# Patient Record
Sex: MALE | Race: WHITE | Employment: OTHER | URBAN - METROPOLITAN AREA
[De-identification: names, ages, dates, MRNs, and addresses within clinical notes are randomized per-mention and may not be internally consistent; named-entity substitution may affect disease eponyms.]

---

## 2017-03-10 PROBLEM — R07.9 CHEST PAIN: Status: ACTIVE | Noted: 2017-03-10

## 2017-05-26 PROBLEM — I48.91 ATRIAL FIBRILLATION WITH RVR (HCC): Status: ACTIVE | Noted: 2017-05-26

## 2018-08-22 ENCOUNTER — TELEPHONE (OUTPATIENT)
Dept: VASCULAR SURGERY | Age: 68
End: 2018-08-22

## 2018-12-13 ENCOUNTER — HOSPITAL ENCOUNTER (EMERGENCY)
Age: 68
Discharge: HOME | End: 2018-12-13
Payer: MEDICARE

## 2018-12-13 ENCOUNTER — APPOINTMENT (OUTPATIENT)
Dept: ULTRASOUND IMAGING | Age: 68
End: 2018-12-13
Payer: MEDICARE

## 2018-12-13 VITALS
DIASTOLIC BLOOD PRESSURE: 86 MMHG | TEMPERATURE: 98 F | HEIGHT: 70 IN | SYSTOLIC BLOOD PRESSURE: 168 MMHG | RESPIRATION RATE: 16 BRPM | OXYGEN SATURATION: 91 % | HEART RATE: 64 BPM | WEIGHT: 172 LBS | BODY MASS INDEX: 24.62 KG/M2

## 2018-12-13 DIAGNOSIS — L03.113 RIGHT ARM CELLULITIS: Primary | ICD-10-CM

## 2018-12-13 PROCEDURE — 99283 EMERGENCY DEPT VISIT LOW MDM: CPT

## 2018-12-13 PROCEDURE — 93971 EXTREMITY STUDY: CPT

## 2018-12-13 PROCEDURE — 6370000000 HC RX 637 (ALT 250 FOR IP): Performed by: NURSE PRACTITIONER

## 2018-12-13 RX ORDER — DIPHENHYDRAMINE HCL 25 MG
25 TABLET ORAL ONCE
Status: COMPLETED | OUTPATIENT
Start: 2018-12-13 | End: 2018-12-13

## 2018-12-13 RX ORDER — DOXYCYCLINE HYCLATE 100 MG
100 TABLET ORAL 2 TIMES DAILY
Qty: 20 TABLET | Refills: 0 | Status: SHIPPED | OUTPATIENT
Start: 2018-12-13 | End: 2018-12-19 | Stop reason: ALTCHOICE

## 2018-12-13 RX ADMIN — DIPHENHYDRAMINE HCL 25 MG: 25 TABLET ORAL at 19:15

## 2018-12-14 NOTE — ED PROVIDER NOTES
Independent MLP    HPI:  12/13/18,   Time: 7:11 PM         Jose Luis Kimbrough Sr. is a 70 y.o. male presenting to the ED for right arm mild swelling, redness after having had itching with some pick marks noted. He has not seen his PCP for this condition. He still had a DVT. He is on anticoagulants. He does have some bruising of the area but no injuries or falls. He notices started over the last week or so. The complaint has been persistent, moderate in severity, and worsened by nothing. He says the fever, chills, nausea, vomiting, inability to use the extremity. ROS:   Pertinent positives and negatives are stated within HPI, all other systems reviewed and are negative.  --------------------------------------------- PAST HISTORY ---------------------------------------------  Past Medical History:  has a past medical history of Acute MI ([facility] Anxiety; Arthritis; Arthritis; CAD (coronary artery disease); Carotid artery stenosis; Carotid bruit; Carotid stenosis, right; CHF (congestive heart failure) ([facility]); COPD (chronic obstructive pulmonary disease) ([facility] [facility] Headache(784.0); Hyperlipidemia; Hypertension; PVD (peripheral vascular disease) ([facility] [address] [facility] PVD (peripheral vascular disease) with [facility]ication [facility] STEMI (ST elevation myocardial infarction) ([facility] Subclavian artery stenosis, left ([facility]); and Traumatic retroperitoneal hematoma. Past Surgical History:  has a past surgical history that includes Coronary angioplasty with stent (10/07/2012); ECHO Compl W Dop Color Flow (10/9/2012); Colonoscopy; Coronary angioplasty; Diagnostic Cardiac Cath Lab Procedure (1998); Diagnostic Cardiac Cath Lab Procedure (1999); Diagnostic Cardiac Cath Lab Procedure (2002); Coronary angioplasty with stent (4/24/13); ECHO Compl W Dop Color Flow (4/25/2013); and Coronary angioplasty (03/13/2017). Social History:  reports that he quit smoking about 5 years ago. He has a 37.50 pack-year smoking history.  He uses smokeless tobacco. He reports left arm as well but that is not erythematous. The patient does state that he has been working outside on some projects and may have gotten something on him. He doesn't have any of these sites anywhere else on his body. Skin: warm and dry without rash  Neurologic: GCS 15, no focal neurologic gross findings  Psych: Normal Affect      ------------------------------------------PROGRESS NOTES -------------------------------------------    MDM  The patient has a negative duplex right arm for DVT. We'll put him on some antibiotics and antihistamines and have him follow-up with his PCP. ED COURSE MEDICATIONS:                Medications   diphenhydrAMINE (BENADRYL) tablet 25 mg (25 mg Oral Given 12/13/18 1915)       RE-Evaluation(s):    Time: 2032   Patients symptoms are improving. Repeat physical examination has improved. COUNSELING:   I have spoken with the patient and discussed todays results, in addition to providing specific details for the plan of care and counseling regarding the diagnosis and prognosis.     --------------------------------------- IMPRESSION & DISPOSITION --------------------------------     IMPRESSION(s):  1. Right arm cellulitis          DISPOSITION:  Disposition: Discharge to home. Patient condition is good.                  Betty Barrera, BARBI - DANYA  12/13/18 2033

## 2018-12-19 ENCOUNTER — APPOINTMENT (OUTPATIENT)
Dept: GENERAL RADIOLOGY | Age: 68
End: 2018-12-19
Payer: MEDICARE

## 2018-12-19 ENCOUNTER — HOSPITAL ENCOUNTER (OUTPATIENT)
Age: 68
Setting detail: OBSERVATION
Discharge: LEFT BEFORE DISCHARGE | End: 2018-12-19
Attending: EMERGENCY MEDICINE | Admitting: INTERNAL MEDICINE
Payer: MEDICARE

## 2018-12-19 VITALS
HEIGHT: 71 IN | WEIGHT: 170 LBS | RESPIRATION RATE: 18 BRPM | TEMPERATURE: 97.9 F | HEART RATE: 50 BPM | OXYGEN SATURATION: 93 % | DIASTOLIC BLOOD PRESSURE: 72 MMHG | BODY MASS INDEX: 23.8 KG/M2 | SYSTOLIC BLOOD PRESSURE: 158 MMHG

## 2018-12-19 DIAGNOSIS — R07.9 CHEST PAIN, UNSPECIFIED TYPE: Primary | ICD-10-CM

## 2018-12-19 LAB
ALBUMIN SERPL-MCNC: 3.9 G/DL (ref 3.5–5.2)
ALP BLD-CCNC: 64 U/L (ref 40–129)
ALT SERPL-CCNC: 24 U/L (ref 0–40)
ANION GAP SERPL CALCULATED.3IONS-SCNC: 10 MMOL/L (ref 7–16)
APTT: <20 SEC (ref 24.5–35.1)
AST SERPL-CCNC: 32 U/L (ref 0–39)
BASOPHILS ABSOLUTE: 0.02 E9/L (ref 0–0.2)
BASOPHILS RELATIVE PERCENT: 0.1 % (ref 0–2)
BILIRUB SERPL-MCNC: 0.2 MG/DL (ref 0–1.2)
BUN BLDV-MCNC: 18 MG/DL (ref 8–23)
CALCIUM SERPL-MCNC: 9.2 MG/DL (ref 8.6–10.2)
CHLORIDE BLD-SCNC: 102 MMOL/L (ref 98–107)
CO2: 26 MMOL/L (ref 22–29)
CREAT SERPL-MCNC: 0.7 MG/DL (ref 0.7–1.2)
EOSINOPHILS ABSOLUTE: 0.14 E9/L (ref 0.05–0.5)
EOSINOPHILS RELATIVE PERCENT: 0.8 % (ref 0–6)
GFR AFRICAN AMERICAN: >60
GFR NON-AFRICAN AMERICAN: >60 ML/MIN/1.73
GLUCOSE BLD-MCNC: 102 MG/DL (ref 74–99)
HCT VFR BLD CALC: 44.7 % (ref 37–54)
HEMOGLOBIN: 14.3 G/DL (ref 12.5–16.5)
IMMATURE GRANULOCYTES #: 0.1 E9/L
IMMATURE GRANULOCYTES %: 0.6 % (ref 0–5)
INR BLD: 1
LYMPHOCYTES ABSOLUTE: 1.13 E9/L (ref 1.5–4)
LYMPHOCYTES RELATIVE PERCENT: 6.8 % (ref 20–42)
MCH RBC QN AUTO: 30.5 PG (ref 26–35)
MCHC RBC AUTO-ENTMCNC: 32 % (ref 32–34.5)
MCV RBC AUTO: 95.3 FL (ref 80–99.9)
MONOCYTES ABSOLUTE: 0.88 E9/L (ref 0.1–0.95)
MONOCYTES RELATIVE PERCENT: 5.3 % (ref 2–12)
NEUTROPHILS ABSOLUTE: 14.3 E9/L (ref 1.8–7.3)
NEUTROPHILS RELATIVE PERCENT: 86.4 % (ref 43–80)
PDW BLD-RTO: 12.9 FL (ref 11.5–15)
PLATELET # BLD: 267 E9/L (ref 130–450)
PMV BLD AUTO: 9.8 FL (ref 7–12)
POTASSIUM SERPL-SCNC: 4.6 MMOL/L (ref 3.5–5)
PROTHROMBIN TIME: 11.6 SEC (ref 9.3–12.4)
RBC # BLD: 4.69 E12/L (ref 3.8–5.8)
SODIUM BLD-SCNC: 138 MMOL/L (ref 132–146)
TOTAL PROTEIN: 7.2 G/DL (ref 6.4–8.3)
TROPONIN: <0.01 NG/ML (ref 0–0.03)
WBC # BLD: 16.6 E9/L (ref 4.5–11.5)

## 2018-12-19 PROCEDURE — 80053 COMPREHEN METABOLIC PANEL: CPT

## 2018-12-19 PROCEDURE — 85610 PROTHROMBIN TIME: CPT

## 2018-12-19 PROCEDURE — 85730 THROMBOPLASTIN TIME PARTIAL: CPT

## 2018-12-19 PROCEDURE — G0378 HOSPITAL OBSERVATION PER HR: HCPCS

## 2018-12-19 PROCEDURE — 93005 ELECTROCARDIOGRAM TRACING: CPT | Performed by: EMERGENCY MEDICINE

## 2018-12-19 PROCEDURE — 99285 EMERGENCY DEPT VISIT HI MDM: CPT

## 2018-12-19 PROCEDURE — 94761 N-INVAS EAR/PLS OXIMETRY MLT: CPT

## 2018-12-19 PROCEDURE — 6370000000 HC RX 637 (ALT 250 FOR IP): Performed by: EMERGENCY MEDICINE

## 2018-12-19 PROCEDURE — 84484 ASSAY OF TROPONIN QUANT: CPT

## 2018-12-19 PROCEDURE — 85025 COMPLETE CBC W/AUTO DIFF WBC: CPT

## 2018-12-19 PROCEDURE — 36415 COLL VENOUS BLD VENIPUNCTURE: CPT

## 2018-12-19 PROCEDURE — 71045 X-RAY EXAM CHEST 1 VIEW: CPT

## 2018-12-19 RX ORDER — ASPIRIN 81 MG/1
81 TABLET, CHEWABLE ORAL DAILY
Status: DISCONTINUED | OUTPATIENT
Start: 2018-12-20 | End: 2018-12-19 | Stop reason: SDUPTHER

## 2018-12-19 RX ORDER — ONDANSETRON 2 MG/ML
4 INJECTION INTRAMUSCULAR; INTRAVENOUS EVERY 6 HOURS PRN
Status: DISCONTINUED | OUTPATIENT
Start: 2018-12-19 | End: 2018-12-20 | Stop reason: HOSPADM

## 2018-12-19 RX ORDER — ISOSORBIDE MONONITRATE 60 MG/1
60 TABLET, EXTENDED RELEASE ORAL 2 TIMES DAILY
Status: DISCONTINUED | OUTPATIENT
Start: 2018-12-19 | End: 2018-12-20 | Stop reason: HOSPADM

## 2018-12-19 RX ORDER — METOPROLOL SUCCINATE 100 MG/1
100 TABLET, EXTENDED RELEASE ORAL DAILY
Status: DISCONTINUED | OUTPATIENT
Start: 2018-12-20 | End: 2018-12-20 | Stop reason: HOSPADM

## 2018-12-19 RX ORDER — SODIUM CHLORIDE 0.9 % (FLUSH) 0.9 %
10 SYRINGE (ML) INJECTION PRN
Status: DISCONTINUED | OUTPATIENT
Start: 2018-12-19 | End: 2018-12-20 | Stop reason: HOSPADM

## 2018-12-19 RX ORDER — SODIUM CHLORIDE 0.9 % (FLUSH) 0.9 %
10 SYRINGE (ML) INJECTION EVERY 12 HOURS SCHEDULED
Status: DISCONTINUED | OUTPATIENT
Start: 2018-12-19 | End: 2018-12-20 | Stop reason: HOSPADM

## 2018-12-19 RX ORDER — ACETAMINOPHEN 325 MG/1
650 TABLET ORAL EVERY 4 HOURS PRN
Status: DISCONTINUED | OUTPATIENT
Start: 2018-12-19 | End: 2018-12-20 | Stop reason: HOSPADM

## 2018-12-19 RX ORDER — CEPHALEXIN 500 MG/1
500 CAPSULE ORAL 4 TIMES DAILY
COMMUNITY
End: 2019-08-26 | Stop reason: ALTCHOICE

## 2018-12-19 RX ORDER — TAMSULOSIN HYDROCHLORIDE 0.4 MG/1
0.4 CAPSULE ORAL DAILY
Status: DISCONTINUED | OUTPATIENT
Start: 2018-12-20 | End: 2018-12-20 | Stop reason: HOSPADM

## 2018-12-19 RX ORDER — NITROGLYCERIN 0.4 MG/1
0.4 TABLET SUBLINGUAL EVERY 5 MIN PRN
Status: DISCONTINUED | OUTPATIENT
Start: 2018-12-19 | End: 2018-12-20 | Stop reason: HOSPADM

## 2018-12-19 RX ORDER — ATORVASTATIN CALCIUM 40 MG/1
40 TABLET, FILM COATED ORAL NIGHTLY
Status: DISCONTINUED | OUTPATIENT
Start: 2018-12-19 | End: 2018-12-20 | Stop reason: HOSPADM

## 2018-12-19 RX ORDER — METHYLPREDNISOLONE 4 MG/1
2 TABLET ORAL DAILY
COMMUNITY
End: 2019-08-26 | Stop reason: ALTCHOICE

## 2018-12-19 RX ORDER — CEPHALEXIN 500 MG/1
500 CAPSULE ORAL 4 TIMES DAILY
Status: DISCONTINUED | OUTPATIENT
Start: 2018-12-19 | End: 2018-12-20 | Stop reason: HOSPADM

## 2018-12-19 RX ORDER — AMLODIPINE BESYLATE 5 MG/1
5 TABLET ORAL DAILY
Status: DISCONTINUED | OUTPATIENT
Start: 2018-12-20 | End: 2018-12-20 | Stop reason: HOSPADM

## 2018-12-19 RX ORDER — ACETAMINOPHEN 325 MG/1
650 TABLET ORAL ONCE
Status: COMPLETED | OUTPATIENT
Start: 2018-12-19 | End: 2018-12-19

## 2018-12-19 RX ORDER — LISINOPRIL 20 MG/1
20 TABLET ORAL 2 TIMES DAILY
Status: DISCONTINUED | OUTPATIENT
Start: 2018-12-19 | End: 2018-12-20 | Stop reason: HOSPADM

## 2018-12-19 RX ORDER — ASPIRIN 81 MG/1
81 TABLET, CHEWABLE ORAL DAILY
Status: DISCONTINUED | OUTPATIENT
Start: 2018-12-20 | End: 2018-12-20 | Stop reason: HOSPADM

## 2018-12-19 RX ADMIN — ACETAMINOPHEN 650 MG: 325 TABLET, FILM COATED ORAL at 21:04

## 2018-12-19 ASSESSMENT — PAIN DESCRIPTION - FREQUENCY: FREQUENCY: CONTINUOUS

## 2018-12-19 ASSESSMENT — PAIN DESCRIPTION - ORIENTATION: ORIENTATION: LEFT

## 2018-12-19 ASSESSMENT — PAIN SCALES - GENERAL
PAINLEVEL_OUTOF10: 2
PAINLEVEL_OUTOF10: 6
PAINLEVEL_OUTOF10: 0

## 2018-12-19 ASSESSMENT — PAIN DESCRIPTION - PAIN TYPE: TYPE: ACUTE PAIN

## 2018-12-19 ASSESSMENT — PAIN DESCRIPTION - LOCATION: LOCATION: CHEST

## 2018-12-19 NOTE — ED NOTES
Bed: 16  Expected date:   Expected time:   Means of arrival:   Comments:  Ems chest pain     Krissy Siddiqui RN  12/19/18 8881

## 2018-12-19 NOTE — ED PROVIDER NOTES
Alb 3.9 3.5 - 5.2 g/dL    Total Bilirubin 0.2 0.0 - 1.2 mg/dL    Alkaline Phosphatase 64 40 - 129 U/L    ALT 24 0 - 40 U/L    AST 32 0 - 39 U/L   Troponin   Result Value Ref Range    Troponin <0.01 0.00 - 0.03 ng/mL     Imaging: All Radiology results interpreted by Radiologist unless otherwise noted. XR CHEST PORTABLE   Final Result   No acute cardiopulmonary pathology. Mild dependent interstitial density in the lungs as a chronic finding. ED Course / Medical Decision Making     Medications   nitroglycerin (NITRO-BID) 2 % ointment 1 inch (not administered)      Re-Evaluations:  12/19/18      Time: 1815    Patients symptoms are improving. I have updated him on the need for repeat blood work. Time: 1930  Patient has been updated on the plan. Consultations:             [facility] HOSPITAL[facility] Dr. Best Murphy spoke with Dr. Emma Alva to arrange admission. Procedures:   none    MDM: Patient presents to emergency department for chest pain. Shift, we had not yet received a call back from the patient's admitting physician. Please refer to Dr. Stefani Crawford note. Counseling:   I have spoken with the patient ans SO and discussed todays results, in addition to providing specific details for the plan of care and counseling regarding the diagnosis and prognosis and are agreeable with the plan. Assessment      1. Chest pain, unspecified type      This patient's ED course included: a personal history and physicial examination, re-evaluation prior to disposition, cardiac monitoring and continuous pulse oximetry  This patient has remained hemodynamically stable, improved and been closely monitored during their ED course. Plan   Admit to telemetry. Patient condition is good. New Medications     New Prescriptions    No medications on file     Electronically signed by Tootie Christianson   DD: 12/19/18  **This report was transcribed using voice recognition software.  Every effort was made to ensure

## 2018-12-20 NOTE — ED NOTES
Patient presented with intermittent chest pains left side going down his  Left arm. Patient has history of coronary disease medicated with nitro and aspirin with improvement.  Heart and lung exam normal abdomen is soft and non tender labs and EKG reviewed patient resting comfortably and in no distress I spoke to Dr. Edmond Gitelman and will admit     Rio Dumont MD  12/19/18 2025

## 2018-12-22 LAB
EKG ATRIAL RATE: 67 BPM
EKG Q-T INTERVAL: 418 MS
EKG QRS DURATION: 114 MS
EKG QTC CALCULATION (BAZETT): 441 MS
EKG R AXIS: 40 DEGREES
EKG T AXIS: 81 DEGREES
EKG VENTRICULAR RATE: 67 BPM

## 2019-07-18 ENCOUNTER — TELEPHONE (OUTPATIENT)
Dept: VASCULAR SURGERY | Age: 69
End: 2019-07-18

## 2019-07-18 ENCOUNTER — OFFICE VISIT (OUTPATIENT)
Dept: VASCULAR SURGERY | Age: 69
End: 2019-07-18
Payer: MEDICARE

## 2019-07-18 VITALS — SYSTOLIC BLOOD PRESSURE: 132 MMHG | HEART RATE: 74 BPM | DIASTOLIC BLOOD PRESSURE: 70 MMHG

## 2019-07-18 DIAGNOSIS — I70.222 ATHEROSCLEROSIS OF NATIVE ARTERY OF LEFT LOWER EXTREMITY WITH REST PAIN (HCC): ICD-10-CM

## 2019-07-18 DIAGNOSIS — R09.89 BILATERAL CAROTID BRUITS: ICD-10-CM

## 2019-07-18 DIAGNOSIS — I77.1 SUBCLAVIAN ARTERY STENOSIS, LEFT (HCC): ICD-10-CM

## 2019-07-18 DIAGNOSIS — I73.9 PVD (PERIPHERAL VASCULAR DISEASE) WITH CLAUDICATION (HCC): Primary | ICD-10-CM

## 2019-07-18 DIAGNOSIS — I65.21 CAROTID STENOSIS, RIGHT: ICD-10-CM

## 2019-07-18 DIAGNOSIS — I65.23 BILATERAL CAROTID ARTERY STENOSIS: ICD-10-CM

## 2019-07-18 PROBLEM — I70.229 ATHEROSCLEROSIS OF NATIVE ARTERIES OF EXTREMITY WITH REST PAIN (HCC): Status: ACTIVE | Noted: 2019-07-18

## 2019-07-18 PROCEDURE — G8427 DOCREV CUR MEDS BY ELIG CLIN: HCPCS | Performed by: SURGERY

## 2019-07-18 PROCEDURE — 99204 OFFICE O/P NEW MOD 45 MIN: CPT | Performed by: SURGERY

## 2019-07-18 PROCEDURE — 4004F PT TOBACCO SCREEN RCVD TLK: CPT | Performed by: SURGERY

## 2019-07-18 PROCEDURE — 1123F ACP DISCUSS/DSCN MKR DOCD: CPT | Performed by: SURGERY

## 2019-07-18 PROCEDURE — G8420 CALC BMI NORM PARAMETERS: HCPCS | Performed by: SURGERY

## 2019-07-18 PROCEDURE — G8599 NO ASA/ANTIPLAT THER USE RNG: HCPCS | Performed by: SURGERY

## 2019-07-18 PROCEDURE — 3017F COLORECTAL CA SCREEN DOC REV: CPT | Performed by: SURGERY

## 2019-07-18 PROCEDURE — 4040F PNEUMOC VAC/ADMIN/RCVD: CPT | Performed by: SURGERY

## 2019-07-18 RX ORDER — CILOSTAZOL 100 MG/1
100 TABLET ORAL 2 TIMES DAILY
Qty: 60 TABLET | Refills: 11 | Status: SHIPPED | OUTPATIENT
Start: 2019-07-18 | End: 2019-09-19

## 2019-07-18 NOTE — PROGRESS NOTES
mouth 4 times daily      methylPREDNISolone (MEDROL) 4 MG tablet Take 2 mg by mouth daily Take until 12/22/18      nitroGLYCERIN (NITROSTAT) 0.4 MG SL tablet Place 0.4 mg under the tongue every 5 minutes as needed for Chest pain.  PROAIR  (90 BASE) MCG/ACT inhaler Inhale 2 puffs into the lungs every 4 hours as needed. No current facility-administered medications for this visit.         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 ([address] 7/18/2019    Bilateral carotid artery stenosis 7/18/2019    CAD (coronary artery disease)     Carotid artery stenosis     Carotid bruit 6/5/2013    Carotid stenosis, right 6/5/2013    [facility]     COPD (chronic obstructive pulmonary disease) [facility]     Headache(784.0)     Hyperlipidemia     Hypertension     PVD (peripheral vascular disease) ([facility] 4/26/2013    PVD (peripheral vascular disease) with claudication ([address] 6/5/2013    STEMI (ST elevation myocardial infarction) ([address] 10/7/2012    Subclavian artery stenosis, left ([address] 4/26/2013    Traumatic retroperitoneal hematoma 4/26/2013       Past Surgical History:   Procedure Laterality Date    COLONOSCOPY      CORONARY ANGIOPLASTY      CORONARY ANGIOPLASTY  03/13/2017    2.5/15 [place] balloon to RCA and RPL    CORONARY ANGIOPLASTY WITH STENT PLACEMENT  10/07/2012    Ion KILEY x2 to RCA per Dr. Mel Almazan  4/24/13    Stent MID RCA 3.5x38, 2.5X15 RPL    DIAGNOSTIC CARDIAC CATH LAB PROCEDURE  1998    [place] stent to the RCA    DIAGNOSTIC CARDIAC CATH LAB PROCEDURE  1999    balloon to prox RCA unsuccessful PTC of the ostium of the posterior descending bracnh of the RCA    DIAGNOSTIC CARDIAC CATH LAB PROCEDURE  2002    cardiac cath with PTCA cutting balloon angioplasty to the ostium of the

## 2019-08-01 ENCOUNTER — HOSPITAL ENCOUNTER (OUTPATIENT)
Dept: INTERVENTIONAL RADIOLOGY/VASCULAR | Age: 69
Discharge: HOME | End: 2019-08-03
Payer: MEDICARE

## 2019-08-01 ENCOUNTER — HOSPITAL ENCOUNTER (OUTPATIENT)
Dept: ULTRASOUND IMAGING | Age: 69
Discharge: HOME | End: 2019-08-03
Payer: MEDICARE

## 2019-08-01 DIAGNOSIS — I73.9 PVD (PERIPHERAL VASCULAR DISEASE) WITH CLAUDICATION (HCC): ICD-10-CM

## 2019-08-01 DIAGNOSIS — I65.23 BILATERAL CAROTID ARTERY STENOSIS: ICD-10-CM

## 2019-08-01 PROCEDURE — 93880 EXTRACRANIAL BILAT STUDY: CPT

## 2019-08-01 PROCEDURE — 93923 UPR/LXTR ART STDY 3+ LVLS: CPT

## 2019-08-02 ENCOUNTER — TELEPHONE (OUTPATIENT)
Dept: VASCULAR SURGERY | Age: 69
End: 2019-08-02

## 2019-08-02 DIAGNOSIS — I70.8 AORTO-ILIAC ATHEROSCLEROSIS (HCC): Primary | ICD-10-CM

## 2019-08-02 DIAGNOSIS — I70.0 AORTO-ILIAC ATHEROSCLEROSIS (HCC): Primary | ICD-10-CM

## 2019-08-02 DIAGNOSIS — I70.222 ATHEROSCLEROSIS OF NATIVE ARTERY OF LEFT LOWER EXTREMITY WITH REST PAIN (HCC): ICD-10-CM

## 2019-08-05 ENCOUNTER — HOSPITAL ENCOUNTER (OUTPATIENT)
Age: 69
Discharge: HOME | End: 2019-08-05
Payer: MEDICARE

## 2019-08-05 DIAGNOSIS — I70.0 AORTO-ILIAC ATHEROSCLEROSIS (HCC): ICD-10-CM

## 2019-08-05 DIAGNOSIS — I70.222 ATHEROSCLEROSIS OF NATIVE ARTERY OF LEFT LOWER EXTREMITY WITH REST PAIN (HCC): ICD-10-CM

## 2019-08-05 DIAGNOSIS — I70.8 AORTO-ILIAC ATHEROSCLEROSIS (HCC): ICD-10-CM

## 2019-08-05 LAB
ANION GAP SERPL CALCULATED.3IONS-SCNC: 12 MMOL/L (ref 7–16)
BUN BLDV-MCNC: 14 MG/DL (ref 8–23)
CALCIUM SERPL-MCNC: 9.5 MG/DL (ref 8.6–10.2)
CHLORIDE BLD-SCNC: 102 MMOL/L (ref 98–107)
CO2: 27 MMOL/L (ref 22–29)
CREAT SERPL-MCNC: 0.8 MG/DL (ref 0.7–1.2)
GFR AFRICAN AMERICAN: >60
GFR NON-AFRICAN AMERICAN: >60 ML/MIN/1.73
GLUCOSE BLD-MCNC: 100 MG/DL (ref 74–99)
POTASSIUM SERPL-SCNC: 4.5 MMOL/L (ref 3.5–5)
SODIUM BLD-SCNC: 141 MMOL/L (ref 132–146)

## 2019-08-05 PROCEDURE — 36415 COLL VENOUS BLD VENIPUNCTURE: CPT

## 2019-08-05 PROCEDURE — 80048 BASIC METABOLIC PNL TOTAL CA: CPT

## 2019-08-08 ENCOUNTER — HOSPITAL ENCOUNTER (OUTPATIENT)
Dept: CT IMAGING | Age: 69
Discharge: HOME | End: 2019-08-10
Payer: MEDICARE

## 2019-08-08 DIAGNOSIS — I70.222 ATHEROSCLEROSIS OF NATIVE ARTERY OF LEFT LOWER EXTREMITY WITH REST PAIN (HCC): ICD-10-CM

## 2019-08-08 DIAGNOSIS — I70.0 AORTO-ILIAC ATHEROSCLEROSIS (HCC): ICD-10-CM

## 2019-08-08 DIAGNOSIS — I70.8 AORTO-ILIAC ATHEROSCLEROSIS (HCC): ICD-10-CM

## 2019-08-08 PROCEDURE — 6360000004 HC RX CONTRAST MEDICATION: Performed by: RADIOLOGY

## 2019-08-08 PROCEDURE — 75635 CT ANGIO ABDOMINAL ARTERIES: CPT

## 2019-08-08 PROCEDURE — 2580000003 HC RX 258: Performed by: RADIOLOGY

## 2019-08-08 RX ORDER — SODIUM CHLORIDE 0.9 % (FLUSH) 0.9 %
10 SYRINGE (ML) INJECTION
Status: COMPLETED | OUTPATIENT
Start: 2019-08-08 | End: 2019-08-08

## 2019-08-08 RX ADMIN — IOPAMIDOL 120 ML: 755 INJECTION, SOLUTION INTRAVENOUS at 06:55

## 2019-08-08 RX ADMIN — Medication 10 ML: at 06:55

## 2019-08-15 ENCOUNTER — TELEPHONE (OUTPATIENT)
Dept: VASCULAR SURGERY | Age: 69
End: 2019-08-15

## 2019-08-15 NOTE — TELEPHONE ENCOUNTER
Discussed with the patient regarding the CTA findings, extensive iliac femoral-popliteal arterial occlusive disease, to see him in the office to review the CTA with him and explained to him the options

## 2019-08-22 ENCOUNTER — OFFICE VISIT (OUTPATIENT)
Dept: VASCULAR SURGERY | Age: 69
End: 2019-08-22
Payer: MEDICARE

## 2019-08-22 DIAGNOSIS — I70.222 ATHEROSCLEROSIS OF NATIVE ARTERY OF LEFT LOWER EXTREMITY WITH REST PAIN (HCC): ICD-10-CM

## 2019-08-22 DIAGNOSIS — I70.0 AORTO-ILIAC ATHEROSCLEROSIS (HCC): Primary | ICD-10-CM

## 2019-08-22 DIAGNOSIS — I70.8 AORTO-ILIAC ATHEROSCLEROSIS (HCC): ICD-10-CM

## 2019-08-22 DIAGNOSIS — I70.8 AORTO-ILIAC ATHEROSCLEROSIS (HCC): Primary | ICD-10-CM

## 2019-08-22 DIAGNOSIS — I73.9 PVD (PERIPHERAL VASCULAR DISEASE) WITH CLAUDICATION (HCC): Primary | ICD-10-CM

## 2019-08-22 DIAGNOSIS — I77.1 SUBCLAVIAN ARTERY STENOSIS, LEFT (HCC): ICD-10-CM

## 2019-08-22 DIAGNOSIS — I65.23 BILATERAL CAROTID ARTERY STENOSIS: ICD-10-CM

## 2019-08-22 DIAGNOSIS — I70.0 AORTO-ILIAC ATHEROSCLEROSIS (HCC): ICD-10-CM

## 2019-08-22 PROCEDURE — 3017F COLORECTAL CA SCREEN DOC REV: CPT | Performed by: SURGERY

## 2019-08-22 PROCEDURE — G8427 DOCREV CUR MEDS BY ELIG CLIN: HCPCS | Performed by: SURGERY

## 2019-08-22 PROCEDURE — G8420 CALC BMI NORM PARAMETERS: HCPCS | Performed by: SURGERY

## 2019-08-22 PROCEDURE — 4004F PT TOBACCO SCREEN RCVD TLK: CPT | Performed by: SURGERY

## 2019-08-22 PROCEDURE — 1123F ACP DISCUSS/DSCN MKR DOCD: CPT | Performed by: SURGERY

## 2019-08-22 PROCEDURE — 99214 OFFICE O/P EST MOD 30 MIN: CPT | Performed by: SURGERY

## 2019-08-22 PROCEDURE — 4040F PNEUMOC VAC/ADMIN/RCVD: CPT | Performed by: SURGERY

## 2019-08-22 PROCEDURE — G8599 NO ASA/ANTIPLAT THER USE RNG: HCPCS | Performed by: SURGERY

## 2019-08-22 NOTE — PROGRESS NOTES
Fear of current or ex partner: Not on file     Emotionally abused: Not on file     Physically abused: Not on file     Forced sexual activity: Not on file   Other Topics Concern    Not on file   Social History Narrative    Not on file       Review of Systems:    Eyes:  No blurring, diplopia or vision loss  Respiratory:  No cough, pleuritic chest pain, dyspnea, or wheezing. Chronic obstructive lung disease  Cardiovascular: No angina, palpitations. Coronary artery disease, coronary artery angioplasty and stent placement  Musculoskeletal:  No arthritis or weakness  Neurologic:  No paralysis, paresis, paresthesia, seizures or headach        Physical Exam:  General appearance:  Alert, awake, oriented x 3. No distress. Eyes:  Conjunctivae appear normal; PERRL  Neck:  No jugular venous distention, lymphadenopathy or thyromegaly. Carotid bruit noted  Lungs:  Clear to ausculation bilaterally. No rhonchi, crackles, wheezes  Heart:  Regular rate and rhythm. No rub or murmur  Abdomen:  Soft, non-tender. No masses, organomegaly. Musculoskeletal : No joint effusions, tenderness swelling    Neuro: Speech is intact. Moving all extremities. No focal motor or sensory deficits      Extremities:  Both feet are warm to touch. The color of both feet is normal.        Pulses Right  Left    Brachial 3 2    Radial 3 2  3=normal   Femoral 0-1 0  2=diminished   Popliteal    1=barely palpable   Dorsalis pedis 0 0  0=absent   Posterior tibial    4=aneurysmal             Other pertinent information:1. The past medical records were reviewed. 2.  The lower  extremity arterial Doppler study was personally reviewed by me, ankle-brachial index of 0.5 on the right and 0.4 on the left, with significantly diminished pulse volume recordings of the metatarsal on the left side, and markedly diminished ankle Doppler tracings left more than the right    3.   The CTA of the aorta with runoff was reviewed with the patient, I have actually shown

## 2019-08-26 ENCOUNTER — OFFICE VISIT (OUTPATIENT)
Dept: CARDIOLOGY CLINIC | Age: 69
End: 2019-08-26
Payer: MEDICARE

## 2019-08-26 VITALS
WEIGHT: 172 LBS | SYSTOLIC BLOOD PRESSURE: 138 MMHG | HEART RATE: 82 BPM | HEIGHT: 70 IN | RESPIRATION RATE: 14 BRPM | BODY MASS INDEX: 24.62 KG/M2 | DIASTOLIC BLOOD PRESSURE: 80 MMHG

## 2019-08-26 DIAGNOSIS — R94.31 ABNORMAL EKG: ICD-10-CM

## 2019-08-26 DIAGNOSIS — I25.10 CORONARY ARTERY DISEASE INVOLVING NATIVE CORONARY ARTERY OF NATIVE HEART WITHOUT ANGINA PECTORIS: Primary | ICD-10-CM

## 2019-08-26 DIAGNOSIS — Z01.818 PRE-OP EXAMINATION: ICD-10-CM

## 2019-08-26 DIAGNOSIS — R07.9 CHEST PAIN, UNSPECIFIED TYPE: ICD-10-CM

## 2019-08-26 PROCEDURE — 1123F ACP DISCUSS/DSCN MKR DOCD: CPT | Performed by: INTERNAL MEDICINE

## 2019-08-26 PROCEDURE — 93000 ELECTROCARDIOGRAM COMPLETE: CPT | Performed by: INTERNAL MEDICINE

## 2019-08-26 PROCEDURE — 4004F PT TOBACCO SCREEN RCVD TLK: CPT | Performed by: INTERNAL MEDICINE

## 2019-08-26 PROCEDURE — G8420 CALC BMI NORM PARAMETERS: HCPCS | Performed by: INTERNAL MEDICINE

## 2019-08-26 PROCEDURE — 3017F COLORECTAL CA SCREEN DOC REV: CPT | Performed by: INTERNAL MEDICINE

## 2019-08-26 PROCEDURE — G8599 NO ASA/ANTIPLAT THER USE RNG: HCPCS | Performed by: INTERNAL MEDICINE

## 2019-08-26 PROCEDURE — 4040F PNEUMOC VAC/ADMIN/RCVD: CPT | Performed by: INTERNAL MEDICINE

## 2019-08-26 PROCEDURE — G8427 DOCREV CUR MEDS BY ELIG CLIN: HCPCS | Performed by: INTERNAL MEDICINE

## 2019-08-26 PROCEDURE — 99213 OFFICE O/P EST LOW 20 MIN: CPT | Performed by: INTERNAL MEDICINE

## 2019-08-26 RX ORDER — ATORVASTATIN CALCIUM 80 MG/1
80 TABLET, FILM COATED ORAL DAILY
Qty: 90 TABLET | Refills: 3 | Status: SHIPPED | OUTPATIENT
Start: 2019-08-26

## 2019-08-26 RX ORDER — NITROGLYCERIN 0.4 MG/1
0.4 TABLET SUBLINGUAL EVERY 5 MIN PRN
Qty: 25 TABLET | Refills: 3 | Status: SHIPPED | OUTPATIENT
Start: 2019-08-26

## 2019-08-26 RX ORDER — ISOSORBIDE MONONITRATE 30 MG/1
30 TABLET, EXTENDED RELEASE ORAL DAILY
Refills: 0 | Status: ON HOLD | COMMUNITY
Start: 2019-06-24 | End: 2020-01-01 | Stop reason: HOSPADM

## 2019-08-26 NOTE — PROGRESS NOTES
stenosis. 99% ostial stenosis of RCA-PL branch ( in stent ) and 100% stenosis of RCA-PDA  LV angio: 50-55% EF. Review of Systems:  Constitutional: negative for fever and chills  Respiratory: negative for cough and hemoptysis  Cardiovascular:   Gastrointestinal: negative for abdominal pain, diarrhea, nausea and vomiting  Genitourinary:negative for dysuria and hematuria  Derm: negative for rash and skin lesion(s)  Neurological: negative for seizures and tremors  Endocrine: negative for diabetic symptoms including polydipsia and polyuria  Musculoskeletal: negative for CTD  Psychiatric: negative for psychosis and major depression    On examination, skin is warm and dry. Respirations are unlabored. /80   Pulse 82   Resp 14   Ht 5' 10\" (1.778 m)   Wt 172 lb (78 kg)   BMI 24.68 kg/m² . HEENT negative for scleral icterus. Extraocular muscles intact. No facial asymmetry or central cyanosis. Neck without masses or goiter. No bruit or JVD. Cardiac apex not displaced. Rhythm regular. Abdomen normal.  Extremities without edema. Dorsalis and posterior tibial 0-1. Lungs clear. EKG today shows sinus rhythm. Normal.    The patient's chest pain and periprocedural cardiac risk will be reevaluated with a stress perfusion study. Today his Lipitor is increased to 80 mg daily.     At completion of today's visit, medications include the following:    Current Outpatient Medications:     isosorbide mononitrate (IMDUR) 30 MG extended release tablet, TAKE 1 TABLET BY MOUTH TWICE DAILY, Disp: , Rfl: 0    nitroGLYCERIN (NITROSTAT) 0.4 MG SL tablet, Place 1 tablet under the tongue every 5 minutes as needed for Chest pain, Disp: 25 tablet, Rfl: 3    atorvastatin (LIPITOR) 80 MG tablet, Take 1 tablet by mouth daily, Disp: 90 tablet, Rfl: 3    Fluticasone-Umeclidin-Vilant (TRELEGY ELLIPTA) 100-62.5-25 MCG/INH AEPB, Inhale into the lungs, Disp: , Rfl:     cilostazol (PLETAL) 100 MG tablet, Take 1 tablet by

## 2019-08-28 ENCOUNTER — HOSPITAL ENCOUNTER (OUTPATIENT)
Dept: CARDIOLOGY | Age: 69
Discharge: HOME | End: 2019-08-28
Payer: MEDICARE

## 2019-08-28 VITALS
HEIGHT: 70 IN | HEART RATE: 83 BPM | DIASTOLIC BLOOD PRESSURE: 74 MMHG | SYSTOLIC BLOOD PRESSURE: 130 MMHG | BODY MASS INDEX: 24.62 KG/M2 | WEIGHT: 172 LBS

## 2019-08-28 DIAGNOSIS — Z01.818 PRE-OP EXAMINATION: ICD-10-CM

## 2019-08-28 DIAGNOSIS — R94.31 ABNORMAL EKG: Primary | ICD-10-CM

## 2019-08-28 DIAGNOSIS — R07.9 CHEST PAIN, UNSPECIFIED TYPE: ICD-10-CM

## 2019-08-28 PROCEDURE — 2580000003 HC RX 258: Performed by: INTERNAL MEDICINE

## 2019-08-28 PROCEDURE — 78452 HT MUSCLE IMAGE SPECT MULT: CPT

## 2019-08-28 PROCEDURE — 93017 CV STRESS TEST TRACING ONLY: CPT

## 2019-08-28 PROCEDURE — A9500 TC99M SESTAMIBI: HCPCS | Performed by: INTERNAL MEDICINE

## 2019-08-28 PROCEDURE — 3430000000 HC RX DIAGNOSTIC RADIOPHARMACEUTICAL: Performed by: INTERNAL MEDICINE

## 2019-08-28 PROCEDURE — 6360000002 HC RX W HCPCS: Performed by: INTERNAL MEDICINE

## 2019-08-28 RX ORDER — SODIUM CHLORIDE 0.9 % (FLUSH) 0.9 %
10 SYRINGE (ML) INJECTION PRN
Status: DISCONTINUED | OUTPATIENT
Start: 2019-08-28 | End: 2019-08-29 | Stop reason: HOSPADM

## 2019-08-28 RX ADMIN — Medication 10 ML: at 09:21

## 2019-08-28 RX ADMIN — REGADENOSON 0.4 MG: 0.08 INJECTION, SOLUTION INTRAVENOUS at 09:20

## 2019-08-28 RX ADMIN — Medication 9.3 MILLICURIE: at 07:50

## 2019-08-28 RX ADMIN — Medication 33.3 MILLICURIE: at 09:20

## 2019-08-28 RX ADMIN — Medication 10 ML: at 07:50

## 2019-08-28 RX ADMIN — Medication 10 ML: at 09:20

## 2019-09-10 ENCOUNTER — TELEPHONE (OUTPATIENT)
Dept: CARDIOLOGY CLINIC | Age: 69
End: 2019-09-10

## 2019-09-11 ENCOUNTER — TELEPHONE (OUTPATIENT)
Dept: CARDIOLOGY CLINIC | Age: 69
End: 2019-09-11

## 2020-01-01 ENCOUNTER — APPOINTMENT (OUTPATIENT)
Dept: GENERAL RADIOLOGY | Age: 70
DRG: 177 | End: 2020-01-01
Payer: MEDICARE

## 2020-01-01 ENCOUNTER — CARE COORDINATION (OUTPATIENT)
Dept: CASE MANAGEMENT | Age: 70
End: 2020-01-01

## 2020-01-01 ENCOUNTER — ANESTHESIA (OUTPATIENT)
Dept: ICU | Age: 70
DRG: 870 | End: 2020-01-01
Payer: MEDICARE

## 2020-01-01 ENCOUNTER — APPOINTMENT (OUTPATIENT)
Dept: GENERAL RADIOLOGY | Age: 70
DRG: 870 | End: 2020-01-01
Payer: MEDICARE

## 2020-01-01 ENCOUNTER — TELEPHONE (OUTPATIENT)
Dept: OTHER | Facility: CLINIC | Age: 70
End: 2020-01-01

## 2020-01-01 ENCOUNTER — TELEPHONE (OUTPATIENT)
Dept: VASCULAR SURGERY | Age: 70
End: 2020-01-01

## 2020-01-01 ENCOUNTER — ANESTHESIA EVENT (OUTPATIENT)
Dept: ICU | Age: 70
DRG: 870 | End: 2020-01-01
Payer: MEDICARE

## 2020-01-01 ENCOUNTER — APPOINTMENT (OUTPATIENT)
Dept: GENERAL RADIOLOGY | Age: 70
DRG: 286 | End: 2020-01-01
Payer: MEDICARE

## 2020-01-01 ENCOUNTER — APPOINTMENT (OUTPATIENT)
Dept: CT IMAGING | Age: 70
DRG: 870 | End: 2020-01-01
Payer: MEDICARE

## 2020-01-01 ENCOUNTER — HOSPITAL ENCOUNTER (INPATIENT)
Age: 70
LOS: 6 days | Discharge: HOME | DRG: 286 | End: 2020-07-22
Attending: EMERGENCY MEDICINE | Admitting: INTERNAL MEDICINE
Payer: MEDICARE

## 2020-01-01 ENCOUNTER — APPOINTMENT (OUTPATIENT)
Dept: CT IMAGING | Age: 70
DRG: 177 | End: 2020-01-01
Payer: MEDICARE

## 2020-01-01 ENCOUNTER — HOSPITAL ENCOUNTER (INPATIENT)
Age: 70
LOS: 8 days | DRG: 870 | End: 2021-01-02
Attending: EMERGENCY MEDICINE | Admitting: INTERNAL MEDICINE
Payer: MEDICARE

## 2020-01-01 ENCOUNTER — APPOINTMENT (OUTPATIENT)
Dept: ULTRASOUND IMAGING | Age: 70
DRG: 177 | End: 2020-01-01
Payer: MEDICARE

## 2020-01-01 ENCOUNTER — TELEPHONE (OUTPATIENT)
Dept: ADMINISTRATIVE | Age: 70
End: 2020-01-01

## 2020-01-01 ENCOUNTER — HOSPITAL ENCOUNTER (EMERGENCY)
Age: 70
Discharge: HOME | DRG: 177 | End: 2020-11-30
Attending: EMERGENCY MEDICINE
Payer: MEDICARE

## 2020-01-01 ENCOUNTER — HOSPITAL ENCOUNTER (INPATIENT)
Age: 70
LOS: 5 days | Discharge: HOME | DRG: 177 | End: 2020-12-15
Attending: EMERGENCY MEDICINE | Admitting: FAMILY MEDICINE
Payer: MEDICARE

## 2020-01-01 ENCOUNTER — HOSPITAL ENCOUNTER (INPATIENT)
Age: 70
LOS: 5 days | Discharge: HOME | DRG: 177 | End: 2020-12-06
Attending: EMERGENCY MEDICINE | Admitting: FAMILY MEDICINE
Payer: MEDICARE

## 2020-01-01 ENCOUNTER — OFFICE VISIT (OUTPATIENT)
Dept: CARDIOLOGY CLINIC | Age: 70
End: 2020-01-01
Payer: MEDICARE

## 2020-01-01 VITALS
DIASTOLIC BLOOD PRESSURE: 53 MMHG | TEMPERATURE: 97.9 F | OXYGEN SATURATION: 90 % | SYSTOLIC BLOOD PRESSURE: 92 MMHG | RESPIRATION RATE: 18 BRPM | WEIGHT: 187 LBS | BODY MASS INDEX: 26.77 KG/M2 | HEART RATE: 59 BPM | HEIGHT: 70 IN

## 2020-01-01 VITALS
SYSTOLIC BLOOD PRESSURE: 128 MMHG | RESPIRATION RATE: 18 BRPM | DIASTOLIC BLOOD PRESSURE: 64 MMHG | HEART RATE: 63 BPM | BODY MASS INDEX: 25.08 KG/M2 | WEIGHT: 175.2 LBS | HEIGHT: 70 IN

## 2020-01-01 VITALS
SYSTOLIC BLOOD PRESSURE: 168 MMHG | TEMPERATURE: 100.7 F | HEIGHT: 70 IN | RESPIRATION RATE: 16 BRPM | BODY MASS INDEX: 25.34 KG/M2 | DIASTOLIC BLOOD PRESSURE: 82 MMHG | WEIGHT: 177 LBS | OXYGEN SATURATION: 95 % | HEART RATE: 86 BPM

## 2020-01-01 VITALS
DIASTOLIC BLOOD PRESSURE: 82 MMHG | HEART RATE: 65 BPM | HEIGHT: 70 IN | TEMPERATURE: 97.7 F | WEIGHT: 177 LBS | RESPIRATION RATE: 18 BRPM | SYSTOLIC BLOOD PRESSURE: 178 MMHG | OXYGEN SATURATION: 96 % | BODY MASS INDEX: 25.34 KG/M2

## 2020-01-01 VITALS
DIASTOLIC BLOOD PRESSURE: 75 MMHG | RESPIRATION RATE: 20 BRPM | WEIGHT: 177 LBS | HEART RATE: 59 BPM | BODY MASS INDEX: 24.78 KG/M2 | TEMPERATURE: 98.6 F | SYSTOLIC BLOOD PRESSURE: 168 MMHG | HEIGHT: 71 IN | OXYGEN SATURATION: 97 %

## 2020-01-01 DIAGNOSIS — R06.03 RESPIRATORY DISTRESS: ICD-10-CM

## 2020-01-01 DIAGNOSIS — J96.21 ACUTE ON CHRONIC RESPIRATORY FAILURE WITH HYPOXIA (HCC): Primary | ICD-10-CM

## 2020-01-01 DIAGNOSIS — R07.9 ACUTE CHEST PAIN: ICD-10-CM

## 2020-01-01 DIAGNOSIS — Z99.81 SUPPLEMENTAL OXYGEN DEPENDENT: ICD-10-CM

## 2020-01-01 DIAGNOSIS — J81.0 ACUTE PULMONARY EDEMA (HCC): ICD-10-CM

## 2020-01-01 DIAGNOSIS — R94.31 ACUTE ELECTROCARDIOGRAM CHANGES: ICD-10-CM

## 2020-01-01 DIAGNOSIS — R77.8 ELEVATED TROPONIN: ICD-10-CM

## 2020-01-01 DIAGNOSIS — I95.9 HYPOTENSION, UNSPECIFIED HYPOTENSION TYPE: ICD-10-CM

## 2020-01-01 LAB
AADO2: 400.9 MMHG
AADO2: 447.9 MMHG
AADO2: 506.6 MMHG
AADO2: 514.6 MMHG
AADO2: 516.9 MMHG
AADO2: 542.5 MMHG
AADO2: 554.6 MMHG
AADO2: 565.9 MMHG
AADO2: 570.8 MMHG
AADO2: 575 MMHG
AADO2: 580.3 MMHG
AADO2: 580.5 MMHG
ABO/RH: NORMAL
ABO/RH: NORMAL
ADENOVIRUS BY PCR: NOT DETECTED
ALBUMIN SERPL-MCNC: 1.8 G/DL (ref 3.5–5.2)
ALBUMIN SERPL-MCNC: 2.3 G/DL (ref 3.5–5.2)
ALBUMIN SERPL-MCNC: 2.5 G/DL (ref 3.5–5.2)
ALBUMIN SERPL-MCNC: 2.7 G/DL (ref 3.5–5.2)
ALBUMIN SERPL-MCNC: 2.7 G/DL (ref 3.5–5.2)
ALBUMIN SERPL-MCNC: 2.8 G/DL (ref 3.5–5.2)
ALBUMIN SERPL-MCNC: 2.9 G/DL (ref 3.5–5.2)
ALBUMIN SERPL-MCNC: 3 G/DL (ref 3.5–5.2)
ALBUMIN SERPL-MCNC: 3 G/DL (ref 3.5–5.2)
ALBUMIN SERPL-MCNC: 3.1 G/DL (ref 3.5–5.2)
ALBUMIN SERPL-MCNC: 3.1 G/DL (ref 3.5–5.2)
ALBUMIN SERPL-MCNC: 3.2 G/DL (ref 3.5–5.2)
ALBUMIN SERPL-MCNC: 3.3 G/DL (ref 3.5–5.2)
ALBUMIN SERPL-MCNC: 3.4 G/DL (ref 3.5–5.2)
ALBUMIN SERPL-MCNC: 3.6 G/DL (ref 3.5–5.2)
ALBUMIN SERPL-MCNC: 3.9 G/DL (ref 3.5–5.2)
ALP BLD-CCNC: 48 U/L (ref 40–129)
ALP BLD-CCNC: 50 U/L (ref 40–129)
ALP BLD-CCNC: 51 U/L (ref 40–129)
ALP BLD-CCNC: 51 U/L (ref 40–129)
ALP BLD-CCNC: 54 U/L (ref 40–129)
ALP BLD-CCNC: 55 U/L (ref 40–129)
ALP BLD-CCNC: 58 U/L (ref 40–129)
ALP BLD-CCNC: 60 U/L (ref 40–129)
ALP BLD-CCNC: 62 U/L (ref 40–129)
ALP BLD-CCNC: 63 U/L (ref 40–129)
ALP BLD-CCNC: 63 U/L (ref 40–129)
ALP BLD-CCNC: 65 U/L (ref 40–129)
ALP BLD-CCNC: 68 U/L (ref 40–129)
ALP BLD-CCNC: 69 U/L (ref 40–129)
ALP BLD-CCNC: 80 U/L (ref 40–129)
ALP BLD-CCNC: 91 U/L (ref 40–129)
ALT SERPL-CCNC: 19 U/L (ref 0–40)
ALT SERPL-CCNC: 20 U/L (ref 0–40)
ALT SERPL-CCNC: 21 U/L (ref 0–40)
ALT SERPL-CCNC: 22 U/L (ref 0–40)
ALT SERPL-CCNC: 28 U/L (ref 0–40)
ALT SERPL-CCNC: 29 U/L (ref 0–40)
ALT SERPL-CCNC: 30 U/L (ref 0–40)
ALT SERPL-CCNC: 30 U/L (ref 0–40)
ALT SERPL-CCNC: 31 U/L (ref 0–40)
ALT SERPL-CCNC: 34 U/L (ref 0–40)
ALT SERPL-CCNC: 34 U/L (ref 0–40)
ALT SERPL-CCNC: 35 U/L (ref 0–40)
ALT SERPL-CCNC: 36 U/L (ref 0–40)
ALT SERPL-CCNC: 37 U/L (ref 0–40)
ALT SERPL-CCNC: 40 U/L (ref 0–40)
ALT SERPL-CCNC: 46 U/L (ref 0–40)
ALT SERPL-CCNC: 59 U/L (ref 0–40)
ALT SERPL-CCNC: 60 U/L (ref 0–40)
ANION GAP SERPL CALCULATED.3IONS-SCNC: 10 MMOL/L (ref 7–16)
ANION GAP SERPL CALCULATED.3IONS-SCNC: 11 MMOL/L (ref 7–16)
ANION GAP SERPL CALCULATED.3IONS-SCNC: 12 MMOL/L (ref 7–16)
ANION GAP SERPL CALCULATED.3IONS-SCNC: 13 MMOL/L (ref 7–16)
ANION GAP SERPL CALCULATED.3IONS-SCNC: 14 MMOL/L (ref 7–16)
ANION GAP SERPL CALCULATED.3IONS-SCNC: 14 MMOL/L (ref 7–16)
ANION GAP SERPL CALCULATED.3IONS-SCNC: 15 MMOL/L (ref 7–16)
ANION GAP SERPL CALCULATED.3IONS-SCNC: 15 MMOL/L (ref 7–16)
ANION GAP SERPL CALCULATED.3IONS-SCNC: 5 MMOL/L (ref 7–16)
ANION GAP SERPL CALCULATED.3IONS-SCNC: 7 MMOL/L (ref 7–16)
ANION GAP SERPL CALCULATED.3IONS-SCNC: 8 MMOL/L (ref 7–16)
ANION GAP SERPL CALCULATED.3IONS-SCNC: 9 MMOL/L (ref 7–16)
ANISOCYTOSIS: ABNORMAL
ANTIBODY SCREEN: NORMAL
ANTIBODY SCREEN: NORMAL
APTT: 31.3 SEC (ref 24.5–35.1)
APTT: 31.4 SEC (ref 24.5–35.1)
APTT: 31.5 SEC (ref 24.5–35.1)
APTT: 34 SEC (ref 24.5–35.1)
APTT: 64.2 SEC (ref 24.5–35.1)
APTT: 96.2 SEC (ref 24.5–35.1)
AST SERPL-CCNC: 24 U/L (ref 0–39)
AST SERPL-CCNC: 27 U/L (ref 0–39)
AST SERPL-CCNC: 28 U/L (ref 0–39)
AST SERPL-CCNC: 29 U/L (ref 0–39)
AST SERPL-CCNC: 29 U/L (ref 0–39)
AST SERPL-CCNC: 30 U/L (ref 0–39)
AST SERPL-CCNC: 31 U/L (ref 0–39)
AST SERPL-CCNC: 32 U/L (ref 0–39)
AST SERPL-CCNC: 33 U/L (ref 0–39)
AST SERPL-CCNC: 35 U/L (ref 0–39)
AST SERPL-CCNC: 35 U/L (ref 0–39)
AST SERPL-CCNC: 37 U/L (ref 0–39)
AST SERPL-CCNC: 37 U/L (ref 0–39)
AST SERPL-CCNC: 46 U/L (ref 0–39)
AST SERPL-CCNC: 58 U/L (ref 0–39)
AST SERPL-CCNC: 86 U/L (ref 0–39)
B.E.: 0.5 MMOL/L (ref -3–3)
B.E.: 10.2 MMOL/L (ref -3–3)
B.E.: 2.8 MMOL/L (ref -3–3)
B.E.: 3.5 MMOL/L (ref -3–3)
B.E.: 3.9 MMOL/L (ref -3–3)
B.E.: 4.9 MMOL/L (ref -3–3)
B.E.: 5.7 MMOL/L (ref -3–3)
B.E.: 5.9 MMOL/L (ref -3–0)
B.E.: 7.7 MMOL/L (ref -3–3)
B.E.: 8.1 MMOL/L (ref -3–3)
B.E.: 8.1 MMOL/L (ref -3–3)
B.E.: 8.3 MMOL/L (ref -3–3)
B.E.: 8.4 MMOL/L (ref -3–3)
B.E.: 9 MMOL/L (ref -3–3)
BASOPHILIC STIPPLING: ABNORMAL
BASOPHILIC STIPPLING: ABNORMAL
BASOPHILS ABSOLUTE: 0 E9/L (ref 0–0.2)
BASOPHILS ABSOLUTE: 0.01 E9/L (ref 0–0.2)
BASOPHILS ABSOLUTE: 0.02 E9/L (ref 0–0.2)
BASOPHILS ABSOLUTE: 0.02 E9/L (ref 0–0.2)
BASOPHILS ABSOLUTE: 0.04 E9/L (ref 0–0.2)
BASOPHILS ABSOLUTE: 0.04 E9/L (ref 0–0.2)
BASOPHILS RELATIVE PERCENT: 0 % (ref 0–2)
BASOPHILS RELATIVE PERCENT: 0 % (ref 0–2)
BASOPHILS RELATIVE PERCENT: 0.1 % (ref 0–2)
BASOPHILS RELATIVE PERCENT: 0.2 % (ref 0–2)
BASOPHILS RELATIVE PERCENT: 0.3 % (ref 0–2)
BASOPHILS RELATIVE PERCENT: 0.6 % (ref 0–2)
BILIRUB SERPL-MCNC: 0.2 MG/DL (ref 0–1.2)
BILIRUB SERPL-MCNC: 0.2 MG/DL (ref 0–1.2)
BILIRUB SERPL-MCNC: 0.3 MG/DL (ref 0–1.2)
BILIRUB SERPL-MCNC: 0.4 MG/DL (ref 0–1.2)
BILIRUB SERPL-MCNC: 0.5 MG/DL (ref 0–1.2)
BILIRUB SERPL-MCNC: 0.5 MG/DL (ref 0–1.2)
BILIRUB SERPL-MCNC: 0.6 MG/DL (ref 0–1.2)
BILIRUB SERPL-MCNC: <0.2 MG/DL (ref 0–1.2)
BILIRUBIN DIRECT: <0.2 MG/DL (ref 0–0.3)
BILIRUBIN URINE: NEGATIVE
BILIRUBIN, INDIRECT: ABNORMAL MG/DL (ref 0–1)
BLOOD BANK DISPENSE STATUS: NORMAL
BLOOD BANK DISPENSE STATUS: NORMAL
BLOOD BANK PRODUCT CODE: NORMAL
BLOOD BANK PRODUCT CODE: NORMAL
BLOOD CULTURE, ROUTINE: NORMAL
BLOOD, URINE: NEGATIVE
BORDETELLA PARAPERTUSSIS BY PCR: NOT DETECTED
BORDETELLA PERTUSSIS BY PCR: NOT DETECTED
BPU ID: NORMAL
BPU ID: NORMAL
BUN BLDV-MCNC: 16 MG/DL (ref 8–23)
BUN BLDV-MCNC: 22 MG/DL (ref 8–23)
BUN BLDV-MCNC: 23 MG/DL (ref 8–23)
BUN BLDV-MCNC: 24 MG/DL (ref 8–23)
BUN BLDV-MCNC: 28 MG/DL (ref 8–23)
BUN BLDV-MCNC: 29 MG/DL (ref 8–23)
BUN BLDV-MCNC: 31 MG/DL (ref 8–23)
BUN BLDV-MCNC: 31 MG/DL (ref 8–23)
BUN BLDV-MCNC: 32 MG/DL (ref 8–23)
BUN BLDV-MCNC: 33 MG/DL (ref 8–23)
BUN BLDV-MCNC: 36 MG/DL (ref 8–23)
BUN BLDV-MCNC: 36 MG/DL (ref 8–23)
BUN BLDV-MCNC: 37 MG/DL (ref 8–23)
BUN BLDV-MCNC: 38 MG/DL (ref 8–23)
BUN BLDV-MCNC: 39 MG/DL (ref 8–23)
BUN BLDV-MCNC: 40 MG/DL (ref 8–23)
BUN BLDV-MCNC: 43 MG/DL (ref 8–23)
BUN BLDV-MCNC: 44 MG/DL (ref 8–23)
BUN BLDV-MCNC: 46 MG/DL (ref 8–23)
BUN BLDV-MCNC: 47 MG/DL (ref 8–23)
BUN BLDV-MCNC: 56 MG/DL (ref 8–23)
BUN BLDV-MCNC: 57 MG/DL (ref 8–23)
BUN BLDV-MCNC: 58 MG/DL (ref 8–23)
BUN BLDV-MCNC: 61 MG/DL (ref 8–23)
BUN BLDV-MCNC: 64 MG/DL (ref 8–23)
BURR CELLS: ABNORMAL
C-REACTIVE PROTEIN: 13.2 MG/DL (ref 0–0.4)
C-REACTIVE PROTEIN: 4.8 MG/DL (ref 0–0.4)
C-REACTIVE PROTEIN: 5.5 MG/DL (ref 0–0.4)
C-REACTIVE PROTEIN: 6.7 MG/DL (ref 0–0.4)
C-REACTIVE PROTEIN: 7 MG/DL (ref 0–0.4)
C-REACTIVE PROTEIN: 7.1 MG/DL (ref 0–0.4)
C-REACTIVE PROTEIN: 8.7 MG/DL (ref 0–0.4)
CALCIUM IONIZED: 1.16 MMOL/L (ref 1.15–1.33)
CALCIUM IONIZED: 1.27 MMOL/L (ref 1.15–1.33)
CALCIUM SERPL-MCNC: 7.1 MG/DL (ref 8.6–10.2)
CALCIUM SERPL-MCNC: 7.5 MG/DL (ref 8.6–10.2)
CALCIUM SERPL-MCNC: 7.7 MG/DL (ref 8.6–10.2)
CALCIUM SERPL-MCNC: 7.8 MG/DL (ref 8.6–10.2)
CALCIUM SERPL-MCNC: 8 MG/DL (ref 8.6–10.2)
CALCIUM SERPL-MCNC: 8 MG/DL (ref 8.6–10.2)
CALCIUM SERPL-MCNC: 8.1 MG/DL (ref 8.6–10.2)
CALCIUM SERPL-MCNC: 8.2 MG/DL (ref 8.6–10.2)
CALCIUM SERPL-MCNC: 8.2 MG/DL (ref 8.6–10.2)
CALCIUM SERPL-MCNC: 8.3 MG/DL (ref 8.6–10.2)
CALCIUM SERPL-MCNC: 8.4 MG/DL (ref 8.6–10.2)
CALCIUM SERPL-MCNC: 8.5 MG/DL (ref 8.6–10.2)
CALCIUM SERPL-MCNC: 8.6 MG/DL (ref 8.6–10.2)
CALCIUM SERPL-MCNC: 8.7 MG/DL (ref 8.6–10.2)
CALCIUM SERPL-MCNC: 9 MG/DL (ref 8.6–10.2)
CALCIUM SERPL-MCNC: 9.1 MG/DL (ref 8.6–10.2)
CHLAMYDOPHILIA PNEUMONIAE BY PCR: NOT DETECTED
CHLORIDE BLD-SCNC: 100 MMOL/L (ref 98–107)
CHLORIDE BLD-SCNC: 101 MMOL/L (ref 98–107)
CHLORIDE BLD-SCNC: 101 MMOL/L (ref 98–107)
CHLORIDE BLD-SCNC: 102 MMOL/L (ref 98–107)
CHLORIDE BLD-SCNC: 103 MMOL/L (ref 98–107)
CHLORIDE BLD-SCNC: 104 MMOL/L (ref 98–107)
CHLORIDE BLD-SCNC: 104 MMOL/L (ref 98–107)
CHLORIDE BLD-SCNC: 106 MMOL/L (ref 98–107)
CHLORIDE BLD-SCNC: 106 MMOL/L (ref 98–107)
CHLORIDE BLD-SCNC: 95 MMOL/L (ref 98–107)
CHLORIDE BLD-SCNC: 96 MMOL/L (ref 98–107)
CHLORIDE BLD-SCNC: 97 MMOL/L (ref 98–107)
CHLORIDE BLD-SCNC: 97 MMOL/L (ref 98–107)
CHLORIDE BLD-SCNC: 98 MMOL/L (ref 98–107)
CHLORIDE BLD-SCNC: 99 MMOL/L (ref 98–107)
CK MB: 12.3 NG/ML (ref 0–7.7)
CK MB: 2.5 NG/ML (ref 0–7.7)
CK MB: 2.5 NG/ML (ref 0–7.7)
CK MB: 2.7 NG/ML (ref 0–7.7)
CK MB: 2.7 NG/ML (ref 0–7.7)
CK MB: 2.8 NG/ML (ref 0–7.7)
CK MB: 2.9 NG/ML (ref 0–7.7)
CK MB: 29.4 NG/ML (ref 0–7.7)
CK MB: 3 NG/ML (ref 0–7.7)
CK MB: 3 NG/ML (ref 0–7.7)
CK MB: 3.3 NG/ML (ref 0–7.7)
CK MB: 3.4 NG/ML (ref 0–7.7)
CK MB: 3.8 NG/ML (ref 0–7.7)
CK MB: 4.5 NG/ML (ref 0–7.7)
CK MB: 4.8 NG/ML (ref 0–7.7)
CK MB: 5.4 NG/ML (ref 0–7.7)
CK MB: 6.2 NG/ML (ref 0–7.7)
CK MB: 6.8 NG/ML (ref 0–7.7)
CK MB: 7.7 NG/ML (ref 0–7.7)
CK MB: 9.3 NG/ML (ref 0–7.7)
CK MB: 9.8 NG/ML (ref 0–7.7)
CLARITY: CLEAR
CO2: 18 MMOL/L (ref 22–29)
CO2: 20 MMOL/L (ref 22–29)
CO2: 21 MMOL/L (ref 22–29)
CO2: 22 MMOL/L (ref 22–29)
CO2: 24 MMOL/L (ref 22–29)
CO2: 25 MMOL/L (ref 22–29)
CO2: 26 MMOL/L (ref 22–29)
CO2: 27 MMOL/L (ref 22–29)
CO2: 28 MMOL/L (ref 22–29)
CO2: 29 MMOL/L (ref 22–29)
CO2: 29 MMOL/L (ref 22–29)
CO2: 30 MMOL/L (ref 22–29)
CO2: 31 MMOL/L (ref 22–29)
CO2: 32 MMOL/L (ref 22–29)
CO2: 32 MMOL/L (ref 22–29)
CO2: 33 MMOL/L (ref 22–29)
CO2: 36 MMOL/L (ref 22–29)
CO2: 38 MMOL/L (ref 22–29)
CO2: 39 MMOL/L (ref 22–29)
COHB: 0.3 % (ref 0–1.5)
COHB: 0.4 % (ref 0–1.5)
COHB: 0.5 % (ref 0–1.5)
COHB: 0.5 % (ref 0–1.5)
COHB: 0.6 % (ref 0–1.5)
COHB: 0.6 % (ref 0–1.5)
COHB: 0.7 % (ref 0–1.5)
COHB: 0.8 % (ref 0–1.5)
COHB: 0.8 % (ref 0–1.5)
COHB: 0.9 % (ref 0–1.5)
COHB: 1.2 % (ref 0–1.5)
COLOR: YELLOW
CORONAVIRUS 229E BY PCR: NOT DETECTED
CORONAVIRUS HKU1 BY PCR: NOT DETECTED
CORONAVIRUS NL63 BY PCR: NOT DETECTED
CORONAVIRUS OC43 BY PCR: NOT DETECTED
CORTISOL TOTAL: 17.07 MCG/DL (ref 2.68–18.4)
CREAT SERPL-MCNC: 0.7 MG/DL (ref 0.7–1.2)
CREAT SERPL-MCNC: 0.8 MG/DL (ref 0.7–1.2)
CREAT SERPL-MCNC: 0.9 MG/DL (ref 0.7–1.2)
CREAT SERPL-MCNC: 1 MG/DL (ref 0.7–1.2)
CREAT SERPL-MCNC: 1.1 MG/DL (ref 0.7–1.2)
CREAT SERPL-MCNC: 1.2 MG/DL (ref 0.7–1.2)
CREAT SERPL-MCNC: 1.2 MG/DL (ref 0.7–1.2)
CREAT SERPL-MCNC: 1.4 MG/DL (ref 0.7–1.2)
CREAT SERPL-MCNC: 1.4 MG/DL (ref 0.7–1.2)
CREAT SERPL-MCNC: 1.5 MG/DL (ref 0.7–1.2)
CREAT SERPL-MCNC: 1.5 MG/DL (ref 0.7–1.2)
CREAT SERPL-MCNC: 1.6 MG/DL (ref 0.7–1.2)
CREAT SERPL-MCNC: 2 MG/DL (ref 0.7–1.2)
CREAT SERPL-MCNC: 2.2 MG/DL (ref 0.7–1.2)
CREAT SERPL-MCNC: 2.2 MG/DL (ref 0.7–1.2)
CRITICAL: ABNORMAL
CULTURE, BLOOD 2: NORMAL
CULTURE, BLOOD 2: NORMAL
CULTURE, RESPIRATORY: ABNORMAL
CULTURE, RESPIRATORY: ABNORMAL
D DIMER: 1543 NG/ML DDU
D DIMER: 1599 NG/ML DDU
D DIMER: 269 NG/ML DDU
D DIMER: 284 NG/ML DDU
D DIMER: 353 NG/ML DDU
D DIMER: 437 NG/ML DDU
D DIMER: 462 NG/ML DDU
D DIMER: 463 NG/ML DDU
D DIMER: 531 NG/ML DDU
DATE ANALYZED: ABNORMAL
DATE OF COLLECTION: ABNORMAL
DESCRIPTION BLOOD BANK: NORMAL
DESCRIPTION BLOOD BANK: NORMAL
DEVICE: ABNORMAL
EKG ATRIAL RATE: 100 BPM
EKG ATRIAL RATE: 105 BPM
EKG ATRIAL RATE: 107 BPM
EKG ATRIAL RATE: 125 BPM
EKG ATRIAL RATE: 131 BPM
EKG ATRIAL RATE: 55 BPM
EKG ATRIAL RATE: 68 BPM
EKG ATRIAL RATE: 70 BPM
EKG ATRIAL RATE: 72 BPM
EKG ATRIAL RATE: 88 BPM
EKG ATRIAL RATE: 93 BPM
EKG ATRIAL RATE: 94 BPM
EKG ATRIAL RATE: 97 BPM
EKG ATRIAL RATE: 99 BPM
EKG P AXIS: 100 DEGREES
EKG P AXIS: 11 DEGREES
EKG P AXIS: 11 DEGREES
EKG P AXIS: 35 DEGREES
EKG P AXIS: 36 DEGREES
EKG P AXIS: 37 DEGREES
EKG P AXIS: 43 DEGREES
EKG P AXIS: 5 DEGREES
EKG P AXIS: 52 DEGREES
EKG P-R INTERVAL: 122 MS
EKG P-R INTERVAL: 144 MS
EKG P-R INTERVAL: 146 MS
EKG P-R INTERVAL: 156 MS
EKG P-R INTERVAL: 160 MS
EKG P-R INTERVAL: 162 MS
EKG P-R INTERVAL: 166 MS
EKG P-R INTERVAL: 168 MS
EKG P-R INTERVAL: 168 MS
EKG P-R INTERVAL: 176 MS
EKG Q-T INTERVAL: 324 MS
EKG Q-T INTERVAL: 336 MS
EKG Q-T INTERVAL: 342 MS
EKG Q-T INTERVAL: 348 MS
EKG Q-T INTERVAL: 348 MS
EKG Q-T INTERVAL: 350 MS
EKG Q-T INTERVAL: 354 MS
EKG Q-T INTERVAL: 358 MS
EKG Q-T INTERVAL: 366 MS
EKG Q-T INTERVAL: 370 MS
EKG Q-T INTERVAL: 380 MS
EKG Q-T INTERVAL: 396 MS
EKG Q-T INTERVAL: 410 MS
EKG Q-T INTERVAL: 444 MS
EKG QRS DURATION: 106 MS
EKG QRS DURATION: 108 MS
EKG QRS DURATION: 110 MS
EKG QRS DURATION: 112 MS
EKG QRS DURATION: 114 MS
EKG QRS DURATION: 114 MS
EKG QRS DURATION: 116 MS
EKG QRS DURATION: 118 MS
EKG QRS DURATION: 120 MS
EKG QRS DURATION: 122 MS
EKG QRS DURATION: 98 MS
EKG QTC CALCULATION (BAZETT): 411 MS
EKG QTC CALCULATION (BAZETT): 416 MS
EKG QTC CALCULATION (BAZETT): 421 MS
EKG QTC CALCULATION (BAZETT): 424 MS
EKG QTC CALCULATION (BAZETT): 441 MS
EKG QTC CALCULATION (BAZETT): 446 MS
EKG QTC CALCULATION (BAZETT): 447 MS
EKG QTC CALCULATION (BAZETT): 448 MS
EKG QTC CALCULATION (BAZETT): 449 MS
EKG QTC CALCULATION (BAZETT): 470 MS
EKG QTC CALCULATION (BAZETT): 472 MS
EKG QTC CALCULATION (BAZETT): 483 MS
EKG QTC CALCULATION (BAZETT): 486 MS
EKG QTC CALCULATION (BAZETT): 493 MS
EKG R AXIS: 10 DEGREES
EKG R AXIS: 17 DEGREES
EKG R AXIS: 20 DEGREES
EKG R AXIS: 21 DEGREES
EKG R AXIS: 25 DEGREES
EKG R AXIS: 26 DEGREES
EKG R AXIS: 27 DEGREES
EKG R AXIS: 28 DEGREES
EKG R AXIS: 31 DEGREES
EKG R AXIS: 32 DEGREES
EKG R AXIS: 36 DEGREES
EKG R AXIS: 37 DEGREES
EKG R AXIS: 42 DEGREES
EKG R AXIS: 5 DEGREES
EKG T AXIS: -159 DEGREES
EKG T AXIS: -162 DEGREES
EKG T AXIS: 1 DEGREES
EKG T AXIS: 110 DEGREES
EKG T AXIS: 116 DEGREES
EKG T AXIS: 141 DEGREES
EKG T AXIS: 34 DEGREES
EKG T AXIS: 42 DEGREES
EKG T AXIS: 44 DEGREES
EKG T AXIS: 62 DEGREES
EKG T AXIS: 65 DEGREES
EKG T AXIS: 79 DEGREES
EKG T AXIS: 84 DEGREES
EKG T AXIS: 93 DEGREES
EKG VENTRICULAR RATE: 100 BPM
EKG VENTRICULAR RATE: 105 BPM
EKG VENTRICULAR RATE: 107 BPM
EKG VENTRICULAR RATE: 126 BPM
EKG VENTRICULAR RATE: 127 BPM
EKG VENTRICULAR RATE: 55 BPM
EKG VENTRICULAR RATE: 65 BPM
EKG VENTRICULAR RATE: 72 BPM
EKG VENTRICULAR RATE: 85 BPM
EKG VENTRICULAR RATE: 88 BPM
EKG VENTRICULAR RATE: 93 BPM
EKG VENTRICULAR RATE: 94 BPM
EKG VENTRICULAR RATE: 97 BPM
EKG VENTRICULAR RATE: 99 BPM
EOSINOPHILS ABSOLUTE: 0 E9/L (ref 0.05–0.5)
EOSINOPHILS ABSOLUTE: 0.01 E9/L (ref 0.05–0.5)
EOSINOPHILS ABSOLUTE: 0.07 E9/L (ref 0.05–0.5)
EOSINOPHILS ABSOLUTE: 0.07 E9/L (ref 0.05–0.5)
EOSINOPHILS ABSOLUTE: 0.11 E9/L (ref 0.05–0.5)
EOSINOPHILS ABSOLUTE: 0.11 E9/L (ref 0.05–0.5)
EOSINOPHILS ABSOLUTE: 0.14 E9/L (ref 0.05–0.5)
EOSINOPHILS ABSOLUTE: 0.16 E9/L (ref 0.05–0.5)
EOSINOPHILS RELATIVE PERCENT: 0 % (ref 0–6)
EOSINOPHILS RELATIVE PERCENT: 0.1 % (ref 0–6)
EOSINOPHILS RELATIVE PERCENT: 0.7 % (ref 0–6)
EOSINOPHILS RELATIVE PERCENT: 0.7 % (ref 0–6)
EOSINOPHILS RELATIVE PERCENT: 0.8 % (ref 0–6)
EOSINOPHILS RELATIVE PERCENT: 0.9 % (ref 0–6)
EOSINOPHILS RELATIVE PERCENT: 1.1 % (ref 0–6)
EOSINOPHILS RELATIVE PERCENT: 2 % (ref 0–6)
FERRITIN: 335 NG/ML
FIO2 ARTERIAL: 75
FIO2: 100 %
FIO2: 75 %
FIO2: 85 %
FIO2: 90 %
FOLATE: 16.8 NG/ML (ref 4.8–24.2)
FOLATE: 17.8 NG/ML (ref 4.8–24.2)
GFR AFRICAN AMERICAN: 36
GFR AFRICAN AMERICAN: 36
GFR AFRICAN AMERICAN: 40
GFR AFRICAN AMERICAN: 52
GFR AFRICAN AMERICAN: 56
GFR AFRICAN AMERICAN: 56
GFR AFRICAN AMERICAN: >60
GFR NON-AFRICAN AMERICAN: 30 ML/MIN/1.73
GFR NON-AFRICAN AMERICAN: 30 ML/MIN/1.73
GFR NON-AFRICAN AMERICAN: 33 ML/MIN/1.73
GFR NON-AFRICAN AMERICAN: 43 ML/MIN/1.73
GFR NON-AFRICAN AMERICAN: 46 ML/MIN/1.73
GFR NON-AFRICAN AMERICAN: 46 ML/MIN/1.73
GFR NON-AFRICAN AMERICAN: 50 ML/MIN/1.73
GFR NON-AFRICAN AMERICAN: 50 ML/MIN/1.73
GFR NON-AFRICAN AMERICAN: 60 ML/MIN/1.73
GFR NON-AFRICAN AMERICAN: 60 ML/MIN/1.73
GFR NON-AFRICAN AMERICAN: >60 ML/MIN/1.73
GLUCOSE BLD-MCNC: 111 MG/DL (ref 74–99)
GLUCOSE BLD-MCNC: 117 MG/DL (ref 74–99)
GLUCOSE BLD-MCNC: 117 MG/DL (ref 74–99)
GLUCOSE BLD-MCNC: 119 MG/DL (ref 74–99)
GLUCOSE BLD-MCNC: 123 MG/DL (ref 74–99)
GLUCOSE BLD-MCNC: 124 MG/DL (ref 74–99)
GLUCOSE BLD-MCNC: 125 MG/DL (ref 74–99)
GLUCOSE BLD-MCNC: 125 MG/DL (ref 74–99)
GLUCOSE BLD-MCNC: 128 MG/DL (ref 74–99)
GLUCOSE BLD-MCNC: 128 MG/DL (ref 74–99)
GLUCOSE BLD-MCNC: 129 MG/DL (ref 74–99)
GLUCOSE BLD-MCNC: 129 MG/DL (ref 74–99)
GLUCOSE BLD-MCNC: 131 MG/DL (ref 74–99)
GLUCOSE BLD-MCNC: 132 MG/DL (ref 74–99)
GLUCOSE BLD-MCNC: 133 MG/DL (ref 74–99)
GLUCOSE BLD-MCNC: 133 MG/DL (ref 74–99)
GLUCOSE BLD-MCNC: 134 MG/DL (ref 74–99)
GLUCOSE BLD-MCNC: 136 MG/DL (ref 74–99)
GLUCOSE BLD-MCNC: 137 MG/DL (ref 74–99)
GLUCOSE BLD-MCNC: 141 MG/DL (ref 74–99)
GLUCOSE BLD-MCNC: 148 MG/DL (ref 74–99)
GLUCOSE BLD-MCNC: 150 MG/DL (ref 74–99)
GLUCOSE BLD-MCNC: 153 MG/DL (ref 74–99)
GLUCOSE BLD-MCNC: 153 MG/DL (ref 74–99)
GLUCOSE BLD-MCNC: 158 MG/DL (ref 74–99)
GLUCOSE BLD-MCNC: 178 MG/DL (ref 74–99)
GLUCOSE BLD-MCNC: 180 MG/DL (ref 74–99)
GLUCOSE BLD-MCNC: 214 MG/DL (ref 74–99)
GLUCOSE BLD-MCNC: 89 MG/DL (ref 74–99)
GLUCOSE BLD-MCNC: 90 MG/DL (ref 74–99)
GLUCOSE URINE: NEGATIVE MG/DL
HCO3 ARTERIAL: 31.5 MMOL/L (ref 22–26)
HCO3: 27.6 MMOL/L (ref 22–26)
HCO3: 28.7 MMOL/L (ref 22–26)
HCO3: 29.6 MMOL/L (ref 22–26)
HCO3: 30.2 MMOL/L (ref 22–26)
HCO3: 31.4 MMOL/L (ref 22–26)
HCO3: 32 MMOL/L (ref 22–26)
HCO3: 33.2 MMOL/L (ref 22–26)
HCO3: 33.5 MMOL/L (ref 22–26)
HCO3: 33.6 MMOL/L (ref 22–26)
HCO3: 33.8 MMOL/L (ref 22–26)
HCO3: 34.8 MMOL/L (ref 22–26)
HCO3: 35.1 MMOL/L (ref 22–26)
HCO3: 35.1 MMOL/L (ref 22–26)
HCT VFR BLD CALC: 23.8 % (ref 37–54)
HCT VFR BLD CALC: 24.3 % (ref 37–54)
HCT VFR BLD CALC: 27.1 % (ref 37–54)
HCT VFR BLD CALC: 27.2 % (ref 37–54)
HCT VFR BLD CALC: 28.3 % (ref 37–54)
HCT VFR BLD CALC: 29.4 % (ref 37–54)
HCT VFR BLD CALC: 29.8 % (ref 37–54)
HCT VFR BLD CALC: 30.3 % (ref 37–54)
HCT VFR BLD CALC: 30.8 % (ref 37–54)
HCT VFR BLD CALC: 31.1 % (ref 37–54)
HCT VFR BLD CALC: 31.2 % (ref 37–54)
HCT VFR BLD CALC: 31.3 % (ref 37–54)
HCT VFR BLD CALC: 31.3 % (ref 37–54)
HCT VFR BLD CALC: 31.4 % (ref 37–54)
HCT VFR BLD CALC: 31.5 % (ref 37–54)
HCT VFR BLD CALC: 32.1 % (ref 37–54)
HCT VFR BLD CALC: 32.2 % (ref 37–54)
HCT VFR BLD CALC: 32.4 % (ref 37–54)
HCT VFR BLD CALC: 32.5 % (ref 37–54)
HCT VFR BLD CALC: 32.6 % (ref 37–54)
HCT VFR BLD CALC: 33.6 % (ref 37–54)
HCT VFR BLD CALC: 33.8 % (ref 37–54)
HCT VFR BLD CALC: 33.9 % (ref 37–54)
HCT VFR BLD CALC: 34.1 % (ref 37–54)
HCT VFR BLD CALC: 34.2 % (ref 37–54)
HCT VFR BLD CALC: 34.3 % (ref 37–54)
HCT VFR BLD CALC: 35 % (ref 37–54)
HCT VFR BLD CALC: 35.1 % (ref 37–54)
HCT VFR BLD CALC: 35.8 % (ref 37–54)
HCT VFR BLD CALC: 35.9 % (ref 37–54)
HCT VFR BLD CALC: 36 % (ref 37–54)
HCT VFR BLD CALC: 36 % (ref 37–54)
HCT VFR BLD CALC: 36.2 % (ref 37–54)
HCT VFR BLD CALC: 36.9 % (ref 37–54)
HCT VFR BLD CALC: 36.9 % (ref 37–54)
HCT VFR BLD CALC: 37 % (ref 37–54)
HCT VFR BLD CALC: 37.1 % (ref 37–54)
HCT VFR BLD CALC: 43.8 % (ref 37–54)
HEMOGLOBIN: 10 G/DL (ref 12.5–16.5)
HEMOGLOBIN: 10.1 G/DL (ref 12.5–16.5)
HEMOGLOBIN: 10.2 G/DL (ref 12.5–16.5)
HEMOGLOBIN: 10.2 G/DL (ref 12.5–16.5)
HEMOGLOBIN: 10.3 G/DL (ref 12.5–16.5)
HEMOGLOBIN: 10.4 G/DL (ref 12.5–16.5)
HEMOGLOBIN: 10.5 G/DL (ref 12.5–16.5)
HEMOGLOBIN: 10.6 G/DL (ref 12.5–16.5)
HEMOGLOBIN: 10.7 G/DL (ref 12.5–16.5)
HEMOGLOBIN: 11 G/DL (ref 12.5–16.5)
HEMOGLOBIN: 11 G/DL (ref 12.5–16.5)
HEMOGLOBIN: 11.1 G/DL (ref 12.5–16.5)
HEMOGLOBIN: 11.1 G/DL (ref 12.5–16.5)
HEMOGLOBIN: 11.2 G/DL (ref 12.5–16.5)
HEMOGLOBIN: 11.3 G/DL (ref 12.5–16.5)
HEMOGLOBIN: 11.5 G/DL (ref 12.5–16.5)
HEMOGLOBIN: 11.6 G/DL (ref 12.5–16.5)
HEMOGLOBIN: 11.7 G/DL (ref 12.5–16.5)
HEMOGLOBIN: 11.7 G/DL (ref 12.5–16.5)
HEMOGLOBIN: 11.8 G/DL (ref 12.5–16.5)
HEMOGLOBIN: 11.8 G/DL (ref 12.5–16.5)
HEMOGLOBIN: 11.9 G/DL (ref 12.5–16.5)
HEMOGLOBIN: 13.4 G/DL (ref 12.5–16.5)
HEMOGLOBIN: 7.5 G/DL (ref 12.5–16.5)
HEMOGLOBIN: 7.7 G/DL (ref 12.5–16.5)
HEMOGLOBIN: 8.4 G/DL (ref 12.5–16.5)
HEMOGLOBIN: 8.7 G/DL (ref 12.5–16.5)
HEMOGLOBIN: 9.1 G/DL (ref 12.5–16.5)
HEMOGLOBIN: 9.3 G/DL (ref 12.5–16.5)
HEMOGLOBIN: 9.4 G/DL (ref 12.5–16.5)
HEMOGLOBIN: 9.7 G/DL (ref 12.5–16.5)
HEMOGLOBIN: 9.9 G/DL (ref 12.5–16.5)
HEMOGLOBIN: 9.9 G/DL (ref 12.5–16.5)
HHB: 13.3 % (ref 0–5)
HHB: 13.7 % (ref 0–5)
HHB: 13.8 % (ref 0–5)
HHB: 23.7 % (ref 0–5)
HHB: 3 % (ref 0–5)
HHB: 3.6 % (ref 0–5)
HHB: 5.2 % (ref 0–5)
HHB: 5.6 % (ref 0–5)
HHB: 5.6 % (ref 0–5)
HHB: 6.2 % (ref 0–5)
HHB: 7 % (ref 0–5)
HHB: 8 % (ref 0–5)
HHB: 9.5 % (ref 0–5)
HUMAN METAPNEUMOVIRUS BY PCR: NOT DETECTED
HUMAN RHINOVIRUS/ENTEROVIRUS BY PCR: NOT DETECTED
HYPOCHROMIA: ABNORMAL
IMMATURE GRANULOCYTES #: 0.02 E9/L
IMMATURE GRANULOCYTES #: 0.04 E9/L
IMMATURE GRANULOCYTES #: 0.06 E9/L
IMMATURE GRANULOCYTES #: 0.07 E9/L
IMMATURE GRANULOCYTES #: 0.07 E9/L
IMMATURE GRANULOCYTES #: 0.08 E9/L
IMMATURE GRANULOCYTES #: 0.14 E9/L
IMMATURE GRANULOCYTES #: 0.15 E9/L
IMMATURE GRANULOCYTES #: 0.32 E9/L
IMMATURE GRANULOCYTES %: 0.3 % (ref 0–5)
IMMATURE GRANULOCYTES %: 0.4 % (ref 0–5)
IMMATURE GRANULOCYTES %: 0.4 % (ref 0–5)
IMMATURE GRANULOCYTES %: 0.5 % (ref 0–5)
IMMATURE GRANULOCYTES %: 0.6 % (ref 0–5)
IMMATURE GRANULOCYTES %: 0.7 % (ref 0–5)
IMMATURE GRANULOCYTES %: 0.8 % (ref 0–5)
IMMATURE GRANULOCYTES %: 1.5 % (ref 0–5)
IMMATURE GRANULOCYTES %: 1.7 % (ref 0–5)
IMMATURE GRANULOCYTES %: 2.2 % (ref 0–5)
INFLUENZA A BY PCR: NOT DETECTED
INFLUENZA B BY PCR: NOT DETECTED
INR BLD: 1.5
INR BLD: 1.8
IRON SATURATION: 44 % (ref 20–55)
IRON SATURATION: 8 % (ref 20–55)
IRON: 14 MCG/DL (ref 59–158)
IRON: 87 MCG/DL (ref 59–158)
KETONES, URINE: NEGATIVE MG/DL
L. PNEUMOPHILA SEROGP 1 UR AG: NORMAL
L. PNEUMOPHILA SEROGP 1 UR AG: NORMAL
LAB: ABNORMAL
LACTIC ACID, SEPSIS: 0.7 MMOL/L (ref 0.5–1.9)
LACTIC ACID, SEPSIS: 2.3 MMOL/L (ref 0.5–1.9)
LACTIC ACID: 0.9 MMOL/L (ref 0.5–2.2)
LACTIC ACID: 1.3 MMOL/L (ref 0.5–2.2)
LEUKOCYTE ESTERASE, URINE: NEGATIVE
LV EF: 60 %
LVEF MODALITY: NORMAL
LYMPHOCYTES ABSOLUTE: 0.22 E9/L (ref 1.5–4)
LYMPHOCYTES ABSOLUTE: 0.37 E9/L (ref 1.5–4)
LYMPHOCYTES ABSOLUTE: 0.39 E9/L (ref 1.5–4)
LYMPHOCYTES ABSOLUTE: 0.43 E9/L (ref 1.5–4)
LYMPHOCYTES ABSOLUTE: 0.52 E9/L (ref 1.5–4)
LYMPHOCYTES ABSOLUTE: 0.54 E9/L (ref 1.5–4)
LYMPHOCYTES ABSOLUTE: 0.6 E9/L (ref 1.5–4)
LYMPHOCYTES ABSOLUTE: 0.6 E9/L (ref 1.5–4)
LYMPHOCYTES ABSOLUTE: 0.65 E9/L (ref 1.5–4)
LYMPHOCYTES ABSOLUTE: 0.67 E9/L (ref 1.5–4)
LYMPHOCYTES ABSOLUTE: 0.79 E9/L (ref 1.5–4)
LYMPHOCYTES ABSOLUTE: 0.8 E9/L (ref 1.5–4)
LYMPHOCYTES ABSOLUTE: 0.86 E9/L (ref 1.5–4)
LYMPHOCYTES ABSOLUTE: 0.91 E9/L (ref 1.5–4)
LYMPHOCYTES ABSOLUTE: 1.04 E9/L (ref 1.5–4)
LYMPHOCYTES ABSOLUTE: 1.1 E9/L (ref 1.5–4)
LYMPHOCYTES ABSOLUTE: 1.1 E9/L (ref 1.5–4)
LYMPHOCYTES ABSOLUTE: 2.49 E9/L (ref 1.5–4)
LYMPHOCYTES RELATIVE PERCENT: 1.7 % (ref 20–42)
LYMPHOCYTES RELATIVE PERCENT: 10.6 % (ref 20–42)
LYMPHOCYTES RELATIVE PERCENT: 12.7 % (ref 20–42)
LYMPHOCYTES RELATIVE PERCENT: 13.6 % (ref 20–42)
LYMPHOCYTES RELATIVE PERCENT: 16.1 % (ref 20–42)
LYMPHOCYTES RELATIVE PERCENT: 18.1 % (ref 20–42)
LYMPHOCYTES RELATIVE PERCENT: 2.6 % (ref 20–42)
LYMPHOCYTES RELATIVE PERCENT: 3 % (ref 20–42)
LYMPHOCYTES RELATIVE PERCENT: 5.4 % (ref 20–42)
LYMPHOCYTES RELATIVE PERCENT: 5.8 % (ref 20–42)
LYMPHOCYTES RELATIVE PERCENT: 6 % (ref 20–42)
LYMPHOCYTES RELATIVE PERCENT: 6.2 % (ref 20–42)
LYMPHOCYTES RELATIVE PERCENT: 6.5 % (ref 20–42)
LYMPHOCYTES RELATIVE PERCENT: 6.8 % (ref 20–42)
LYMPHOCYTES RELATIVE PERCENT: 7.1 % (ref 20–42)
LYMPHOCYTES RELATIVE PERCENT: 7.4 % (ref 20–42)
LYMPHOCYTES RELATIVE PERCENT: 8.2 % (ref 20–42)
LYMPHOCYTES RELATIVE PERCENT: 8.3 % (ref 20–42)
Lab: ABNORMAL
MAGNESIUM: 2 MG/DL (ref 1.6–2.6)
MAGNESIUM: 2.1 MG/DL (ref 1.6–2.6)
MAGNESIUM: 2.2 MG/DL (ref 1.6–2.6)
MCH RBC QN AUTO: 29.4 PG (ref 26–35)
MCH RBC QN AUTO: 29.4 PG (ref 26–35)
MCH RBC QN AUTO: 29.6 PG (ref 26–35)
MCH RBC QN AUTO: 29.8 PG (ref 26–35)
MCH RBC QN AUTO: 29.9 PG (ref 26–35)
MCH RBC QN AUTO: 29.9 PG (ref 26–35)
MCH RBC QN AUTO: 30 PG (ref 26–35)
MCH RBC QN AUTO: 30.1 PG (ref 26–35)
MCH RBC QN AUTO: 30.2 PG (ref 26–35)
MCH RBC QN AUTO: 30.4 PG (ref 26–35)
MCH RBC QN AUTO: 30.4 PG (ref 26–35)
MCH RBC QN AUTO: 30.5 PG (ref 26–35)
MCH RBC QN AUTO: 30.6 PG (ref 26–35)
MCH RBC QN AUTO: 30.7 PG (ref 26–35)
MCH RBC QN AUTO: 30.7 PG (ref 26–35)
MCH RBC QN AUTO: 31 PG (ref 26–35)
MCHC RBC AUTO-ENTMCNC: 30.6 % (ref 32–34.5)
MCHC RBC AUTO-ENTMCNC: 30.6 % (ref 32–34.5)
MCHC RBC AUTO-ENTMCNC: 31 % (ref 32–34.5)
MCHC RBC AUTO-ENTMCNC: 31.1 % (ref 32–34.5)
MCHC RBC AUTO-ENTMCNC: 31.3 % (ref 32–34.5)
MCHC RBC AUTO-ENTMCNC: 31.4 % (ref 32–34.5)
MCHC RBC AUTO-ENTMCNC: 31.5 % (ref 32–34.5)
MCHC RBC AUTO-ENTMCNC: 31.6 % (ref 32–34.5)
MCHC RBC AUTO-ENTMCNC: 31.6 % (ref 32–34.5)
MCHC RBC AUTO-ENTMCNC: 31.7 % (ref 32–34.5)
MCHC RBC AUTO-ENTMCNC: 32 % (ref 32–34.5)
MCHC RBC AUTO-ENTMCNC: 32 % (ref 32–34.5)
MCHC RBC AUTO-ENTMCNC: 32.2 % (ref 32–34.5)
MCHC RBC AUTO-ENTMCNC: 32.5 % (ref 32–34.5)
MCHC RBC AUTO-ENTMCNC: 32.5 % (ref 32–34.5)
MCHC RBC AUTO-ENTMCNC: 32.6 % (ref 32–34.5)
MCHC RBC AUTO-ENTMCNC: 32.6 % (ref 32–34.5)
MCHC RBC AUTO-ENTMCNC: 33 % (ref 32–34.5)
MCHC RBC AUTO-ENTMCNC: 33 % (ref 32–34.5)
MCHC RBC AUTO-ENTMCNC: 33.2 % (ref 32–34.5)
MCHC RBC AUTO-ENTMCNC: 33.5 % (ref 32–34.5)
MCHC RBC AUTO-ENTMCNC: 33.7 % (ref 32–34.5)
MCV RBC AUTO: 100.6 FL (ref 80–99.9)
MCV RBC AUTO: 90.2 FL (ref 80–99.9)
MCV RBC AUTO: 90.4 FL (ref 80–99.9)
MCV RBC AUTO: 91.2 FL (ref 80–99.9)
MCV RBC AUTO: 91.3 FL (ref 80–99.9)
MCV RBC AUTO: 91.7 FL (ref 80–99.9)
MCV RBC AUTO: 92.3 FL (ref 80–99.9)
MCV RBC AUTO: 92.6 FL (ref 80–99.9)
MCV RBC AUTO: 92.6 FL (ref 80–99.9)
MCV RBC AUTO: 93 FL (ref 80–99.9)
MCV RBC AUTO: 93.4 FL (ref 80–99.9)
MCV RBC AUTO: 93.6 FL (ref 80–99.9)
MCV RBC AUTO: 93.7 FL (ref 80–99.9)
MCV RBC AUTO: 94.4 FL (ref 80–99.9)
MCV RBC AUTO: 95.2 FL (ref 80–99.9)
MCV RBC AUTO: 95.2 FL (ref 80–99.9)
MCV RBC AUTO: 95.3 FL (ref 80–99.9)
MCV RBC AUTO: 95.8 FL (ref 80–99.9)
MCV RBC AUTO: 96.1 FL (ref 80–99.9)
MCV RBC AUTO: 97.2 FL (ref 80–99.9)
MCV RBC AUTO: 97.7 FL (ref 80–99.9)
MCV RBC AUTO: 99.5 FL (ref 80–99.9)
METAMYELOCYTES RELATIVE PERCENT: 0.9 % (ref 0–1)
METAMYELOCYTES RELATIVE PERCENT: 0.9 % (ref 0–1)
METHB: 0.1 % (ref 0–1.5)
METHB: 0.2 % (ref 0–1.5)
METHB: 0.2 % (ref 0–1.5)
METHB: 0.3 % (ref 0–1.5)
METHB: 0.4 % (ref 0–1.5)
METHB: 0.5 % (ref 0–1.5)
MODE: ABNORMAL
MODE: AC
MONOCYTES ABSOLUTE: 0.16 E9/L (ref 0.1–0.95)
MONOCYTES ABSOLUTE: 0.35 E9/L (ref 0.1–0.95)
MONOCYTES ABSOLUTE: 0.38 E9/L (ref 0.1–0.95)
MONOCYTES ABSOLUTE: 0.46 E9/L (ref 0.1–0.95)
MONOCYTES ABSOLUTE: 0.55 E9/L (ref 0.1–0.95)
MONOCYTES ABSOLUTE: 0.56 E9/L (ref 0.1–0.95)
MONOCYTES ABSOLUTE: 0.72 E9/L (ref 0.1–0.95)
MONOCYTES ABSOLUTE: 0.74 E9/L (ref 0.1–0.95)
MONOCYTES ABSOLUTE: 0.76 E9/L (ref 0.1–0.95)
MONOCYTES ABSOLUTE: 0.79 E9/L (ref 0.1–0.95)
MONOCYTES ABSOLUTE: 0.8 E9/L (ref 0.1–0.95)
MONOCYTES ABSOLUTE: 0.84 E9/L (ref 0.1–0.95)
MONOCYTES ABSOLUTE: 0.88 E9/L (ref 0.1–0.95)
MONOCYTES ABSOLUTE: 0.93 E9/L (ref 0.1–0.95)
MONOCYTES ABSOLUTE: 1.09 E9/L (ref 0.1–0.95)
MONOCYTES ABSOLUTE: 1.32 E9/L (ref 0.1–0.95)
MONOCYTES ABSOLUTE: 1.33 E9/L (ref 0.1–0.95)
MONOCYTES ABSOLUTE: 1.37 E9/L (ref 0.1–0.95)
MONOCYTES RELATIVE PERCENT: 10.3 % (ref 2–12)
MONOCYTES RELATIVE PERCENT: 13 % (ref 2–12)
MONOCYTES RELATIVE PERCENT: 16.9 % (ref 2–12)
MONOCYTES RELATIVE PERCENT: 2.7 % (ref 2–12)
MONOCYTES RELATIVE PERCENT: 2.8 % (ref 2–12)
MONOCYTES RELATIVE PERCENT: 20.6 % (ref 2–12)
MONOCYTES RELATIVE PERCENT: 4.3 % (ref 2–12)
MONOCYTES RELATIVE PERCENT: 4.8 % (ref 2–12)
MONOCYTES RELATIVE PERCENT: 5.2 % (ref 2–12)
MONOCYTES RELATIVE PERCENT: 5.5 % (ref 2–12)
MONOCYTES RELATIVE PERCENT: 5.8 % (ref 2–12)
MONOCYTES RELATIVE PERCENT: 6.8 % (ref 2–12)
MONOCYTES RELATIVE PERCENT: 7.3 % (ref 2–12)
MONOCYTES RELATIVE PERCENT: 7.5 % (ref 2–12)
MONOCYTES RELATIVE PERCENT: 8 % (ref 2–12)
MONOCYTES RELATIVE PERCENT: 8.1 % (ref 2–12)
MONOCYTES RELATIVE PERCENT: 9.1 % (ref 2–12)
MONOCYTES RELATIVE PERCENT: 9.6 % (ref 2–12)
MYCOPLASMA PNEUMONIAE BY PCR: NOT DETECTED
MYELOCYTE PERCENT: 0.9 % (ref 0–0)
NEUTROPHILS ABSOLUTE: 10.42 E9/L (ref 1.8–7.3)
NEUTROPHILS ABSOLUTE: 11.58 E9/L (ref 1.8–7.3)
NEUTROPHILS ABSOLUTE: 12.19 E9/L (ref 1.8–7.3)
NEUTROPHILS ABSOLUTE: 12.37 E9/L (ref 1.8–7.3)
NEUTROPHILS ABSOLUTE: 16.47 E9/L (ref 1.8–7.3)
NEUTROPHILS ABSOLUTE: 3.97 E9/L (ref 1.8–7.3)
NEUTROPHILS ABSOLUTE: 4.77 E9/L (ref 1.8–7.3)
NEUTROPHILS ABSOLUTE: 5.1 E9/L (ref 1.8–7.3)
NEUTROPHILS ABSOLUTE: 5.51 E9/L (ref 1.8–7.3)
NEUTROPHILS ABSOLUTE: 5.58 E9/L (ref 1.8–7.3)
NEUTROPHILS ABSOLUTE: 6.09 E9/L (ref 1.8–7.3)
NEUTROPHILS ABSOLUTE: 6.29 E9/L (ref 1.8–7.3)
NEUTROPHILS ABSOLUTE: 7.86 E9/L (ref 1.8–7.3)
NEUTROPHILS ABSOLUTE: 8.28 E9/L (ref 1.8–7.3)
NEUTROPHILS ABSOLUTE: 8.31 E9/L (ref 1.8–7.3)
NEUTROPHILS ABSOLUTE: 8.62 E9/L (ref 1.8–7.3)
NEUTROPHILS ABSOLUTE: 9.71 E9/L (ref 1.8–7.3)
NEUTROPHILS ABSOLUTE: 9.71 E9/L (ref 1.8–7.3)
NEUTROPHILS RELATIVE PERCENT: 61.6 % (ref 43–80)
NEUTROPHILS RELATIVE PERCENT: 68.8 % (ref 43–80)
NEUTROPHILS RELATIVE PERCENT: 70.8 % (ref 43–80)
NEUTROPHILS RELATIVE PERCENT: 71.4 % (ref 43–80)
NEUTROPHILS RELATIVE PERCENT: 77.3 % (ref 43–80)
NEUTROPHILS RELATIVE PERCENT: 81.8 % (ref 43–80)
NEUTROPHILS RELATIVE PERCENT: 82.1 % (ref 43–80)
NEUTROPHILS RELATIVE PERCENT: 83.1 % (ref 43–80)
NEUTROPHILS RELATIVE PERCENT: 83.7 % (ref 43–80)
NEUTROPHILS RELATIVE PERCENT: 85.2 % (ref 43–80)
NEUTROPHILS RELATIVE PERCENT: 86.6 % (ref 43–80)
NEUTROPHILS RELATIVE PERCENT: 87.1 % (ref 43–80)
NEUTROPHILS RELATIVE PERCENT: 89.1 % (ref 43–80)
NEUTROPHILS RELATIVE PERCENT: 90.3 % (ref 43–80)
NEUTROPHILS RELATIVE PERCENT: 90.4 % (ref 43–80)
NEUTROPHILS RELATIVE PERCENT: 90.5 % (ref 43–80)
NEUTROPHILS RELATIVE PERCENT: 90.8 % (ref 43–80)
NEUTROPHILS RELATIVE PERCENT: 92.2 % (ref 43–80)
NITRITE, URINE: NEGATIVE
NUCLEATED RED BLOOD CELLS: 0.9 /100 WBC
O2 CONTENT: 11.7 ML/DL
O2 CONTENT: 13.6 ML/DL
O2 CONTENT: 14 ML/DL
O2 CONTENT: 14.1 ML/DL
O2 CONTENT: 14.9 ML/DL
O2 CONTENT: 15.1 ML/DL
O2 CONTENT: 15.2 ML/DL
O2 CONTENT: 15.2 ML/DL
O2 CONTENT: 15.4 ML/DL
O2 CONTENT: 15.6 ML/DL
O2 CONTENT: 15.6 ML/DL
O2 CONTENT: 15.7 ML/DL
O2 CONTENT: 15.8 ML/DL
O2 SATURATION: 76.1 % (ref 92–98.5)
O2 SATURATION: 86 % (ref 92–98.5)
O2 SATURATION: 86.1 % (ref 92–98.5)
O2 SATURATION: 86.6 % (ref 92–98.5)
O2 SATURATION: 90.4 % (ref 92–98.5)
O2 SATURATION: 92 % (ref 92–98.5)
O2 SATURATION: 92.9 % (ref 92–98.5)
O2 SATURATION: 93.8 % (ref 92–98.5)
O2 SATURATION: 94.4 % (ref 92–98.5)
O2 SATURATION: 94.4 % (ref 92–98.5)
O2 SATURATION: 94.8 % (ref 92–98.5)
O2 SATURATION: 96.3 % (ref 92–98.5)
O2 SATURATION: 96.4 % (ref 92–98.5)
O2 SATURATION: 97 % (ref 92–98.5)
O2HB: 75.3 % (ref 94–97)
O2HB: 84.9 % (ref 94–97)
O2HB: 85.1 % (ref 94–97)
O2HB: 85.8 % (ref 94–97)
O2HB: 89.8 % (ref 94–97)
O2HB: 91.5 % (ref 94–97)
O2HB: 92.1 % (ref 94–97)
O2HB: 93 % (ref 94–97)
O2HB: 93.6 % (ref 94–97)
O2HB: 93.7 % (ref 94–97)
O2HB: 94 % (ref 94–97)
O2HB: 95.8 % (ref 94–97)
O2HB: 96.3 % (ref 94–97)
OPERATOR ID: 1632
OPERATOR ID: 1632
OPERATOR ID: 1868
OPERATOR ID: 1874
OPERATOR ID: 2067
OPERATOR ID: 2067
OPERATOR ID: 2593
OPERATOR ID: 2863
OPERATOR ID: 2962
OPERATOR ID: 359
OPERATOR ID: 366
OPERATOR ID: 421
OPERATOR ID: 882
OPERATOR ID: ABNORMAL
ORGANISM: ABNORMAL
OVALOCYTES: ABNORMAL
PARAINFLUENZA VIRUS 1 BY PCR: NOT DETECTED
PARAINFLUENZA VIRUS 2 BY PCR: NOT DETECTED
PARAINFLUENZA VIRUS 3 BY PCR: NOT DETECTED
PARAINFLUENZA VIRUS 4 BY PCR: NOT DETECTED
PARATHYROID HORMONE INTACT: 37 PG/ML (ref 15–65)
PATIENT TEMP: 37 C
PCO2 ARTERIAL: 50.1 MMHG (ref 35–45)
PCO2: 37.8 MMHG (ref 35–45)
PCO2: 44.6 MMHG (ref 35–45)
PCO2: 47.4 MMHG (ref 35–45)
PCO2: 48.3 MMHG (ref 35–45)
PCO2: 49 MMHG (ref 35–45)
PCO2: 49.9 MMHG (ref 35–45)
PCO2: 50.4 MMHG (ref 35–45)
PCO2: 55.5 MMHG (ref 35–45)
PCO2: 55.8 MMHG (ref 35–45)
PCO2: 60 MMHG (ref 35–45)
PCO2: 62.7 MMHG (ref 35–45)
PCO2: 63 MMHG (ref 35–45)
PCO2: 67.5 MMHG (ref 35–45)
PDW BLD-RTO: 13.1 FL (ref 11.5–15)
PDW BLD-RTO: 13.1 FL (ref 11.5–15)
PDW BLD-RTO: 13.2 FL (ref 11.5–15)
PDW BLD-RTO: 13.3 FL (ref 11.5–15)
PDW BLD-RTO: 13.3 FL (ref 11.5–15)
PDW BLD-RTO: 13.4 FL (ref 11.5–15)
PDW BLD-RTO: 13.5 FL (ref 11.5–15)
PDW BLD-RTO: 13.6 FL (ref 11.5–15)
PDW BLD-RTO: 13.7 FL (ref 11.5–15)
PDW BLD-RTO: 13.8 FL (ref 11.5–15)
PDW BLD-RTO: 14 FL (ref 11.5–15)
PDW BLD-RTO: 14.1 FL (ref 11.5–15)
PDW BLD-RTO: 14.1 FL (ref 11.5–15)
PDW BLD-RTO: 14.4 FL (ref 11.5–15)
PDW BLD-RTO: 14.5 FL (ref 11.5–15)
PDW BLD-RTO: 14.6 FL (ref 11.5–15)
PDW BLD-RTO: 14.7 FL (ref 11.5–15)
PDW BLD-RTO: 15.7 FL (ref 11.5–15)
PEEP/CPAP: 12 CMH2O
PEEP/CPAP: 14 CMH2O
PEEP/CPAP: 16 CMH2O
PEEP/CPAP: 5 CMH2O
PEEP/CPAP: 5 CMH2O
PEEP/CPAP: 9 CMH2O
PERFORMED ON: ABNORMAL
PFO2: 0.44 MMHG/%
PFO2: 0.53 MMHG/%
PFO2: 0.55 MMHG/%
PFO2: 0.57 MMHG/%
PFO2: 0.59 MMHG/%
PFO2: 0.67 MMHG/%
PFO2: 0.71 MMHG/%
PFO2: 0.73 MMHG/%
PFO2: 0.78 MMHG/%
PFO2: 0.78 MMHG/%
PFO2: 0.9 MMHG/%
PFO2: 1 MMHG/%
PH BLOOD GAS: 7.29 (ref 7.35–7.45)
PH BLOOD GAS: 7.31 (ref 7.35–7.45)
PH BLOOD GAS: 7.34 (ref 7.35–7.45)
PH BLOOD GAS: 7.35 (ref 7.35–7.45)
PH BLOOD GAS: 7.36 (ref 7.35–7.45)
PH BLOOD GAS: 7.37 (ref 7.35–7.45)
PH BLOOD GAS: 7.38 (ref 7.35–7.45)
PH BLOOD GAS: 7.41 (ref 7.35–7.45)
PH BLOOD GAS: 7.44 (ref 7.35–7.45)
PH BLOOD GAS: 7.45 (ref 7.35–7.45)
PH BLOOD GAS: 7.45 (ref 7.35–7.45)
PH BLOOD GAS: 7.47 (ref 7.35–7.45)
PH BLOOD GAS: 7.48 (ref 7.35–7.45)
PH BLOOD GAS: 7.54 (ref 7.35–7.45)
PH UA: 7 (ref 5–9)
PIP: 12 CMH2O
PLATELET # BLD: 190 E9/L (ref 130–450)
PLATELET # BLD: 191 E9/L (ref 130–450)
PLATELET # BLD: 196 E9/L (ref 130–450)
PLATELET # BLD: 198 E9/L (ref 130–450)
PLATELET # BLD: 203 E9/L (ref 130–450)
PLATELET # BLD: 205 E9/L (ref 130–450)
PLATELET # BLD: 209 E9/L (ref 130–450)
PLATELET # BLD: 209 E9/L (ref 130–450)
PLATELET # BLD: 212 E9/L (ref 130–450)
PLATELET # BLD: 216 E9/L (ref 130–450)
PLATELET # BLD: 222 E9/L (ref 130–450)
PLATELET # BLD: 226 E9/L (ref 130–450)
PLATELET # BLD: 229 E9/L (ref 130–450)
PLATELET # BLD: 239 E9/L (ref 130–450)
PLATELET # BLD: 247 E9/L (ref 130–450)
PLATELET # BLD: 249 E9/L (ref 130–450)
PLATELET # BLD: 254 E9/L (ref 130–450)
PLATELET # BLD: 266 E9/L (ref 130–450)
PLATELET # BLD: 291 E9/L (ref 130–450)
PLATELET # BLD: 304 E9/L (ref 130–450)
PLATELET # BLD: 362 E9/L (ref 130–450)
PLATELET # BLD: 383 E9/L (ref 130–450)
PMV BLD AUTO: 10.1 FL (ref 7–12)
PMV BLD AUTO: 10.2 FL (ref 7–12)
PMV BLD AUTO: 10.3 FL (ref 7–12)
PMV BLD AUTO: 10.4 FL (ref 7–12)
PMV BLD AUTO: 9.6 FL (ref 7–12)
PMV BLD AUTO: 9.7 FL (ref 7–12)
PMV BLD AUTO: 9.8 FL (ref 7–12)
PMV BLD AUTO: 9.8 FL (ref 7–12)
PMV BLD AUTO: 9.9 FL (ref 7–12)
PO2 ARTERIAL: 85.3 MMHG (ref 60–80)
PO2: 44.5 MMHG (ref 75–100)
PO2: 51.2 MMHG (ref 75–100)
PO2: 53 MMHG (ref 75–100)
PO2: 54.5 MMHG (ref 75–100)
PO2: 58.7 MMHG (ref 75–100)
PO2: 64 MMHG (ref 75–100)
PO2: 65.9 MMHG (ref 75–100)
PO2: 66.6 MMHG (ref 75–100)
PO2: 67.7 MMHG (ref 75–100)
PO2: 77.6 MMHG (ref 75–100)
PO2: 78 MMHG (ref 75–100)
PO2: 84.6 MMHG (ref 75–100)
PO2: 92.7 MMHG (ref 75–100)
POC CHLORIDE: 101 MMOL/L (ref 100–108)
POC CREATININE: 1.1 MG/DL (ref 0.7–1.2)
POC POTASSIUM: 5.3 MMOL/L (ref 3.5–5)
POC SODIUM: 140 MMOL/L (ref 132–146)
POIKILOCYTES: ABNORMAL
POLYCHROMASIA: ABNORMAL
POSITIVE END EXP PRESS: 5 CMH2O
POTASSIUM REFLEX MAGNESIUM: 3.7 MMOL/L (ref 3.5–5)
POTASSIUM REFLEX MAGNESIUM: 3.9 MMOL/L (ref 3.5–5)
POTASSIUM REFLEX MAGNESIUM: 3.9 MMOL/L (ref 3.5–5)
POTASSIUM REFLEX MAGNESIUM: 4.1 MMOL/L (ref 3.5–5)
POTASSIUM REFLEX MAGNESIUM: 4.2 MMOL/L (ref 3.5–5)
POTASSIUM REFLEX MAGNESIUM: 4.2 MMOL/L (ref 3.5–5)
POTASSIUM REFLEX MAGNESIUM: 4.3 MMOL/L (ref 3.5–5)
POTASSIUM REFLEX MAGNESIUM: 4.4 MMOL/L (ref 3.5–5)
POTASSIUM REFLEX MAGNESIUM: 4.5 MMOL/L (ref 3.5–5)
POTASSIUM REFLEX MAGNESIUM: 4.6 MMOL/L (ref 3.5–5)
POTASSIUM REFLEX MAGNESIUM: 4.9 MMOL/L (ref 3.5–5)
POTASSIUM REFLEX MAGNESIUM: 5.3 MMOL/L (ref 3.5–5)
POTASSIUM REFLEX MAGNESIUM: 6.2 MMOL/L (ref 3.5–5)
POTASSIUM SERPL-SCNC: 3.4 MMOL/L (ref 3.5–5)
POTASSIUM SERPL-SCNC: 3.6 MMOL/L (ref 3.5–5)
POTASSIUM SERPL-SCNC: 3.7 MMOL/L (ref 3.5–5)
POTASSIUM SERPL-SCNC: 3.8 MMOL/L (ref 3.5–5)
POTASSIUM SERPL-SCNC: 3.9 MMOL/L (ref 3.5–5)
POTASSIUM SERPL-SCNC: 3.9 MMOL/L (ref 3.5–5)
POTASSIUM SERPL-SCNC: 4 MMOL/L (ref 3.5–5)
POTASSIUM SERPL-SCNC: 4 MMOL/L (ref 3.5–5)
POTASSIUM SERPL-SCNC: 4.1 MMOL/L (ref 3.5–5)
POTASSIUM SERPL-SCNC: 4.1 MMOL/L (ref 3.5–5)
POTASSIUM SERPL-SCNC: 4.2 MMOL/L (ref 3.5–5)
POTASSIUM SERPL-SCNC: 4.4 MMOL/L (ref 3.5–5)
POTASSIUM SERPL-SCNC: 4.5 MMOL/L (ref 3.5–5)
POTASSIUM SERPL-SCNC: 4.5 MMOL/L (ref 3.5–5)
POTASSIUM SERPL-SCNC: 4.6 MMOL/L (ref 3.5–5)
POTASSIUM SERPL-SCNC: 4.6 MMOL/L (ref 3.5–5)
POTASSIUM SERPL-SCNC: 4.8 MMOL/L (ref 3.5–5)
POTASSIUM SERPL-SCNC: 5.4 MMOL/L (ref 3.5–5)
POTASSIUM SERPL-SCNC: 5.5 MMOL/L (ref 3.5–5)
POTASSIUM SERPL-SCNC: 5.8 MMOL/L (ref 3.5–5)
PRESSURE SUPPORT: 15 CMH2O
PRO-BNP: 1648 PG/ML (ref 0–125)
PRO-BNP: 1754 PG/ML (ref 0–125)
PRO-BNP: 1760 PG/ML (ref 0–125)
PRO-BNP: 4001 PG/ML (ref 0–125)
PRO-BNP: 4425 PG/ML (ref 0–125)
PRO-BNP: 5077 PG/ML (ref 0–125)
PROCALCITONIN: 0.08 NG/ML (ref 0–0.08)
PROCALCITONIN: 0.08 NG/ML (ref 0–0.08)
PROCALCITONIN: 0.09 NG/ML (ref 0–0.08)
PROCALCITONIN: 0.13 NG/ML (ref 0–0.08)
PROTEIN UA: NEGATIVE MG/DL
PROTHROMBIN TIME: 16.5 SEC (ref 9.3–12.4)
PROTHROMBIN TIME: 19.9 SEC (ref 9.3–12.4)
PS: 15 CMH20
PS: 18 CMH20
RBC # BLD: 2.5 E12/L (ref 3.8–5.8)
RBC # BLD: 2.55 E12/L (ref 3.8–5.8)
RBC # BLD: 2.82 E12/L (ref 3.8–5.8)
RBC # BLD: 2.88 E12/L (ref 3.8–5.8)
RBC # BLD: 3.29 E12/L (ref 3.8–5.8)
RBC # BLD: 3.36 E12/L (ref 3.8–5.8)
RBC # BLD: 3.39 E12/L (ref 3.8–5.8)
RBC # BLD: 3.39 E12/L (ref 3.8–5.8)
RBC # BLD: 3.5 E12/L (ref 3.8–5.8)
RBC # BLD: 3.51 E12/L (ref 3.8–5.8)
RBC # BLD: 3.52 E12/L (ref 3.8–5.8)
RBC # BLD: 3.55 E12/L (ref 3.8–5.8)
RBC # BLD: 3.57 E12/L (ref 3.8–5.8)
RBC # BLD: 3.58 E12/L (ref 3.8–5.8)
RBC # BLD: 3.61 E12/L (ref 3.8–5.8)
RBC # BLD: 3.75 E12/L (ref 3.8–5.8)
RBC # BLD: 3.83 E12/L (ref 3.8–5.8)
RBC # BLD: 3.85 E12/L (ref 3.8–5.8)
RBC # BLD: 3.91 E12/L (ref 3.8–5.8)
RBC # BLD: 3.95 E12/L (ref 3.8–5.8)
RBC # BLD: 3.98 E12/L (ref 3.8–5.8)
RBC # BLD: 4.4 E12/L (ref 3.8–5.8)
REASON FOR REJECTION: NORMAL
REJECTED TEST: NORMAL
RESPIRATORY SYNCYTIAL VIRUS BY PCR: NOT DETECTED
RI(T): 10.09
RI(T): 10.47
RI(T): 10.85
RI(T): 13.05
RI(T): 5.29
RI(T): 5.92
RI(T): 6.96
RI(T): 7.15
RI(T): 7.81
RI(T): 7.92
RI(T): 8.5
RI(T): 9.89
RR MECHANICAL: 20 B/MIN
RR MECHANICAL: 24 B/MIN
SARS-COV-2, NAAT: NOT DETECTED
SARS-COV-2, PCR: DETECTED
SARS-COV-2, PCR: DETECTED
SCHISTOCYTES: ABNORMAL
SMEAR, RESPIRATORY: ABNORMAL
SODIUM BLD-SCNC: 131 MMOL/L (ref 132–146)
SODIUM BLD-SCNC: 134 MMOL/L (ref 132–146)
SODIUM BLD-SCNC: 135 MMOL/L (ref 132–146)
SODIUM BLD-SCNC: 136 MMOL/L (ref 132–146)
SODIUM BLD-SCNC: 136 MMOL/L (ref 132–146)
SODIUM BLD-SCNC: 137 MMOL/L (ref 132–146)
SODIUM BLD-SCNC: 138 MMOL/L (ref 132–146)
SODIUM BLD-SCNC: 138 MMOL/L (ref 132–146)
SODIUM BLD-SCNC: 139 MMOL/L (ref 132–146)
SODIUM BLD-SCNC: 140 MMOL/L (ref 132–146)
SODIUM BLD-SCNC: 141 MMOL/L (ref 132–146)
SODIUM BLD-SCNC: 141 MMOL/L (ref 132–146)
SODIUM BLD-SCNC: 142 MMOL/L (ref 132–146)
SODIUM BLD-SCNC: 142 MMOL/L (ref 132–146)
SODIUM BLD-SCNC: 143 MMOL/L (ref 132–146)
SODIUM BLD-SCNC: 144 MMOL/L (ref 132–146)
SODIUM BLD-SCNC: 144 MMOL/L (ref 132–146)
SODIUM BLD-SCNC: 145 MMOL/L (ref 132–146)
SODIUM BLD-SCNC: 147 MMOL/L (ref 132–146)
SOURCE, BLOOD GAS: ABNORMAL
SPECIFIC GRAVITY UA: 1.01 (ref 1–1.03)
STREP PNEUMONIAE ANTIGEN, URINE: NORMAL
STREP PNEUMONIAE ANTIGEN, URINE: NORMAL
TARGET CELLS: ABNORMAL
TEAR DROP CELLS: ABNORMAL
THB: 10.3 G/DL (ref 11.5–16.5)
THB: 10.7 G/DL (ref 11.5–16.5)
THB: 11.5 G/DL (ref 11.5–16.5)
THB: 11.6 G/DL (ref 11.5–16.5)
THB: 11.8 G/DL (ref 11.5–16.5)
THB: 11.8 G/DL (ref 11.5–16.5)
THB: 12.2 G/DL (ref 11.5–16.5)
THB: 12.3 G/DL (ref 11.5–16.5)
THB: 12.5 G/DL (ref 11.5–16.5)
THB: 12.7 G/DL (ref 11.5–16.5)
THB: 12.9 G/DL (ref 11.5–16.5)
THB: 13 G/DL (ref 11.5–16.5)
THB: 8.5 G/DL (ref 11.5–16.5)
TIME ANALYZED: 1237
TIME ANALYZED: 1315
TIME ANALYZED: 1317
TIME ANALYZED: 1434
TIME ANALYZED: 150
TIME ANALYZED: 1910
TIME ANALYZED: 1917
TIME ANALYZED: 2052
TIME ANALYZED: 330
TIME ANALYZED: 503
TIME ANALYZED: 509
TIME ANALYZED: 602
TIME ANALYZED: 649
TOTAL CK: 107 U/L (ref 20–200)
TOTAL CK: 108 U/L (ref 20–200)
TOTAL CK: 118 U/L (ref 20–200)
TOTAL CK: 233 U/L (ref 20–200)
TOTAL CK: 47 U/L (ref 20–200)
TOTAL CK: 49 U/L (ref 20–200)
TOTAL CK: 50 U/L (ref 20–200)
TOTAL CK: 50 U/L (ref 20–200)
TOTAL CK: 51 U/L (ref 20–200)
TOTAL CK: 55 U/L (ref 20–200)
TOTAL CK: 57 U/L (ref 20–200)
TOTAL CK: 62 U/L (ref 20–200)
TOTAL CK: 63 U/L (ref 20–200)
TOTAL CK: 68 U/L (ref 20–200)
TOTAL CK: 68 U/L (ref 20–200)
TOTAL CK: 71 U/L (ref 20–200)
TOTAL CK: 73 U/L (ref 20–200)
TOTAL CK: 84 U/L (ref 20–200)
TOTAL CK: 88 U/L (ref 20–200)
TOTAL CK: 90 U/L (ref 20–200)
TOTAL CK: 96 U/L (ref 20–200)
TOTAL IRON BINDING CAPACITY: 170 MCG/DL (ref 250–450)
TOTAL IRON BINDING CAPACITY: 200 MCG/DL (ref 250–450)
TOTAL PROTEIN: 4.9 G/DL (ref 6.4–8.3)
TOTAL PROTEIN: 5.1 G/DL (ref 6.4–8.3)
TOTAL PROTEIN: 5.2 G/DL (ref 6.4–8.3)
TOTAL PROTEIN: 5.4 G/DL (ref 6.4–8.3)
TOTAL PROTEIN: 5.5 G/DL (ref 6.4–8.3)
TOTAL PROTEIN: 5.6 G/DL (ref 6.4–8.3)
TOTAL PROTEIN: 5.7 G/DL (ref 6.4–8.3)
TOTAL PROTEIN: 5.7 G/DL (ref 6.4–8.3)
TOTAL PROTEIN: 5.8 G/DL (ref 6.4–8.3)
TOTAL PROTEIN: 5.9 G/DL (ref 6.4–8.3)
TOTAL PROTEIN: 5.9 G/DL (ref 6.4–8.3)
TOTAL PROTEIN: 6.1 G/DL (ref 6.4–8.3)
TOTAL PROTEIN: 6.2 G/DL (ref 6.4–8.3)
TOTAL PROTEIN: 6.2 G/DL (ref 6.4–8.3)
TOTAL PROTEIN: 6.3 G/DL (ref 6.4–8.3)
TOTAL PROTEIN: 6.3 G/DL (ref 6.4–8.3)
TOTAL PROTEIN: 7 G/DL (ref 6.4–8.3)
TOTAL PROTEIN: 7.1 G/DL (ref 6.4–8.3)
TROPONIN: 0.07 NG/ML (ref 0–0.03)
TROPONIN: 0.11 NG/ML (ref 0–0.03)
TROPONIN: 0.2 NG/ML (ref 0–0.03)
TROPONIN: 0.21 NG/ML (ref 0–0.03)
TROPONIN: 0.21 NG/ML (ref 0–0.03)
TROPONIN: 0.22 NG/ML (ref 0–0.03)
TROPONIN: 0.27 NG/ML (ref 0–0.03)
TROPONIN: 0.32 NG/ML (ref 0–0.03)
TROPONIN: 0.39 NG/ML (ref 0–0.03)
TROPONIN: 0.4 NG/ML (ref 0–0.03)
TROPONIN: 0.42 NG/ML (ref 0–0.03)
TROPONIN: 0.45 NG/ML (ref 0–0.03)
TROPONIN: 0.62 NG/ML (ref 0–0.03)
TROPONIN: 0.66 NG/ML (ref 0–0.03)
TROPONIN: 0.72 NG/ML (ref 0–0.03)
TROPONIN: 0.81 NG/ML (ref 0–0.03)
TROPONIN: 0.88 NG/ML (ref 0–0.03)
TROPONIN: 0.88 NG/ML (ref 0–0.03)
TROPONIN: 0.98 NG/ML (ref 0–0.03)
TROPONIN: <0.01 NG/ML (ref 0–0.03)
UROBILINOGEN, URINE: 0.2 E.U./DL
VITAMIN B-12: 452 PG/ML (ref 211–946)
VITAMIN B-12: 659 PG/ML (ref 211–946)
VITAMIN D 25-HYDROXY: 26 NG/ML (ref 30–100)
VT MECHANICAL: 450 ML
VT MECHANICAL: 500 ML
WBC # BLD: 10 E9/L (ref 4.5–11.5)
WBC # BLD: 10 E9/L (ref 4.5–11.5)
WBC # BLD: 11.2 E9/L (ref 4.5–11.5)
WBC # BLD: 11.2 E9/L (ref 4.5–11.5)
WBC # BLD: 12.8 E9/L (ref 4.5–11.5)
WBC # BLD: 13.6 E9/L (ref 4.5–11.5)
WBC # BLD: 13.6 E9/L (ref 4.5–11.5)
WBC # BLD: 13.7 E9/L (ref 4.5–11.5)
WBC # BLD: 14.1 E9/L (ref 4.5–11.5)
WBC # BLD: 14.9 E9/L (ref 4.5–11.5)
WBC # BLD: 17.9 E9/L (ref 4.5–11.5)
WBC # BLD: 5.3 E9/L (ref 4.5–11.5)
WBC # BLD: 5.3 E9/L (ref 4.5–11.5)
WBC # BLD: 6.3 E9/L (ref 4.5–11.5)
WBC # BLD: 6.5 E9/L (ref 4.5–11.5)
WBC # BLD: 7.2 E9/L (ref 4.5–11.5)
WBC # BLD: 7.3 E9/L (ref 4.5–11.5)
WBC # BLD: 8.1 E9/L (ref 4.5–11.5)
WBC # BLD: 8.1 E9/L (ref 4.5–11.5)
WBC # BLD: 9.6 E9/L (ref 4.5–11.5)
WBC # BLD: 9.7 E9/L (ref 4.5–11.5)
WBC # BLD: 9.7 E9/L (ref 4.5–11.5)

## 2020-01-01 PROCEDURE — 1123F ACP DISCUSS/DSCN MKR DOCD: CPT | Performed by: INTERNAL MEDICINE

## 2020-01-01 PROCEDURE — P9016 RBC LEUKOCYTES REDUCED: HCPCS

## 2020-01-01 PROCEDURE — 83735 ASSAY OF MAGNESIUM: CPT

## 2020-01-01 PROCEDURE — 6360000002 HC RX W HCPCS: Performed by: INTERNAL MEDICINE

## 2020-01-01 PROCEDURE — 6370000000 HC RX 637 (ALT 250 FOR IP): Performed by: NURSE PRACTITIONER

## 2020-01-01 PROCEDURE — 6370000000 HC RX 637 (ALT 250 FOR IP): Performed by: FAMILY MEDICINE

## 2020-01-01 PROCEDURE — 85025 COMPLETE CBC W/AUTO DIFF WBC: CPT

## 2020-01-01 PROCEDURE — 2580000003 HC RX 258: Performed by: INTERNAL MEDICINE

## 2020-01-01 PROCEDURE — 80048 BASIC METABOLIC PNL TOTAL CA: CPT

## 2020-01-01 PROCEDURE — 6360000002 HC RX W HCPCS: Performed by: EMERGENCY MEDICINE

## 2020-01-01 PROCEDURE — 86850 RBC ANTIBODY SCREEN: CPT

## 2020-01-01 PROCEDURE — 99223 1ST HOSP IP/OBS HIGH 75: CPT | Performed by: INTERNAL MEDICINE

## 2020-01-01 PROCEDURE — 80076 HEPATIC FUNCTION PANEL: CPT

## 2020-01-01 PROCEDURE — 85027 COMPLETE CBC AUTOMATED: CPT

## 2020-01-01 PROCEDURE — 85378 FIBRIN DEGRADE SEMIQUANT: CPT

## 2020-01-01 PROCEDURE — C1769 GUIDE WIRE: HCPCS

## 2020-01-01 PROCEDURE — 85014 HEMATOCRIT: CPT

## 2020-01-01 PROCEDURE — 82553 CREATINE MB FRACTION: CPT

## 2020-01-01 PROCEDURE — 80053 COMPREHEN METABOLIC PANEL: CPT

## 2020-01-01 PROCEDURE — 93010 ELECTROCARDIOGRAM REPORT: CPT | Performed by: INTERNAL MEDICINE

## 2020-01-01 PROCEDURE — 94660 CPAP INITIATION&MGMT: CPT

## 2020-01-01 PROCEDURE — 2700000000 HC OXYGEN THERAPY PER DAY

## 2020-01-01 PROCEDURE — 83880 ASSAY OF NATRIURETIC PEPTIDE: CPT

## 2020-01-01 PROCEDURE — 6370000000 HC RX 637 (ALT 250 FOR IP): Performed by: INTERNAL MEDICINE

## 2020-01-01 PROCEDURE — 2140000000 HC CCU INTERMEDIATE R&B

## 2020-01-01 PROCEDURE — 36415 COLL VENOUS BLD VENIPUNCTURE: CPT

## 2020-01-01 PROCEDURE — 87040 BLOOD CULTURE FOR BACTERIA: CPT

## 2020-01-01 PROCEDURE — 83550 IRON BINDING TEST: CPT

## 2020-01-01 PROCEDURE — 99222 1ST HOSP IP/OBS MODERATE 55: CPT | Performed by: INTERNAL MEDICINE

## 2020-01-01 PROCEDURE — 0202U NFCT DS 22 TRGT SARS-COV-2: CPT

## 2020-01-01 PROCEDURE — 93005 ELECTROCARDIOGRAM TRACING: CPT | Performed by: INTERNAL MEDICINE

## 2020-01-01 PROCEDURE — 85730 THROMBOPLASTIN TIME PARTIAL: CPT

## 2020-01-01 PROCEDURE — 6360000002 HC RX W HCPCS: Performed by: GENERAL PRACTICE

## 2020-01-01 PROCEDURE — 6370000000 HC RX 637 (ALT 250 FOR IP)

## 2020-01-01 PROCEDURE — 6360000002 HC RX W HCPCS: Performed by: FAMILY MEDICINE

## 2020-01-01 PROCEDURE — 2580000003 HC RX 258: Performed by: FAMILY MEDICINE

## 2020-01-01 PROCEDURE — 84145 PROCALCITONIN (PCT): CPT

## 2020-01-01 PROCEDURE — 2580000003 HC RX 258: Performed by: EMERGENCY MEDICINE

## 2020-01-01 PROCEDURE — 94640 AIRWAY INHALATION TREATMENT: CPT

## 2020-01-01 PROCEDURE — 36410 VNPNXR 3YR/> PHY/QHP DX/THER: CPT

## 2020-01-01 PROCEDURE — 5A1955Z RESPIRATORY VENTILATION, GREATER THAN 96 CONSECUTIVE HOURS: ICD-10-PCS | Performed by: INTERNAL MEDICINE

## 2020-01-01 PROCEDURE — 94761 N-INVAS EAR/PLS OXIMETRY MLT: CPT

## 2020-01-01 PROCEDURE — 31500 INSERT EMERGENCY AIRWAY: CPT | Performed by: ANESTHESIOLOGY

## 2020-01-01 PROCEDURE — 94002 VENT MGMT INPAT INIT DAY: CPT

## 2020-01-01 PROCEDURE — 71045 X-RAY EXAM CHEST 1 VIEW: CPT

## 2020-01-01 PROCEDURE — 2500000003 HC RX 250 WO HCPCS: Performed by: FAMILY MEDICINE

## 2020-01-01 PROCEDURE — 2500000003 HC RX 250 WO HCPCS: Performed by: INTERNAL MEDICINE

## 2020-01-01 PROCEDURE — 2060000000 HC ICU INTERMEDIATE R&B

## 2020-01-01 PROCEDURE — 36592 COLLECT BLOOD FROM PICC: CPT

## 2020-01-01 PROCEDURE — 87206 SMEAR FLUORESCENT/ACID STAI: CPT

## 2020-01-01 PROCEDURE — 84484 ASSAY OF TROPONIN QUANT: CPT

## 2020-01-01 PROCEDURE — 4A023N7 MEASUREMENT OF CARDIAC SAMPLING AND PRESSURE, LEFT HEART, PERCUTANEOUS APPROACH: ICD-10-PCS | Performed by: INTERNAL MEDICINE

## 2020-01-01 PROCEDURE — 97530 THERAPEUTIC ACTIVITIES: CPT

## 2020-01-01 PROCEDURE — 82330 ASSAY OF CALCIUM: CPT

## 2020-01-01 PROCEDURE — C1887 CATHETER, GUIDING: HCPCS

## 2020-01-01 PROCEDURE — 36430 TRANSFUSION BLD/BLD COMPNT: CPT

## 2020-01-01 PROCEDURE — 85018 HEMOGLOBIN: CPT

## 2020-01-01 PROCEDURE — 82550 ASSAY OF CK (CPK): CPT

## 2020-01-01 PROCEDURE — 6360000002 HC RX W HCPCS

## 2020-01-01 PROCEDURE — 99284 EMERGENCY DEPT VISIT MOD MDM: CPT

## 2020-01-01 PROCEDURE — 36620 INSERTION CATHETER ARTERY: CPT | Performed by: INTERNAL MEDICINE

## 2020-01-01 PROCEDURE — 82805 BLOOD GASES W/O2 SATURATION: CPT

## 2020-01-01 PROCEDURE — 96374 THER/PROPH/DIAG INJ IV PUSH: CPT

## 2020-01-01 PROCEDURE — APPSS60 APP SPLIT SHARED TIME 46-60 MINUTES: Performed by: NURSE PRACTITIONER

## 2020-01-01 PROCEDURE — 86901 BLOOD TYPING SEROLOGIC RH(D): CPT

## 2020-01-01 PROCEDURE — 82607 VITAMIN B-12: CPT

## 2020-01-01 PROCEDURE — 0100U HC RESPIRPTHGN MULT REV TRANS & AMP PRB TECH 21 TRGT: CPT

## 2020-01-01 PROCEDURE — 93005 ELECTROCARDIOGRAM TRACING: CPT | Performed by: EMERGENCY MEDICINE

## 2020-01-01 PROCEDURE — 99233 SBSQ HOSP IP/OBS HIGH 50: CPT | Performed by: INTERNAL MEDICINE

## 2020-01-01 PROCEDURE — APPSS45 APP SPLIT SHARED TIME 31-45 MINUTES: Performed by: NURSE PRACTITIONER

## 2020-01-01 PROCEDURE — 83970 ASSAY OF PARATHORMONE: CPT

## 2020-01-01 PROCEDURE — 99232 SBSQ HOSP IP/OBS MODERATE 35: CPT | Performed by: NURSE PRACTITIONER

## 2020-01-01 PROCEDURE — 36620 INSERTION CATHETER ARTERY: CPT

## 2020-01-01 PROCEDURE — 97165 OT EVAL LOW COMPLEX 30 MIN: CPT

## 2020-01-01 PROCEDURE — 86140 C-REACTIVE PROTEIN: CPT

## 2020-01-01 PROCEDURE — 2500000003 HC RX 250 WO HCPCS

## 2020-01-01 PROCEDURE — 71046 X-RAY EXAM CHEST 2 VIEWS: CPT

## 2020-01-01 PROCEDURE — 82746 ASSAY OF FOLIC ACID SERUM: CPT

## 2020-01-01 PROCEDURE — 2000000000 HC ICU R&B

## 2020-01-01 PROCEDURE — 96375 TX/PRO/DX INJ NEW DRUG ADDON: CPT

## 2020-01-01 PROCEDURE — 82947 ASSAY GLUCOSE BLOOD QUANT: CPT

## 2020-01-01 PROCEDURE — 6360000004 HC RX CONTRAST MEDICATION: Performed by: RADIOLOGY

## 2020-01-01 PROCEDURE — 83540 ASSAY OF IRON: CPT

## 2020-01-01 PROCEDURE — 93458 L HRT ARTERY/VENTRICLE ANGIO: CPT | Performed by: INTERNAL MEDICINE

## 2020-01-01 PROCEDURE — 0BH18EZ INSERTION OF ENDOTRACHEAL AIRWAY INTO TRACHEA, VIA NATURAL OR ARTIFICIAL OPENING ENDOSCOPIC: ICD-10-PCS | Performed by: INTERNAL MEDICINE

## 2020-01-01 PROCEDURE — 96365 THER/PROPH/DIAG IV INF INIT: CPT

## 2020-01-01 PROCEDURE — 74018 RADEX ABDOMEN 1 VIEW: CPT

## 2020-01-01 PROCEDURE — 99285 EMERGENCY DEPT VISIT HI MDM: CPT

## 2020-01-01 PROCEDURE — 82803 BLOOD GASES ANY COMBINATION: CPT

## 2020-01-01 PROCEDURE — 94003 VENT MGMT INPAT SUBQ DAY: CPT

## 2020-01-01 PROCEDURE — 71275 CT ANGIOGRAPHY CHEST: CPT

## 2020-01-01 PROCEDURE — 87070 CULTURE OTHR SPECIMN AEROBIC: CPT

## 2020-01-01 PROCEDURE — 2580000003 HC RX 258: Performed by: SPECIALIST

## 2020-01-01 PROCEDURE — G8417 CALC BMI ABV UP PARAM F/U: HCPCS | Performed by: INTERNAL MEDICINE

## 2020-01-01 PROCEDURE — 86900 BLOOD TYPING SEROLOGIC ABO: CPT

## 2020-01-01 PROCEDURE — 6360000002 HC RX W HCPCS: Performed by: SPECIALIST

## 2020-01-01 PROCEDURE — 96367 TX/PROPH/DG ADDL SEQ IV INF: CPT

## 2020-01-01 PROCEDURE — 3017F COLORECTAL CA SCREEN DOC REV: CPT | Performed by: INTERNAL MEDICINE

## 2020-01-01 PROCEDURE — 31500 INSERT EMERGENCY AIRWAY: CPT

## 2020-01-01 PROCEDURE — 83605 ASSAY OF LACTIC ACID: CPT

## 2020-01-01 PROCEDURE — 37799 UNLISTED PX VASCULAR SURGERY: CPT

## 2020-01-01 PROCEDURE — 87077 CULTURE AEROBIC IDENTIFY: CPT

## 2020-01-01 PROCEDURE — 93306 TTE W/DOPPLER COMPLETE: CPT

## 2020-01-01 PROCEDURE — 2500000003 HC RX 250 WO HCPCS: Performed by: EMERGENCY MEDICINE

## 2020-01-01 PROCEDURE — 02HV33Z INSERTION OF INFUSION DEVICE INTO SUPERIOR VENA CAVA, PERCUTANEOUS APPROACH: ICD-10-PCS | Performed by: HOSPITALIST

## 2020-01-01 PROCEDURE — XW033E5 INTRODUCTION OF REMDESIVIR ANTI-INFECTIVE INTO PERIPHERAL VEIN, PERCUTANEOUS APPROACH, NEW TECHNOLOGY GROUP 5: ICD-10-PCS | Performed by: INTERNAL MEDICINE

## 2020-01-01 PROCEDURE — 4040F PNEUMOC VAC/ADMIN/RCVD: CPT | Performed by: INTERNAL MEDICINE

## 2020-01-01 PROCEDURE — 99221 1ST HOSP IP/OBS SF/LOW 40: CPT | Performed by: INTERNAL MEDICINE

## 2020-01-01 PROCEDURE — C1894 INTRO/SHEATH, NON-LASER: HCPCS

## 2020-01-01 PROCEDURE — 82435 ASSAY OF BLOOD CHLORIDE: CPT

## 2020-01-01 PROCEDURE — 82306 VITAMIN D 25 HYDROXY: CPT

## 2020-01-01 PROCEDURE — 93454 CORONARY ARTERY ANGIO S&I: CPT

## 2020-01-01 PROCEDURE — 85610 PROTHROMBIN TIME: CPT

## 2020-01-01 PROCEDURE — C9113 INJ PANTOPRAZOLE SODIUM, VIA: HCPCS | Performed by: INTERNAL MEDICINE

## 2020-01-01 PROCEDURE — 84132 ASSAY OF SERUM POTASSIUM: CPT

## 2020-01-01 PROCEDURE — 94770 HC ETCO2 MONITOR DAILY: CPT

## 2020-01-01 PROCEDURE — 86923 COMPATIBILITY TEST ELECTRIC: CPT

## 2020-01-01 PROCEDURE — 1111F DSCHRG MED/CURRENT MED MERGE: CPT | Performed by: INTERNAL MEDICINE

## 2020-01-01 PROCEDURE — 2709999900 HC NON-CHARGEABLE SUPPLY

## 2020-01-01 PROCEDURE — P9047 ALBUMIN (HUMAN), 25%, 50ML: HCPCS | Performed by: INTERNAL MEDICINE

## 2020-01-01 PROCEDURE — 82565 ASSAY OF CREATININE: CPT

## 2020-01-01 PROCEDURE — 6360000002 HC RX W HCPCS: Performed by: NURSE PRACTITIONER

## 2020-01-01 PROCEDURE — 96366 THER/PROPH/DIAG IV INF ADDON: CPT

## 2020-01-01 PROCEDURE — 76937 US GUIDE VASCULAR ACCESS: CPT

## 2020-01-01 PROCEDURE — APPSS30 APP SPLIT SHARED TIME 16-30 MINUTES: Performed by: PHYSICIAN ASSISTANT

## 2020-01-01 PROCEDURE — 96361 HYDRATE IV INFUSION ADD-ON: CPT

## 2020-01-01 PROCEDURE — 51702 INSERT TEMP BLADDER CATH: CPT

## 2020-01-01 PROCEDURE — 93005 ELECTROCARDIOGRAM TRACING: CPT | Performed by: FAMILY MEDICINE

## 2020-01-01 PROCEDURE — 81003 URINALYSIS AUTO W/O SCOPE: CPT

## 2020-01-01 PROCEDURE — 6370000000 HC RX 637 (ALT 250 FOR IP): Performed by: EMERGENCY MEDICINE

## 2020-01-01 PROCEDURE — 94664 DEMO&/EVAL PT USE INHALER: CPT

## 2020-01-01 PROCEDURE — 82533 TOTAL CORTISOL: CPT

## 2020-01-01 PROCEDURE — 84295 ASSAY OF SERUM SODIUM: CPT

## 2020-01-01 PROCEDURE — 87088 URINE BACTERIA CULTURE: CPT

## 2020-01-01 PROCEDURE — 87449 NOS EACH ORGANISM AG IA: CPT

## 2020-01-01 PROCEDURE — U0002 COVID-19 LAB TEST NON-CDC: HCPCS

## 2020-01-01 PROCEDURE — 87186 SC STD MICRODIL/AGAR DIL: CPT

## 2020-01-01 PROCEDURE — B2111ZZ FLUOROSCOPY OF MULTIPLE CORONARY ARTERIES USING LOW OSMOLAR CONTRAST: ICD-10-PCS | Performed by: INTERNAL MEDICINE

## 2020-01-01 PROCEDURE — 93005 ELECTROCARDIOGRAM TRACING: CPT | Performed by: PHYSICIAN ASSISTANT

## 2020-01-01 PROCEDURE — G8428 CUR MEDS NOT DOCUMENT: HCPCS | Performed by: INTERNAL MEDICINE

## 2020-01-01 PROCEDURE — 4004F PT TOBACCO SCREEN RCVD TLK: CPT | Performed by: INTERNAL MEDICINE

## 2020-01-01 PROCEDURE — 99213 OFFICE O/P EST LOW 20 MIN: CPT | Performed by: INTERNAL MEDICINE

## 2020-01-01 PROCEDURE — 87450 HC DIRECT STREP B ANTIGEN: CPT

## 2020-01-01 PROCEDURE — 97162 PT EVAL MOD COMPLEX 30 MIN: CPT

## 2020-01-01 PROCEDURE — XW033E5 INTRODUCTION OF REMDESIVIR ANTI-INFECTIVE INTO PERIPHERAL VEIN, PERCUTANEOUS APPROACH, NEW TECHNOLOGY GROUP 5: ICD-10-PCS | Performed by: FAMILY MEDICINE

## 2020-01-01 PROCEDURE — 76770 US EXAM ABDO BACK WALL COMP: CPT

## 2020-01-01 PROCEDURE — 93000 ELECTROCARDIOGRAM COMPLETE: CPT | Performed by: INTERNAL MEDICINE

## 2020-01-01 PROCEDURE — C1751 CATH, INF, PER/CENT/MIDLINE: HCPCS

## 2020-01-01 PROCEDURE — 82728 ASSAY OF FERRITIN: CPT

## 2020-01-01 PROCEDURE — 93005 ELECTROCARDIOGRAM TRACING: CPT | Performed by: NURSE PRACTITIONER

## 2020-01-01 PROCEDURE — 99291 CRITICAL CARE FIRST HOUR: CPT

## 2020-01-01 RX ORDER — 0.9 % SODIUM CHLORIDE 0.9 %
1000 INTRAVENOUS SOLUTION INTRAVENOUS ONCE
Status: COMPLETED | OUTPATIENT
Start: 2020-01-01 | End: 2020-01-01

## 2020-01-01 RX ORDER — DEXAMETHASONE SODIUM PHOSPHATE 10 MG/ML
6 INJECTION INTRAMUSCULAR; INTRAVENOUS EVERY 6 HOURS
Status: DISCONTINUED | OUTPATIENT
Start: 2020-01-01 | End: 2020-01-01

## 2020-01-01 RX ORDER — AMLODIPINE BESYLATE 5 MG/1
5 TABLET ORAL 2 TIMES DAILY
Status: DISCONTINUED | OUTPATIENT
Start: 2020-01-01 | End: 2020-01-01 | Stop reason: HOSPADM

## 2020-01-01 RX ORDER — LANOLIN ALCOHOL/MO/W.PET/CERES
6 CREAM (GRAM) TOPICAL NIGHTLY
Status: DISCONTINUED | OUTPATIENT
Start: 2020-01-01 | End: 2020-01-01 | Stop reason: HOSPADM

## 2020-01-01 RX ORDER — BUMETANIDE 0.25 MG/ML
0.5 INJECTION, SOLUTION INTRAMUSCULAR; INTRAVENOUS ONCE
Status: COMPLETED | OUTPATIENT
Start: 2020-01-01 | End: 2020-01-01

## 2020-01-01 RX ORDER — ATORVASTATIN CALCIUM 80 MG/1
80 TABLET, FILM COATED ORAL DAILY
Status: DISCONTINUED | OUTPATIENT
Start: 2020-01-01 | End: 2020-01-01 | Stop reason: HOSPADM

## 2020-01-01 RX ORDER — CILOSTAZOL 100 MG/1
100 TABLET ORAL 2 TIMES DAILY
COMMUNITY
End: 2020-01-01 | Stop reason: SDUPTHER

## 2020-01-01 RX ORDER — IPRATROPIUM BROMIDE AND ALBUTEROL SULFATE 2.5; .5 MG/3ML; MG/3ML
3 SOLUTION RESPIRATORY (INHALATION)
Status: DISCONTINUED | OUTPATIENT
Start: 2020-01-01 | End: 2020-01-01

## 2020-01-01 RX ORDER — PREDNISONE 20 MG/1
20 TABLET ORAL DAILY
Status: DISCONTINUED | OUTPATIENT
Start: 2020-01-01 | End: 2020-01-01 | Stop reason: HOSPADM

## 2020-01-01 RX ORDER — MONTELUKAST SODIUM 10 MG/1
10 TABLET ORAL NIGHTLY
Status: DISCONTINUED | OUTPATIENT
Start: 2020-01-01 | End: 2021-01-01

## 2020-01-01 RX ORDER — CLOPIDOGREL BISULFATE 75 MG/1
75 TABLET ORAL DAILY
Status: DISCONTINUED | OUTPATIENT
Start: 2020-01-01 | End: 2021-01-01

## 2020-01-01 RX ORDER — ZOLPIDEM TARTRATE 5 MG/1
5 TABLET ORAL NIGHTLY PRN
Status: DISCONTINUED | OUTPATIENT
Start: 2020-01-01 | End: 2020-01-01 | Stop reason: HOSPADM

## 2020-01-01 RX ORDER — ACETAMINOPHEN 325 MG/1
650 TABLET ORAL ONCE
Status: COMPLETED | OUTPATIENT
Start: 2020-01-01 | End: 2020-01-01

## 2020-01-01 RX ORDER — CILOSTAZOL 100 MG/1
100 TABLET ORAL 2 TIMES DAILY
Status: DISCONTINUED | OUTPATIENT
Start: 2020-01-01 | End: 2020-01-01 | Stop reason: HOSPADM

## 2020-01-01 RX ORDER — FLUTICASONE PROPIONATE 50 MCG
1 SPRAY, SUSPENSION (ML) NASAL DAILY
Status: DISCONTINUED | OUTPATIENT
Start: 2020-01-01 | End: 2020-01-01 | Stop reason: HOSPADM

## 2020-01-01 RX ORDER — SENNA PLUS 8.6 MG/1
1 TABLET ORAL DAILY PRN
Status: DISCONTINUED | OUTPATIENT
Start: 2020-01-01 | End: 2021-01-01

## 2020-01-01 RX ORDER — IPRATROPIUM BROMIDE AND ALBUTEROL SULFATE 2.5; .5 MG/3ML; MG/3ML
1 SOLUTION RESPIRATORY (INHALATION)
Status: DISCONTINUED | OUTPATIENT
Start: 2020-01-01 | End: 2020-01-01

## 2020-01-01 RX ORDER — ALBUMIN (HUMAN) 12.5 G/50ML
25 SOLUTION INTRAVENOUS EVERY 8 HOURS
Status: COMPLETED | OUTPATIENT
Start: 2020-01-01 | End: 2021-01-01

## 2020-01-01 RX ORDER — LORAZEPAM 2 MG/ML
0.5 INJECTION INTRAMUSCULAR ONCE
Status: COMPLETED | OUTPATIENT
Start: 2020-01-01 | End: 2020-01-01

## 2020-01-01 RX ORDER — DEXAMETHASONE 4 MG/1
6 TABLET ORAL DAILY
Status: DISCONTINUED | OUTPATIENT
Start: 2020-01-01 | End: 2020-01-01

## 2020-01-01 RX ORDER — ZINC SULFATE 50(220)MG
50 CAPSULE ORAL DAILY
Status: DISCONTINUED | OUTPATIENT
Start: 2020-01-01 | End: 2020-01-01 | Stop reason: HOSPADM

## 2020-01-01 RX ORDER — BUMETANIDE 0.25 MG/ML
2 INJECTION, SOLUTION INTRAMUSCULAR; INTRAVENOUS ONCE
Status: COMPLETED | OUTPATIENT
Start: 2020-01-01 | End: 2020-01-01

## 2020-01-01 RX ORDER — RANOLAZINE 500 MG/1
500 TABLET, EXTENDED RELEASE ORAL 2 TIMES DAILY
Qty: 60 TABLET | Refills: 3 | Status: SHIPPED | OUTPATIENT
Start: 2020-01-01

## 2020-01-01 RX ORDER — PREDNISONE 10 MG/1
TABLET ORAL
Qty: 7 TABLET | Refills: 0 | Status: SHIPPED | OUTPATIENT
Start: 2020-01-01 | End: 2020-01-01

## 2020-01-01 RX ORDER — CHOLECALCIFEROL (VITAMIN D3) 25 MCG
1000 TABLET ORAL DAILY
Qty: 60 TABLET | Refills: 0 | Status: SHIPPED | OUTPATIENT
Start: 2020-01-01

## 2020-01-01 RX ORDER — SODIUM CHLORIDE 0.9 % (FLUSH) 0.9 %
10 SYRINGE (ML) INJECTION PRN
Status: DISCONTINUED | OUTPATIENT
Start: 2020-01-01 | End: 2021-01-01

## 2020-01-01 RX ORDER — GUAIFENESIN 400 MG/1
400 TABLET ORAL 2 TIMES DAILY PRN
Status: DISCONTINUED | OUTPATIENT
Start: 2020-01-01 | End: 2021-01-01

## 2020-01-01 RX ORDER — ACETAMINOPHEN 325 MG/1
650 TABLET ORAL EVERY 6 HOURS PRN
Status: DISCONTINUED | OUTPATIENT
Start: 2020-01-01 | End: 2020-01-01 | Stop reason: HOSPADM

## 2020-01-01 RX ORDER — ISOSORBIDE MONONITRATE 60 MG/1
60 TABLET, EXTENDED RELEASE ORAL DAILY
Status: DISCONTINUED | OUTPATIENT
Start: 2020-01-01 | End: 2020-01-01 | Stop reason: HOSPADM

## 2020-01-01 RX ORDER — ACETYLCYSTEINE 200 MG/ML
2 SOLUTION ORAL; RESPIRATORY (INHALATION) EVERY 6 HOURS
Status: DISCONTINUED | OUTPATIENT
Start: 2020-01-01 | End: 2020-01-01

## 2020-01-01 RX ORDER — 0.9 % SODIUM CHLORIDE 0.9 %
20 INTRAVENOUS SOLUTION INTRAVENOUS ONCE
Status: COMPLETED | OUTPATIENT
Start: 2020-01-01 | End: 2020-01-01

## 2020-01-01 RX ORDER — ARFORMOTEROL TARTRATE 15 UG/2ML
15 SOLUTION RESPIRATORY (INHALATION) 2 TIMES DAILY
Status: DISCONTINUED | OUTPATIENT
Start: 2020-01-01 | End: 2020-01-01 | Stop reason: HOSPADM

## 2020-01-01 RX ORDER — POLYETHYLENE GLYCOL 3350 17 G/17G
17 POWDER, FOR SOLUTION ORAL DAILY PRN
Status: DISCONTINUED | OUTPATIENT
Start: 2020-01-01 | End: 2020-01-01 | Stop reason: HOSPADM

## 2020-01-01 RX ORDER — ACETAMINOPHEN 325 MG/1
650 TABLET ORAL EVERY 4 HOURS PRN
Status: DISCONTINUED | OUTPATIENT
Start: 2020-01-01 | End: 2020-01-01 | Stop reason: SDUPTHER

## 2020-01-01 RX ORDER — BUMETANIDE 0.25 MG/ML
2 INJECTION, SOLUTION INTRAMUSCULAR; INTRAVENOUS DAILY
Status: DISCONTINUED | OUTPATIENT
Start: 2020-01-01 | End: 2021-01-01 | Stop reason: HOSPADM

## 2020-01-01 RX ORDER — PANTOPRAZOLE SODIUM 40 MG/10ML
40 INJECTION, POWDER, LYOPHILIZED, FOR SOLUTION INTRAVENOUS DAILY
Status: DISCONTINUED | OUTPATIENT
Start: 2020-01-01 | End: 2020-01-01 | Stop reason: SDUPTHER

## 2020-01-01 RX ORDER — METOPROLOL SUCCINATE 50 MG/1
50 TABLET, EXTENDED RELEASE ORAL DAILY
Status: DISCONTINUED | OUTPATIENT
Start: 2020-01-01 | End: 2020-01-01

## 2020-01-01 RX ORDER — ASPIRIN 81 MG/1
81 TABLET, CHEWABLE ORAL DAILY
Status: DISCONTINUED | OUTPATIENT
Start: 2020-01-01 | End: 2020-01-01 | Stop reason: HOSPADM

## 2020-01-01 RX ORDER — ATORVASTATIN CALCIUM 80 MG/1
80 TABLET, FILM COATED ORAL NIGHTLY
Status: DISCONTINUED | OUTPATIENT
Start: 2020-01-01 | End: 2021-01-01

## 2020-01-01 RX ORDER — ETOMIDATE 2 MG/ML
INJECTION INTRAVENOUS
Status: COMPLETED
Start: 2020-01-01 | End: 2020-01-01

## 2020-01-01 RX ORDER — IPRATROPIUM BROMIDE AND ALBUTEROL SULFATE 2.5; .5 MG/3ML; MG/3ML
3 SOLUTION RESPIRATORY (INHALATION) EVERY 6 HOURS PRN
Status: DISCONTINUED | OUTPATIENT
Start: 2020-01-01 | End: 2020-01-01 | Stop reason: HOSPADM

## 2020-01-01 RX ORDER — GUAIFENESIN/DEXTROMETHORPHAN 100-10MG/5
5 SYRUP ORAL EVERY 4 HOURS PRN
Status: DISCONTINUED | OUTPATIENT
Start: 2020-01-01 | End: 2020-01-01 | Stop reason: HOSPADM

## 2020-01-01 RX ORDER — 0.9 % SODIUM CHLORIDE 0.9 %
500 INTRAVENOUS SOLUTION INTRAVENOUS ONCE
Status: COMPLETED | OUTPATIENT
Start: 2020-01-01 | End: 2020-01-01

## 2020-01-01 RX ORDER — VITAMIN B COMPLEX
1000 TABLET ORAL DAILY
Status: DISCONTINUED | OUTPATIENT
Start: 2020-01-01 | End: 2021-01-01

## 2020-01-01 RX ORDER — LEVOFLOXACIN 5 MG/ML
750 INJECTION, SOLUTION INTRAVENOUS EVERY 24 HOURS
Status: DISCONTINUED | OUTPATIENT
Start: 2020-01-01 | End: 2020-01-01

## 2020-01-01 RX ORDER — ASCORBIC ACID 500 MG
500 TABLET ORAL DAILY
Qty: 30 TABLET | Refills: 3 | Status: SHIPPED | OUTPATIENT
Start: 2020-01-01

## 2020-01-01 RX ORDER — ISOSORBIDE MONONITRATE 60 MG/1
60 TABLET, EXTENDED RELEASE ORAL DAILY
Qty: 30 TABLET | Refills: 3 | Status: SHIPPED | OUTPATIENT
Start: 2020-01-01

## 2020-01-01 RX ORDER — IPRATROPIUM BROMIDE AND ALBUTEROL SULFATE 2.5; .5 MG/3ML; MG/3ML
3 SOLUTION RESPIRATORY (INHALATION) EVERY 6 HOURS PRN
Status: DISCONTINUED | OUTPATIENT
Start: 2020-01-01 | End: 2020-01-01

## 2020-01-01 RX ORDER — BUDESONIDE AND FORMOTEROL FUMARATE DIHYDRATE 160; 4.5 UG/1; UG/1
2 AEROSOL RESPIRATORY (INHALATION) 2 TIMES DAILY
Status: DISCONTINUED | OUTPATIENT
Start: 2020-01-01 | End: 2020-01-01 | Stop reason: HOSPADM

## 2020-01-01 RX ORDER — PANTOPRAZOLE SODIUM 40 MG/10ML
40 INJECTION, POWDER, LYOPHILIZED, FOR SOLUTION INTRAVENOUS DAILY
Status: DISCONTINUED | OUTPATIENT
Start: 2020-01-01 | End: 2021-01-01

## 2020-01-01 RX ORDER — LOPERAMIDE HYDROCHLORIDE 2 MG/1
2 CAPSULE ORAL 4 TIMES DAILY PRN
Status: DISCONTINUED | OUTPATIENT
Start: 2020-01-01 | End: 2020-01-01 | Stop reason: HOSPADM

## 2020-01-01 RX ORDER — METOPROLOL SUCCINATE 50 MG/1
75 TABLET, EXTENDED RELEASE ORAL 2 TIMES DAILY
Status: DISCONTINUED | OUTPATIENT
Start: 2020-01-01 | End: 2020-01-01 | Stop reason: HOSPADM

## 2020-01-01 RX ORDER — LORAZEPAM 2 MG/ML
0.5 INJECTION INTRAMUSCULAR EVERY 6 HOURS PRN
Status: DISCONTINUED | OUTPATIENT
Start: 2020-01-01 | End: 2020-01-01 | Stop reason: ALTCHOICE

## 2020-01-01 RX ORDER — MIDAZOLAM HYDROCHLORIDE 2 MG/2ML
2 INJECTION, SOLUTION INTRAMUSCULAR; INTRAVENOUS EVERY 4 HOURS PRN
Status: DISCONTINUED | OUTPATIENT
Start: 2020-01-01 | End: 2020-01-01

## 2020-01-01 RX ORDER — POTASSIUM CHLORIDE 20 MEQ/1
40 TABLET, EXTENDED RELEASE ORAL ONCE
Status: COMPLETED | OUTPATIENT
Start: 2020-01-01 | End: 2020-01-01

## 2020-01-01 RX ORDER — AMLODIPINE BESYLATE 5 MG/1
5 TABLET ORAL 2 TIMES DAILY
COMMUNITY

## 2020-01-01 RX ORDER — MIDAZOLAM HYDROCHLORIDE 2 MG/2ML
2 INJECTION, SOLUTION INTRAMUSCULAR; INTRAVENOUS ONCE
Status: COMPLETED | OUTPATIENT
Start: 2020-01-01 | End: 2020-01-01

## 2020-01-01 RX ORDER — ACETYLCYSTEINE 200 MG/ML
2 SOLUTION ORAL; RESPIRATORY (INHALATION) 2 TIMES DAILY
Status: DISCONTINUED | OUTPATIENT
Start: 2020-01-01 | End: 2021-01-01

## 2020-01-01 RX ORDER — MAGNESIUM SULFATE IN WATER 40 MG/ML
2 INJECTION, SOLUTION INTRAVENOUS ONCE
Status: COMPLETED | OUTPATIENT
Start: 2020-01-01 | End: 2020-01-01

## 2020-01-01 RX ORDER — LANOLIN ALCOHOL/MO/W.PET/CERES
3 CREAM (GRAM) TOPICAL NIGHTLY PRN
Status: DISCONTINUED | OUTPATIENT
Start: 2020-01-01 | End: 2020-01-01 | Stop reason: HOSPADM

## 2020-01-01 RX ORDER — GUAIFENESIN/DEXTROMETHORPHAN 100-10MG/5
5 SYRUP ORAL EVERY 4 HOURS PRN
Qty: 120 ML | Refills: 0 | Status: SHIPPED | OUTPATIENT
Start: 2020-01-01 | End: 2020-01-01

## 2020-01-01 RX ORDER — METOPROLOL TARTRATE 50 MG/1
50 TABLET, FILM COATED ORAL 2 TIMES DAILY
Status: DISCONTINUED | OUTPATIENT
Start: 2020-01-01 | End: 2021-01-01

## 2020-01-01 RX ORDER — SODIUM CHLORIDE 9 MG/ML
INJECTION, SOLUTION INTRAVENOUS CONTINUOUS
Status: ACTIVE | OUTPATIENT
Start: 2020-01-01 | End: 2020-01-01

## 2020-01-01 RX ORDER — FUROSEMIDE 10 MG/ML
40 INJECTION INTRAMUSCULAR; INTRAVENOUS ONCE
Status: COMPLETED | OUTPATIENT
Start: 2020-01-01 | End: 2020-01-01

## 2020-01-01 RX ORDER — SODIUM CHLORIDE 0.9 % (FLUSH) 0.9 %
10 SYRINGE (ML) INJECTION PRN
Status: DISCONTINUED | OUTPATIENT
Start: 2020-01-01 | End: 2020-01-01 | Stop reason: HOSPADM

## 2020-01-01 RX ORDER — VITAMIN B COMPLEX
1000 TABLET ORAL DAILY
Status: DISCONTINUED | OUTPATIENT
Start: 2020-01-01 | End: 2020-01-01 | Stop reason: HOSPADM

## 2020-01-01 RX ORDER — MIDAZOLAM HYDROCHLORIDE 2 MG/2ML
4 INJECTION, SOLUTION INTRAMUSCULAR; INTRAVENOUS ONCE
Status: DISCONTINUED | OUTPATIENT
Start: 2020-01-01 | End: 2021-01-01

## 2020-01-01 RX ORDER — ACETAMINOPHEN 650 MG/1
650 SUPPOSITORY RECTAL EVERY 6 HOURS PRN
Status: DISCONTINUED | OUTPATIENT
Start: 2020-01-01 | End: 2020-01-01 | Stop reason: SDUPTHER

## 2020-01-01 RX ORDER — ONDANSETRON 2 MG/ML
4 INJECTION INTRAMUSCULAR; INTRAVENOUS EVERY 6 HOURS PRN
Status: DISCONTINUED | OUTPATIENT
Start: 2020-01-01 | End: 2020-01-01 | Stop reason: HOSPADM

## 2020-01-01 RX ORDER — PROPOFOL 10 MG/ML
INJECTION, EMULSION INTRAVENOUS
Status: DISPENSED
Start: 2020-01-01 | End: 2020-01-01

## 2020-01-01 RX ORDER — DIPHENHYDRAMINE HYDROCHLORIDE 50 MG/ML
25 INJECTION INTRAMUSCULAR; INTRAVENOUS EVERY 6 HOURS PRN
Status: DISCONTINUED | OUTPATIENT
Start: 2020-01-01 | End: 2021-01-01

## 2020-01-01 RX ORDER — BUMETANIDE 0.25 MG/ML
INJECTION, SOLUTION INTRAMUSCULAR; INTRAVENOUS
Status: COMPLETED
Start: 2020-01-01 | End: 2020-01-01

## 2020-01-01 RX ORDER — 0.9 % SODIUM CHLORIDE 0.9 %
30 INTRAVENOUS SOLUTION INTRAVENOUS PRN
Status: DISCONTINUED | OUTPATIENT
Start: 2020-01-01 | End: 2020-01-01 | Stop reason: HOSPADM

## 2020-01-01 RX ORDER — SODIUM CHLORIDE 0.9 % (FLUSH) 0.9 %
10 SYRINGE (ML) INJECTION PRN
Status: DISCONTINUED | OUTPATIENT
Start: 2020-01-01 | End: 2020-01-01 | Stop reason: SDUPTHER

## 2020-01-01 RX ORDER — TAMSULOSIN HYDROCHLORIDE 0.4 MG/1
0.4 CAPSULE ORAL DAILY
Status: DISCONTINUED | OUTPATIENT
Start: 2020-01-01 | End: 2020-01-01 | Stop reason: HOSPADM

## 2020-01-01 RX ORDER — ARFORMOTEROL TARTRATE 15 UG/2ML
15 SOLUTION RESPIRATORY (INHALATION) 2 TIMES DAILY
Status: DISCONTINUED | OUTPATIENT
Start: 2020-01-01 | End: 2021-01-01

## 2020-01-01 RX ORDER — METOPROLOL SUCCINATE 100 MG/1
100 TABLET, EXTENDED RELEASE ORAL DAILY
Status: DISCONTINUED | OUTPATIENT
Start: 2020-01-01 | End: 2020-01-01

## 2020-01-01 RX ORDER — DEXAMETHASONE SODIUM PHOSPHATE 4 MG/ML
4 INJECTION, SOLUTION INTRA-ARTICULAR; INTRALESIONAL; INTRAMUSCULAR; INTRAVENOUS; SOFT TISSUE EVERY 12 HOURS
Status: DISCONTINUED | OUTPATIENT
Start: 2020-01-01 | End: 2020-01-01

## 2020-01-01 RX ORDER — NITROGLYCERIN 20 MG/100ML
5 INJECTION INTRAVENOUS CONTINUOUS
Status: DISCONTINUED | OUTPATIENT
Start: 2020-01-01 | End: 2020-01-01

## 2020-01-01 RX ORDER — METHYLPREDNISOLONE SODIUM SUCCINATE 125 MG/2ML
40 INJECTION, POWDER, LYOPHILIZED, FOR SOLUTION INTRAMUSCULAR; INTRAVENOUS EVERY 8 HOURS
Status: DISCONTINUED | OUTPATIENT
Start: 2020-01-01 | End: 2020-01-01

## 2020-01-01 RX ORDER — NITROGLYCERIN 0.4 MG/1
0.4 TABLET SUBLINGUAL EVERY 5 MIN PRN
Status: DISCONTINUED | OUTPATIENT
Start: 2020-01-01 | End: 2021-01-01

## 2020-01-01 RX ORDER — CILOSTAZOL 100 MG/1
100 TABLET ORAL 2 TIMES DAILY
Qty: 180 TABLET | Refills: 3 | Status: SHIPPED | OUTPATIENT
Start: 2020-01-01

## 2020-01-01 RX ORDER — TRAMADOL HYDROCHLORIDE 50 MG/1
50 TABLET ORAL EVERY 6 HOURS PRN
Status: DISCONTINUED | OUTPATIENT
Start: 2020-01-01 | End: 2020-01-01 | Stop reason: HOSPADM

## 2020-01-01 RX ORDER — SODIUM CHLORIDE 0.9 % (FLUSH) 0.9 %
10 SYRINGE (ML) INJECTION EVERY 12 HOURS SCHEDULED
Status: DISCONTINUED | OUTPATIENT
Start: 2020-01-01 | End: 2020-01-01 | Stop reason: HOSPADM

## 2020-01-01 RX ORDER — SODIUM CHLORIDE 0.9 % (FLUSH) 0.9 %
10 SYRINGE (ML) INJECTION EVERY 12 HOURS SCHEDULED
Status: DISCONTINUED | OUTPATIENT
Start: 2020-01-01 | End: 2020-01-01 | Stop reason: SDUPTHER

## 2020-01-01 RX ORDER — METOPROLOL SUCCINATE 25 MG/1
75 TABLET, EXTENDED RELEASE ORAL 2 TIMES DAILY
Qty: 30 TABLET | Refills: 3 | Status: SHIPPED | OUTPATIENT
Start: 2020-01-01

## 2020-01-01 RX ORDER — ZINC SULFATE 50(220)MG
50 CAPSULE ORAL DAILY
Qty: 30 CAPSULE | Refills: 3 | COMMUNITY
Start: 2020-01-01

## 2020-01-01 RX ORDER — METOPROLOL SUCCINATE 25 MG/1
75 TABLET, EXTENDED RELEASE ORAL 2 TIMES DAILY
Status: CANCELLED | OUTPATIENT
Start: 2020-01-01

## 2020-01-01 RX ORDER — ALBUTEROL SULFATE 2.5 MG/3ML
7.5 SOLUTION RESPIRATORY (INHALATION) ONCE
Status: COMPLETED | OUTPATIENT
Start: 2020-01-01 | End: 2020-01-01

## 2020-01-01 RX ORDER — IPRATROPIUM BROMIDE AND ALBUTEROL SULFATE 2.5; .5 MG/3ML; MG/3ML
3 SOLUTION RESPIRATORY (INHALATION) EVERY 6 HOURS PRN
Qty: 360 ML | Refills: 2 | Status: SHIPPED | OUTPATIENT
Start: 2020-01-01

## 2020-01-01 RX ORDER — LISINOPRIL 20 MG/1
20 TABLET ORAL 2 TIMES DAILY
Status: DISCONTINUED | OUTPATIENT
Start: 2020-01-01 | End: 2020-01-01 | Stop reason: HOSPADM

## 2020-01-01 RX ORDER — SUCCINYLCHOLINE CHLORIDE 20 MG/ML
INJECTION INTRAMUSCULAR; INTRAVENOUS
Status: COMPLETED
Start: 2020-01-01 | End: 2020-01-01

## 2020-01-01 RX ORDER — SODIUM CHLORIDE 0.9 % (FLUSH) 0.9 %
10 SYRINGE (ML) INJECTION EVERY 12 HOURS SCHEDULED
Status: DISCONTINUED | OUTPATIENT
Start: 2020-01-01 | End: 2021-01-01

## 2020-01-01 RX ORDER — IPRATROPIUM BROMIDE AND ALBUTEROL SULFATE 2.5; .5 MG/3ML; MG/3ML
1 SOLUTION RESPIRATORY (INHALATION)
Status: DISCONTINUED | OUTPATIENT
Start: 2020-01-01 | End: 2021-01-01

## 2020-01-01 RX ORDER — ACETAMINOPHEN 500 MG
1000 TABLET ORAL 2 TIMES DAILY PRN
Status: DISCONTINUED | OUTPATIENT
Start: 2020-01-01 | End: 2020-01-01 | Stop reason: HOSPADM

## 2020-01-01 RX ORDER — ISOSORBIDE MONONITRATE 30 MG/1
60 TABLET, EXTENDED RELEASE ORAL DAILY
Status: DISCONTINUED | OUTPATIENT
Start: 2020-01-01 | End: 2020-01-01 | Stop reason: HOSPADM

## 2020-01-01 RX ORDER — MONTELUKAST SODIUM 10 MG/1
10 TABLET ORAL NIGHTLY
Status: DISCONTINUED | OUTPATIENT
Start: 2020-01-01 | End: 2020-01-01 | Stop reason: HOSPADM

## 2020-01-01 RX ORDER — RANOLAZINE 500 MG/1
500 TABLET, EXTENDED RELEASE ORAL 2 TIMES DAILY
Status: DISCONTINUED | OUTPATIENT
Start: 2020-01-01 | End: 2020-01-01 | Stop reason: HOSPADM

## 2020-01-01 RX ORDER — BUMETANIDE 0.25 MG/ML
1 INJECTION, SOLUTION INTRAMUSCULAR; INTRAVENOUS ONCE
Status: DISCONTINUED | OUTPATIENT
Start: 2020-01-01 | End: 2020-01-01

## 2020-01-01 RX ORDER — CALCIUM CARBONATE 200(500)MG
500 TABLET,CHEWABLE ORAL 3 TIMES DAILY PRN
Status: DISCONTINUED | OUTPATIENT
Start: 2020-01-01 | End: 2020-01-01 | Stop reason: HOSPADM

## 2020-01-01 RX ORDER — DEXAMETHASONE 4 MG/1
4 TABLET ORAL EVERY 12 HOURS SCHEDULED
Status: DISCONTINUED | OUTPATIENT
Start: 2020-01-01 | End: 2020-01-01 | Stop reason: HOSPADM

## 2020-01-01 RX ORDER — IPRATROPIUM BROMIDE AND ALBUTEROL SULFATE 2.5; .5 MG/3ML; MG/3ML
1 SOLUTION RESPIRATORY (INHALATION) 4 TIMES DAILY
Status: DISCONTINUED | OUTPATIENT
Start: 2020-01-01 | End: 2020-01-01 | Stop reason: HOSPADM

## 2020-01-01 RX ORDER — ALBUMIN (HUMAN) 12.5 G/50ML
25 SOLUTION INTRAVENOUS EVERY 8 HOURS
Status: DISCONTINUED | OUTPATIENT
Start: 2020-01-01 | End: 2020-01-01

## 2020-01-01 RX ORDER — PROMETHAZINE HYDROCHLORIDE 25 MG/1
12.5 TABLET ORAL EVERY 6 HOURS PRN
Status: DISCONTINUED | OUTPATIENT
Start: 2020-01-01 | End: 2020-01-01 | Stop reason: HOSPADM

## 2020-01-01 RX ORDER — ASPIRIN 81 MG/1
81 TABLET, CHEWABLE ORAL DAILY
Status: DISCONTINUED | OUTPATIENT
Start: 2020-01-01 | End: 2021-01-01

## 2020-01-01 RX ORDER — 0.9 % SODIUM CHLORIDE 0.9 %
30 INTRAVENOUS SOLUTION INTRAVENOUS ONCE
Status: COMPLETED | OUTPATIENT
Start: 2020-01-01 | End: 2020-01-01

## 2020-01-01 RX ORDER — ACETAMINOPHEN 500 MG
1000 TABLET ORAL 2 TIMES DAILY PRN
COMMUNITY

## 2020-01-01 RX ORDER — ACETAMINOPHEN 325 MG/1
650 TABLET ORAL EVERY 6 HOURS PRN
Status: DISCONTINUED | OUTPATIENT
Start: 2020-01-01 | End: 2021-01-01

## 2020-01-01 RX ORDER — HEPARIN SODIUM 1000 [USP'U]/ML
4000 INJECTION, SOLUTION INTRAVENOUS; SUBCUTANEOUS ONCE
Status: COMPLETED | OUTPATIENT
Start: 2020-01-01 | End: 2020-01-01

## 2020-01-01 RX ORDER — POTASSIUM CHLORIDE 20 MEQ/1
40 TABLET, EXTENDED RELEASE ORAL PRN
Status: DISCONTINUED | OUTPATIENT
Start: 2020-01-01 | End: 2020-01-01 | Stop reason: HOSPADM

## 2020-01-01 RX ORDER — METOPROLOL TARTRATE 50 MG/1
50 TABLET, FILM COATED ORAL 2 TIMES DAILY
Status: DISCONTINUED | OUTPATIENT
Start: 2020-01-01 | End: 2020-01-01

## 2020-01-01 RX ORDER — HEPARIN SODIUM 1000 [USP'U]/ML
4000 INJECTION, SOLUTION INTRAVENOUS; SUBCUTANEOUS PRN
Status: DISCONTINUED | OUTPATIENT
Start: 2020-01-01 | End: 2020-01-01

## 2020-01-01 RX ORDER — MAGNESIUM SULFATE IN WATER 40 MG/ML
2 INJECTION, SOLUTION INTRAVENOUS PRN
Status: DISCONTINUED | OUTPATIENT
Start: 2020-01-01 | End: 2020-01-01 | Stop reason: HOSPADM

## 2020-01-01 RX ORDER — POTASSIUM CHLORIDE 20 MEQ/1
20 TABLET, EXTENDED RELEASE ORAL ONCE
Status: DISCONTINUED | OUTPATIENT
Start: 2020-01-01 | End: 2020-01-01 | Stop reason: ALTCHOICE

## 2020-01-01 RX ORDER — DEXAMETHASONE 4 MG/1
4 TABLET ORAL EVERY 12 HOURS SCHEDULED
Qty: 20 TABLET | Refills: 0 | Status: SHIPPED | OUTPATIENT
Start: 2020-01-01 | End: 2020-01-01

## 2020-01-01 RX ORDER — FENTANYL CITRATE 50 UG/ML
50 INJECTION, SOLUTION INTRAMUSCULAR; INTRAVENOUS ONCE
Status: COMPLETED | OUTPATIENT
Start: 2020-01-01 | End: 2020-01-01

## 2020-01-01 RX ORDER — ACETAMINOPHEN 325 MG/1
650 TABLET ORAL EVERY 4 HOURS PRN
Status: DISCONTINUED | OUTPATIENT
Start: 2020-01-01 | End: 2020-01-01 | Stop reason: HOSPADM

## 2020-01-01 RX ORDER — ISOSORBIDE MONONITRATE 30 MG/1
30 TABLET, EXTENDED RELEASE ORAL DAILY
Status: DISCONTINUED | OUTPATIENT
Start: 2020-01-01 | End: 2020-01-01

## 2020-01-01 RX ORDER — FUROSEMIDE 20 MG/1
20 TABLET ORAL DAILY
Status: DISCONTINUED | OUTPATIENT
Start: 2020-01-01 | End: 2020-01-01

## 2020-01-01 RX ORDER — POTASSIUM CHLORIDE 7.45 MG/ML
10 INJECTION INTRAVENOUS ONCE
Status: COMPLETED | OUTPATIENT
Start: 2020-01-01 | End: 2020-01-01

## 2020-01-01 RX ORDER — METHYLPREDNISOLONE SODIUM SUCCINATE 125 MG/2ML
40 INJECTION, POWDER, LYOPHILIZED, FOR SOLUTION INTRAMUSCULAR; INTRAVENOUS DAILY
Status: COMPLETED | OUTPATIENT
Start: 2020-01-01 | End: 2020-01-01

## 2020-01-01 RX ORDER — BENZONATATE 100 MG/1
100 CAPSULE ORAL 3 TIMES DAILY PRN
Status: DISCONTINUED | OUTPATIENT
Start: 2020-01-01 | End: 2020-01-01 | Stop reason: HOSPADM

## 2020-01-01 RX ORDER — ACETAMINOPHEN 650 MG/1
650 SUPPOSITORY RECTAL EVERY 6 HOURS PRN
Status: DISCONTINUED | OUTPATIENT
Start: 2020-01-01 | End: 2020-01-01 | Stop reason: HOSPADM

## 2020-01-01 RX ORDER — CILOSTAZOL 50 MG/1
100 TABLET ORAL 2 TIMES DAILY
Status: DISCONTINUED | OUTPATIENT
Start: 2020-01-01 | End: 2020-01-01 | Stop reason: HOSPADM

## 2020-01-01 RX ORDER — FENTANYL CITRATE 50 UG/ML
25 INJECTION, SOLUTION INTRAMUSCULAR; INTRAVENOUS ONCE
Status: COMPLETED | OUTPATIENT
Start: 2020-01-01 | End: 2020-01-01

## 2020-01-01 RX ORDER — ASCORBIC ACID 500 MG
500 TABLET ORAL DAILY
Status: DISCONTINUED | OUTPATIENT
Start: 2020-01-01 | End: 2020-01-01 | Stop reason: HOSPADM

## 2020-01-01 RX ORDER — NITROGLYCERIN 0.4 MG/1
TABLET SUBLINGUAL
Status: COMPLETED
Start: 2020-01-01 | End: 2020-01-01

## 2020-01-01 RX ORDER — POTASSIUM CHLORIDE 7.45 MG/ML
10 INJECTION INTRAVENOUS PRN
Status: DISCONTINUED | OUTPATIENT
Start: 2020-01-01 | End: 2020-01-01 | Stop reason: HOSPADM

## 2020-01-01 RX ORDER — ASCORBIC ACID 500 MG
500 TABLET ORAL 2 TIMES DAILY
Status: DISCONTINUED | OUTPATIENT
Start: 2020-01-01 | End: 2020-01-01 | Stop reason: HOSPADM

## 2020-01-01 RX ORDER — SODIUM CHLORIDE 9 MG/ML
INJECTION, SOLUTION INTRAVENOUS CONTINUOUS
Status: DISCONTINUED | OUTPATIENT
Start: 2020-01-01 | End: 2020-01-01

## 2020-01-01 RX ORDER — ACETAMINOPHEN 650 MG/1
650 SUPPOSITORY RECTAL EVERY 6 HOURS PRN
Status: DISCONTINUED | OUTPATIENT
Start: 2020-01-01 | End: 2021-01-01

## 2020-01-01 RX ORDER — DEXAMETHASONE 4 MG/1
6 TABLET ORAL DAILY
Status: DISCONTINUED | OUTPATIENT
Start: 2020-01-01 | End: 2020-01-01 | Stop reason: HOSPADM

## 2020-01-01 RX ORDER — SODIUM CHLORIDE 0.9 % (FLUSH) 0.9 %
10 SYRINGE (ML) INJECTION PRN
Status: DISCONTINUED | OUTPATIENT
Start: 2020-01-01 | End: 2020-01-01

## 2020-01-01 RX ORDER — NITROGLYCERIN 0.4 MG/1
0.4 TABLET SUBLINGUAL EVERY 5 MIN PRN
Status: DISCONTINUED | OUTPATIENT
Start: 2020-01-01 | End: 2020-01-01 | Stop reason: HOSPADM

## 2020-01-01 RX ORDER — HEPARIN SODIUM 1000 [USP'U]/ML
2000 INJECTION, SOLUTION INTRAVENOUS; SUBCUTANEOUS PRN
Status: DISCONTINUED | OUTPATIENT
Start: 2020-01-01 | End: 2020-01-01

## 2020-01-01 RX ORDER — CHLORHEXIDINE GLUCONATE 0.12 MG/ML
15 RINSE ORAL 2 TIMES DAILY
Status: DISCONTINUED | OUTPATIENT
Start: 2020-01-01 | End: 2021-01-01

## 2020-01-01 RX ORDER — METOPROLOL SUCCINATE 50 MG/1
25 TABLET, EXTENDED RELEASE ORAL DAILY
Status: DISCONTINUED | OUTPATIENT
Start: 2020-01-01 | End: 2020-01-01

## 2020-01-01 RX ORDER — GUAIFENESIN/DEXTROMETHORPHAN 100-10MG/5
10 SYRUP ORAL 4 TIMES DAILY
Status: DISCONTINUED | OUTPATIENT
Start: 2020-01-01 | End: 2020-01-01 | Stop reason: HOSPADM

## 2020-01-01 RX ORDER — HEPARIN SODIUM 10000 [USP'U]/100ML
12 INJECTION, SOLUTION INTRAVENOUS CONTINUOUS
Status: DISPENSED | OUTPATIENT
Start: 2020-01-01 | End: 2020-01-01

## 2020-01-01 RX ORDER — ACETAMINOPHEN 325 MG/1
650 TABLET ORAL EVERY 6 HOURS PRN
Status: DISCONTINUED | OUTPATIENT
Start: 2020-01-01 | End: 2020-01-01 | Stop reason: SDUPTHER

## 2020-01-01 RX ORDER — LISINOPRIL 10 MG/1
20 TABLET ORAL 2 TIMES DAILY
Status: DISCONTINUED | OUTPATIENT
Start: 2020-01-01 | End: 2020-01-01 | Stop reason: HOSPADM

## 2020-01-01 RX ORDER — BUDESONIDE 0.5 MG/2ML
500 INHALANT ORAL 2 TIMES DAILY
Status: DISCONTINUED | OUTPATIENT
Start: 2020-01-01 | End: 2021-01-01

## 2020-01-01 RX ORDER — BUDESONIDE AND FORMOTEROL FUMARATE DIHYDRATE 160; 4.5 UG/1; UG/1
2 AEROSOL RESPIRATORY (INHALATION) 2 TIMES DAILY
Qty: 1 INHALER | Refills: 3 | Status: SHIPPED | OUTPATIENT
Start: 2020-01-01 | End: 2020-01-01 | Stop reason: HOSPADM

## 2020-01-01 RX ORDER — SODIUM CHLORIDE 9 MG/ML
10 INJECTION INTRAVENOUS DAILY
Status: DISCONTINUED | OUTPATIENT
Start: 2020-01-01 | End: 2021-01-01

## 2020-01-01 RX ORDER — PHENYLEPHRINE HYDROCHLORIDE 10 MG/ML
INJECTION INTRAVENOUS
Status: DISCONTINUED
Start: 2020-01-01 | End: 2020-01-01 | Stop reason: WASHOUT

## 2020-01-01 RX ORDER — 0.9 % SODIUM CHLORIDE 0.9 %
500 INTRAVENOUS SOLUTION INTRAVENOUS ONCE
Status: DISCONTINUED | OUTPATIENT
Start: 2020-01-01 | End: 2020-01-01

## 2020-01-01 RX ORDER — SODIUM CHLORIDE 9 MG/ML
INJECTION, SOLUTION INTRAVENOUS ONCE
Status: COMPLETED | OUTPATIENT
Start: 2020-01-01 | End: 2020-01-01

## 2020-01-01 RX ORDER — POLYETHYLENE GLYCOL 3350 17 G/17G
17 POWDER, FOR SOLUTION ORAL DAILY PRN
Status: DISCONTINUED | OUTPATIENT
Start: 2020-01-01 | End: 2021-01-01

## 2020-01-01 RX ORDER — ASPIRIN 81 MG/1
324 TABLET, CHEWABLE ORAL ONCE
Status: COMPLETED | OUTPATIENT
Start: 2020-01-01 | End: 2020-01-01

## 2020-01-01 RX ORDER — ISOSORBIDE MONONITRATE 30 MG/1
30 TABLET, EXTENDED RELEASE ORAL ONCE
Status: COMPLETED | OUTPATIENT
Start: 2020-01-01 | End: 2020-01-01

## 2020-01-01 RX ORDER — GUAIFENESIN AND DEXTROMETHORPHAN HYDROBROMIDE 1200; 60 MG/1; MG/1
1 TABLET, EXTENDED RELEASE ORAL 2 TIMES DAILY PRN
Qty: 14 TABLET | Refills: 0 | Status: SHIPPED | OUTPATIENT
Start: 2020-01-01 | End: 2020-01-01

## 2020-01-01 RX ORDER — CLOPIDOGREL 300 MG/1
300 TABLET, FILM COATED ORAL ONCE
Status: COMPLETED | OUTPATIENT
Start: 2020-01-01 | End: 2020-01-01

## 2020-01-01 RX ORDER — BENZONATATE 100 MG/1
100 CAPSULE ORAL 3 TIMES DAILY PRN
Qty: 45 CAPSULE | Refills: 0 | Status: SHIPPED | OUTPATIENT
Start: 2020-01-01 | End: 2020-01-01

## 2020-01-01 RX ORDER — DEXAMETHASONE SODIUM PHOSPHATE 10 MG/ML
6 INJECTION INTRAMUSCULAR; INTRAVENOUS ONCE
Status: COMPLETED | OUTPATIENT
Start: 2020-01-01 | End: 2020-01-01

## 2020-01-01 RX ORDER — BUDESONIDE 0.5 MG/2ML
0.5 INHALANT ORAL 2 TIMES DAILY
Status: DISCONTINUED | OUTPATIENT
Start: 2020-01-01 | End: 2020-01-01 | Stop reason: HOSPADM

## 2020-01-01 RX ORDER — NITROGLYCERIN 20 MG/100ML
5 INJECTION INTRAVENOUS CONTINUOUS
Status: DISCONTINUED | OUTPATIENT
Start: 2020-01-01 | End: 2020-01-01 | Stop reason: HOSPADM

## 2020-01-01 RX ORDER — METOPROLOL SUCCINATE 100 MG/1
100 TABLET, EXTENDED RELEASE ORAL ONCE
Status: COMPLETED | OUTPATIENT
Start: 2020-01-01 | End: 2020-01-01

## 2020-01-01 RX ORDER — DEXAMETHASONE 4 MG/1
TABLET ORAL
Qty: 10 TABLET | Refills: 0 | Status: SHIPPED | OUTPATIENT
Start: 2020-01-01

## 2020-01-01 RX ORDER — FLUTICASONE PROPIONATE 50 MCG
1 SPRAY, SUSPENSION (ML) NASAL DAILY
Status: DISCONTINUED | OUTPATIENT
Start: 2020-01-01 | End: 2021-01-01

## 2020-01-01 RX ORDER — AMLODIPINE BESYLATE 5 MG/1
5 TABLET ORAL DAILY
Status: DISCONTINUED | OUTPATIENT
Start: 2020-01-01 | End: 2021-01-01

## 2020-01-01 RX ORDER — METHYLPREDNISOLONE SODIUM SUCCINATE 125 MG/2ML
40 INJECTION, POWDER, LYOPHILIZED, FOR SOLUTION INTRAMUSCULAR; INTRAVENOUS EVERY 6 HOURS
Status: DISCONTINUED | OUTPATIENT
Start: 2020-01-01 | End: 2020-01-01

## 2020-01-01 RX ORDER — GUAIFENESIN AND DEXTROMETHORPHAN HYDROBROMIDE 400; 20 MG/1; MG/1
1 TABLET ORAL 3 TIMES DAILY PRN
Status: DISCONTINUED | OUTPATIENT
Start: 2020-01-01 | End: 2020-01-01 | Stop reason: HOSPADM

## 2020-01-01 RX ORDER — PREDNISONE 20 MG/1
40 TABLET ORAL DAILY
Qty: 10 TABLET | Refills: 0 | Status: SHIPPED | OUTPATIENT
Start: 2020-01-01 | End: 2020-01-01

## 2020-01-01 RX ADMIN — RANOLAZINE 500 MG: 500 TABLET, FILM COATED, EXTENDED RELEASE ORAL at 00:41

## 2020-01-01 RX ADMIN — AMLODIPINE BESYLATE 5 MG: 5 TABLET ORAL at 19:59

## 2020-01-01 RX ADMIN — GUAIFENESIN 400 MG: 400 TABLET, FILM COATED ORAL at 08:33

## 2020-01-01 RX ADMIN — HYDROCORTISONE SODIUM SUCCINATE 100 MG: 100 INJECTION, POWDER, FOR SOLUTION INTRAMUSCULAR; INTRAVENOUS at 18:42

## 2020-01-01 RX ADMIN — AMLODIPINE BESYLATE 5 MG: 5 TABLET ORAL at 09:21

## 2020-01-01 RX ADMIN — TIOTROPIUM BROMIDE INHALATION SPRAY 2 PUFF: 3.12 SPRAY, METERED RESPIRATORY (INHALATION) at 19:58

## 2020-01-01 RX ADMIN — AMLODIPINE BESYLATE 5 MG: 5 TABLET ORAL at 20:37

## 2020-01-01 RX ADMIN — RANOLAZINE 500 MG: 500 TABLET, FILM COATED, EXTENDED RELEASE ORAL at 08:49

## 2020-01-01 RX ADMIN — METOPROLOL SUCCINATE 75 MG: 50 TABLET, EXTENDED RELEASE ORAL at 21:23

## 2020-01-01 RX ADMIN — ZOLPIDEM TARTRATE 5 MG: 5 TABLET ORAL at 01:12

## 2020-01-01 RX ADMIN — AMLODIPINE BESYLATE 5 MG: 5 TABLET ORAL at 08:34

## 2020-01-01 RX ADMIN — BUDESONIDE AND FORMOTEROL FUMARATE DIHYDRATE 2 PUFF: 160; 4.5 AEROSOL RESPIRATORY (INHALATION) at 08:58

## 2020-01-01 RX ADMIN — BUMETANIDE 2 MG: 0.25 INJECTION, SOLUTION INTRAMUSCULAR; INTRAVENOUS at 22:49

## 2020-01-01 RX ADMIN — AMLODIPINE BESYLATE 5 MG: 5 TABLET ORAL at 08:48

## 2020-01-01 RX ADMIN — METHYLPREDNISOLONE SODIUM SUCCINATE 40 MG: 125 INJECTION, POWDER, FOR SOLUTION INTRAMUSCULAR; INTRAVENOUS at 08:48

## 2020-01-01 RX ADMIN — TAMSULOSIN HYDROCHLORIDE 0.4 MG: 0.4 CAPSULE ORAL at 09:59

## 2020-01-01 RX ADMIN — CILOSTAZOL 100 MG: 100 TABLET ORAL at 21:19

## 2020-01-01 RX ADMIN — ACETAMINOPHEN 650 MG: 325 TABLET ORAL at 20:10

## 2020-01-01 RX ADMIN — GUAIFENESIN AND DEXTROMETHORPHAN 10 ML: 100; 10 SYRUP ORAL at 16:57

## 2020-01-01 RX ADMIN — DEXAMETHASONE 6 MG: 4 TABLET ORAL at 08:59

## 2020-01-01 RX ADMIN — Medication 1000 UNITS: at 12:11

## 2020-01-01 RX ADMIN — GUAIFENESIN AND DEXTROMETHORPHAN 10 ML: 100; 10 SYRUP ORAL at 09:20

## 2020-01-01 RX ADMIN — BUDESONIDE 500 MCG: 0.5 SUSPENSION RESPIRATORY (INHALATION) at 10:44

## 2020-01-01 RX ADMIN — Medication 10 ML: at 21:18

## 2020-01-01 RX ADMIN — NITROGLYCERIN 5 MCG/MIN: 20 INJECTION INTRAVENOUS at 00:51

## 2020-01-01 RX ADMIN — IPRATROPIUM BROMIDE AND ALBUTEROL SULFATE 1 AMPULE: .5; 3 SOLUTION RESPIRATORY (INHALATION) at 15:56

## 2020-01-01 RX ADMIN — TIOTROPIUM BROMIDE INHALATION SPRAY 2 PUFF: 3.12 SPRAY, METERED RESPIRATORY (INHALATION) at 08:58

## 2020-01-01 RX ADMIN — IPRATROPIUM BROMIDE AND ALBUTEROL SULFATE 1 AMPULE: 2.5; .5 SOLUTION RESPIRATORY (INHALATION) at 09:44

## 2020-01-01 RX ADMIN — Medication 10 ML: at 20:40

## 2020-01-01 RX ADMIN — METOPROLOL SUCCINATE 75 MG: 50 TABLET, EXTENDED RELEASE ORAL at 08:49

## 2020-01-01 RX ADMIN — METOPROLOL TARTRATE 50 MG: 50 TABLET, FILM COATED ORAL at 08:18

## 2020-01-01 RX ADMIN — Medication 1000 UNITS: at 08:33

## 2020-01-01 RX ADMIN — RANOLAZINE 500 MG: 500 TABLET, FILM COATED, EXTENDED RELEASE ORAL at 20:38

## 2020-01-01 RX ADMIN — GUAIFENESIN AND DEXTROMETHORPHAN 10 ML: 100; 10 SYRUP ORAL at 18:06

## 2020-01-01 RX ADMIN — OXYCODONE HYDROCHLORIDE AND ACETAMINOPHEN 500 MG: 500 TABLET ORAL at 09:37

## 2020-01-01 RX ADMIN — RANOLAZINE 500 MG: 500 TABLET, FILM COATED, EXTENDED RELEASE ORAL at 20:00

## 2020-01-01 RX ADMIN — ASPIRIN 81 MG CHEWABLE TABLET 81 MG: 81 TABLET CHEWABLE at 08:49

## 2020-01-01 RX ADMIN — ASPIRIN 81 MG CHEWABLE TABLET 81 MG: 81 TABLET CHEWABLE at 22:33

## 2020-01-01 RX ADMIN — METOPROLOL SUCCINATE 75 MG: 50 TABLET, EXTENDED RELEASE ORAL at 08:48

## 2020-01-01 RX ADMIN — GUAIFENESIN 400 MG: 400 TABLET, FILM COATED ORAL at 08:18

## 2020-01-01 RX ADMIN — METOPROLOL TARTRATE 50 MG: 50 TABLET, FILM COATED ORAL at 08:33

## 2020-01-01 RX ADMIN — RANOLAZINE 500 MG: 500 TABLET, FILM COATED, EXTENDED RELEASE ORAL at 09:22

## 2020-01-01 RX ADMIN — CLOPIDOGREL 75 MG: 75 TABLET, FILM COATED ORAL at 09:16

## 2020-01-01 RX ADMIN — ENOXAPARIN SODIUM 30 MG: 30 INJECTION SUBCUTANEOUS at 21:15

## 2020-01-01 RX ADMIN — MONTELUKAST SODIUM 10 MG: 10 TABLET, FILM COATED ORAL at 22:33

## 2020-01-01 RX ADMIN — Medication 6 MG: at 20:48

## 2020-01-01 RX ADMIN — Medication 10 ML: at 20:09

## 2020-01-01 RX ADMIN — ENOXAPARIN SODIUM 30 MG: 30 INJECTION SUBCUTANEOUS at 08:48

## 2020-01-01 RX ADMIN — SODIUM CHLORIDE 30 ML: 9 INJECTION, SOLUTION INTRAVENOUS at 18:11

## 2020-01-01 RX ADMIN — DEXAMETHASONE SODIUM PHOSPHATE 4 MG: 4 INJECTION, SOLUTION INTRAMUSCULAR; INTRAVENOUS at 01:10

## 2020-01-01 RX ADMIN — METOPROLOL SUCCINATE 75 MG: 50 TABLET, EXTENDED RELEASE ORAL at 08:34

## 2020-01-01 RX ADMIN — PIPERACILLIN AND TAZOBACTAM 3.38 G: 3; .375 INJECTION, POWDER, LYOPHILIZED, FOR SOLUTION INTRAVENOUS at 05:00

## 2020-01-01 RX ADMIN — ACETAMINOPHEN 650 MG: 325 TABLET ORAL at 20:31

## 2020-01-01 RX ADMIN — ACETAMINOPHEN 650 MG: 325 TABLET ORAL at 13:16

## 2020-01-01 RX ADMIN — LISINOPRIL 20 MG: 20 TABLET ORAL at 21:45

## 2020-01-01 RX ADMIN — IPRATROPIUM BROMIDE AND ALBUTEROL SULFATE 1 AMPULE: 2.5; .5 SOLUTION RESPIRATORY (INHALATION) at 19:07

## 2020-01-01 RX ADMIN — OXYCODONE HYDROCHLORIDE AND ACETAMINOPHEN 500 MG: 500 TABLET ORAL at 20:30

## 2020-01-01 RX ADMIN — Medication 10 ML: at 08:30

## 2020-01-01 RX ADMIN — DEXAMETHASONE SODIUM PHOSPHATE 4 MG: 4 INJECTION, SOLUTION INTRAMUSCULAR; INTRAVENOUS at 13:23

## 2020-01-01 RX ADMIN — ENOXAPARIN SODIUM 30 MG: 30 INJECTION SUBCUTANEOUS at 22:38

## 2020-01-01 RX ADMIN — BUDESONIDE AND FORMOTEROL FUMARATE DIHYDRATE 2 PUFF: 160; 4.5 AEROSOL RESPIRATORY (INHALATION) at 23:33

## 2020-01-01 RX ADMIN — RANOLAZINE 500 MG: 500 TABLET, FILM COATED, EXTENDED RELEASE ORAL at 20:31

## 2020-01-01 RX ADMIN — AMLODIPINE BESYLATE 5 MG: 5 TABLET ORAL at 08:59

## 2020-01-01 RX ADMIN — ZOLPIDEM TARTRATE 5 MG: 5 TABLET ORAL at 03:55

## 2020-01-01 RX ADMIN — ALBUMIN (HUMAN) 25 G: 0.25 INJECTION, SOLUTION INTRAVENOUS at 17:32

## 2020-01-01 RX ADMIN — IPRATROPIUM BROMIDE AND ALBUTEROL SULFATE 1 AMPULE: 2.5; .5 SOLUTION RESPIRATORY (INHALATION) at 20:45

## 2020-01-01 RX ADMIN — VANCOMYCIN HYDROCHLORIDE 1.5 G: 10 INJECTION, POWDER, LYOPHILIZED, FOR SOLUTION INTRAVENOUS at 13:05

## 2020-01-01 RX ADMIN — SODIUM CHLORIDE, PRESERVATIVE FREE 10 ML: 5 INJECTION INTRAVENOUS at 09:55

## 2020-01-01 RX ADMIN — CILOSTAZOL 100 MG: 100 TABLET ORAL at 09:22

## 2020-01-01 RX ADMIN — CHLORHEXIDINE GLUCONATE 0.12% ORAL RINSE 15 ML: 1.2 LIQUID ORAL at 21:18

## 2020-01-01 RX ADMIN — ISOSORBIDE MONONITRATE 60 MG: 30 TABLET ORAL at 09:30

## 2020-01-01 RX ADMIN — DEXAMETHASONE 6 MG: 4 TABLET ORAL at 08:33

## 2020-01-01 RX ADMIN — ENOXAPARIN SODIUM 30 MG: 30 INJECTION SUBCUTANEOUS at 09:13

## 2020-01-01 RX ADMIN — TAMSULOSIN HYDROCHLORIDE 0.4 MG: 0.4 CAPSULE ORAL at 09:12

## 2020-01-01 RX ADMIN — Medication 10 ML: at 20:19

## 2020-01-01 RX ADMIN — ASPIRIN 81 MG: 81 TABLET, CHEWABLE ORAL at 09:20

## 2020-01-01 RX ADMIN — TRAMADOL HYDROCHLORIDE 50 MG: 50 TABLET, FILM COATED ORAL at 17:14

## 2020-01-01 RX ADMIN — ATORVASTATIN CALCIUM 80 MG: 80 TABLET, FILM COATED ORAL at 08:57

## 2020-01-01 RX ADMIN — MONTELUKAST SODIUM 10 MG: 10 TABLET, FILM COATED ORAL at 20:31

## 2020-01-01 RX ADMIN — LORAZEPAM 0.5 MG: 2 INJECTION INTRAMUSCULAR; INTRAVENOUS at 10:39

## 2020-01-01 RX ADMIN — SODIUM CHLORIDE 30 ML: 9 INJECTION, SOLUTION INTRAVENOUS at 18:38

## 2020-01-01 RX ADMIN — ARFORMOTEROL TARTRATE 15 MCG: 15 SOLUTION RESPIRATORY (INHALATION) at 22:46

## 2020-01-01 RX ADMIN — PHENYLEPHRINE HYDROCHLORIDE 100 MCG/MIN: 10 INJECTION INTRAVENOUS at 01:12

## 2020-01-01 RX ADMIN — Medication 10 ML: at 21:23

## 2020-01-01 RX ADMIN — METOPROLOL SUCCINATE 75 MG: 50 TABLET, EXTENDED RELEASE ORAL at 20:42

## 2020-01-01 RX ADMIN — AMLODIPINE BESYLATE 5 MG: 5 TABLET ORAL at 09:33

## 2020-01-01 RX ADMIN — MONTELUKAST SODIUM 10 MG: 10 TABLET, FILM COATED ORAL at 20:37

## 2020-01-01 RX ADMIN — METOPROLOL TARTRATE 50 MG: 50 TABLET, FILM COATED ORAL at 20:15

## 2020-01-01 RX ADMIN — VANCOMYCIN HYDROCHLORIDE 1.5 G: 10 INJECTION, POWDER, LYOPHILIZED, FOR SOLUTION INTRAVENOUS at 03:59

## 2020-01-01 RX ADMIN — METOPROLOL TARTRATE 75 MG: 50 TABLET, FILM COATED ORAL at 20:09

## 2020-01-01 RX ADMIN — ASPIRIN 81 MG CHEWABLE TABLET 81 MG: 81 TABLET CHEWABLE at 08:58

## 2020-01-01 RX ADMIN — ENOXAPARIN SODIUM 30 MG: 30 INJECTION SUBCUTANEOUS at 08:15

## 2020-01-01 RX ADMIN — MONTELUKAST SODIUM 10 MG: 10 TABLET, COATED ORAL at 20:04

## 2020-01-01 RX ADMIN — ENOXAPARIN SODIUM 30 MG: 30 INJECTION SUBCUTANEOUS at 08:59

## 2020-01-01 RX ADMIN — SODIUM CHLORIDE: 9 INJECTION, SOLUTION INTRAVENOUS at 08:33

## 2020-01-01 RX ADMIN — BUDESONIDE AND FORMOTEROL FUMARATE DIHYDRATE 2 PUFF: 160; 4.5 AEROSOL RESPIRATORY (INHALATION) at 11:52

## 2020-01-01 RX ADMIN — MONTELUKAST SODIUM 10 MG: 10 TABLET, FILM COATED ORAL at 20:42

## 2020-01-01 RX ADMIN — ENOXAPARIN SODIUM 30 MG: 30 INJECTION SUBCUTANEOUS at 08:33

## 2020-01-01 RX ADMIN — PHENYLEPHRINE HYDROCHLORIDE 300 MCG/MIN: 10 INJECTION INTRAVENOUS at 09:00

## 2020-01-01 RX ADMIN — ACETAMINOPHEN 1000 MG: 500 TABLET ORAL at 17:14

## 2020-01-01 RX ADMIN — Medication 10 ML: at 08:59

## 2020-01-01 RX ADMIN — METHYLPREDNISOLONE SODIUM SUCCINATE 40 MG: 125 INJECTION, POWDER, FOR SOLUTION INTRAMUSCULAR; INTRAVENOUS at 14:26

## 2020-01-01 RX ADMIN — ATORVASTATIN CALCIUM 80 MG: 80 TABLET, FILM COATED ORAL at 08:49

## 2020-01-01 RX ADMIN — NITROGLYCERIN 0.4 MG: 0.4 TABLET, ORALLY DISINTEGRATING SUBLINGUAL at 08:58

## 2020-01-01 RX ADMIN — METOPROLOL SUCCINATE 75 MG: 50 TABLET, EXTENDED RELEASE ORAL at 20:31

## 2020-01-01 RX ADMIN — Medication 200 MCG/HR: at 12:41

## 2020-01-01 RX ADMIN — HEPARIN SODIUM 2000 UNITS: 1000 INJECTION, SOLUTION INTRAVENOUS; SUBCUTANEOUS at 16:58

## 2020-01-01 RX ADMIN — IPRATROPIUM BROMIDE AND ALBUTEROL SULFATE 1 AMPULE: 2.5; .5 SOLUTION RESPIRATORY (INHALATION) at 08:41

## 2020-01-01 RX ADMIN — ARFORMOTEROL TARTRATE 15 MCG: 15 SOLUTION RESPIRATORY (INHALATION) at 20:44

## 2020-01-01 RX ADMIN — ATORVASTATIN CALCIUM 80 MG: 80 TABLET, FILM COATED ORAL at 21:15

## 2020-01-01 RX ADMIN — PHENYLEPHRINE HYDROCHLORIDE 300 MCG/MIN: 10 INJECTION INTRAVENOUS at 17:29

## 2020-01-01 RX ADMIN — AMLODIPINE BESYLATE 5 MG: 5 TABLET ORAL at 09:13

## 2020-01-01 RX ADMIN — METHYLPREDNISOLONE SODIUM SUCCINATE 40 MG: 125 INJECTION, POWDER, FOR SOLUTION INTRAMUSCULAR; INTRAVENOUS at 20:23

## 2020-01-01 RX ADMIN — BUMETANIDE 2 MG: 0.25 INJECTION INTRAMUSCULAR; INTRAVENOUS at 10:57

## 2020-01-01 RX ADMIN — OXYCODONE HYDROCHLORIDE AND ACETAMINOPHEN 500 MG: 500 TABLET ORAL at 22:00

## 2020-01-01 RX ADMIN — CILOSTAZOL 100 MG: 100 TABLET ORAL at 19:59

## 2020-01-01 RX ADMIN — ARFORMOTEROL TARTRATE 15 MCG: 15 SOLUTION RESPIRATORY (INHALATION) at 10:22

## 2020-01-01 RX ADMIN — FLUTICASONE PROPIONATE 1 SPRAY: 50 SPRAY, METERED NASAL at 08:34

## 2020-01-01 RX ADMIN — TIOTROPIUM BROMIDE INHALATION SPRAY 2 PUFF: 3.12 SPRAY, METERED RESPIRATORY (INHALATION) at 08:57

## 2020-01-01 RX ADMIN — ACETAMINOPHEN 650 MG: 325 TABLET ORAL at 15:49

## 2020-01-01 RX ADMIN — ACETYLCYSTEINE 400 MG: 200 SOLUTION ORAL; RESPIRATORY (INHALATION) at 20:44

## 2020-01-01 RX ADMIN — Medication 200 MCG/HR: at 18:55

## 2020-01-01 RX ADMIN — RANOLAZINE 500 MG: 500 TABLET, FILM COATED, EXTENDED RELEASE ORAL at 08:46

## 2020-01-01 RX ADMIN — IPRATROPIUM BROMIDE AND ALBUTEROL SULFATE 1 AMPULE: 2.5; .5 SOLUTION RESPIRATORY (INHALATION) at 12:43

## 2020-01-01 RX ADMIN — MONTELUKAST SODIUM 10 MG: 10 TABLET, FILM COATED ORAL at 20:22

## 2020-01-01 RX ADMIN — ARFORMOTEROL TARTRATE 15 MCG: 15 SOLUTION RESPIRATORY (INHALATION) at 09:37

## 2020-01-01 RX ADMIN — FLUTICASONE PROPIONATE 1 SPRAY: 50 SPRAY, METERED NASAL at 22:32

## 2020-01-01 RX ADMIN — DEXAMETHASONE 4 MG: 4 TABLET ORAL at 09:38

## 2020-01-01 RX ADMIN — ASPIRIN 81 MG: 81 TABLET, CHEWABLE ORAL at 08:47

## 2020-01-01 RX ADMIN — AMLODIPINE BESYLATE 5 MG: 5 TABLET ORAL at 08:18

## 2020-01-01 RX ADMIN — IPRATROPIUM BROMIDE AND ALBUTEROL SULFATE 1 AMPULE: 2.5; .5 SOLUTION RESPIRATORY (INHALATION) at 16:45

## 2020-01-01 RX ADMIN — ISOSORBIDE MONONITRATE 30 MG: 30 TABLET ORAL at 08:47

## 2020-01-01 RX ADMIN — IPRATROPIUM BROMIDE AND ALBUTEROL SULFATE 3 ML: .5; 2.5 SOLUTION RESPIRATORY (INHALATION) at 17:33

## 2020-01-01 RX ADMIN — AMLODIPINE BESYLATE 5 MG: 5 TABLET ORAL at 08:46

## 2020-01-01 RX ADMIN — OXYCODONE HYDROCHLORIDE AND ACETAMINOPHEN 500 MG: 500 TABLET ORAL at 09:59

## 2020-01-01 RX ADMIN — IPRATROPIUM BROMIDE AND ALBUTEROL SULFATE 1 AMPULE: 2.5; .5 SOLUTION RESPIRATORY (INHALATION) at 17:43

## 2020-01-01 RX ADMIN — CEFTRIAXONE SODIUM 1 G: 1 INJECTION, POWDER, FOR SOLUTION INTRAMUSCULAR; INTRAVENOUS at 14:41

## 2020-01-01 RX ADMIN — ARFORMOTEROL TARTRATE 15 MCG: 15 SOLUTION RESPIRATORY (INHALATION) at 08:47

## 2020-01-01 RX ADMIN — CILOSTAZOL 100 MG: 100 TABLET ORAL at 20:24

## 2020-01-01 RX ADMIN — IPRATROPIUM BROMIDE AND ALBUTEROL SULFATE 1 AMPULE: 2.5; .5 SOLUTION RESPIRATORY (INHALATION) at 15:34

## 2020-01-01 RX ADMIN — MONTELUKAST SODIUM 10 MG: 10 TABLET, COATED ORAL at 21:49

## 2020-01-01 RX ADMIN — NITROGLYCERIN 0.4 MG: 0.4 TABLET, ORALLY DISINTEGRATING SUBLINGUAL at 22:03

## 2020-01-01 RX ADMIN — ACETAMINOPHEN 650 MG: 325 TABLET ORAL at 17:04

## 2020-01-01 RX ADMIN — CILOSTAZOL 100 MG: 100 TABLET ORAL at 08:34

## 2020-01-01 RX ADMIN — TAMSULOSIN HYDROCHLORIDE 0.4 MG: 0.4 CAPSULE ORAL at 08:34

## 2020-01-01 RX ADMIN — POTASSIUM CHLORIDE 10 MEQ: 10 INJECTION, SOLUTION INTRAVENOUS at 13:06

## 2020-01-01 RX ADMIN — Medication 10 ML: at 11:22

## 2020-01-01 RX ADMIN — ARFORMOTEROL TARTRATE 15 MCG: 15 SOLUTION RESPIRATORY (INHALATION) at 08:42

## 2020-01-01 RX ADMIN — Medication 10 ML: at 08:15

## 2020-01-01 RX ADMIN — REMDESIVIR 100 MG: 100 INJECTION, POWDER, LYOPHILIZED, FOR SOLUTION INTRAVENOUS at 18:19

## 2020-01-01 RX ADMIN — TAMSULOSIN HYDROCHLORIDE 0.4 MG: 0.4 CAPSULE ORAL at 08:58

## 2020-01-01 RX ADMIN — AMLODIPINE BESYLATE 5 MG: 5 TABLET ORAL at 21:24

## 2020-01-01 RX ADMIN — SODIUM CHLORIDE 30 ML: 9 INJECTION, SOLUTION INTRAVENOUS at 23:57

## 2020-01-01 RX ADMIN — ASPIRIN 81 MG CHEWABLE TABLET 81 MG: 81 TABLET CHEWABLE at 08:59

## 2020-01-01 RX ADMIN — RANOLAZINE 500 MG: 500 TABLET, FILM COATED, EXTENDED RELEASE ORAL at 09:37

## 2020-01-01 RX ADMIN — DEXAMETHASONE SODIUM PHOSPHATE 6 MG: 10 INJECTION INTRAMUSCULAR; INTRAVENOUS at 19:03

## 2020-01-01 RX ADMIN — ENOXAPARIN SODIUM 30 MG: 30 INJECTION SUBCUTANEOUS at 20:31

## 2020-01-01 RX ADMIN — AMLODIPINE BESYLATE 5 MG: 5 TABLET ORAL at 20:24

## 2020-01-01 RX ADMIN — ISOSORBIDE MONONITRATE 60 MG: 30 TABLET ORAL at 09:34

## 2020-01-01 RX ADMIN — ZOLPIDEM TARTRATE 5 MG: 5 TABLET ORAL at 21:24

## 2020-01-01 RX ADMIN — ISOSORBIDE MONONITRATE 60 MG: 60 TABLET ORAL at 08:49

## 2020-01-01 RX ADMIN — ISOSORBIDE MONONITRATE 60 MG: 30 TABLET ORAL at 08:49

## 2020-01-01 RX ADMIN — MONTELUKAST SODIUM 10 MG: 10 TABLET ORAL at 20:09

## 2020-01-01 RX ADMIN — MONTELUKAST SODIUM 10 MG: 10 TABLET, COATED ORAL at 20:23

## 2020-01-01 RX ADMIN — TAMSULOSIN HYDROCHLORIDE 0.4 MG: 0.4 CAPSULE ORAL at 08:47

## 2020-01-01 RX ADMIN — Medication 10 ML: at 20:37

## 2020-01-01 RX ADMIN — LORAZEPAM 0.5 MG: 2 INJECTION INTRAMUSCULAR; INTRAVENOUS at 09:45

## 2020-01-01 RX ADMIN — ENOXAPARIN SODIUM 30 MG: 30 INJECTION SUBCUTANEOUS at 21:31

## 2020-01-01 RX ADMIN — NITROGLYCERIN: 0.4 TABLET, ORALLY DISINTEGRATING SUBLINGUAL at 22:00

## 2020-01-01 RX ADMIN — PIPERACILLIN AND TAZOBACTAM 3.38 G: 3; .375 INJECTION, POWDER, LYOPHILIZED, FOR SOLUTION INTRAVENOUS at 13:07

## 2020-01-01 RX ADMIN — BUDESONIDE 500 MCG: 0.5 SUSPENSION RESPIRATORY (INHALATION) at 20:45

## 2020-01-01 RX ADMIN — ACETAMINOPHEN 650 MG: 325 TABLET ORAL at 13:44

## 2020-01-01 RX ADMIN — ACETYLCYSTEINE 400 MG: 200 SOLUTION ORAL; RESPIRATORY (INHALATION) at 02:45

## 2020-01-01 RX ADMIN — GUAIFENESIN AND DEXTROMETHORPHAN 10 ML: 100; 10 SYRUP ORAL at 20:22

## 2020-01-01 RX ADMIN — ASPIRIN 81 MG: 81 TABLET, CHEWABLE ORAL at 08:33

## 2020-01-01 RX ADMIN — FLUTICASONE PROPIONATE 1 SPRAY: 50 SPRAY, METERED NASAL at 09:24

## 2020-01-01 RX ADMIN — Medication 10 ML: at 21:15

## 2020-01-01 RX ADMIN — ENOXAPARIN SODIUM 30 MG: 30 INJECTION SUBCUTANEOUS at 09:36

## 2020-01-01 RX ADMIN — SODIUM CHLORIDE: 9 INJECTION, SOLUTION INTRAVENOUS at 18:00

## 2020-01-01 RX ADMIN — FLUTICASONE PROPIONATE 1 SPRAY: 50 SPRAY, METERED NASAL at 09:13

## 2020-01-01 RX ADMIN — Medication 10 ML: at 09:15

## 2020-01-01 RX ADMIN — IPRATROPIUM BROMIDE AND ALBUTEROL SULFATE 1 AMPULE: 2.5; .5 SOLUTION RESPIRATORY (INHALATION) at 17:31

## 2020-01-01 RX ADMIN — FLUTICASONE PROPIONATE 1 SPRAY: 50 SPRAY, METERED NASAL at 08:19

## 2020-01-01 RX ADMIN — CILOSTAZOL 100 MG: 50 TABLET ORAL at 00:40

## 2020-01-01 RX ADMIN — AMLODIPINE BESYLATE 5 MG: 5 TABLET ORAL at 20:42

## 2020-01-01 RX ADMIN — FLUTICASONE PROPIONATE 1 SPRAY: 50 SPRAY, METERED NASAL at 08:58

## 2020-01-01 RX ADMIN — METOPROLOL SUCCINATE 75 MG: 50 TABLET, EXTENDED RELEASE ORAL at 09:13

## 2020-01-01 RX ADMIN — LISINOPRIL 20 MG: 20 TABLET ORAL at 09:24

## 2020-01-01 RX ADMIN — ARFORMOTEROL TARTRATE 15 MCG: 15 SOLUTION RESPIRATORY (INHALATION) at 10:44

## 2020-01-01 RX ADMIN — ENOXAPARIN SODIUM 40 MG: 40 INJECTION SUBCUTANEOUS at 22:32

## 2020-01-01 RX ADMIN — ISOSORBIDE MONONITRATE 60 MG: 60 TABLET ORAL at 09:24

## 2020-01-01 RX ADMIN — METOPROLOL SUCCINATE 75 MG: 50 TABLET, EXTENDED RELEASE ORAL at 20:08

## 2020-01-01 RX ADMIN — LISINOPRIL 20 MG: 10 TABLET ORAL at 09:57

## 2020-01-01 RX ADMIN — OXYCODONE HYDROCHLORIDE AND ACETAMINOPHEN 500 MG: 500 TABLET ORAL at 22:38

## 2020-01-01 RX ADMIN — CILOSTAZOL 100 MG: 50 TABLET ORAL at 20:30

## 2020-01-01 RX ADMIN — ENOXAPARIN SODIUM 40 MG: 40 INJECTION SUBCUTANEOUS at 11:41

## 2020-01-01 RX ADMIN — MONTELUKAST SODIUM 10 MG: 10 TABLET, COATED ORAL at 02:13

## 2020-01-01 RX ADMIN — ISOSORBIDE MONONITRATE 60 MG: 60 TABLET ORAL at 08:34

## 2020-01-01 RX ADMIN — BUMETANIDE 2 MG: 0.25 INJECTION, SOLUTION INTRAMUSCULAR; INTRAVENOUS at 15:49

## 2020-01-01 RX ADMIN — REMDESIVIR 100 MG: 100 INJECTION, POWDER, LYOPHILIZED, FOR SOLUTION INTRAVENOUS at 18:11

## 2020-01-01 RX ADMIN — GUAIFENESIN AND DEXTROMETHORPHAN 10 ML: 100; 10 SYRUP ORAL at 23:10

## 2020-01-01 RX ADMIN — Medication 50 MG: at 08:46

## 2020-01-01 RX ADMIN — BUDESONIDE AND FORMOTEROL FUMARATE DIHYDRATE 2 PUFF: 160; 4.5 AEROSOL RESPIRATORY (INHALATION) at 08:47

## 2020-01-01 RX ADMIN — ENOXAPARIN SODIUM 30 MG: 30 INJECTION SUBCUTANEOUS at 21:18

## 2020-01-01 RX ADMIN — TIOTROPIUM BROMIDE INHALATION SPRAY 2 PUFF: 3.12 SPRAY, METERED RESPIRATORY (INHALATION) at 23:33

## 2020-01-01 RX ADMIN — IPRATROPIUM BROMIDE AND ALBUTEROL SULFATE 1 AMPULE: 2.5; .5 SOLUTION RESPIRATORY (INHALATION) at 21:12

## 2020-01-01 RX ADMIN — ISOSORBIDE MONONITRATE 60 MG: 30 TABLET ORAL at 09:13

## 2020-01-01 RX ADMIN — DEXAMETHASONE 6 MG: 4 TABLET ORAL at 08:26

## 2020-01-01 RX ADMIN — ENOXAPARIN SODIUM 30 MG: 30 INJECTION SUBCUTANEOUS at 20:38

## 2020-01-01 RX ADMIN — IPRATROPIUM BROMIDE AND ALBUTEROL SULFATE 1 AMPULE: 2.5; .5 SOLUTION RESPIRATORY (INHALATION) at 21:35

## 2020-01-01 RX ADMIN — CEFEPIME 2 G: 2 INJECTION, POWDER, FOR SOLUTION INTRAVENOUS at 10:37

## 2020-01-01 RX ADMIN — IPRATROPIUM BROMIDE AND ALBUTEROL SULFATE 1 AMPULE: .5; 3 SOLUTION RESPIRATORY (INHALATION) at 19:59

## 2020-01-01 RX ADMIN — TAMSULOSIN HYDROCHLORIDE 0.4 MG: 0.4 CAPSULE ORAL at 09:25

## 2020-01-01 RX ADMIN — LORAZEPAM 0.5 MG: 2 INJECTION INTRAMUSCULAR; INTRAVENOUS at 22:49

## 2020-01-01 RX ADMIN — LORAZEPAM 0.5 MG: 2 INJECTION INTRAMUSCULAR; INTRAVENOUS at 17:21

## 2020-01-01 RX ADMIN — DEXTROMETHORPHAN HBR, GUAIFENESIN 1 TABLET: 20; 400 TABLET, FILM COATED ORAL at 09:57

## 2020-01-01 RX ADMIN — ENOXAPARIN SODIUM 30 MG: 30 INJECTION SUBCUTANEOUS at 23:20

## 2020-01-01 RX ADMIN — FENTANYL CITRATE 25 MCG: 50 INJECTION, SOLUTION INTRAMUSCULAR; INTRAVENOUS at 20:25

## 2020-01-01 RX ADMIN — TIOTROPIUM BROMIDE INHALATION SPRAY 2 PUFF: 3.12 SPRAY, METERED RESPIRATORY (INHALATION) at 11:52

## 2020-01-01 RX ADMIN — PIPERACILLIN AND TAZOBACTAM 3.38 G: 3; .375 INJECTION, POWDER, LYOPHILIZED, FOR SOLUTION INTRAVENOUS at 13:37

## 2020-01-01 RX ADMIN — MIDAZOLAM 2 MG: 1 INJECTION INTRAMUSCULAR; INTRAVENOUS at 04:54

## 2020-01-01 RX ADMIN — Medication 10 ML: at 08:49

## 2020-01-01 RX ADMIN — GUAIFENESIN AND DEXTROMETHORPHAN 10 ML: 100; 10 SYRUP ORAL at 08:33

## 2020-01-01 RX ADMIN — SODIUM CHLORIDE 1000 ML: 9 INJECTION, SOLUTION INTRAVENOUS at 01:17

## 2020-01-01 RX ADMIN — LISINOPRIL 20 MG: 20 TABLET ORAL at 21:24

## 2020-01-01 RX ADMIN — REMDESIVIR 100 MG: 100 INJECTION, POWDER, LYOPHILIZED, FOR SOLUTION INTRAVENOUS at 17:02

## 2020-01-01 RX ADMIN — Medication 10 ML: at 09:36

## 2020-01-01 RX ADMIN — BUDESONIDE AND FORMOTEROL FUMARATE DIHYDRATE 2 PUFF: 160; 4.5 AEROSOL RESPIRATORY (INHALATION) at 09:24

## 2020-01-01 RX ADMIN — AMLODIPINE BESYLATE 5 MG: 5 TABLET ORAL at 21:19

## 2020-01-01 RX ADMIN — TIOTROPIUM BROMIDE INHALATION SPRAY 2 PUFF: 3.12 SPRAY, METERED RESPIRATORY (INHALATION) at 09:39

## 2020-01-01 RX ADMIN — Medication 50 MG: at 09:34

## 2020-01-01 RX ADMIN — MIDAZOLAM 2 MG: 1 INJECTION INTRAMUSCULAR; INTRAVENOUS at 01:15

## 2020-01-01 RX ADMIN — MONTELUKAST SODIUM 10 MG: 10 TABLET, COATED ORAL at 20:15

## 2020-01-01 RX ADMIN — PIPERACILLIN AND TAZOBACTAM 3.38 G: 3; .375 INJECTION, POWDER, LYOPHILIZED, FOR SOLUTION INTRAVENOUS at 21:15

## 2020-01-01 RX ADMIN — CILOSTAZOL 100 MG: 100 TABLET ORAL at 22:35

## 2020-01-01 RX ADMIN — SODIUM CHLORIDE, PRESERVATIVE FREE 10 ML: 5 INJECTION INTRAVENOUS at 13:24

## 2020-01-01 RX ADMIN — ASPIRIN 81 MG CHEWABLE TABLET 81 MG: 81 TABLET CHEWABLE at 09:59

## 2020-01-01 RX ADMIN — BUDESONIDE AND FORMOTEROL FUMARATE DIHYDRATE 2 PUFF: 160; 4.5 AEROSOL RESPIRATORY (INHALATION) at 21:35

## 2020-01-01 RX ADMIN — ARFORMOTEROL TARTRATE 15 MCG: 15 SOLUTION RESPIRATORY (INHALATION) at 20:45

## 2020-01-01 RX ADMIN — REMDESIVIR 100 MG: 100 INJECTION, POWDER, LYOPHILIZED, FOR SOLUTION INTRAVENOUS at 18:01

## 2020-01-01 RX ADMIN — ASPIRIN 81 MG CHEWABLE TABLET 81 MG: 81 TABLET CHEWABLE at 09:12

## 2020-01-01 RX ADMIN — Medication 10 ML: at 20:00

## 2020-01-01 RX ADMIN — REMDESIVIR 100 MG: 100 INJECTION, POWDER, LYOPHILIZED, FOR SOLUTION INTRAVENOUS at 18:38

## 2020-01-01 RX ADMIN — Medication 3 MG: at 21:28

## 2020-01-01 RX ADMIN — ARFORMOTEROL TARTRATE 15 MCG: 15 SOLUTION RESPIRATORY (INHALATION) at 08:58

## 2020-01-01 RX ADMIN — ALBUTEROL SULFATE 7.5 MG: 2.5 SOLUTION RESPIRATORY (INHALATION) at 07:25

## 2020-01-01 RX ADMIN — SODIUM CHLORIDE, PRESERVATIVE FREE 10 ML: 5 INJECTION INTRAVENOUS at 17:44

## 2020-01-01 RX ADMIN — LORAZEPAM 0.5 MG: 2 INJECTION INTRAMUSCULAR; INTRAVENOUS at 08:18

## 2020-01-01 RX ADMIN — ETOMIDATE: 2 INJECTION, SOLUTION INTRAVENOUS at 13:13

## 2020-01-01 RX ADMIN — BUDESONIDE AND FORMOTEROL FUMARATE DIHYDRATE 2 PUFF: 160; 4.5 AEROSOL RESPIRATORY (INHALATION) at 09:38

## 2020-01-01 RX ADMIN — IPRATROPIUM BROMIDE AND ALBUTEROL SULFATE 1 AMPULE: 2.5; .5 SOLUTION RESPIRATORY (INHALATION) at 02:45

## 2020-01-01 RX ADMIN — FUROSEMIDE 20 MG: 20 TABLET ORAL at 08:48

## 2020-01-01 RX ADMIN — RANOLAZINE 500 MG: 500 TABLET, FILM COATED, EXTENDED RELEASE ORAL at 08:48

## 2020-01-01 RX ADMIN — CHLORHEXIDINE GLUCONATE 0.12% ORAL RINSE 15 ML: 1.2 LIQUID ORAL at 08:25

## 2020-01-01 RX ADMIN — ENOXAPARIN SODIUM 40 MG: 40 INJECTION SUBCUTANEOUS at 08:35

## 2020-01-01 RX ADMIN — Medication 10 ML: at 10:21

## 2020-01-01 RX ADMIN — IPRATROPIUM BROMIDE AND ALBUTEROL SULFATE 1 AMPULE: 2.5; .5 SOLUTION RESPIRATORY (INHALATION) at 19:35

## 2020-01-01 RX ADMIN — METHYLPREDNISOLONE SODIUM SUCCINATE 40 MG: 125 INJECTION, POWDER, FOR SOLUTION INTRAMUSCULAR; INTRAVENOUS at 16:42

## 2020-01-01 RX ADMIN — CILOSTAZOL 100 MG: 50 TABLET ORAL at 08:48

## 2020-01-01 RX ADMIN — ISOSORBIDE MONONITRATE 60 MG: 60 TABLET ORAL at 08:33

## 2020-01-01 RX ADMIN — SALINE NASAL SPRAY 1 SPRAY: 1.5 SOLUTION NASAL at 15:42

## 2020-01-01 RX ADMIN — FLUTICASONE PROPIONATE 1 SPRAY: 50 SPRAY, METERED NASAL at 12:17

## 2020-01-01 RX ADMIN — TAMSULOSIN HYDROCHLORIDE 0.4 MG: 0.4 CAPSULE ORAL at 08:50

## 2020-01-01 RX ADMIN — LISINOPRIL 20 MG: 20 TABLET ORAL at 20:37

## 2020-01-01 RX ADMIN — BUDESONIDE 500 MCG: 0.5 SUSPENSION RESPIRATORY (INHALATION) at 08:41

## 2020-01-01 RX ADMIN — IPRATROPIUM BROMIDE AND ALBUTEROL SULFATE 1 AMPULE: 2.5; .5 SOLUTION RESPIRATORY (INHALATION) at 16:13

## 2020-01-01 RX ADMIN — MONTELUKAST SODIUM 10 MG: 10 TABLET, FILM COATED ORAL at 21:45

## 2020-01-01 RX ADMIN — METOPROLOL SUCCINATE 100 MG: 100 TABLET, EXTENDED RELEASE ORAL at 08:47

## 2020-01-01 RX ADMIN — LISINOPRIL 20 MG: 10 TABLET ORAL at 09:31

## 2020-01-01 RX ADMIN — TRAMADOL HYDROCHLORIDE 50 MG: 50 TABLET, FILM COATED ORAL at 12:33

## 2020-01-01 RX ADMIN — ATORVASTATIN CALCIUM 80 MG: 80 TABLET, FILM COATED ORAL at 08:48

## 2020-01-01 RX ADMIN — ASPIRIN 81 MG: 81 TABLET, CHEWABLE ORAL at 08:50

## 2020-01-01 RX ADMIN — BUDESONIDE 500 MCG: 0.5 SUSPENSION RESPIRATORY (INHALATION) at 19:07

## 2020-01-01 RX ADMIN — BUDESONIDE 500 MCG: 0.5 SUSPENSION RESPIRATORY (INHALATION) at 21:14

## 2020-01-01 RX ADMIN — CILOSTAZOL 100 MG: 100 TABLET ORAL at 20:37

## 2020-01-01 RX ADMIN — ENOXAPARIN SODIUM 30 MG: 30 INJECTION SUBCUTANEOUS at 20:03

## 2020-01-01 RX ADMIN — ENOXAPARIN SODIUM 30 MG: 30 INJECTION SUBCUTANEOUS at 09:34

## 2020-01-01 RX ADMIN — PIPERACILLIN AND TAZOBACTAM 3.38 G: 3; .375 INJECTION, POWDER, LYOPHILIZED, FOR SOLUTION INTRAVENOUS at 14:16

## 2020-01-01 RX ADMIN — METHYLPREDNISOLONE SODIUM SUCCINATE 40 MG: 125 INJECTION, POWDER, FOR SOLUTION INTRAMUSCULAR; INTRAVENOUS at 03:54

## 2020-01-01 RX ADMIN — OXYCODONE HYDROCHLORIDE AND ACETAMINOPHEN 500 MG: 500 TABLET ORAL at 08:58

## 2020-01-01 RX ADMIN — Medication 50 MG: at 09:59

## 2020-01-01 RX ADMIN — SODIUM CHLORIDE, PRESERVATIVE FREE 10 ML: 5 INJECTION INTRAVENOUS at 08:34

## 2020-01-01 RX ADMIN — IPRATROPIUM BROMIDE AND ALBUTEROL SULFATE 1 AMPULE: 2.5; .5 SOLUTION RESPIRATORY (INHALATION) at 19:59

## 2020-01-01 RX ADMIN — Medication 10 ML: at 08:47

## 2020-01-01 RX ADMIN — MONTELUKAST SODIUM 10 MG: 10 TABLET, COATED ORAL at 20:10

## 2020-01-01 RX ADMIN — ISOSORBIDE MONONITRATE 60 MG: 30 TABLET ORAL at 09:22

## 2020-01-01 RX ADMIN — PIPERACILLIN AND TAZOBACTAM 3.38 G: 3; .375 INJECTION, POWDER, LYOPHILIZED, FOR SOLUTION INTRAVENOUS at 21:49

## 2020-01-01 RX ADMIN — RANOLAZINE 500 MG: 500 TABLET, FILM COATED, EXTENDED RELEASE ORAL at 12:12

## 2020-01-01 RX ADMIN — AMLODIPINE BESYLATE 5 MG: 5 TABLET ORAL at 21:29

## 2020-01-01 RX ADMIN — AZITHROMYCIN MONOHYDRATE 500 MG: 500 INJECTION, POWDER, LYOPHILIZED, FOR SOLUTION INTRAVENOUS at 09:11

## 2020-01-01 RX ADMIN — Medication 200 MCG/HR: at 23:00

## 2020-01-01 RX ADMIN — CLOPIDOGREL 75 MG: 75 TABLET, FILM COATED ORAL at 08:34

## 2020-01-01 RX ADMIN — Medication 10 ML: at 22:38

## 2020-01-01 RX ADMIN — HEPARIN SODIUM 12 UNITS/KG/HR: 10000 INJECTION, SOLUTION INTRAVENOUS at 20:21

## 2020-01-01 RX ADMIN — METOPROLOL SUCCINATE 100 MG: 100 TABLET, EXTENDED RELEASE ORAL at 11:35

## 2020-01-01 RX ADMIN — ISOSORBIDE MONONITRATE 60 MG: 30 TABLET ORAL at 09:37

## 2020-01-01 RX ADMIN — BUMETANIDE 0.5 MG: 0.25 INJECTION, SOLUTION INTRAMUSCULAR; INTRAVENOUS at 10:39

## 2020-01-01 RX ADMIN — IOPAMIDOL 75 ML: 755 INJECTION, SOLUTION INTRAVENOUS at 15:14

## 2020-01-01 RX ADMIN — IPRATROPIUM BROMIDE AND ALBUTEROL SULFATE 1 AMPULE: 2.5; .5 SOLUTION RESPIRATORY (INHALATION) at 10:45

## 2020-01-01 RX ADMIN — IPRATROPIUM BROMIDE AND ALBUTEROL SULFATE 1 AMPULE: 2.5; .5 SOLUTION RESPIRATORY (INHALATION) at 21:14

## 2020-01-01 RX ADMIN — BUDESONIDE 500 MCG: 0.5 SUSPENSION RESPIRATORY (INHALATION) at 19:59

## 2020-01-01 RX ADMIN — HEPARIN SODIUM 2000 UNITS: 1000 INJECTION, SOLUTION INTRAVENOUS; SUBCUTANEOUS at 03:40

## 2020-01-01 RX ADMIN — DEXAMETHASONE 6 MG: 4 TABLET ORAL at 08:50

## 2020-01-01 RX ADMIN — ISOSORBIDE MONONITRATE 30 MG: 30 TABLET ORAL at 14:56

## 2020-01-01 RX ADMIN — OXYCODONE HYDROCHLORIDE AND ACETAMINOPHEN 500 MG: 500 TABLET ORAL at 09:12

## 2020-01-01 RX ADMIN — ATORVASTATIN CALCIUM 80 MG: 80 TABLET, FILM COATED ORAL at 20:24

## 2020-01-01 RX ADMIN — TIOTROPIUM BROMIDE INHALATION SPRAY 2 PUFF: 3.12 SPRAY, METERED RESPIRATORY (INHALATION) at 20:31

## 2020-01-01 RX ADMIN — Medication 1000 UNITS: at 09:34

## 2020-01-01 RX ADMIN — RANOLAZINE 500 MG: 500 TABLET, FILM COATED, EXTENDED RELEASE ORAL at 21:28

## 2020-01-01 RX ADMIN — SODIUM CHLORIDE: 9 INJECTION, SOLUTION INTRAVENOUS at 12:39

## 2020-01-01 RX ADMIN — SALINE NASAL SPRAY 1 SPRAY: 1.5 SOLUTION NASAL at 09:39

## 2020-01-01 RX ADMIN — GUAIFENESIN AND DEXTROMETHORPHAN 10 ML: 100; 10 SYRUP ORAL at 21:41

## 2020-01-01 RX ADMIN — NITROGLYCERIN 0.4 MG: 0.4 TABLET, ORALLY DISINTEGRATING SUBLINGUAL at 22:27

## 2020-01-01 RX ADMIN — BUDESONIDE 500 MCG: 0.5 SUSPENSION RESPIRATORY (INHALATION) at 08:47

## 2020-01-01 RX ADMIN — Medication 10 ML: at 09:24

## 2020-01-01 RX ADMIN — FLUTICASONE PROPIONATE 1 SPRAY: 50 SPRAY, METERED NASAL at 08:47

## 2020-01-01 RX ADMIN — RANOLAZINE 500 MG: 500 TABLET, FILM COATED, EXTENDED RELEASE ORAL at 21:45

## 2020-01-01 RX ADMIN — ASPIRIN 81 MG CHEWABLE TABLET 81 MG: 81 TABLET CHEWABLE at 08:46

## 2020-01-01 RX ADMIN — METOPROLOL SUCCINATE 75 MG: 50 TABLET, EXTENDED RELEASE ORAL at 21:41

## 2020-01-01 RX ADMIN — AMLODIPINE BESYLATE 5 MG: 5 TABLET ORAL at 21:45

## 2020-01-01 RX ADMIN — PHENYLEPHRINE HYDROCHLORIDE 270 MCG/MIN: 10 INJECTION INTRAVENOUS at 23:01

## 2020-01-01 RX ADMIN — CILOSTAZOL 100 MG: 100 TABLET ORAL at 08:33

## 2020-01-01 RX ADMIN — AMLODIPINE BESYLATE 5 MG: 5 TABLET ORAL at 12:31

## 2020-01-01 RX ADMIN — CLOPIDOGREL 75 MG: 75 TABLET, FILM COATED ORAL at 08:18

## 2020-01-01 RX ADMIN — OXYCODONE HYDROCHLORIDE AND ACETAMINOPHEN 500 MG: 500 TABLET ORAL at 09:22

## 2020-01-01 RX ADMIN — SODIUM CHLORIDE 20 ML: 9 INJECTION, SOLUTION INTRAVENOUS at 09:21

## 2020-01-01 RX ADMIN — ATORVASTATIN CALCIUM 80 MG: 80 TABLET, FILM COATED ORAL at 21:49

## 2020-01-01 RX ADMIN — RANOLAZINE 500 MG: 500 TABLET, FILM COATED, EXTENDED RELEASE ORAL at 09:34

## 2020-01-01 RX ADMIN — OXYCODONE HYDROCHLORIDE AND ACETAMINOPHEN 500 MG: 500 TABLET ORAL at 08:46

## 2020-01-01 RX ADMIN — METOPROLOL SUCCINATE 75 MG: 50 TABLET, EXTENDED RELEASE ORAL at 20:25

## 2020-01-01 RX ADMIN — NITROGLYCERIN 0.4 MG: 0.4 TABLET, ORALLY DISINTEGRATING SUBLINGUAL at 14:53

## 2020-01-01 RX ADMIN — ARFORMOTEROL TARTRATE 15 MCG: 15 SOLUTION RESPIRATORY (INHALATION) at 09:43

## 2020-01-01 RX ADMIN — REMDESIVIR 200 MG: 100 INJECTION, POWDER, LYOPHILIZED, FOR SOLUTION INTRAVENOUS at 21:15

## 2020-01-01 RX ADMIN — SODIUM CHLORIDE, PRESERVATIVE FREE 10 ML: 5 INJECTION INTRAVENOUS at 12:02

## 2020-01-01 RX ADMIN — ACETAMINOPHEN 650 MG: 325 TABLET ORAL at 05:34

## 2020-01-01 RX ADMIN — Medication 10 ML: at 09:31

## 2020-01-01 RX ADMIN — LISINOPRIL 20 MG: 20 TABLET ORAL at 20:24

## 2020-01-01 RX ADMIN — Medication 10 ML: at 09:00

## 2020-01-01 RX ADMIN — ACETAMINOPHEN 650 MG: 325 TABLET ORAL at 17:58

## 2020-01-01 RX ADMIN — REMDESIVIR 100 MG: 100 INJECTION, POWDER, LYOPHILIZED, FOR SOLUTION INTRAVENOUS at 17:56

## 2020-01-01 RX ADMIN — FLUTICASONE PROPIONATE 1 SPRAY: 50 SPRAY, METERED NASAL at 08:15

## 2020-01-01 RX ADMIN — TIOTROPIUM BROMIDE INHALATION SPRAY 2 PUFF: 3.12 SPRAY, METERED RESPIRATORY (INHALATION) at 08:59

## 2020-01-01 RX ADMIN — ASPIRIN 81 MG: 81 TABLET, CHEWABLE ORAL at 08:31

## 2020-01-01 RX ADMIN — DEXAMETHASONE SODIUM PHOSPHATE 4 MG: 4 INJECTION, SOLUTION INTRAMUSCULAR; INTRAVENOUS at 13:14

## 2020-01-01 RX ADMIN — BUDESONIDE 500 MCG: 0.5 SUSPENSION RESPIRATORY (INHALATION) at 09:36

## 2020-01-01 RX ADMIN — CILOSTAZOL 100 MG: 100 TABLET ORAL at 20:42

## 2020-01-01 RX ADMIN — CILOSTAZOL 100 MG: 100 TABLET ORAL at 21:45

## 2020-01-01 RX ADMIN — METOPROLOL TARTRATE 75 MG: 50 TABLET, FILM COATED ORAL at 21:49

## 2020-01-01 RX ADMIN — Medication 10 ML: at 09:20

## 2020-01-01 RX ADMIN — NITROGLYCERIN 0.4 MG: 0.4 TABLET, ORALLY DISINTEGRATING SUBLINGUAL at 21:50

## 2020-01-01 RX ADMIN — Medication 2 MCG/MIN: at 17:17

## 2020-01-01 RX ADMIN — IPRATROPIUM BROMIDE AND ALBUTEROL SULFATE 1 AMPULE: 2.5; .5 SOLUTION RESPIRATORY (INHALATION) at 09:29

## 2020-01-01 RX ADMIN — CILOSTAZOL 100 MG: 50 TABLET ORAL at 09:13

## 2020-01-01 RX ADMIN — REMDESIVIR 100 MG: 100 INJECTION, POWDER, LYOPHILIZED, FOR SOLUTION INTRAVENOUS at 17:35

## 2020-01-01 RX ADMIN — IPRATROPIUM BROMIDE AND ALBUTEROL SULFATE 1 AMPULE: 2.5; .5 SOLUTION RESPIRATORY (INHALATION) at 16:25

## 2020-01-01 RX ADMIN — PREDNISONE 20 MG: 20 TABLET ORAL at 08:49

## 2020-01-01 RX ADMIN — SODIUM CHLORIDE: 9 INJECTION, SOLUTION INTRAVENOUS at 20:29

## 2020-01-01 RX ADMIN — ARFORMOTEROL TARTRATE 15 MCG: 15 SOLUTION RESPIRATORY (INHALATION) at 21:13

## 2020-01-01 RX ADMIN — CILOSTAZOL 100 MG: 50 TABLET ORAL at 09:37

## 2020-01-01 RX ADMIN — TAMSULOSIN HYDROCHLORIDE 0.4 MG: 0.4 CAPSULE ORAL at 08:46

## 2020-01-01 RX ADMIN — FUROSEMIDE 40 MG: 10 INJECTION, SOLUTION INTRAMUSCULAR; INTRAVENOUS at 09:45

## 2020-01-01 RX ADMIN — ENOXAPARIN SODIUM 40 MG: 40 INJECTION SUBCUTANEOUS at 08:47

## 2020-01-01 RX ADMIN — ACETAMINOPHEN 650 MG: 325 TABLET ORAL at 15:09

## 2020-01-01 RX ADMIN — OXYCODONE HYDROCHLORIDE AND ACETAMINOPHEN 500 MG: 500 TABLET ORAL at 12:12

## 2020-01-01 RX ADMIN — RANOLAZINE 500 MG: 500 TABLET, FILM COATED, EXTENDED RELEASE ORAL at 20:42

## 2020-01-01 RX ADMIN — METOPROLOL SUCCINATE 75 MG: 50 TABLET, EXTENDED RELEASE ORAL at 08:59

## 2020-01-01 RX ADMIN — RANOLAZINE 500 MG: 500 TABLET, FILM COATED, EXTENDED RELEASE ORAL at 08:34

## 2020-01-01 RX ADMIN — SODIUM CHLORIDE 30 ML: 9 INJECTION, SOLUTION INTRAVENOUS at 17:44

## 2020-01-01 RX ADMIN — Medication 1000 UNITS: at 09:22

## 2020-01-01 RX ADMIN — HEPARIN SODIUM 12 UNITS/KG/HR: 10000 INJECTION, SOLUTION INTRAVENOUS at 14:53

## 2020-01-01 RX ADMIN — LISINOPRIL 20 MG: 20 TABLET ORAL at 08:34

## 2020-01-01 RX ADMIN — DEXAMETHASONE SODIUM PHOSPHATE 4 MG: 4 INJECTION, SOLUTION INTRAMUSCULAR; INTRAVENOUS at 00:12

## 2020-01-01 RX ADMIN — ASPIRIN 81 MG: 81 TABLET, CHEWABLE ORAL at 08:27

## 2020-01-01 RX ADMIN — BUMETANIDE 0.5 MG/HR: 0.25 INJECTION INTRAMUSCULAR; INTRAVENOUS at 10:30

## 2020-01-01 RX ADMIN — RANOLAZINE 500 MG: 500 TABLET, FILM COATED, EXTENDED RELEASE ORAL at 09:20

## 2020-01-01 RX ADMIN — BUDESONIDE AND FORMOTEROL FUMARATE DIHYDRATE 2 PUFF: 160; 4.5 AEROSOL RESPIRATORY (INHALATION) at 20:32

## 2020-01-01 RX ADMIN — AMLODIPINE BESYLATE 5 MG: 5 TABLET ORAL at 20:23

## 2020-01-01 RX ADMIN — AMLODIPINE BESYLATE 5 MG: 5 TABLET ORAL at 21:28

## 2020-01-01 RX ADMIN — FUROSEMIDE 20 MG: 20 TABLET ORAL at 08:34

## 2020-01-01 RX ADMIN — DEXAMETHASONE 6 MG: 4 TABLET ORAL at 08:46

## 2020-01-01 RX ADMIN — AMLODIPINE BESYLATE 5 MG: 5 TABLET ORAL at 09:22

## 2020-01-01 RX ADMIN — IPRATROPIUM BROMIDE AND ALBUTEROL SULFATE 1 AMPULE: 2.5; .5 SOLUTION RESPIRATORY (INHALATION) at 08:47

## 2020-01-01 RX ADMIN — IPRATROPIUM BROMIDE AND ALBUTEROL SULFATE 1 AMPULE: 2.5; .5 SOLUTION RESPIRATORY (INHALATION) at 22:43

## 2020-01-01 RX ADMIN — Medication 10 ML: at 08:58

## 2020-01-01 RX ADMIN — Medication 10 ML: at 20:24

## 2020-01-01 RX ADMIN — BUDESONIDE 500 MCG: 0.5 SUSPENSION RESPIRATORY (INHALATION) at 09:31

## 2020-01-01 RX ADMIN — OXYCODONE HYDROCHLORIDE AND ACETAMINOPHEN 500 MG: 500 TABLET ORAL at 20:09

## 2020-01-01 RX ADMIN — ZOLPIDEM TARTRATE 5 MG: 5 TABLET ORAL at 23:17

## 2020-01-01 RX ADMIN — Medication 10 ML: at 21:31

## 2020-01-01 RX ADMIN — Medication 1000 UNITS: at 08:15

## 2020-01-01 RX ADMIN — CEFEPIME 2 G: 2 INJECTION, POWDER, FOR SOLUTION INTRAVENOUS at 00:06

## 2020-01-01 RX ADMIN — RANOLAZINE 500 MG: 500 TABLET, FILM COATED, EXTENDED RELEASE ORAL at 09:57

## 2020-01-01 RX ADMIN — TAMSULOSIN HYDROCHLORIDE 0.4 MG: 0.4 CAPSULE ORAL at 08:48

## 2020-01-01 RX ADMIN — PIPERACILLIN AND TAZOBACTAM 3.38 G: 3; .375 INJECTION, POWDER, LYOPHILIZED, FOR SOLUTION INTRAVENOUS at 04:56

## 2020-01-01 RX ADMIN — AMLODIPINE BESYLATE 5 MG: 5 TABLET ORAL at 08:49

## 2020-01-01 RX ADMIN — ARFORMOTEROL TARTRATE 15 MCG: 15 SOLUTION RESPIRATORY (INHALATION) at 10:48

## 2020-01-01 RX ADMIN — DEXTROMETHORPHAN HBR, GUAIFENESIN 1 TABLET: 20; 400 TABLET, FILM COATED ORAL at 22:35

## 2020-01-01 RX ADMIN — METOPROLOL SUCCINATE 75 MG: 50 TABLET, EXTENDED RELEASE ORAL at 22:38

## 2020-01-01 RX ADMIN — AMLODIPINE BESYLATE 5 MG: 5 TABLET ORAL at 20:31

## 2020-01-01 RX ADMIN — MIDAZOLAM HYDROCHLORIDE 3 MG/HR: 5 INJECTION, SOLUTION INTRAMUSCULAR; INTRAVENOUS at 15:10

## 2020-01-01 RX ADMIN — LISINOPRIL 20 MG: 20 TABLET ORAL at 08:48

## 2020-01-01 RX ADMIN — HEPARIN SODIUM 4000 UNITS: 1000 INJECTION INTRAVENOUS; SUBCUTANEOUS at 20:21

## 2020-01-01 RX ADMIN — CILOSTAZOL 100 MG: 100 TABLET ORAL at 12:12

## 2020-01-01 RX ADMIN — Medication 10 ML: at 20:23

## 2020-01-01 RX ADMIN — DEXAMETHASONE 6 MG: 4 TABLET ORAL at 09:58

## 2020-01-01 RX ADMIN — METOPROLOL SUCCINATE 75 MG: 50 TABLET, EXTENDED RELEASE ORAL at 08:46

## 2020-01-01 RX ADMIN — Medication 10 ML: at 09:13

## 2020-01-01 RX ADMIN — ATORVASTATIN CALCIUM 80 MG: 80 TABLET, FILM COATED ORAL at 09:13

## 2020-01-01 RX ADMIN — CHLORHEXIDINE GLUCONATE 0.12% ORAL RINSE 15 ML: 1.2 LIQUID ORAL at 21:50

## 2020-01-01 RX ADMIN — CLOPIDOGREL BISULFATE 300 MG: 300 TABLET, FILM COATED ORAL at 12:47

## 2020-01-01 RX ADMIN — RANOLAZINE 500 MG: 500 TABLET, FILM COATED, EXTENDED RELEASE ORAL at 22:35

## 2020-01-01 RX ADMIN — ENOXAPARIN SODIUM 30 MG: 30 INJECTION SUBCUTANEOUS at 09:54

## 2020-01-01 RX ADMIN — IPRATROPIUM BROMIDE AND ALBUTEROL SULFATE 1 AMPULE: 2.5; .5 SOLUTION RESPIRATORY (INHALATION) at 08:54

## 2020-01-01 RX ADMIN — BUDESONIDE AND FORMOTEROL FUMARATE DIHYDRATE 2 PUFF: 160; 4.5 AEROSOL RESPIRATORY (INHALATION) at 22:37

## 2020-01-01 RX ADMIN — RANOLAZINE 500 MG: 500 TABLET, FILM COATED, EXTENDED RELEASE ORAL at 21:29

## 2020-01-01 RX ADMIN — SODIUM CHLORIDE, PRESERVATIVE FREE 10 ML: 5 INJECTION INTRAVENOUS at 08:16

## 2020-01-01 RX ADMIN — ENOXAPARIN SODIUM 30 MG: 30 INJECTION SUBCUTANEOUS at 21:49

## 2020-01-01 RX ADMIN — ATORVASTATIN CALCIUM 80 MG: 80 TABLET, FILM COATED ORAL at 08:34

## 2020-01-01 RX ADMIN — ZINC SULFATE 220 MG (50 MG) CAPSULE 50 MG: CAPSULE at 08:49

## 2020-01-01 RX ADMIN — SODIUM CHLORIDE: 9 INJECTION, SOLUTION INTRAVENOUS at 16:46

## 2020-01-01 RX ADMIN — Medication 1000 UNITS: at 08:50

## 2020-01-01 RX ADMIN — OXYCODONE HYDROCHLORIDE AND ACETAMINOPHEN 500 MG: 500 TABLET ORAL at 21:36

## 2020-01-01 RX ADMIN — SODIUM CHLORIDE 20 ML: 9 INJECTION, SOLUTION INTRAVENOUS at 11:50

## 2020-01-01 RX ADMIN — MAGNESIUM SULFATE HEPTAHYDRATE 2 G: 40 INJECTION, SOLUTION INTRAVENOUS at 07:37

## 2020-01-01 RX ADMIN — ENOXAPARIN SODIUM 30 MG: 30 INJECTION SUBCUTANEOUS at 19:59

## 2020-01-01 RX ADMIN — ACETYLCYSTEINE 400 MG: 200 SOLUTION ORAL; RESPIRATORY (INHALATION) at 08:41

## 2020-01-01 RX ADMIN — ATORVASTATIN CALCIUM 80 MG: 80 TABLET, FILM COATED ORAL at 20:17

## 2020-01-01 RX ADMIN — BUDESONIDE AND FORMOTEROL FUMARATE DIHYDRATE 2 PUFF: 160; 4.5 AEROSOL RESPIRATORY (INHALATION) at 12:11

## 2020-01-01 RX ADMIN — ARFORMOTEROL TARTRATE 15 MCG: 15 SOLUTION RESPIRATORY (INHALATION) at 19:59

## 2020-01-01 RX ADMIN — FLUTICASONE PROPIONATE 1 SPRAY: 50 SPRAY, METERED NASAL at 08:50

## 2020-01-01 RX ADMIN — ENOXAPARIN SODIUM 40 MG: 40 INJECTION SUBCUTANEOUS at 09:25

## 2020-01-01 RX ADMIN — ATORVASTATIN CALCIUM 80 MG: 80 TABLET, FILM COATED ORAL at 12:12

## 2020-01-01 RX ADMIN — SODIUM CHLORIDE, PRESERVATIVE FREE 10 ML: 5 INJECTION INTRAVENOUS at 03:36

## 2020-01-01 RX ADMIN — MONTELUKAST SODIUM 10 MG: 10 TABLET ORAL at 21:29

## 2020-01-01 RX ADMIN — CILOSTAZOL 100 MG: 100 TABLET ORAL at 10:37

## 2020-01-01 RX ADMIN — Medication 25 MCG/HR: at 12:52

## 2020-01-01 RX ADMIN — IPRATROPIUM BROMIDE AND ALBUTEROL SULFATE 1 AMPULE: 2.5; .5 SOLUTION RESPIRATORY (INHALATION) at 10:48

## 2020-01-01 RX ADMIN — GUAIFENESIN AND DEXTROMETHORPHAN 5 ML: 100; 10 SYRUP ORAL at 20:30

## 2020-01-01 RX ADMIN — SODIUM CHLORIDE 2409 ML: 9 INJECTION, SOLUTION INTRAVENOUS at 18:42

## 2020-01-01 RX ADMIN — DEXAMETHASONE 6 MG: 4 TABLET ORAL at 09:12

## 2020-01-01 RX ADMIN — OXYCODONE HYDROCHLORIDE AND ACETAMINOPHEN 500 MG: 500 TABLET ORAL at 09:33

## 2020-01-01 RX ADMIN — BUMETANIDE 2 MG: 0.25 INJECTION, SOLUTION INTRAMUSCULAR; INTRAVENOUS at 13:31

## 2020-01-01 RX ADMIN — ATORVASTATIN CALCIUM 80 MG: 80 TABLET, FILM COATED ORAL at 08:33

## 2020-01-01 RX ADMIN — NITROGLYCERIN 0.4 MG: 0.4 TABLET, ORALLY DISINTEGRATING SUBLINGUAL at 08:41

## 2020-01-01 RX ADMIN — METHYLPREDNISOLONE SODIUM SUCCINATE 40 MG: 125 INJECTION, POWDER, FOR SOLUTION INTRAMUSCULAR; INTRAVENOUS at 08:34

## 2020-01-01 RX ADMIN — BUDESONIDE AND FORMOTEROL FUMARATE DIHYDRATE 2 PUFF: 160; 4.5 AEROSOL RESPIRATORY (INHALATION) at 20:31

## 2020-01-01 RX ADMIN — BUDESONIDE AND FORMOTEROL FUMARATE DIHYDRATE 2 PUFF: 160; 4.5 AEROSOL RESPIRATORY (INHALATION) at 08:57

## 2020-01-01 RX ADMIN — AMLODIPINE BESYLATE 5 MG: 5 TABLET ORAL at 09:30

## 2020-01-01 RX ADMIN — METOPROLOL SUCCINATE 75 MG: 50 TABLET, EXTENDED RELEASE ORAL at 10:39

## 2020-01-01 RX ADMIN — MONTELUKAST SODIUM 10 MG: 10 TABLET, FILM COATED ORAL at 21:19

## 2020-01-01 RX ADMIN — DEXAMETHASONE 6 MG: 4 TABLET ORAL at 22:33

## 2020-01-01 RX ADMIN — RANOLAZINE 500 MG: 500 TABLET, FILM COATED, EXTENDED RELEASE ORAL at 20:24

## 2020-01-01 RX ADMIN — AMLODIPINE BESYLATE 5 MG: 5 TABLET ORAL at 20:08

## 2020-01-01 RX ADMIN — GUAIFENESIN AND DEXTROMETHORPHAN 10 ML: 100; 10 SYRUP ORAL at 17:06

## 2020-01-01 RX ADMIN — ZOLPIDEM TARTRATE 5 MG: 5 TABLET ORAL at 19:59

## 2020-01-01 RX ADMIN — LISINOPRIL 20 MG: 20 TABLET ORAL at 20:42

## 2020-01-01 RX ADMIN — Medication 6 MG: at 22:10

## 2020-01-01 RX ADMIN — RANOLAZINE 500 MG: 500 TABLET, FILM COATED, EXTENDED RELEASE ORAL at 09:13

## 2020-01-01 RX ADMIN — SUCCINYLCHOLINE CHLORIDE: 20 INJECTION, SOLUTION INTRAMUSCULAR; INTRAVENOUS at 13:12

## 2020-01-01 RX ADMIN — ASPIRIN 81 MG: 81 TABLET, CHEWABLE ORAL at 08:34

## 2020-01-01 RX ADMIN — Medication 10 ML: at 21:49

## 2020-01-01 RX ADMIN — TAMSULOSIN HYDROCHLORIDE 0.4 MG: 0.4 CAPSULE ORAL at 09:22

## 2020-01-01 RX ADMIN — AMLODIPINE BESYLATE 5 MG: 5 TABLET ORAL at 09:57

## 2020-01-01 RX ADMIN — FLUTICASONE PROPIONATE 1 SPRAY: 50 SPRAY, METERED NASAL at 09:38

## 2020-01-01 RX ADMIN — AMLODIPINE BESYLATE 5 MG: 5 TABLET ORAL at 09:37

## 2020-01-01 RX ADMIN — PHENYLEPHRINE HYDROCHLORIDE 270 MCG/MIN: 10 INJECTION INTRAVENOUS at 18:55

## 2020-01-01 RX ADMIN — GUAIFENESIN AND DEXTROMETHORPHAN 10 ML: 100; 10 SYRUP ORAL at 17:12

## 2020-01-01 RX ADMIN — PANTOPRAZOLE SODIUM 40 MG: 40 INJECTION, POWDER, FOR SOLUTION INTRAVENOUS at 08:15

## 2020-01-01 RX ADMIN — CILOSTAZOL 100 MG: 100 TABLET ORAL at 20:38

## 2020-01-01 RX ADMIN — GUAIFENESIN AND DEXTROMETHORPHAN 10 ML: 100; 10 SYRUP ORAL at 14:17

## 2020-01-01 RX ADMIN — DEXAMETHASONE 6 MG: 4 TABLET ORAL at 09:30

## 2020-01-01 RX ADMIN — ATORVASTATIN CALCIUM 80 MG: 80 TABLET, FILM COATED ORAL at 12:28

## 2020-01-01 RX ADMIN — ATORVASTATIN CALCIUM 80 MG: 80 TABLET, FILM COATED ORAL at 21:24

## 2020-01-01 RX ADMIN — BUMETANIDE 0.5 MG/HR: 0.25 INJECTION INTRAMUSCULAR; INTRAVENOUS at 05:04

## 2020-01-01 RX ADMIN — CILOSTAZOL 100 MG: 50 TABLET ORAL at 08:58

## 2020-01-01 RX ADMIN — FLUTICASONE PROPIONATE 1 SPRAY: 50 SPRAY, METERED NASAL at 08:40

## 2020-01-01 RX ADMIN — SODIUM CHLORIDE, PRESERVATIVE FREE 10 ML: 5 INJECTION INTRAVENOUS at 09:34

## 2020-01-01 RX ADMIN — ENOXAPARIN SODIUM 40 MG: 40 INJECTION SUBCUTANEOUS at 22:38

## 2020-01-01 RX ADMIN — CEFTRIAXONE SODIUM 1 G: 1 INJECTION, POWDER, FOR SOLUTION INTRAMUSCULAR; INTRAVENOUS at 08:27

## 2020-01-01 RX ADMIN — Medication 10 ML: at 20:43

## 2020-01-01 RX ADMIN — Medication 125 MCG/HR: at 01:32

## 2020-01-01 RX ADMIN — ENOXAPARIN SODIUM 40 MG: 40 INJECTION SUBCUTANEOUS at 09:30

## 2020-01-01 RX ADMIN — Medication 50 MG: at 09:22

## 2020-01-01 RX ADMIN — ENOXAPARIN SODIUM 40 MG: 40 INJECTION SUBCUTANEOUS at 10:23

## 2020-01-01 RX ADMIN — DEXAMETHASONE 6 MG: 4 TABLET ORAL at 08:18

## 2020-01-01 RX ADMIN — METOPROLOL SUCCINATE 75 MG: 50 TABLET, EXTENDED RELEASE ORAL at 19:59

## 2020-01-01 RX ADMIN — MONTELUKAST SODIUM 10 MG: 10 TABLET, FILM COATED ORAL at 19:59

## 2020-01-01 RX ADMIN — ATORVASTATIN CALCIUM 80 MG: 80 TABLET, FILM COATED ORAL at 09:20

## 2020-01-01 RX ADMIN — FLUTICASONE PROPIONATE 1 SPRAY: 50 SPRAY, METERED NASAL at 09:19

## 2020-01-01 RX ADMIN — ASPIRIN 324 MG: 81 TABLET, CHEWABLE ORAL at 20:38

## 2020-01-01 RX ADMIN — ZINC SULFATE 220 MG (50 MG) CAPSULE 50 MG: CAPSULE at 08:58

## 2020-01-01 RX ADMIN — GUAIFENESIN 400 MG: 400 TABLET, FILM COATED ORAL at 08:50

## 2020-01-01 RX ADMIN — BENZONATATE 100 MG: 100 CAPSULE ORAL at 15:42

## 2020-01-01 RX ADMIN — Medication 10 ML: at 08:51

## 2020-01-01 RX ADMIN — AMLODIPINE BESYLATE 5 MG: 5 TABLET ORAL at 08:57

## 2020-01-01 RX ADMIN — ALBUMIN (HUMAN) 25 G: 0.25 INJECTION, SOLUTION INTRAVENOUS at 08:07

## 2020-01-01 RX ADMIN — ZINC SULFATE 220 MG (50 MG) CAPSULE 50 MG: CAPSULE at 10:08

## 2020-01-01 RX ADMIN — GUAIFENESIN AND DEXTROMETHORPHAN 5 ML: 100; 10 SYRUP ORAL at 13:22

## 2020-01-01 RX ADMIN — Medication 10 ML: at 21:47

## 2020-01-01 RX ADMIN — BUDESONIDE AND FORMOTEROL FUMARATE DIHYDRATE 2 PUFF: 160; 4.5 AEROSOL RESPIRATORY (INHALATION) at 22:38

## 2020-01-01 RX ADMIN — ENOXAPARIN SODIUM 30 MG: 30 INJECTION SUBCUTANEOUS at 20:33

## 2020-01-01 RX ADMIN — MONTELUKAST SODIUM 10 MG: 10 TABLET, COATED ORAL at 21:15

## 2020-01-01 RX ADMIN — IPRATROPIUM BROMIDE AND ALBUTEROL SULFATE 1 AMPULE: 2.5; .5 SOLUTION RESPIRATORY (INHALATION) at 16:34

## 2020-01-01 RX ADMIN — FLUTICASONE PROPIONATE 1 SPRAY: 50 SPRAY, METERED NASAL at 08:57

## 2020-01-01 RX ADMIN — CLOPIDOGREL 75 MG: 75 TABLET, FILM COATED ORAL at 08:16

## 2020-01-01 RX ADMIN — GUAIFENESIN AND DEXTROMETHORPHAN 10 ML: 100; 10 SYRUP ORAL at 14:56

## 2020-01-01 RX ADMIN — TAMSULOSIN HYDROCHLORIDE 0.4 MG: 0.4 CAPSULE ORAL at 09:30

## 2020-01-01 RX ADMIN — CLOPIDOGREL 75 MG: 75 TABLET, FILM COATED ORAL at 08:33

## 2020-01-01 RX ADMIN — MONTELUKAST SODIUM 10 MG: 10 TABLET ORAL at 00:41

## 2020-01-01 RX ADMIN — ATORVASTATIN CALCIUM 80 MG: 80 TABLET, FILM COATED ORAL at 09:22

## 2020-01-01 RX ADMIN — ASPIRIN 81 MG: 81 TABLET, CHEWABLE ORAL at 08:49

## 2020-01-01 RX ADMIN — CHLORHEXIDINE GLUCONATE 0.12% ORAL RINSE 15 ML: 1.2 LIQUID ORAL at 20:02

## 2020-01-01 RX ADMIN — CHLORHEXIDINE GLUCONATE 0.12% ORAL RINSE 15 ML: 1.2 LIQUID ORAL at 08:30

## 2020-01-01 RX ADMIN — RANOLAZINE 500 MG: 500 TABLET, FILM COATED, EXTENDED RELEASE ORAL at 20:08

## 2020-01-01 RX ADMIN — Medication 10 ML: at 08:35

## 2020-01-01 RX ADMIN — ACETYLCYSTEINE 400 MG: 200 SOLUTION ORAL; RESPIRATORY (INHALATION) at 09:38

## 2020-01-01 RX ADMIN — CILOSTAZOL 100 MG: 50 TABLET ORAL at 21:27

## 2020-01-01 RX ADMIN — IPRATROPIUM BROMIDE AND ALBUTEROL SULFATE 1 AMPULE: 2.5; .5 SOLUTION RESPIRATORY (INHALATION) at 17:33

## 2020-01-01 RX ADMIN — GUAIFENESIN AND DEXTROMETHORPHAN 10 ML: 100; 10 SYRUP ORAL at 17:54

## 2020-01-01 RX ADMIN — ASPIRIN 81 MG: 81 TABLET, CHEWABLE ORAL at 09:25

## 2020-01-01 RX ADMIN — HYDROCORTISONE SODIUM SUCCINATE 100 MG: 100 INJECTION, POWDER, FOR SOLUTION INTRAMUSCULAR; INTRAVENOUS at 08:15

## 2020-01-01 RX ADMIN — CILOSTAZOL 100 MG: 100 TABLET ORAL at 09:33

## 2020-01-01 RX ADMIN — Medication 1000 UNITS: at 09:59

## 2020-01-01 RX ADMIN — MIDAZOLAM HYDROCHLORIDE 1 MG/HR: 5 INJECTION, SOLUTION INTRAMUSCULAR; INTRAVENOUS at 06:14

## 2020-01-01 RX ADMIN — GUAIFENESIN AND DEXTROMETHORPHAN 10 ML: 100; 10 SYRUP ORAL at 20:37

## 2020-01-01 RX ADMIN — LEVOFLOXACIN 750 MG: 5 INJECTION, SOLUTION INTRAVENOUS at 11:02

## 2020-01-01 RX ADMIN — FLUTICASONE PROPIONATE 1 SPRAY: 50 SPRAY, METERED NASAL at 09:15

## 2020-01-01 RX ADMIN — NITROGLYCERIN: 0.4 TABLET SUBLINGUAL at 18:15

## 2020-01-01 RX ADMIN — Medication 200 MCG/HR: at 13:10

## 2020-01-01 RX ADMIN — ZINC SULFATE 220 MG (50 MG) CAPSULE 50 MG: CAPSULE at 08:59

## 2020-01-01 RX ADMIN — IOPAMIDOL 75 ML: 755 INJECTION, SOLUTION INTRAVENOUS at 19:13

## 2020-01-01 RX ADMIN — LISINOPRIL 20 MG: 10 TABLET ORAL at 22:32

## 2020-01-01 RX ADMIN — RANOLAZINE 500 MG: 500 TABLET, FILM COATED, EXTENDED RELEASE ORAL at 08:57

## 2020-01-01 RX ADMIN — ISOSORBIDE MONONITRATE 60 MG: 30 TABLET ORAL at 08:59

## 2020-01-01 RX ADMIN — SODIUM CHLORIDE 1000 ML: 9 INJECTION, SOLUTION INTRAVENOUS at 12:30

## 2020-01-01 RX ADMIN — CEFEPIME 2 G: 2 INJECTION, POWDER, FOR SOLUTION INTRAVENOUS at 12:11

## 2020-01-01 RX ADMIN — POTASSIUM CHLORIDE 40 MEQ: 1500 TABLET, EXTENDED RELEASE ORAL at 02:13

## 2020-01-01 RX ADMIN — BUDESONIDE 500 MCG: 0.5 SUSPENSION RESPIRATORY (INHALATION) at 17:43

## 2020-01-01 RX ADMIN — ACETAMINOPHEN 1000 MG: 500 TABLET ORAL at 16:42

## 2020-01-01 RX ADMIN — ACETAMINOPHEN 650 MG: 325 TABLET ORAL at 21:27

## 2020-01-01 RX ADMIN — BUDESONIDE 500 MCG: 0.5 SUSPENSION RESPIRATORY (INHALATION) at 21:35

## 2020-01-01 RX ADMIN — DEXAMETHASONE 6 MG: 4 TABLET ORAL at 23:20

## 2020-01-01 RX ADMIN — IPRATROPIUM BROMIDE AND ALBUTEROL SULFATE 1 AMPULE: 2.5; .5 SOLUTION RESPIRATORY (INHALATION) at 12:15

## 2020-01-01 RX ADMIN — MIDAZOLAM 2 MG: 1 INJECTION INTRAMUSCULAR; INTRAVENOUS at 22:11

## 2020-01-01 RX ADMIN — FLUTICASONE PROPIONATE 1 SPRAY: 50 SPRAY, METERED NASAL at 09:31

## 2020-01-01 RX ADMIN — ATORVASTATIN CALCIUM 80 MG: 80 TABLET, FILM COATED ORAL at 20:10

## 2020-01-01 RX ADMIN — MONTELUKAST SODIUM 10 MG: 10 TABLET ORAL at 21:28

## 2020-01-01 RX ADMIN — ZINC SULFATE 220 MG (50 MG) CAPSULE 50 MG: CAPSULE at 09:36

## 2020-01-01 RX ADMIN — BUMETANIDE 0.5 MG/HR: 0.25 INJECTION INTRAMUSCULAR; INTRAVENOUS at 20:52

## 2020-01-01 RX ADMIN — LORAZEPAM 0.5 MG: 2 INJECTION INTRAMUSCULAR; INTRAVENOUS at 02:51

## 2020-01-01 RX ADMIN — ZOLPIDEM TARTRATE 5 MG: 5 TABLET ORAL at 23:11

## 2020-01-01 RX ADMIN — BUDESONIDE AND FORMOTEROL FUMARATE DIHYDRATE 2 PUFF: 160; 4.5 AEROSOL RESPIRATORY (INHALATION) at 20:48

## 2020-01-01 RX ADMIN — BUDESONIDE 500 MCG: 0.5 SUSPENSION RESPIRATORY (INHALATION) at 21:12

## 2020-01-01 RX ADMIN — METOPROLOL SUCCINATE 75 MG: 50 TABLET, EXTENDED RELEASE ORAL at 21:29

## 2020-01-01 RX ADMIN — IPRATROPIUM BROMIDE AND ALBUTEROL SULFATE 1 AMPULE: 2.5; .5 SOLUTION RESPIRATORY (INHALATION) at 12:24

## 2020-01-01 RX ADMIN — PANTOPRAZOLE SODIUM 40 MG: 40 INJECTION, POWDER, FOR SOLUTION INTRAVENOUS at 08:33

## 2020-01-01 RX ADMIN — CILOSTAZOL 100 MG: 100 TABLET ORAL at 09:20

## 2020-01-01 RX ADMIN — ISOSORBIDE MONONITRATE 60 MG: 30 TABLET ORAL at 08:47

## 2020-01-01 RX ADMIN — ENOXAPARIN SODIUM 30 MG: 30 INJECTION SUBCUTANEOUS at 22:00

## 2020-01-01 RX ADMIN — HYDROCORTISONE SODIUM SUCCINATE 100 MG: 100 INJECTION, POWDER, FOR SOLUTION INTRAMUSCULAR; INTRAVENOUS at 17:36

## 2020-01-01 RX ADMIN — ACETYLCYSTEINE 400 MG: 200 SOLUTION ORAL; RESPIRATORY (INHALATION) at 09:31

## 2020-01-01 RX ADMIN — CILOSTAZOL 100 MG: 100 TABLET ORAL at 20:23

## 2020-01-01 RX ADMIN — BUDESONIDE 500 MCG: 0.5 SUSPENSION RESPIRATORY (INHALATION) at 10:48

## 2020-01-01 RX ADMIN — NITROGLYCERIN 0.4 MG: 0.4 TABLET, ORALLY DISINTEGRATING SUBLINGUAL at 08:46

## 2020-01-01 RX ADMIN — ISOSORBIDE MONONITRATE 60 MG: 30 TABLET ORAL at 22:32

## 2020-01-01 RX ADMIN — ASPIRIN 81 MG CHEWABLE TABLET 81 MG: 81 TABLET CHEWABLE at 09:37

## 2020-01-01 RX ADMIN — Medication 200 MCG/HR: at 04:43

## 2020-01-01 RX ADMIN — IPRATROPIUM BROMIDE AND ALBUTEROL SULFATE 1 AMPULE: 2.5; .5 SOLUTION RESPIRATORY (INHALATION) at 12:05

## 2020-01-01 RX ADMIN — ARFORMOTEROL TARTRATE 15 MCG: 15 SOLUTION RESPIRATORY (INHALATION) at 19:35

## 2020-01-01 RX ADMIN — METOPROLOL SUCCINATE 75 MG: 50 TABLET, EXTENDED RELEASE ORAL at 12:59

## 2020-01-01 RX ADMIN — LISINOPRIL 20 MG: 10 TABLET ORAL at 22:14

## 2020-01-01 RX ADMIN — Medication 200 MCG/HR: at 06:14

## 2020-01-01 RX ADMIN — Medication 175 MCG/HR: at 16:51

## 2020-01-01 RX ADMIN — Medication 1000 UNITS: at 08:47

## 2020-01-01 RX ADMIN — NITROGLYCERIN 0.4 MG: 0.4 TABLET, ORALLY DISINTEGRATING SUBLINGUAL at 22:33

## 2020-01-01 RX ADMIN — OXYCODONE HYDROCHLORIDE AND ACETAMINOPHEN 500 MG: 500 TABLET ORAL at 08:48

## 2020-01-01 RX ADMIN — IPRATROPIUM BROMIDE AND ALBUTEROL SULFATE 1 AMPULE: 2.5; .5 SOLUTION RESPIRATORY (INHALATION) at 13:19

## 2020-01-01 RX ADMIN — Medication 150 MCG/HR: at 08:25

## 2020-01-01 RX ADMIN — ACETAMINOPHEN 650 MG: 325 TABLET ORAL at 08:47

## 2020-01-01 RX ADMIN — ARFORMOTEROL TARTRATE 15 MCG: 15 SOLUTION RESPIRATORY (INHALATION) at 21:14

## 2020-01-01 RX ADMIN — IPRATROPIUM BROMIDE AND ALBUTEROL SULFATE 1 AMPULE: 2.5; .5 SOLUTION RESPIRATORY (INHALATION) at 12:12

## 2020-01-01 RX ADMIN — PIPERACILLIN AND TAZOBACTAM 3.38 G: 3; .375 INJECTION, POWDER, LYOPHILIZED, FOR SOLUTION INTRAVENOUS at 20:11

## 2020-01-01 RX ADMIN — OXYCODONE HYDROCHLORIDE AND ACETAMINOPHEN 500 MG: 500 TABLET ORAL at 09:00

## 2020-01-01 RX ADMIN — MONTELUKAST SODIUM 10 MG: 10 TABLET, FILM COATED ORAL at 20:38

## 2020-01-01 RX ADMIN — ASPIRIN 81 MG CHEWABLE TABLET 81 MG: 81 TABLET CHEWABLE at 09:31

## 2020-01-01 RX ADMIN — ACETAMINOPHEN 650 MG: 325 TABLET ORAL at 18:01

## 2020-01-01 RX ADMIN — Medication 10 ML: at 23:27

## 2020-01-01 RX ADMIN — ATORVASTATIN CALCIUM 80 MG: 80 TABLET, FILM COATED ORAL at 09:24

## 2020-01-01 RX ADMIN — AMLODIPINE BESYLATE 5 MG: 5 TABLET ORAL at 00:41

## 2020-01-01 RX ADMIN — ARFORMOTEROL TARTRATE 15 MCG: 15 SOLUTION RESPIRATORY (INHALATION) at 21:35

## 2020-01-01 RX ADMIN — BUDESONIDE AND FORMOTEROL FUMARATE DIHYDRATE 2 PUFF: 160; 4.5 AEROSOL RESPIRATORY (INHALATION) at 19:58

## 2020-01-01 RX ADMIN — ATORVASTATIN CALCIUM 80 MG: 80 TABLET, FILM COATED ORAL at 09:34

## 2020-01-01 RX ADMIN — DEXTROMETHORPHAN HBR, GUAIFENESIN 1 TABLET: 20; 400 TABLET, FILM COATED ORAL at 08:50

## 2020-01-01 RX ADMIN — BENZONATATE 100 MG: 100 CAPSULE ORAL at 08:57

## 2020-01-01 RX ADMIN — ACETYLCYSTEINE 400 MG: 200 SOLUTION ORAL; RESPIRATORY (INHALATION) at 22:46

## 2020-01-01 RX ADMIN — ASPIRIN 81 MG CHEWABLE TABLET 81 MG: 81 TABLET CHEWABLE at 09:34

## 2020-01-01 RX ADMIN — ARFORMOTEROL TARTRATE 15 MCG: 15 SOLUTION RESPIRATORY (INHALATION) at 09:31

## 2020-01-01 RX ADMIN — TAMSULOSIN HYDROCHLORIDE 0.4 MG: 0.4 CAPSULE ORAL at 09:34

## 2020-01-01 RX ADMIN — TAMSULOSIN HYDROCHLORIDE 0.4 MG: 0.4 CAPSULE ORAL at 09:37

## 2020-01-01 RX ADMIN — ACETYLCYSTEINE 400 MG: 200 SOLUTION ORAL; RESPIRATORY (INHALATION) at 21:12

## 2020-01-01 RX ADMIN — ACETYLCYSTEINE 400 MG: 200 SOLUTION ORAL; RESPIRATORY (INHALATION) at 21:35

## 2020-01-01 RX ADMIN — Medication 10 ML: at 08:41

## 2020-01-01 RX ADMIN — ACETAMINOPHEN 650 MG: 325 TABLET ORAL at 21:24

## 2020-01-01 RX ADMIN — BUDESONIDE 500 MCG: 0.5 SUSPENSION RESPIRATORY (INHALATION) at 09:44

## 2020-01-01 RX ADMIN — SALINE NASAL SPRAY 1 SPRAY: 1.5 SOLUTION NASAL at 16:47

## 2020-01-01 RX ADMIN — ISOSORBIDE MONONITRATE 60 MG: 30 TABLET ORAL at 09:59

## 2020-01-01 RX ADMIN — ACETAMINOPHEN 650 MG: 325 TABLET ORAL at 17:35

## 2020-01-01 RX ADMIN — CILOSTAZOL 100 MG: 100 TABLET ORAL at 21:24

## 2020-01-01 RX ADMIN — SODIUM CHLORIDE: 9 INJECTION, SOLUTION INTRAVENOUS at 12:57

## 2020-01-01 RX ADMIN — ARFORMOTEROL TARTRATE 15 MCG: 15 SOLUTION RESPIRATORY (INHALATION) at 19:07

## 2020-01-01 RX ADMIN — LORAZEPAM 0.5 MG: 2 INJECTION INTRAMUSCULAR; INTRAVENOUS at 20:42

## 2020-01-01 RX ADMIN — ACETAMINOPHEN 1000 MG: 500 TABLET ORAL at 08:38

## 2020-01-01 RX ADMIN — GUAIFENESIN AND DEXTROMETHORPHAN 5 ML: 100; 10 SYRUP ORAL at 13:15

## 2020-01-01 RX ADMIN — PREDNISONE 20 MG: 20 TABLET ORAL at 09:20

## 2020-01-01 RX ADMIN — AMLODIPINE BESYLATE 5 MG: 5 TABLET ORAL at 08:33

## 2020-01-01 RX ADMIN — ATORVASTATIN CALCIUM 80 MG: 80 TABLET, FILM COATED ORAL at 08:58

## 2020-01-01 RX ADMIN — ENOXAPARIN SODIUM 30 MG: 30 INJECTION SUBCUTANEOUS at 09:31

## 2020-01-01 RX ADMIN — CHLORHEXIDINE GLUCONATE 0.12% ORAL RINSE 15 ML: 1.2 LIQUID ORAL at 13:37

## 2020-01-01 RX ADMIN — TAMSULOSIN HYDROCHLORIDE 0.4 MG: 0.4 CAPSULE ORAL at 10:39

## 2020-01-01 RX ADMIN — METHYLPREDNISOLONE SODIUM SUCCINATE 40 MG: 125 INJECTION, POWDER, FOR SOLUTION INTRAMUSCULAR; INTRAVENOUS at 08:35

## 2020-01-01 RX ADMIN — DEXAMETHASONE SODIUM PHOSPHATE 6 MG: 10 INJECTION INTRAMUSCULAR; INTRAVENOUS at 12:41

## 2020-01-01 RX ADMIN — METHYLPREDNISOLONE SODIUM SUCCINATE 40 MG: 125 INJECTION, POWDER, FOR SOLUTION INTRAMUSCULAR; INTRAVENOUS at 09:25

## 2020-01-01 RX ADMIN — FUROSEMIDE 40 MG: 10 INJECTION, SOLUTION INTRAVENOUS at 21:24

## 2020-01-01 RX ADMIN — SODIUM CHLORIDE: 9 INJECTION, SOLUTION INTRAVENOUS at 08:34

## 2020-01-01 RX ADMIN — LISINOPRIL 20 MG: 20 TABLET ORAL at 20:23

## 2020-01-01 RX ADMIN — BUDESONIDE 500 MCG: 0.5 SUSPENSION RESPIRATORY (INHALATION) at 22:43

## 2020-01-01 RX ADMIN — ASPIRIN 81 MG: 81 TABLET, CHEWABLE ORAL at 08:15

## 2020-01-01 RX ADMIN — AMLODIPINE BESYLATE 5 MG: 5 TABLET ORAL at 20:38

## 2020-01-01 RX ADMIN — PIPERACILLIN AND TAZOBACTAM 3.38 G: 3; .375 INJECTION, POWDER, LYOPHILIZED, FOR SOLUTION INTRAVENOUS at 05:16

## 2020-01-01 RX ADMIN — MONTELUKAST SODIUM 10 MG: 10 TABLET ORAL at 20:31

## 2020-01-01 RX ADMIN — NITROGLYCERIN 0.4 MG: 0.4 TABLET, ORALLY DISINTEGRATING SUBLINGUAL at 12:21

## 2020-01-01 RX ADMIN — BUMETANIDE 2 MG: 0.25 INJECTION, SOLUTION INTRAMUSCULAR; INTRAVENOUS at 10:57

## 2020-01-01 RX ADMIN — ENOXAPARIN SODIUM 30 MG: 30 INJECTION SUBCUTANEOUS at 09:00

## 2020-01-01 RX ADMIN — TRAMADOL HYDROCHLORIDE 50 MG: 50 TABLET, FILM COATED ORAL at 18:27

## 2020-01-01 RX ADMIN — Medication 1000 UNITS: at 08:19

## 2020-01-01 RX ADMIN — REMDESIVIR 200 MG: 100 INJECTION, POWDER, LYOPHILIZED, FOR SOLUTION INTRAVENOUS at 23:20

## 2020-01-01 RX ADMIN — FLUTICASONE PROPIONATE 1 SPRAY: 50 SPRAY, METERED NASAL at 09:35

## 2020-01-01 RX ADMIN — AMLODIPINE BESYLATE 5 MG: 5 TABLET ORAL at 09:24

## 2020-01-01 RX ADMIN — DEXAMETHASONE 4 MG: 4 TABLET ORAL at 20:36

## 2020-01-01 RX ADMIN — HEPARIN SODIUM 2000 UNITS: 1000 INJECTION, SOLUTION INTRAVENOUS; SUBCUTANEOUS at 06:41

## 2020-01-01 RX ADMIN — BUDESONIDE 500 MCG: 0.5 SUSPENSION RESPIRATORY (INHALATION) at 10:13

## 2020-01-01 RX ADMIN — METOPROLOL TARTRATE 75 MG: 50 TABLET, FILM COATED ORAL at 08:25

## 2020-01-01 RX ADMIN — Medication 50 MG: at 12:12

## 2020-01-01 RX ADMIN — RANOLAZINE 500 MG: 500 TABLET, FILM COATED, EXTENDED RELEASE ORAL at 21:24

## 2020-01-01 RX ADMIN — SODIUM CHLORIDE: 9 INJECTION, SOLUTION INTRAVENOUS at 16:18

## 2020-01-01 RX ADMIN — Medication 10 ML: at 08:34

## 2020-01-01 RX ADMIN — CILOSTAZOL 100 MG: 50 TABLET ORAL at 20:08

## 2020-01-01 RX ADMIN — ERTAPENEM SODIUM 1000 MG: 1 INJECTION, POWDER, LYOPHILIZED, FOR SOLUTION INTRAMUSCULAR; INTRAVENOUS at 15:04

## 2020-01-01 RX ADMIN — GUAIFENESIN AND DEXTROMETHORPHAN 10 ML: 100; 10 SYRUP ORAL at 08:35

## 2020-01-01 RX ADMIN — DEXAMETHASONE 4 MG: 4 TABLET ORAL at 20:11

## 2020-01-01 RX ADMIN — FUROSEMIDE 20 MG: 20 TABLET ORAL at 16:42

## 2020-01-01 RX ADMIN — LISINOPRIL 20 MG: 20 TABLET ORAL at 12:31

## 2020-01-01 RX ADMIN — ASPIRIN 81 MG CHEWABLE TABLET 81 MG: 81 TABLET CHEWABLE at 09:22

## 2020-01-01 RX ADMIN — Medication 1000 UNITS: at 08:25

## 2020-01-01 RX ADMIN — ATORVASTATIN CALCIUM 80 MG: 80 TABLET, FILM COATED ORAL at 20:04

## 2020-01-01 RX ADMIN — PANTOPRAZOLE SODIUM 40 MG: 40 INJECTION, POWDER, FOR SOLUTION INTRAVENOUS at 12:02

## 2020-01-01 RX ADMIN — ISOSORBIDE MONONITRATE 60 MG: 30 TABLET ORAL at 08:58

## 2020-01-01 RX ADMIN — CILOSTAZOL 100 MG: 50 TABLET ORAL at 08:57

## 2020-01-01 RX ADMIN — METOPROLOL SUCCINATE 100 MG: 100 TABLET, EXTENDED RELEASE ORAL at 22:08

## 2020-01-01 RX ADMIN — DEXAMETHASONE 6 MG: 4 TABLET ORAL at 09:33

## 2020-01-01 RX ADMIN — METOPROLOL SUCCINATE 25 MG: 50 TABLET, EXTENDED RELEASE ORAL at 09:21

## 2020-01-01 RX ADMIN — BUDESONIDE 500 MCG: 0.5 SUSPENSION RESPIRATORY (INHALATION) at 19:35

## 2020-01-01 RX ADMIN — SODIUM CHLORIDE 500 ML: 9 INJECTION, SOLUTION INTRAVENOUS at 08:53

## 2020-01-01 RX ADMIN — IPRATROPIUM BROMIDE AND ALBUTEROL SULFATE 1 AMPULE: 2.5; .5 SOLUTION RESPIRATORY (INHALATION) at 12:55

## 2020-01-01 RX ADMIN — RANOLAZINE 500 MG: 500 TABLET, FILM COATED, EXTENDED RELEASE ORAL at 08:58

## 2020-01-01 RX ADMIN — Medication 6 MG: at 20:00

## 2020-01-01 RX ADMIN — ATORVASTATIN CALCIUM 80 MG: 80 TABLET, FILM COATED ORAL at 09:59

## 2020-01-01 RX ADMIN — ASPIRIN 81 MG: 81 TABLET, CHEWABLE ORAL at 08:18

## 2020-01-01 RX ADMIN — DEXAMETHASONE 6 MG: 4 TABLET ORAL at 10:20

## 2020-01-01 RX ADMIN — MONTELUKAST SODIUM 10 MG: 10 TABLET, FILM COATED ORAL at 20:24

## 2020-01-01 RX ADMIN — BUDESONIDE AND FORMOTEROL FUMARATE DIHYDRATE 2 PUFF: 160; 4.5 AEROSOL RESPIRATORY (INHALATION) at 09:40

## 2020-01-01 RX ADMIN — MIDAZOLAM 2 MG: 1 INJECTION INTRAMUSCULAR; INTRAVENOUS at 17:59

## 2020-01-01 RX ADMIN — BUDESONIDE AND FORMOTEROL FUMARATE DIHYDRATE 2 PUFF: 160; 4.5 AEROSOL RESPIRATORY (INHALATION) at 10:02

## 2020-01-01 RX ADMIN — Medication 10 ML: at 20:45

## 2020-01-01 RX ADMIN — FLUTICASONE PROPIONATE 1 SPRAY: 50 SPRAY, METERED NASAL at 08:33

## 2020-01-01 RX ADMIN — METOPROLOL SUCCINATE 75 MG: 50 TABLET, EXTENDED RELEASE ORAL at 08:58

## 2020-01-01 RX ADMIN — FLUTICASONE PROPIONATE 1 SPRAY: 50 SPRAY, METERED NASAL at 10:02

## 2020-01-01 RX ADMIN — FLUTICASONE PROPIONATE 1 SPRAY: 50 SPRAY, METERED NASAL at 08:51

## 2020-01-01 RX ADMIN — CILOSTAZOL 100 MG: 100 TABLET ORAL at 09:24

## 2020-01-01 RX ADMIN — GUAIFENESIN AND DEXTROMETHORPHAN 10 ML: 100; 10 SYRUP ORAL at 14:41

## 2020-01-01 RX ADMIN — FENTANYL CITRATE 50 MCG: 50 INJECTION, SOLUTION INTRAMUSCULAR; INTRAVENOUS at 19:16

## 2020-01-01 RX ADMIN — ATORVASTATIN CALCIUM 80 MG: 80 TABLET, FILM COATED ORAL at 22:34

## 2020-01-01 RX ADMIN — ENOXAPARIN SODIUM 30 MG: 30 INJECTION SUBCUTANEOUS at 20:09

## 2020-01-01 RX ADMIN — Medication 200 MCG/HR: at 21:14

## 2020-01-01 RX ADMIN — PHENYLEPHRINE HYDROCHLORIDE 300 MCG/MIN: 10 INJECTION INTRAVENOUS at 04:44

## 2020-01-01 RX ADMIN — Medication 10 ML: at 20:32

## 2020-01-01 RX ADMIN — ISOSORBIDE MONONITRATE 30 MG: 30 TABLET ORAL at 09:24

## 2020-01-01 RX ADMIN — GUAIFENESIN AND DEXTROMETHORPHAN 10 ML: 100; 10 SYRUP ORAL at 13:20

## 2020-01-01 RX ADMIN — TRAMADOL HYDROCHLORIDE 50 MG: 50 TABLET, FILM COATED ORAL at 21:23

## 2020-01-01 RX ADMIN — ACETAMINOPHEN 650 MG: 325 TABLET ORAL at 21:16

## 2020-01-01 RX ADMIN — REMDESIVIR 100 MG: 100 INJECTION, POWDER, LYOPHILIZED, FOR SOLUTION INTRAVENOUS at 17:43

## 2020-01-01 RX ADMIN — ACETAMINOPHEN 650 MG: 325 TABLET ORAL at 18:44

## 2020-01-01 RX ADMIN — Medication 10 ML: at 22:01

## 2020-01-01 RX ADMIN — CILOSTAZOL 100 MG: 100 TABLET ORAL at 08:48

## 2020-01-01 RX ADMIN — SODIUM CHLORIDE: 9 INJECTION, SOLUTION INTRAVENOUS at 17:46

## 2020-01-01 RX ADMIN — RANOLAZINE 500 MG: 500 TABLET, FILM COATED, EXTENDED RELEASE ORAL at 21:18

## 2020-01-01 RX ADMIN — Medication 10 ML: at 20:48

## 2020-01-01 RX ADMIN — MONTELUKAST SODIUM 10 MG: 10 TABLET, FILM COATED ORAL at 21:24

## 2020-01-01 RX ADMIN — Medication 10 ML: at 20:25

## 2020-01-01 RX ADMIN — METOPROLOL SUCCINATE 75 MG: 50 TABLET, EXTENDED RELEASE ORAL at 09:38

## 2020-01-01 RX ADMIN — AMLODIPINE BESYLATE 5 MG: 5 TABLET ORAL at 22:33

## 2020-01-01 RX ADMIN — BUDESONIDE 500 MCG: 0.5 SUSPENSION RESPIRATORY (INHALATION) at 08:55

## 2020-01-01 RX ADMIN — ATORVASTATIN CALCIUM 80 MG: 80 TABLET, FILM COATED ORAL at 08:46

## 2020-01-01 RX ADMIN — ACETYLCYSTEINE 400 MG: 200 SOLUTION ORAL; RESPIRATORY (INHALATION) at 08:47

## 2020-01-01 RX ADMIN — METHYLPREDNISOLONE SODIUM SUCCINATE 40 MG: 125 INJECTION, POWDER, FOR SOLUTION INTRAMUSCULAR; INTRAVENOUS at 03:36

## 2020-01-01 RX ADMIN — Medication 6 MG: at 21:18

## 2020-01-01 RX ADMIN — GUAIFENESIN AND DEXTROMETHORPHAN 10 ML: 100; 10 SYRUP ORAL at 09:50

## 2020-01-01 RX ADMIN — CILOSTAZOL 100 MG: 100 TABLET ORAL at 20:31

## 2020-01-01 RX ADMIN — CILOSTAZOL 100 MG: 50 TABLET ORAL at 21:28

## 2020-01-01 RX ADMIN — ENOXAPARIN SODIUM 30 MG: 30 INJECTION SUBCUTANEOUS at 08:46

## 2020-01-01 RX ADMIN — CHLORHEXIDINE GLUCONATE 0.12% ORAL RINSE 15 ML: 1.2 LIQUID ORAL at 20:11

## 2020-01-01 RX ADMIN — RANOLAZINE 500 MG: 500 TABLET, FILM COATED, EXTENDED RELEASE ORAL at 20:37

## 2020-01-01 RX ADMIN — CHLORHEXIDINE GLUCONATE 0.12% ORAL RINSE 15 ML: 1.2 LIQUID ORAL at 08:15

## 2020-01-01 RX ADMIN — CILOSTAZOL 100 MG: 100 TABLET ORAL at 08:47

## 2020-01-01 RX ADMIN — IPRATROPIUM BROMIDE AND ALBUTEROL SULFATE 1 AMPULE: 2.5; .5 SOLUTION RESPIRATORY (INHALATION) at 09:36

## 2020-01-01 RX ADMIN — PHENYLEPHRINE HYDROCHLORIDE 300 MCG/MIN: 10 INJECTION INTRAVENOUS at 13:00

## 2020-01-01 ASSESSMENT — PAIN - FUNCTIONAL ASSESSMENT
PAIN_FUNCTIONAL_ASSESSMENT: ACTIVITIES ARE NOT PREVENTED

## 2020-01-01 ASSESSMENT — PULMONARY FUNCTION TESTS
PIF_VALUE: 36
PIF_VALUE: 33
PIF_VALUE: 34
PIF_VALUE: 35
PIF_VALUE: 35
PIF_VALUE: 32
PIF_VALUE: 35
PIF_VALUE: 34
PIF_VALUE: 32
PIF_VALUE: 35
PIF_VALUE: 35
PIF_VALUE: 32
PIF_VALUE: 36
PIF_VALUE: 37
PIF_VALUE: 34
PIF_VALUE: 33
PIF_VALUE: 33
PIF_VALUE: 35
PIF_VALUE: 35
PIF_VALUE: 36
PIF_VALUE: 33
PIF_VALUE: 38
PIF_VALUE: 35
PIF_VALUE: 33
PIF_VALUE: 36
PIF_VALUE: 35
PIF_VALUE: 37
PIF_VALUE: 34
PIF_VALUE: 32
PIF_VALUE: 34
PIF_VALUE: 34
PIF_VALUE: 31
PIF_VALUE: 37
PIF_VALUE: 38
PIF_VALUE: 33
PIF_VALUE: 34
PIF_VALUE: 36
PIF_VALUE: 34
PIF_VALUE: 31
PIF_VALUE: 33
PIF_VALUE: 34
PIF_VALUE: 36
PIF_VALUE: 37
PIF_VALUE: 33
PIF_VALUE: 33
PIF_VALUE: 35

## 2020-01-01 ASSESSMENT — PAIN DESCRIPTION - PAIN TYPE
TYPE: ACUTE PAIN

## 2020-01-01 ASSESSMENT — ENCOUNTER SYMPTOMS
NAUSEA: 0
VOMITING: 0
DIARRHEA: 0
SORE THROAT: 0
EYE PAIN: 0
EYE REDNESS: 0
ABDOMINAL PAIN: 0
SINUS PRESSURE: 0
WHEEZING: 0
COUGH: 1
SHORTNESS OF BREATH: 1
EYE DISCHARGE: 0
BACK PAIN: 0

## 2020-01-01 ASSESSMENT — PAIN SCALES - GENERAL
PAINLEVEL_OUTOF10: 0
PAINLEVEL_OUTOF10: 0
PAINLEVEL_OUTOF10: 3
PAINLEVEL_OUTOF10: 0
PAINLEVEL_OUTOF10: 10
PAINLEVEL_OUTOF10: 5
PAINLEVEL_OUTOF10: 0
PAINLEVEL_OUTOF10: 4
PAINLEVEL_OUTOF10: 3
PAINLEVEL_OUTOF10: 5
PAINLEVEL_OUTOF10: 8
PAINLEVEL_OUTOF10: 0
PAINLEVEL_OUTOF10: 0
PAINLEVEL_OUTOF10: 6
PAINLEVEL_OUTOF10: 0
PAINLEVEL_OUTOF10: 1
PAINLEVEL_OUTOF10: 0
PAINLEVEL_OUTOF10: 5
PAINLEVEL_OUTOF10: 3
PAINLEVEL_OUTOF10: 0
PAINLEVEL_OUTOF10: 7
PAINLEVEL_OUTOF10: 2
PAINLEVEL_OUTOF10: 5
PAINLEVEL_OUTOF10: 0
PAINLEVEL_OUTOF10: 2
PAINLEVEL_OUTOF10: 0
PAINLEVEL_OUTOF10: 3
PAINLEVEL_OUTOF10: 6
PAINLEVEL_OUTOF10: 0
PAINLEVEL_OUTOF10: 0
PAINLEVEL_OUTOF10: 4
PAINLEVEL_OUTOF10: 0
PAINLEVEL_OUTOF10: 6
PAINLEVEL_OUTOF10: 0
PAINLEVEL_OUTOF10: 3
PAINLEVEL_OUTOF10: 0
PAINLEVEL_OUTOF10: 4
PAINLEVEL_OUTOF10: 0
PAINLEVEL_OUTOF10: 10
PAINLEVEL_OUTOF10: 4
PAINLEVEL_OUTOF10: 0
PAINLEVEL_OUTOF10: 3
PAINLEVEL_OUTOF10: 0
PAINLEVEL_OUTOF10: 7
PAINLEVEL_OUTOF10: 6
PAINLEVEL_OUTOF10: 0
PAINLEVEL_OUTOF10: 4
PAINLEVEL_OUTOF10: 10
PAINLEVEL_OUTOF10: 2
PAINLEVEL_OUTOF10: 5
PAINLEVEL_OUTOF10: 0
PAINLEVEL_OUTOF10: 0
PAINLEVEL_OUTOF10: 9
PAINLEVEL_OUTOF10: 0
PAINLEVEL_OUTOF10: 0
PAINLEVEL_OUTOF10: 4
PAINLEVEL_OUTOF10: 0
PAINLEVEL_OUTOF10: 1
PAINLEVEL_OUTOF10: 0
PAINLEVEL_OUTOF10: 4
PAINLEVEL_OUTOF10: 0

## 2020-01-01 ASSESSMENT — PAIN DESCRIPTION - DESCRIPTORS
DESCRIPTORS: ACHING;CONSTANT;HEADACHE
DESCRIPTORS: HEADACHE
DESCRIPTORS: ACHING;HEADACHE
DESCRIPTORS: HEADACHE
DESCRIPTORS: HEADACHE
DESCRIPTORS: CONSTANT;DISCOMFORT
DESCRIPTORS: SORE

## 2020-01-01 ASSESSMENT — PAIN DESCRIPTION - LOCATION
LOCATION: BACK;HEAD
LOCATION: HEAD
LOCATION: CHEST
LOCATION: GENERALIZED;HEAD
LOCATION: HEAD
LOCATION: CHEST;ARM
LOCATION: GENERALIZED
LOCATION: CHEST;ARM
LOCATION: HEAD
LOCATION: BACK;HEAD
LOCATION: THROAT
LOCATION: HEAD

## 2020-01-01 ASSESSMENT — PAIN DESCRIPTION - FREQUENCY
FREQUENCY: INTERMITTENT
FREQUENCY: INTERMITTENT
FREQUENCY: CONTINUOUS
FREQUENCY: CONTINUOUS
FREQUENCY: INTERMITTENT
FREQUENCY: CONTINUOUS
FREQUENCY: INTERMITTENT

## 2020-01-01 ASSESSMENT — PAIN DESCRIPTION - PROGRESSION
CLINICAL_PROGRESSION: NOT CHANGED
CLINICAL_PROGRESSION: GRADUALLY IMPROVING
CLINICAL_PROGRESSION: GRADUALLY IMPROVING
CLINICAL_PROGRESSION: NOT CHANGED
CLINICAL_PROGRESSION: NOT CHANGED
CLINICAL_PROGRESSION: GRADUALLY WORSENING
CLINICAL_PROGRESSION: GRADUALLY IMPROVING

## 2020-01-01 ASSESSMENT — PAIN DESCRIPTION - ORIENTATION
ORIENTATION: LEFT
ORIENTATION: RIGHT;UPPER

## 2020-01-01 ASSESSMENT — PAIN DESCRIPTION - ONSET
ONSET: GRADUAL
ONSET: ON-GOING
ONSET: GRADUAL
ONSET: GRADUAL
ONSET: ON-GOING

## 2020-01-02 NOTE — TELEPHONE ENCOUNTER
Discussed with the patient, patient tells me that he still has 7 refills for the prescriptions but in the future may need to send it to a mail-in prescription in place, he does not know the details, he will have the wife called the office for details, also will see him in the office, patient's questions regarding possible vascular intervention of the right leg

## 2020-01-02 NOTE — TELEPHONE ENCOUNTER
----- Message from Coolidge Brittle, MD sent at 1/2/2020 12:20 PM EST -----  Please give him an appoint to see me on the 15th of this month

## 2020-01-02 NOTE — TELEPHONE ENCOUNTER
Medication refill request for Pletal 100 mg one twice daily, qty 180 with additional refills. The chart states this medication was discontinued 9-2019.

## 2020-07-16 PROBLEM — I50.9 CHF WITH UNKNOWN LVEF (HCC): Status: ACTIVE | Noted: 2020-01-01

## 2020-07-16 PROBLEM — J44.1 CHRONIC OBSTRUCTIVE PULMONARY DISEASE WITH ACUTE EXACERBATION (HCC): Status: ACTIVE | Noted: 2020-01-01

## 2020-07-16 NOTE — PROGRESS NOTES
Date: 7/16/2020    Time: 8:20 AM    Patient Placed On BIPAP/CPAP/ Non-Invasive Ventilation? Yes    If no must comment. Facial area red/color change? No           If YES are Blister/Lesion present? No   If yes must notify nursing staff  BIPAP/CPAP skin barrier?   Yes    Skin barrier type:Liquicel       Comments:        Alecia Vee

## 2020-07-16 NOTE — CONSULTS
[facility] [address]  [address], [address]  [facility], [address] [place] [address]    Pulmonary Consultation      Reason for Consult:  sob    Requesting Physician:  Jake Vallecillo MD    CHIEF COMPLAINT:  sob    History Obtained From:  patient    HISTORY OF PRESENT ILLNESS:                The patient is a 78 y.o. male with significant past medical history of COPD oxygen-dependent who presents with increased sob for past 4-5 months. Has been gradually getting worse for the past 4-5 months, on Trelegy daily and using nebs 2-3x/day. Was seen in office last month, continued on usual medications and felt continued to get worse until a week ago when got much worse with symptoms and not much help with medication use. Was using his oxygen usually at night only (2 li) and started using in daytime. Mentions cough and minimal thick sputum production. Upon arrival here, placed on steroids, nebs and BIPAP. Currently feeling slightly better, slight increase in sputum production with similar cough, on 7 li NC, lying down in bed.     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 ([address] 7/18/2019    Bilateral carotid artery stenosis 7/18/2019    CAD (coronary artery disease)     Carotid artery stenosis     Carotid bruit 6/5/2013    Carotid stenosis, right 6/5/2013    CHF (congestive heart failure) ([address]     COPD (chronic obstructive pulmonary disease) (HCC)     Headache(784.0)     Hyperlipidemia     Hypertension     PVD (peripheral vascular disease) ([address] 4/26/2013    PVD (peripheral vascular disease) with claudication ([address] 6/5/2013    STEMI (ST elevation myocardial infarction) ([address] 10/7/2012    Subclavian artery stenosis, left ([address] 4/26/2013    Traumatic retroperitoneal hematoma 4/26/2013       Past Surgical History: Procedure Laterality Date    COLONOSCOPY      CORONARY ANGIOPLASTY      CORONARY ANGIOPLASTY  03/13/2017    2.5/15 [place] balloon to RCA and RPL    CORONARY ANGIOPLASTY WITH STENT PLACEMENT  10/07/2012    Ion KILEY x2 to RCA per Dr. Yuniel Zuluaga  4/24/13    Stent MID RCA 3.5x38, 2.5X15 RPL    DIAGNOSTIC CARDIAC CATH LAB PROCEDURE  1998    [place] stent to the RCA    DIAGNOSTIC CARDIAC CATH LAB PROCEDURE  1999    balloon to prox RCA unsuccessful PTC of the ostium of the posterior descending bracnh of the RCA    DIAGNOSTIC CARDIAC CATH LAB PROCEDURE  2002    cardiac cath with PTCA cutting balloon angioplasty to the ostium of the posterior descending artery branch of the RC    ECHO COMPL W DOP COLOR FLOW  10/9/2012    Mod concentric LVH, EF 55%, stage II diastolic dysfunction    ECHO COMPL W DOP COLOR FLOW  4/25/2013            Current Medications:    Current Facility-Administered Medications: sodium chloride flush 0.9 % injection 10 mL, 10 mL, Intravenous, 2 times per day  sodium chloride flush 0.9 % injection 10 mL, 10 mL, Intravenous, PRN  enoxaparin (LOVENOX) injection 40 mg, 40 mg, Subcutaneous, Daily  acetaminophen (TYLENOL) tablet 1,000 mg, 1,000 mg, Oral, BID PRN  amLODIPine (NORVASC) tablet 5 mg, 5 mg, Oral, BID  aspirin chewable tablet 81 mg, 81 mg, Oral, Daily  atorvastatin (LIPITOR) tablet 80 mg, 80 mg, Oral, Daily  cilostazol (PLETAL) tablet 100 mg, 100 mg, Oral, BID  fluticasone (FLONASE) 50 MCG/ACT nasal spray 1 spray, 1 spray, Nasal, Daily  isosorbide mononitrate (IMDUR) extended release tablet 30 mg, 30 mg, Oral, Daily  lisinopril (PRINIVIL;ZESTRIL) tablet 20 mg, 20 mg, Oral, BID  metoprolol succinate (TOPROL XL) extended release tablet 100 mg, 100 mg, Oral, Daily  montelukast (SINGULAIR) tablet 10 mg, 10 mg, Oral, Nightly  tamsulosin (FLOMAX) capsule 0.4 mg, 0.4 mg, Oral, Daily  sodium chloride flush 0.9 % injection 10 mL, 10 mL, Intravenous, 2 times per day  sodium chloride flush 0.9 % injection 10 mL, 10 mL, Intravenous, PRN  polyethylene glycol (GLYCOLAX) packet 17 g, 17 g, Oral, Daily PRN  promethazine (PHENERGAN) tablet 12.5 mg, 12.5 mg, Oral, Q6H PRN **OR** ondansetron (ZOFRAN) injection 4 mg, 4 mg, Intravenous, Q6H PRN  ipratropium-albuterol (DUONEB) nebulizer solution 1 ampule, 1 ampule, Inhalation, Q4H WA  cefTRIAXone (ROCEPHIN) 1 g in sterile water 10 mL IV syringe, 1 g, Intravenous, Q24H  guaiFENesin-dextromethorphan (ROBITUSSIN DM) 100-10 MG/5ML syrup 10 mL, 10 mL, Oral, 4x Daily  methylPREDNISolone sodium (SOLU-MEDROL) injection 40 mg, 40 mg, Intravenous, Q6H    Allergies:  Bee pollen and Penicillins    Social History:    TOBACCO:   reports that he quit smoking about 7 years ago. His smoking use included cigarettes. He started smoking about 53 years ago. He has a 45.00 pack-year smoking history. His smokeless tobacco use includes chew. Family History:       Problem Relation Age of Onset    Heart Disease Mother          age 50    Heart Disease Father          66's     Cancer Father         kidney    Cancer Paternal Uncle         kidney       REVIEW OF SYSTEMS:    RESPIRATORY:  Sob and cough  Remainder of complete ROS is negative. PHYSICAL EXAM:      Vitals:    /80   Pulse 81   Temp 97.8 °F (36.6 °C) (Oral)   Resp 22   Ht 5' 10\" (1.778 m)   Wt 179 lb (81.2 kg)   SpO2 90%   BMI 25.68 kg/m²     EYES:  Lids and lashes normal, pupils equal, round and reactive to light, extra ocular muscles intact, sclera clear, conjunctiva normal  ENT:  Normocephalic, without obvious abnormality, atraumatic, sinuses nontender on palpation, external ears without lesions, oral pharynx with moist mucus membranes, tonsils without erythema or exudates, gums normal and good dentition.   NECK:  Supple, symmetrical, trachea midline, no adenopathy, thyroid symmetric, not enlarged and no tenderness, skin normal  LUNGS:  Decreased breath sounds, minimal ronchi and wheezing with cough  CARDIOVASCULAR:  Normal apical impulse, regular rate and rhythm, normal S1 and S2, no S3 or S4, and no murmur noted  ABDOMEN:  No scars, normal bowel sounds, soft, non-distended, non-tender, no masses palpated, no hepatosplenomegally  MUSCULOSKELETAL:  There is no redness, warmth, or swelling of the joints. Full range of motion noted. Motor strength is 5 out of 5 all extremities bilaterally. Tone is normal.  NEUROLOGIC:  Awake, alert, oriented to name, place and time. Cranial nerves II-XII are grossly intact. DATA:    CBC:   Recent Labs     07/16/20  0738   WBC 13.7*   HGB 13.4   HCT 43.8   MCV 99.5          BMP:  Recent Labs     07/16/20  0738      K 4.9      CO2 27   BUN 16   CREATININE 0.8    ALB:3,BILIDIR:3,BILITOT:3,ALKPHOS:3)@    PT/INR: No results for input(s): PROTIME, INR in the last 72 hours. ABG:   No results for input(s): PH, PO2, PCO2, HCO3, BE, O2SAT, METHB, O2HB, COHB, O2CON, HHB, THB in the last 72 hours. FiO2 : 40 %       Radiology Review:  CXR reviewed with patchy opacity both lower lung fields    IMPRESSION/RECOMMENDATIONS:      COPD  Possible pneumonia  Hypoxia    1. Cont with antibiotics, will send for viral and infectious titers. Will repeat COVID testing tomorrow  2. Cont with steroids, taper as tolerated  3. Cont with nebs, add pulmicort and brovana bid, observe respiratory status  4. Watch fluid balance, diurese as needed  5. Call if any questions or problems        Time at the bedside, reviewing labs and radiographs, reviewing notes and consultations, discussing with staff and family was more than 50 minutes. Thanks for letting us see this patient in consultation. Please contact us with any questions. Office [phone] or after hours through [employer], x [phone].

## 2020-07-16 NOTE — ED PROVIDER NOTES
Department of Emergency Medicine   ED  Provider Note  Admit Date/RoomTime: 7/16/2020  7:25 AM  ED Room: 13/13          History of Present Illness:  7/16/20, Time: 7:25 AM EDT  Chief Complaint   Patient presents with    Respiratory Distress     Hx copd, increasing SOB over past few days                Varsha Bañeulos is a 78 y.o. male presenting to the ED for shortness of breath, beginning last evening. The complaint has been persistent, severe in severity, and worsened by nothing. Patient has history of COPD, shortness of breath with exertion last evening that progressed to shortness of breath at rest throughout the morning. Patient denies any recent fevers or cough. Patient has had no relief with his albuterol, EMS was summoned. EMS states that patient was hypoxic at 68% despite home oxygen, started on positive pressure ventilation. Patient was given 2 DuoNeb treatments and 125 mg Solu-Medrol. Patient states that the BiPAP is helping but he still feels short of breath, describing a chest tightness and difficulty getting a full breath. He denies any recent episodes of chest pain, no abdominal pain.     Review of Systems:   Pertinent positives and negatives are stated within HPI, all other systems reviewed and are negative.        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 ([address] Bilateral carotid artery stenosis, CAD (coronary artery disease), Carotid artery stenosis, Carotid bruit, Carotid stenosis, right, CHF (congestive heart failure) ([address] COPD (chronic obstructive pulmonary disease) ([address] Headache(784.0), Hyperlipidemia, Hypertension, PVD (peripheral vascular disease) ([address] PVD (peripheral vascular disease) with claudication ([facility] [address] STEMI (ST elevation myocardial infarction) [facility]), Subclavian artery stenosis, left ([address] and Traumatic retroperitoneal hematoma. Past Surgical History:  has a past surgical history that includes Coronary angioplasty with stent (10/07/2012); ECHO Compl W Dop Color Flow (10/9/2012); Colonoscopy; Coronary angioplasty; Diagnostic Cardiac Cath Lab Procedure (1998); Diagnostic Cardiac Cath Lab Procedure (1999); Diagnostic Cardiac Cath Lab Procedure (2002); Coronary angioplasty with stent (4/24/13); ECHO Compl W Dop Color Flow (4/25/2013); and Coronary angioplasty (03/13/2017). Social History:  reports that he quit smoking about 7 years ago. His smoking use included cigarettes. He has a 37.50 pack-year smoking history. His smokeless tobacco use includes chew. He reports current alcohol use. He reports that he does not use drugs. Family History: family history includes Cancer in his father and paternal uncle; Heart Disease in his father and mother. . Unless otherwise noted, family history is non contributory    The patients home medications have been reviewed. Allergies: Bee pollen and Penicillins        ---------------------------------------------------PHYSICAL EXAM--------------------------------------    Constitutional/General: Alert and oriented x3  Head: Normocephalic and atraumatic  Eyes: PERRL, EOMI, sclera non icteric  Mouth: Oropharynx clear, handling secretions, no trismus, no asymmetry of the posterior oropharynx or uvular edema  Neck: Supple, full ROM, no stridor, no meningeal signs  Respiratory: Increased work of breathing with prolonged expiratory phase and accessory muscle use, diminished bases with inspiratory and expiratory wheezing and poor air movement throughout all fields  Cardiovascular:  Regular rate. Regular rhythm. No murmurs, no aortic murmurs, no gallops, or rubs. 2+ distal pulses. Equal extremity pulses. Chest: No chest wall tenderness  GI:  Abdomen Soft, Non tender, Non distended. No rebound, guarding, or rigidity.  No 2. 49 1.50 - 4.00 E9/L    Monocytes Absolute 1.32 (H) 0.10 - 0.95 E9/L    Eosinophils Absolute 0.11 0.05 - 0.50 E9/L    Basophils Absolute 0.04 0.00 - 0.20 E9/L   COVID-19   Result Value Ref Range    SARS-CoV-2, NAAT Not Detected Not Detected   ,       RADIOLOGY:  Interpreted by Radiologist unless otherwise specified  XR CHEST PORTABLE   Final Result   COPD with  superimposed patchy and groundglass infiltrates in the lung   bases which may be due to pneumonia including viral infection and/or   edema. EKG Interpretation  Interpreted by emergency department physician, Dr. Morse Pod    Date of EK20  Time: 728    Rhythm: normal sinus   Rate: 99  Axis: normal  Conduction: left bundle branch block (incomplete)  ST Segments: no acute change  T Waves: no acute change    Clinical Impression: No findings suggestive of acute ischemia or injury  Comparison to prior EKG: stable as compared to patient's most recent EKG      ------------------------- NURSING NOTES AND VITALS REVIEWED ---------------------------   The nursing notes within the ED encounter and vital signs as below have been reviewed by myself  /68   Pulse 82   Temp 98.1 °F (36.7 °C)   Resp 19   SpO2 96%     Oxygen Saturation Interpretation: Improved after treatment    The patients available past medical records and past encounters were reviewed. ------------------------------ ED COURSE/MEDICAL DECISION MAKING----------------------  Medications   azithromycin (ZITHROMAX) 500 mg in D5W 250ml Vial Mate (has no administration in time range)   magnesium sulfate 2 g in 50 mL IVPB premix (0 g Intravenous Stopped 20 0749)   albuterol (PROVENTIL) nebulizer solution 7.5 mg (7.5 mg Nebulization Given 20)   cefTRIAXone (ROCEPHIN) 1 g in sterile water 10 mL IV syringe (1 g Intravenous New Bag 20)           The cardiac monitor revealed sinus rhythm with a heart rate in the 90s as interpreted by me.  The cardiac monitor was ordered secondary to the patient's chest pain and to monitor for patient for dysrhythmia. CPT [id number]           Medical Decision Making:     I, Dr. Kelle Hancock, am the primary provider of record    76-year-old male with history of COPD presenting with shortness of breath. He arrives with poor air movement, inspiratory and expiratory wheezing with increased work of breathing and hypoxia. He is getting some relief with BiPAP therapy. He arrives hemodynamically stable. Patient was given 2 DuoNeb, will give hour-long albuterol with magnesium sulfate. EKG shows no signs of ischemia or injury. Chest x-ray shows bilateral lower lobe infiltrative process, possible edema. Patient does have a leukocytosis, but also has elevation in BNP. Metabolic panel is within acceptable limits. Troponin is negative. Patient was given a dose of Rocephin and azithromycin. He is feeling significantly improved with positive pressure ventilation. Should there be pulmonary edema and vascular congestion, would opt to continue BiPAP therapy, patient feels comfortable while using the BiPAP. He rested comfortably during the remainder of his ED course, admitted for further management. Please note that the withdrawal or failure to initiate urgent interventions for this patient would likely result in a life threatening deterioration or permanent disability. Accordingly this patient received 33 minutes of critical care time, excluding separately billable procedures. Re-Evaluations: This patient's ED course included: a personal history and physicial examination, re-evaluation prior to disposition, multiple bedside re-evaluations, IV medications, cardiac monitoring, continuous pulse oximetry and complex medical decision making and emergency management    This patient has remained hemodynamically stable, improved and been closely monitored during their ED course. Counseling:    The emergency provider has spoken with the patient and discussed todays results, in addition to providing specific details for the plan of care and counseling regarding the diagnosis and prognosis. Questions are answered at this time and they are agreeable with the plan.       --------------------------------- IMPRESSION AND DISPOSITION ---------------------------------    IMPRESSION  1. Acute on chronic congestive heart failure, unspecified heart failure type ([facility] [address]    2. COPD exacerbation (HCC)    3. Lung infiltrate        DISPOSITION  Disposition: Admit to telemetry  Patient condition is stable        NOTE: This report was transcribed using voice recognition software.  Every effort was made to ensure accuracy; however, inadvertent computerized transcription errors may be present        Fahad Farah DO  07/16/20 0848

## 2020-07-16 NOTE — H&P
History and Physical      CHIEF COMPLAINT:  SOB      HISTORY OF PRESENT ILLNESS:      The patient is a 78 y.o. male patient presents with increasing SOB over the past 2 weeks. History of known COPD and silica pneumoconiosis. 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 He denies fever or chills. Unable to expectorate this sputum recently. Resuscitated in the ER with BiPAP, mg++, steroid and bronchodilators.     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 10/7/2012    Subclavian artery stenosis, left ([address] 4/26/2013    Traumatic retroperitoneal hematoma 4/26/2013       Past Surgical History:    Past Surgical History:   Procedure Laterality Date    COLONOSCOPY      CORONARY ANGIOPLASTY      CORONARY ANGIOPLASTY  03/13/2017    2.5/15 [place] balloon to RCA and RPL    CORONARY ANGIOPLASTY WITH STENT PLACEMENT  10/07/2012    Ion KILEY x2 to RCA per Dr. Lorin Khanna  4/24/13    Stent MID RCA 3.5x38, 2.5X15 RPL    DIAGNOSTIC CARDIAC CATH LAB PROCEDURE  1998    [place] stent to the RCA    DIAGNOSTIC CARDIAC CATH LAB PROCEDURE  1999    balloon to prox RCA unsuccessful PTC of the ostium of the posterior descending bracnh of the RCA    DIAGNOSTIC CARDIAC CATH LAB PROCEDURE 2002    cardiac cath with PTCA cutting balloon angioplasty to the ostium of the posterior descending artery branch of the RC    ECHO COMPL W DOP COLOR FLOW  10/9/2012    Mod concentric LVH, EF 55%, stage II diastolic dysfunction    ECHO COMPL W DOP COLOR FLOW  4/25/2013            Medications Prior to Admission:    Medications Prior to Admission: amLODIPine (NORVASC) 5 MG tablet, Take 5 mg by mouth 2 times daily  acetaminophen (TYLENOL) 500 MG tablet, Take 1,000 mg by mouth 2 times daily as needed for Pain  fluticasone (FLONASE) 50 MCG/ACT nasal spray, 1 spray by Nasal route daily   montelukast (SINGULAIR) 10 MG tablet, Take 10 mg by mouth nightly   isosorbide mononitrate (IMDUR) 30 MG extended release tablet, Take 30 mg by mouth daily   Fluticasone-Umeclidin-Vilant (TRELEGY ELLIPTA) 100-62.5-25 MCG/INH AEPB, Inhale 1 puff into the lungs daily   metoprolol (TOPROL-XL) 100 MG XL tablet, Take 100 mg by mouth daily   tamsulosin (FLOMAX) 0.4 MG capsule, Take 0.4 mg by mouth daily   lisinopril (PRINIVIL;ZESTRIL) 20 MG tablet, Take 1 tablet by mouth 2 times daily. aspirin 81 MG chewable tablet, Take 1 tablet by mouth daily. cilostazol (PLETAL) 100 MG tablet, Take 1 tablet by mouth 2 times daily  nitroGLYCERIN (NITROSTAT) 0.4 MG SL tablet, Place 1 tablet under the tongue every 5 minutes as needed for Chest pain  atorvastatin (LIPITOR) 80 MG tablet, Take 1 tablet by mouth daily    Allergies:    Bee pollen and Penicillins    Social History:    reports that he quit smoking about 7 years ago. His smoking use included cigarettes. He has a 37.50 pack-year smoking history. His smokeless tobacco use includes chew. He reports current alcohol use. He reports that he does not use drugs. Family History:   family history includes Cancer in his father and paternal uncle; Heart Disease in his father and mother.     REVIEW OF SYSTEMS:  As above in the HPI, otherwise negative    PHYSICAL EXAM:    Vitals:  /80   Pulse 81

## 2020-07-16 NOTE — ED NOTES
Bed: 13  Expected date:   Expected time:   Means of arrival:   Comments:  ems     Lisa Hussein RN  07/16/20 4901

## 2020-07-17 NOTE — PROGRESS NOTES
Chief Complaint:  SOB and chest pain    Subjective:  Chest pain with SOB and accelerated BP last night  The patient is alert. No further chest pain or SOB. No problems overnight. Denies abdominal pain. Tolerating diet. No nausea or vomiting. Sputum now productive    Objective:    Vitals:    20 0923   BP: 111/61   Pulse: 63   Resp:    Temp:    SpO2:      A&O x's 3, no respiratory distress. Heart:  RRR, no murmurs, gallops, or rubs. Lungs:  CTA bilaterally, no wheeze, rales or rhonchi  Abd: bowel sounds present, nontender, nondistended, no masses  Extrem:  No clubbing, cyanosis, or edema  Tele: NSR    Lab Results   Component Value Date     2020    K 4.9 2020     2020    CO2 27 2020    BUN 16 2020    CREATININE 0.8 2020    CALCIUM 9.0 2020      Lab Results   Component Value Date    WBC 13.6 2020    RBC 3.50 2020    HGB 10.7 2020    HCT 34.2 2020    MCV 97.7 2020    MCH 30.6 2020    MCHC 31.3 2020    RDW 13.7 2020     2020    MPV 9.9 2020     PT/INR:    Lab Results   Component Value Date    PROTIME 11.6 2018    INR 1.0 2018     No results for input(s): POCGLU in the last 72 hours. Recent Labs     20  0738      K 4.9      CO2 27   BUN 16   CREATININE 0.8   CALCIUM 9.0   GFRAA >60   LABGLOM >60   GLUCOSE 158*       Xr Chest Portable    Result Date: 2020  Patient MRN:  [id number] : 1950 Age: 78 years Gender: Male Order Date:  2020 7:30 AM EXAM: XR CHEST PORTABLE One image INDICATION:  SOB SOB COMPARISON: 2018 FINDINGS: There is borderline cardiac size. There is COPD with the patchy and hazy perihilar and bibasilar infiltrates predominantly in the periphery of the lungs. COPD with  superimposed patchy and groundglass infiltrates in the lung bases which may be due to pneumonia including viral infection and/or edema. Scheduled Meds:   sodium chloride flush  10 mL Intravenous 2 times per day    enoxaparin  40 mg Subcutaneous Daily    amLODIPine  5 mg Oral BID    aspirin  81 mg Oral Daily    atorvastatin  80 mg Oral Daily    cilostazol  100 mg Oral BID    fluticasone  1 spray Nasal Daily    isosorbide mononitrate  30 mg Oral Daily    lisinopril  20 mg Oral BID    metoprolol succinate  100 mg Oral Daily    montelukast  10 mg Oral Nightly    tamsulosin  0.4 mg Oral Daily    sodium chloride flush  10 mL Intravenous 2 times per day    ipratropium-albuterol  1 ampule Inhalation Q4H WA    cefTRIAXone (ROCEPHIN) IV  1 g Intravenous Q24H    guaiFENesin-dextromethorphan  10 mL Oral 4x Daily    methylPREDNISolone  40 mg Intravenous Q6H    budesonide  0.5 mg Nebulization BID    Arformoterol Tartrate  15 mcg Nebulization BID     Continuous Infusions:   nitroGLYCERIN 15 mcg/min (07/17/20 0358)     PRN Meds:.sodium chloride flush, acetaminophen, sodium chloride flush, polyethylene glycol, promethazine **OR** ondansetron, nitroGLYCERIN    I/O last 3 completed shifts: In: 360 [P.O.:360]  Out: 2000 [Urine:2000]  No intake/output data recorded.     Intake/Output Summary (Last 24 hours) at 7/17/2020 0934  Last data filed at 7/17/2020 0851  Gross per 24 hour   Intake 360 ml   Output 2000 ml   Net -1640 ml       Assessment:    Active Hospital Problems    Diagnosis    Chronic obstructive pulmonary disease with acute exacerbation ([facility] [J44.1]    Hypertension [I10]    PVD (peripheral vascular disease) with claudication ([facility] [I73.9]    CAD (coronary artery disease) [I25.10]       Plan:  Consult notes reviewed             Bump in troponin with chest pain             BP well controlled with NTG drip             Cardiac eval requested             Continue steroids, nebs, O2 and ATB             Viral or bacterial pathogens not identified thus far              EKG showing marked subendo depression           Jack Driscoll, DO  9:34 AM  7/17/2020

## 2020-07-17 NOTE — CARE COORDINATION
SW spoke with Pt about Transition Plan of Care. Pt is from home with wife. Pt was independent prior to admission. Pt uses oxygen at home but has no portable tanks. Pt is in the process of switching from [address] [place] [address] to place on [facility].  Pt was suppose to be paying 20% and find out that he was paying the whole cost.  PCP: Dr. Edil Erickson. Pharmacy: [address] [facility] or uses mail in. Pt declined [facility] at this time. TRAMAINE/ALLISON to follow for discharge needs.   Jose Jorgensen, [email or web address].  [phone]

## 2020-07-17 NOTE — PROGRESS NOTES
Page placed to Dr. Costello regarding patient stated to bedside RN that he is having 10/10 pain in chest and radiating down the left arm. . She took Vital Signs and obtained EKG. Spoke to Dr. Costello regarding above. Orders obtained.

## 2020-07-17 NOTE — CONSULTS
second troponin this morning is 0.09 with a CK-MB of 9.2. His breathing is much better. Medical History:  1. Cardiac cath and stents to RCA, 1998.    2. Cardiac cath with balloon angioplasty proximal RCA unsuccessful, 1999.  3. Cardiac cath with PTCA cutting balloon angioplasty to ostium of PDA branch of RCA, 2002. 4. Multiple percutaneous coronary artery interventions (possibly up to four stents RCA), 2007. 5. Cardiac cath, 10/25/2008. RCA treated with 2.75 x 12 mm Vision stent. 6. Hypertension. 7. Hyperlipidemia. 8. GERD. 9. COPD. And wears oxygen at night  10. No history of diabetes mellitus. 11. Allergy to penicillin. 12. Cigarette abuse. 37.5 pack years. Quit 01/20/2013. 13. Echo, 2007. Mild aortic stenosis. 14. Carotid US, 2007. Mild carotid stenosis. 15. Presentation with inferior STEMI. Coronary stenting/PCI, 10/07/2012 (Dr. Hanna Mohamud). ION KILEY x 2 to RCA. 16. [facility] admission for \"angina\", 04/2013. Catheterization/PCI to mid RCA and proximal VLV. Post catheterization right groin hematoma/retroperitoneal hematoma with hypotension and tachycardia. NSTEMI secondary to hypotension. 17. Echo, 04/2013. Suboptimal.    18. Coronary angioplasty with stent mid RCA 3.5 x 38 and 2.5 x 15 RPL, 04/23/2013. 19. History of noncompliance with medications. 20. Lexiscan MPS, 08/2014. Large fixed inferior defect. Moderate lateral reversible defect. EF normal.    21. Cardiac catheterization, 09/25/2014. Balloon angioplasty of mid and distal RCA and right PLV. No stents (Azra). 22. OP evaluation, Dr. Marek Leblanc, 10/2014. Palpitations. Holter monitor showed frequent PVCs. 23. OP evaluation, Dr. Guerline Kelley, 01/14/2015. 24. Echo, 03/31/2015. LVEF normal.  Moderate CLVH. Small pericardial effusion. AV not well demonstrated. Maximal gradient 8.4.    25. Cardiac CTA, [facility], 04/02/2015. Extensive stenting of RCA, cannot assess lumen of vessel.   Extensive multifocal plaque and stenosis of LAD and left CX.    26. Lexiscan/exercise MPS 04/23/2015. EF 54%. Fixed inferior and lateral defects. Small reversible defect. No significant WMA. Low-to-intermediate risk. 27. Echo, 05/10/2016. Normal EF. LA dilatation (index 49). Mild calcific aortic stenosis. Peak velocity 2.2. Mean gradient 9. MUKUL by continuity equation 1.5 and by telemetry 1.6 cm² consistent with mild stenosis. 28. Admitted with chest pain March 11, 2017, no acute coronary syndrome, symptoms consistent with unstable angina  29. Cardiac catheterization March 13, 2017 Left main: 0% stenosisLAD: 50 % stenosisCircumflex: 80%stenosisAfter OM1RCA: Dominant. 70 % mid vessel in stent stenosis. 99% ostial stenosis of RCA-PL branch ( in stent ) and 100% stenosis of RCA-PDA  LV angio: 50-55% EF.  30. Lexiscan stress test August 28, 2019 intermediate risk and EF was abnormal at 47% with hypokinesis of the inferior lateral wall and a moderate sized fixed defect in the inferior lateral wall suggestive of a prior MI but no reversible ischemia  31. Derril Mary PAD  the lower extremity with claudication pain chronically   32. Subclavian artery stenosis on the left in 2013 carotid stenosis           Medications Prior to admit:  Prior to Admission medications    Medication Sig Start Date End Date Taking?  Authorizing Provider   amLODIPine (NORVASC) 5 MG tablet Take 5 mg by mouth 2 times daily   Yes Historical Provider, MD   acetaminophen (TYLENOL) 500 MG tablet Take 1,000 mg by mouth 2 times daily as needed for Pain   Yes Historical Provider, MD   fluticasone (FLONASE) 50 MCG/ACT nasal spray 1 spray by Nasal route daily  5/13/20  Yes Historical Provider, MD   montelukast (SINGULAIR) 10 MG tablet Take 10 mg by mouth nightly  5/13/20  Yes Historical Provider, MD   isosorbide mononitrate (IMDUR) 30 MG extended release tablet Take 30 mg by mouth daily  6/24/19  Yes Historical Provider, MD   Fluticasone-Umeclidin-Vilant (Sinda Rummacuco) 100-62.5-25 MCG/INH AEPB Inhale 1 puff into the lungs daily    Yes Historical Provider, MD   metoprolol (TOPROL-XL) 100 MG XL tablet Take 100 mg by mouth daily  10/6/15  Yes Historical Provider, MD   tamsulosin (FLOMAX) 0.4 MG capsule Take 0.4 mg by mouth daily  9/8/15  Yes Historical Provider, MD   lisinopril (PRINIVIL;ZESTRIL) 20 MG tablet Take 1 tablet by mouth 2 times daily. 1/14/15  Yes Chet Ramos MD   aspirin 81 MG chewable tablet Take 1 tablet by mouth daily.  9/27/14  Yes Pollo Kelly MD   cilostazol (PLETAL) 100 MG tablet Take 1 tablet by mouth 2 times daily 1/3/20   Vipul Enriquez MD   nitroGLYCERIN (NITROSTAT) 0.4 MG SL tablet Place 1 tablet under the tongue every 5 minutes as needed for Chest pain 8/26/19   Dionna Belcher MD   atorvastatin (LIPITOR) 80 MG tablet Take 1 tablet by mouth daily 8/26/19   Dionna Belcher MD       Current Medications:    Current Facility-Administered Medications: nitroGLYCERIN 50 mg in dextrose 5% 250 mL infusion, 5 mcg/min, Intravenous, Continuous  sodium chloride flush 0.9 % injection 10 mL, 10 mL, Intravenous, 2 times per day  sodium chloride flush 0.9 % injection 10 mL, 10 mL, Intravenous, PRN  enoxaparin (LOVENOX) injection 40 mg, 40 mg, Subcutaneous, Daily  acetaminophen (TYLENOL) tablet 1,000 mg, 1,000 mg, Oral, BID PRN  amLODIPine (NORVASC) tablet 5 mg, 5 mg, Oral, BID  aspirin chewable tablet 81 mg, 81 mg, Oral, Daily  atorvastatin (LIPITOR) tablet 80 mg, 80 mg, Oral, Daily  cilostazol (PLETAL) tablet 100 mg, 100 mg, Oral, BID  fluticasone (FLONASE) 50 MCG/ACT nasal spray 1 spray, 1 spray, Nasal, Daily  isosorbide mononitrate (IMDUR) extended release tablet 30 mg, 30 mg, Oral, Daily  lisinopril (PRINIVIL;ZESTRIL) tablet 20 mg, 20 mg, Oral, BID  metoprolol succinate (TOPROL XL) extended release tablet 100 mg, 100 mg, Oral, Daily  montelukast (SINGULAIR) tablet 10 mg, 10 mg, Oral, Nightly  tamsulosin (FLOMAX) capsule 0.4 mg, 0.4 mg, Oral, Daily  sodium chloride flush 0.9 % injection 10 mL, 10 mL, Intravenous, 2 times per day  sodium chloride flush 0.9 % injection 10 mL, 10 mL, Intravenous, PRN  polyethylene glycol (GLYCOLAX) packet 17 g, 17 g, Oral, Daily PRN  promethazine (PHENERGAN) tablet 12.5 mg, 12.5 mg, Oral, Q6H PRN **OR** ondansetron (ZOFRAN) injection 4 mg, 4 mg, Intravenous, Q6H PRN  ipratropium-albuterol (DUONEB) nebulizer solution 1 ampule, 1 ampule, Inhalation, Q4H WA  cefTRIAXone (ROCEPHIN) 1 g in sterile water 10 mL IV syringe, 1 g, Intravenous, Q24H  guaiFENesin-dextromethorphan (ROBITUSSIN DM) 100-10 MG/5ML syrup 10 mL, 10 mL, Oral, 4x Daily  methylPREDNISolone sodium (SOLU-MEDROL) injection 40 mg, 40 mg, Intravenous, Q6H  budesonide (PULMICORT) nebulizer suspension 500 mcg, 0.5 mg, Nebulization, BID  Arformoterol Tartrate (BROVANA) nebulizer solution 15 mcg, 15 mcg, Nebulization, BID  nitroGLYCERIN (NITROSTAT) SL tablet 0.4 mg, 0.4 mg, Sublingual, Q5 Min PRN    Allergies:  Bee pollen and Penicillins    Social History: Denies alcohol and quit smoking in  with 37.5 pack year history      Family History:   Family History   Problem Relation Age of Onset    Heart Disease Mother          age 54    Heart Disease Father          66's     Cancer Father         kidney    Cancer Paternal Uncle         kidney       REVIEW OF SYSTEMS:     · Constitutional: Denies fatigue, fevers, chills or night sweats  · Eyes: Denies visual changes or drainage  · ENT: Denies headaches or hearing loss. No mouth sores or sore throat. No epistaxis   · Cardiovascular: Denies chest pain, pressure or palpitations. No lower extremity swelling. · Respiratory:  chronic ROSADO, but no cough, orthopnea or PND. No hemoptysis   · Gastrointestinal: Denies hematemesis or anorexia. No hematochezia or melena    · Genitourinary: Denies urgency, dysuria or hematuria.   · Musculoskeletal: Denies gait disturbance, weakness or joint complaints  · Integumentary: Denies rash, Recent Labs     07/16/20  0738 07/17/20  0507   WBC 13.7* 13.6*   HGB 13.4 10.7*   HCT 43.8 34.2*    222     BMP:   Recent Labs     07/16/20  0738      K 4.9   CO2 27   BUN 16   CREATININE 0.8   LABGLOM >60   CALCIUM 9.0     Mag: No results for input(s): MG in the last 72 hours. Phos: No results for input(s): PHOS in the last 72 hours. TSH: No results for input(s): TSH in the last 72 hours. HgA1c:   Lab Results   Component Value Date    LABA1C 5.8 04/25/2013     No results found for: EAG  proBNP:   Recent Labs     07/16/20  0738   PROBNP 1,648*     PT/INR: No results for input(s): PROTIME, INR in the last 72 hours. APTT:No results for input(s): APTT in the last 72 hours. CARDIAC ENZYMES:  Recent Labs     07/16/20  0738 07/16/20  2201 07/17/20  0507   CKTOTAL  --  96 108   CKMB  --  6.8 9.3*   TROPONINI <0.01 <0.01 0.07*     FASTING LIPID PANEL:  Lab Results   Component Value Date    CHOL 179 03/11/2017    HDL 49 03/11/2017    LDLCALC 111 03/11/2017    TRIG 97 03/11/2017     LIVER PROFILE:No results for input(s): AST, ALT, LABALBU in the last 72 hours. Electronically signed by BARBI Reyes CNP on 7/17/2020 at 7:45 AM       Reason for consult: Chest discomfort. Patient seen with BARBI Reyes. Agree with the findings and A/P. Management plan was discussed. I have personally interviewed the patient, independently performed a focused cardiac exam, reviewed the pertinent laboratory and diagnostic testing results and directly participated in the medical decision-making. HPI: 76-year-old ex-smoker male who is seen in consultation due to CHF. He has history of CAD, status post multiple PCI to his RCA in 1998 with last heart catheterization in January 2017 when patient was not felt to be candidate for coronary vascularization, peripheral arterial disease of the carotid and lower extremities, hypertension, mild aortic stenosis, hyperlipidemia and COPD oxygen dependent. Recent Labs     07/16/20  0738   PROBNP 1,648*         Assessment:  -Chronic diastolic CHF due to moderate elevated hypertension and myocardial ischemia  -Hypertension: Moderately elevated on admission and currently controlled. -COPD with questionable superimposed pneumonia  /Leukocytosis. -CAD with multiple PCI's on the RCA, not amenable to further revascularization.  -History of mild aortic stenosis. -Peripheral arterial disease.  -Hyperlipidemia. Plan:  -Increase Imdur to 60 mg p.o. daily.  -Start Ranexa 500 p.o. twice daily.  -Lasix 40 mg p.o. daily and potassium 20 meq  daily.  -Follow-up proBNP. -Continue aspirin, Norvasc, Lipitor,  Prinivil and Toprol.  -Pulmonary consult.  -Obtain an echocardiogram to reassess the significance of the aortic stenosis. Thank you for the consult. Will follow. Electronically signed by Eduardo Rodgers MD on 7/17/2020 at 9:50 PM  [facility].

## 2020-07-17 NOTE — PROGRESS NOTES
[facility]  [address], [address], [address]      Pulmonary Progress Note      SUBJECTIVE:  Developed chest pain with radiation to left arm yesterday evening, noted to be hypertensive also. Currently on NTG drip, awaiting Cardiology recommendations if any further workup needed. Lying down in bed on 6 li, claims doing better with breathing compared to admission with less cough, ambulating some in room with dyspnea with minimal exertion but better than when initially admitted.     OBJECTIVE    Medications    Continuous Infusions:   nitroGLYCERIN 15 mcg/min (07/17/20 0358)       Scheduled Meds:   sodium chloride flush  10 mL Intravenous 2 times per day    enoxaparin  40 mg Subcutaneous Daily    amLODIPine  5 mg Oral BID    aspirin  81 mg Oral Daily    atorvastatin  80 mg Oral Daily    cilostazol  100 mg Oral BID    fluticasone  1 spray Nasal Daily    isosorbide mononitrate  30 mg Oral Daily    lisinopril  20 mg Oral BID    metoprolol succinate  100 mg Oral Daily    montelukast  10 mg Oral Nightly    tamsulosin  0.4 mg Oral Daily    sodium chloride flush  10 mL Intravenous 2 times per day    ipratropium-albuterol  1 ampule Inhalation Q4H WA    cefTRIAXone (ROCEPHIN) IV  1 g Intravenous Q24H    guaiFENesin-dextromethorphan  10 mL Oral 4x Daily    methylPREDNISolone  40 mg Intravenous Q6H    budesonide  0.5 mg Nebulization BID    Arformoterol Tartrate  15 mcg Nebulization BID       PRN Meds:sodium chloride flush, acetaminophen, sodium chloride flush, polyethylene glycol, promethazine **OR** ondansetron, nitroGLYCERIN    Physical    VITALS:  /61   Pulse 63   Temp (!) 32 °F (0 °C)   Resp 16   Ht 5' 10\" (1.778 m)   Wt 178 lb 9.6 oz (81 kg)   SpO2 95%   BMI 25.63 kg/m²     24HR INTAKE/OUTPUT:      Intake/Output Summary (Last 24 hours) at 7/17/2020 0931  Last data filed at 7/17/2020 0851  Gross per 24 hour

## 2020-07-18 NOTE — PROGRESS NOTES
Patient complaining of left sided chest pain and shoulder pain again, patient refusing nitro at this time, Cardiology messaged for orders.

## 2020-07-18 NOTE — PROGRESS NOTES
Chief Complaint:  SOB and chest pain    Subjective:  Chest pain with SOB and accelerated BP last night  The patient is alert. Ongoing intermittent chest pain or SOB. No problems overnight. Denies abdominal pain. Tolerating diet. No nausea or vomiting. Objective:    Vitals:    20 0841   BP: (!) 169/79   Pulse: 95   Resp:    Temp:    SpO2:      A&O x's 3, no respiratory distress. Heart:  RRR, no murmurs, gallops, or rubs. Lungs:  CTA bilaterally, no wheeze, rales or rhonchi  Abd: bowel sounds present, nontender, nondistended, no masses  Extrem:  No clubbing, cyanosis, or edema  Tele: NSR    Lab Results   Component Value Date     2020    K 4.9 2020     2020    CO2 27 2020    BUN 16 2020    CREATININE 0.8 2020    CALCIUM 9.0 2020      Lab Results   Component Value Date    WBC 13.6 2020    RBC 3.50 2020    HGB 10.7 2020    HCT 34.2 2020    MCV 97.7 2020    MCH 30.6 2020    MCHC 31.3 2020    RDW 13.7 2020     2020    MPV 9.9 2020     PT/INR:    Lab Results   Component Value Date    PROTIME 11.6 2018    INR 1.0 2018     No results for input(s): POCGLU in the last 72 hours. Recent Labs     20  0738      K 4.9      CO2 27   BUN 16   CREATININE 0.8   CALCIUM 9.0   GFRAA >60   LABGLOM >60   GLUCOSE 158*       Xr Chest Portable    Result Date: 2020  Patient MRN:  [id number] : 1950 Age: 78 years Gender: Male Order Date:  2020 7:30 AM EXAM: XR CHEST PORTABLE One image INDICATION:  SOB SOB COMPARISON: 2018 FINDINGS: There is borderline cardiac size. There is COPD with the patchy and hazy perihilar and bibasilar infiltrates predominantly in the periphery of the lungs. COPD with  superimposed patchy and groundglass infiltrates in the lung bases which may be due to pneumonia including viral infection and/or edema.        Scheduled EKG showing marked subendo depression              Ambien for sleep             Tramadol for headache             Ranexa started per cardiology                         Blayne Osborne DO  9:58 AM  7/18/2020

## 2020-07-18 NOTE — PROGRESS NOTES
Pulmonary Progress Note    Admit Date: 2020  Hospital day                               PCP: Des Tovar MD    Chief Complaint (s):  Patient Active Problem List   Diagnosis    Arthritis    CAD (coronary artery disease)    Subclavian artery stenosis, left (HCC)    Carotid bruit    Carotid stenosis, right    PVD (peripheral vascular disease) with claudication [facility]    Chest pain in adult    Atrial fibrillation with RVR ([facility]    Chest pain    Bilateral carotid artery stenosis    Atherosclerosis 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 ([address]    Chronic obstructive pulmonary disease with acute exacerbation ([facility]    Hypertension    Acute on chronic congestive heart failure (HCC)       Subjective:  · Awake and alert and continues to complain of chest pain. Cardiology is aware and is to attend to the patient soon. Films and clinical course have been reviewed.       Vitals:  VITALS:  BP (!) 173/81   Pulse 90   Temp 98.6 °F (37 °C) (Temporal)   Resp 16   Ht 5' 10\" (1.778 m)   Wt 179 lb 12.8 oz (81.6 kg)   SpO2 95%   BMI 25.80 kg/m²     24HR INTAKE/OUTPUT:      Intake/Output Summary (Last 24 hours) at 2020 1248  Last data filed at 2020 1420  Gross per 24 hour   Intake 0 ml   Output --   Net 0 ml       24HR PULSE OXIMETRY RANGE:    SpO2  Av.8 %  Min: 95 %  Max: 97 %    Medications:  IV:   nitroGLYCERIN Stopped (20 1628)       Scheduled Meds:   methylPREDNISolone  40 mg Intravenous Q8H    ipratropium-albuterol  1 ampule Inhalation 4x daily    ranolazine  500 mg Oral BID    furosemide  20 mg Oral Daily    sodium chloride flush  10 mL Intravenous 2 times per day    enoxaparin  40 mg Subcutaneous Daily    amLODIPine  5 mg Oral BID    aspirin  81 mg Oral Daily    atorvastatin  80 mg Oral Daily    cilostazol  100 mg Oral BID    fluticasone  1 spray Nasal Daily    isosorbide mononitrate  30 mg Oral Daily    lisinopril  20 mg Oral BID    metoprolol succinate  100 mg Oral Daily    montelukast  10 mg Oral Nightly    tamsulosin  0.4 mg Oral Daily    sodium chloride flush  10 mL Intravenous 2 times per day    cefTRIAXone (ROCEPHIN) IV  1 g Intravenous Q24H    guaiFENesin-dextromethorphan  10 mL Oral 4x Daily    budesonide  0.5 mg Nebulization BID    Arformoterol Tartrate  15 mcg Nebulization BID       Diet:   DIET CARDIAC; No Caffeine     EXAM:  General: No distress. Alert. Eyes: PERRL. No sclera icterus. No conjunctival injection. ENT: No discharge. Pharynx clear. Neck: Trachea midline. Normal thyroid. Resp: No accessory muscle use. Bibasilar rales. No wheezing. No rhonchi. CV: Regular rate. Regular rhythm. No murmur or rub. Abd: Non-tender. Non-distended. No masses. No organomegaly. Normal bowel sounds. Skin: Warm and dry. No nodule on exposed extremities. No rash on exposed extremities. Ext: No cyanosis, clubbing, edema  Lymph: No cervical LAD. No supraclavicular LAD. M/S: No cyanosis. No joint deformity. No clubbing. Neuro: Awake. Follows commands. Positive pupils/gag/corneals. Normal pain response. Results:  CBC:   Recent Labs     07/16/20  0738 07/17/20  0507   WBC 13.7* 13.6*   HGB 13.4 10.7*   HCT 43.8 34.2*   MCV 99.5 97.7    222     BMP:   Recent Labs     07/16/20  0738      K 4.9      CO2 27   BUN 16   CREATININE 0.8     LIVER PROFILE: No results for input(s): AST, ALT, LIPASE, BILIDIR, BILITOT, ALKPHOS in the last 72 hours. Invalid input(s): AMYLASE,  ALB  PT/INR: No results for input(s): PROTIME, INR in the last 72 hours. APTT: No results for input(s): APTT in the last 72 hours. Pathology:  1. N/A      Microbiology:  1. None    Recent ABG:   No results for input(s): PH, PO2, PCO2, HCO3, BE, O2SAT, METHB, O2HB, COHB, O2CON, HHB, THB in the last 72 hours.   FiO2 : 40 %       Recent Films:  XR CHEST PORTABLE   Final Result   Tortuous aorta    There are patchy infiltrates seen throughout both the lung fields. Pneumonia could give this appearance. The chest appears significantly improved in the interval                  XR CHEST PORTABLE   Final Result   COPD with  superimposed patchy and groundglass infiltrates in the lung   bases which may be due to pneumonia including viral infection and/or   edema. Assessment:  1. Gold class IV COPD: Appears stable at this point  2. Bibasilar infiltrates: These were not pneumonic. Procalcitonin is negligible and with clearing in 48 hours this suggest simple volume overload which is cleared  3. Chest pain: The patient has noncritical coronary artery disease    Plan:  1. Decrease steroids   2. No indication for antimicrobials  3. Aerosol treatments    Time at the bedside, reviewing labs and radiographs, reviewing updated notes and consultations, discussing with staff and family was more than 40 minutes. Please note that voice recognition technology was used in the preparation of this note and make therefore it may contain inadvertent transcription errors. If the patient is a COVID 19 isolation patient, the above physical exam reflects that of the examining physician for the day. Jenni Howard M.D., F.C.C.P.     [facility], [address] [facility], [address] [place] [address]

## 2020-07-18 NOTE — PROGRESS NOTES
[facility] Inpatient Progress Note    Patient is a 78 y.o. male of Marisol Turner MD seen in hospital follow up. Chief complaint: Chest pain    HPI: 69-year-old ex-smoker male who is seen in consultation due to CHF. He has history of CAD, status post multiple PCI to his RCA in 1998 with last heart catheterization in January 2017 when patient was not felt to be candidate for coronary vascularization, peripheral arterial disease of the carotid and lower extremities, hypertension, mild aortic stenosis, hyperlipidemia and COPD oxygen dependent. Some CP overnight. No SOB. Medical History:  1. Cardiac cath and stents to RCA, 1998.    2. Cardiac cath with balloon angioplasty proximal RCA unsuccessful, 1999.  3. Cardiac cath with PTCA cutting balloon angioplasty to ostium of PDA branch of RCA, 2002. 4. Multiple percutaneous coronary artery interventions (possibly up to four stents RCA), 2007. 5. Cardiac cath, 10/25/2008. RCA treated with 2.75 x 12 mm Vision stent. 6. Hypertension. 7. Hyperlipidemia. 8. GERD. 9. COPD. And wears oxygen at night  10. No history of diabetes mellitus. 11. Allergy to penicillin. 12. Cigarette abuse. 37.5 pack years. Quit 01/20/2013. 13. Echo, 2007. Mild aortic stenosis. 14. Carotid US, 2007. Mild carotid stenosis. 15. Presentation with inferior STEMI. Coronary stenting/PCI, 10/07/2012 (Dr. Fausto Lentz). ION KILEY x 2 to RCA. 16. [facility] admission for \"angina\", 04/2013. Catheterization/PCI to mid RCA and proximal VLV. Post catheterization right groin hematoma/retroperitoneal hematoma with hypotension and tachycardia. NSTEMI secondary to hypotension. 17. Echo, 04/2013. Suboptimal.    18. Coronary angioplasty with stent mid RCA 3.5 x 38 and 2.5 x 15 RPL, 04/23/2013. 19. History of noncompliance with medications. 20. Lexiscan MPS, 08/2014. Large fixed inferior defect. Moderate lateral reversible defect.    EF normal.    21. Cardiac catheterization, 09/25/2014. Balloon angioplasty of mid and distal RCA and right PLV. No stents ([facility]). 22. OP evaluation, Dr. Delia Peralta, 10/2014. Palpitations. Holter monitor showed frequent PVCs. 23. OP evaluation, Dr. Mena Miguel, 01/14/2015. 24. Echo, 03/31/2015. LVEF normal.  Moderate CLVH. Small pericardial effusion. AV not well demonstrated. Maximal gradient 8.4.    25. Cardiac CTA, [facility], 04/02/2015. Extensive stenting of RCA, cannot assess lumen of vessel. Extensive multifocal plaque and stenosis of LAD and left CX.    26. Lexiscan/exercise MPS 04/23/2015. EF 54%. Fixed inferior and lateral defects. Small reversible defect. No significant WMA. Low-to-intermediate risk. 27. Echo, 05/10/2016. Normal EF. LA dilatation (index 49). Mild calcific aortic stenosis. Peak velocity 2.2. Mean gradient 9. MUKUL by continuity equation 1.5 and by telemetry 1.6 cm² consistent with mild stenosis. 28. Admitted with chest pain March 11, 2017, no acute coronary syndrome, symptoms consistent with unstable angina  29. Cardiac catheterization March 13, 2017 Left main: 0% stenosisLAD: 50 % stenosisCircumflex: 80%stenosisAfter OM1RCA: Dominant. 70 % mid vessel in stent stenosis. 99% ostial stenosis of RCA-PL branch ( in stent ) and 100% stenosis of RCA-PDA  LV angio: 50-55% EF.  30. Lexiscan stress test August 28, 2019 intermediate risk and EF was abnormal at 47% with hypokinesis of the inferior lateral wall and a moderate sized fixed defect in the inferior lateral wall suggestive of a prior MI but no reversible ischemia  31. Ana Boop PAD  the lower extremity with claudication pain chronically   32.  Subclavian artery stenosis on the left in 2013 carotid stenosis      Patient Active Problem List   Diagnosis    Arthritis    CAD (coronary artery disease)    Subclavian artery stenosis, left (HCC)    Carotid bruit    Carotid stenosis, right    PVD (peripheral vascular disease) with claudication (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 (HCC)    Chronic obstructive pulmonary disease with acute exacerbation (HCC)    Hypertension       Allergies   Allergen Reactions    Bee Pollen Swelling     Severe reaction, hypotensive , tongue swelling    Penicillins Hives       Current Facility-Administered Medications   Medication Dose Route Frequency Provider Last Rate Last Dose    zolpidem (AMBIEN) tablet 5 mg  5 mg Oral Nightly PRN Jack Dingc, DO        traMADol (ULTRAM) tablet 50 mg  50 mg Oral Q6H PRN Jack Payne, DO        methylPREDNISolone sodium (SOLU-MEDROL) injection 40 mg  40 mg Intravenous Q8H Trang Toro MD   40 mg at 07/18/20 0848    ipratropium-albuterol (DUONEB) nebulizer solution 1 ampule  1 ampule Inhalation 4x daily Trang Toro MD   1 ampule at 07/18/20 0944    perflutren lipid microspheres (DEFINITY) injection 1.65 mg  1.5 mL Intravenous ONCE PRN Joselin Pantelis, APRN - CNP        nitroGLYCERIN 50 mg in dextrose 5% 250 mL infusion  5 mcg/min Intravenous Continuous Joselin Pantelis, APRN - CNP   Stopped at 07/17/20 1628    ranolazine (RANEXA) extended release tablet 500 mg  500 mg Oral BID Joselin Pantelis, APRN - CNP   500 mg at 07/18/20 0848    furosemide (LASIX) tablet 20 mg  20 mg Oral Daily Joselin Pantelis, APRN - CNP   20 mg at 07/18/20 0848    sodium chloride flush 0.9 % injection 10 mL  10 mL Intravenous 2 times per day Jack Payne DO   Stopped at 07/18/20 0858    sodium chloride flush 0.9 % injection 10 mL  10 mL Intravenous PRN Jcak Payne, DO        enoxaparin (LOVENOX) injection 40 mg  40 mg Subcutaneous Daily Jack Payne DO   40 mg at 07/18/20 0847    acetaminophen (TYLENOL) tablet 1,000 mg  1,000 mg Oral BID PRN Jack Payne, DO   1,000 mg at 07/17/20 1642    amLODIPine (NORVASC) tablet 5 mg  5 mg Oral BID Jack WONG Popovec, DO   5 mg at 07/18/20 0848    aspirin chewable tablet 81 mg  81 mg Oral Daily Jack WONG Popovec, DO   81 mg at 07/18/20 0847    atorvastatin (LIPITOR) tablet 80 mg  80 mg Oral Daily Jack P Popovec, DO   80 mg at 07/18/20 0848    cilostazol (PLETAL) tablet 100 mg  100 mg Oral BID Jack P Popovec, DO   100 mg at 07/18/20 0848    fluticasone (FLONASE) 50 MCG/ACT nasal spray 1 spray  1 spray Nasal Daily Jack P Popovec, DO   1 spray at 07/18/20 0847    isosorbide mononitrate (IMDUR) extended release tablet 30 mg  30 mg Oral Daily Jack WONG Popovec, DO   30 mg at 07/18/20 0847    lisinopril (PRINIVIL;ZESTRIL) tablet 20 mg  20 mg Oral BID Jack WONG Popovec, DO   20 mg at 07/18/20 0848    metoprolol succinate (TOPROL XL) extended release tablet 100 mg  100 mg Oral Daily Jack WONG Popovec, DO   100 mg at 07/18/20 0847    montelukast (SINGULAIR) tablet 10 mg  10 mg Oral Nightly Jack P Popovec, DO   10 mg at 07/17/20 2037    tamsulosin (FLOMAX) capsule 0.4 mg  0.4 mg Oral Daily Jack P Popovec, DO   0.4 mg at 07/18/20 0847    sodium chloride flush 0.9 % injection 10 mL  10 mL Intravenous 2 times per day Jack WONG Popovec, DO   10 mL at 07/18/20 0847    sodium chloride flush 0.9 % injection 10 mL  10 mL Intravenous PRN Jack WONG Popovec, DO   10 mL at 07/18/20 0336    polyethylene glycol (GLYCOLAX) packet 17 g  17 g Oral Daily PRN Jack WONG Popovec, DO        promethazine (PHENERGAN) tablet 12.5 mg  12.5 mg Oral Q6H PRN Jack P Popovec, DO        Or    ondansetron (ZOFRAN) injection 4 mg  4 mg Intravenous Q6H PRN Jack WONG Popovec, DO        cefTRIAXone (ROCEPHIN) 1 g in sterile water 10 mL IV syringe  1 g Intravenous Q24H Jack P Popovec, DO   1 g at 07/17/20 1441    guaiFENesin-dextromethorphan (ROBITUSSIN DM) 100-10 MG/5ML syrup 10 mL  10 mL Oral 4x Daily Jack Payne DO   10 mL at 07/17/20 2037    budesonide (PULMICORT) nebulizer suspension 500 mcg  0.5 mg Nebulization BID Raven Oliveira MD   500 mcg at 07/18/20 8107    Arformoterol Tartrate (BROVANA) nebulizer solution 15 mcg  15 mcg Nebulization BID Johnny Tenorio MD   15 mcg at 07/18/20 0943    nitroGLYCERIN (NITROSTAT) SL tablet 0.4 mg  0.4 mg Sublingual Q5 Min PRN Jack Payne DO   0.4 mg at 07/18/20 7464       Review of systems:   Heart: as above   Lungs: as above   Eyes: denies changes in vision or discharge. Ears: denies changes in hearing or pain. Nose: denies epistaxis or masses   Throat: denies sore throat or trouble swallowing. Neuro: denies numbness, tingling, tremors. Skin: denies rashes or itching. : denies hematuria, dysuria   GI: denies vomiting, diarrhea   Psych: denies mood changed, anxiety, depression. Physical Exam   BP (!) 169/79   Pulse 95   Temp 98.6 °F (37 °C) (Temporal)   Resp 16   Ht 5' 10\" (1.778 m)   Wt 179 lb 12.8 oz (81.6 kg)   SpO2 95%   BMI 25.80 kg/m²   Constitutional: Oriented to person, place, and time. No distress. Well developed. Head: Normocephalic and atraumatic. Neck: Neck supple. No hepatojugular reflux. No JVD present. Carotid bruit is not present. No tracheal deviation present. No thyromegaly present. Cardiovascular: Normal rate, regular rhythm, normal heart sounds. intact distal pulses. No gallop and no friction rub. No murmur heard. Pulmonary: Breath sounds normal. No respiratory distress. No wheezes. No rales. Chest: Effort normal. No tenderness. Abdominal: Soft. Bowel sounds are normal. No distension or mass. No tenderness, rebound or guarding. Musculoskeletal: . No tenderness. No clubbing or cyanosis. Extremitites: Intact distal pulses. No edema  Neurological: Alert and oriented to person, place, and time. Skin: Skin is warm and dry. No rash noted. Not diaphoretic. No erythema. Psychiatric: Normal mood and affect. Behavior is normal.   Lymphadenopathy: No cervical adenopathy. No groin adenopathy.       CBC:   Lab Results   Component Value Date    WBC 13.6 07/17/2020    RBC 3.50 07/17/2020 HGB 10.7 07/17/2020    HCT 34.2 07/17/2020    MCV 97.7 07/17/2020    MCH 30.6 07/17/2020    MCHC 31.3 07/17/2020    RDW 13.7 07/17/2020     07/17/2020    MPV 9.9 07/17/2020     BMP:   Lab Results   Component Value Date     07/16/2020    K 4.9 07/16/2020     07/16/2020    CO2 27 07/16/2020    BUN 16 07/16/2020    LABALBU 3.9 12/19/2018    CREATININE 0.8 07/16/2020    CALCIUM 9.0 07/16/2020    GFRAA >60 07/16/2020    LABGLOM >60 07/16/2020     Magnesium:    Lab Results   Component Value Date    MG 2.0 09/26/2014     Cardiac Enzymes:   Lab Results   Component Value Date    CKTOTAL 62 07/18/2020    CKTOTAL 73 07/18/2020    CKTOTAL 84 07/17/2020    CKMB 4.5 07/18/2020    CKMB 5.4 07/18/2020    CKMB 6.2 07/17/2020    TROPONINI 0.11 (H) 07/17/2020    TROPONINI 0.07 (H) 07/17/2020    TROPONINI <0.01 07/16/2020      PT/INR:    Lab Results   Component Value Date    PROTIME 11.6 12/19/2018    INR 1.0 12/19/2018     TSH:    Lab Results   Component Value Date    TSH 1.411 04/25/2013     Rhythm Strip: normal sinus rhythm. ASSESSMENT & PLAN:    Patient Active Problem List   Diagnosis    Arthritis    CAD (coronary artery disease)    Subclavian artery stenosis, left (HCC)    Carotid bruit    Carotid stenosis, right    PVD (peripheral vascular disease) with claudication (HCC)    Chest pain in adult    Atrial fibrillation with RVR (HCC)    Chest pain    Bilateral carotid artery stenosis    Atherosclerosis 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 ([address]    Chronic obstructive pulmonary disease with acute exacerbation ([address]    Hypertension     1. Chest pain/CAD:     Progressive exertional chest pain symptoms. Titrate medications. Cath Monday. ASA/statin/BB/imdur/ranexa. 2. Chronic diastolic CHF due to moderate elevated hypertension and myocardial ischemia    3. Hypertension: Observe. 4. COPD with questionable superimposed pneumonia    5.  History of mild aortic stenosis: Echo

## 2020-07-18 NOTE — PROGRESS NOTES
Giuliana Van [place] notified of chest pain that has been re occurring since yesterday, awaiting orders.

## 2020-07-18 NOTE — PLAN OF CARE
Problem: Falls - Risk of:  Goal: Will remain free from falls  Description: Will remain free from falls  7/18/2020 1104 by Marely Mack RN  Outcome: Met This Shift     Problem: Pain:  Goal: Pain level will decrease  Description: Pain level will decrease  7/18/2020 1104 by Marely Mack RN  Outcome: Ongoing

## 2020-07-18 NOTE — PROGRESS NOTES
Patient agreeable to one SL nitro, clarified with pharmacist the amount pt can receive in one day and he can receive another one. Dr. Whitney Plummer coming to assess patient.

## 2020-07-18 NOTE — PLAN OF CARE
Problem: Falls - Risk of:  Goal: Will remain free from falls  Description: Will remain free from falls  7/18/2020 0154 by Tomasa cMneill RN  Outcome: Met This Shift  7/17/2020 1251 by Katiuska Dias RN  Outcome: Met This Shift     Problem: Pain:  Goal: Pain level will decrease  Description: Pain level will decrease  7/18/2020 0154 by Tomasa Mcneill RN  Outcome: Met This Shift  7/17/2020 1251 by Katiuska Dias RN  Outcome: Met This Shift

## 2020-07-19 NOTE — PLAN OF CARE
Problem: Falls - Risk of:  Goal: Will remain free from falls  Description: Will remain free from falls  7/19/2020 1137 by Rene Suggs RN  Outcome: Met This Shift     Problem: Pain:  Goal: Pain level will decrease  Description: Pain level will decrease  7/19/2020 1137 by Rene Suggs RN  Outcome: Met This Shift

## 2020-07-19 NOTE — PROGRESS NOTES
Chief Complaint:  SOB and chest pain    Subjective: The patient is alert. No intermittent chest pain or SOB(rest). No problems overnight. Denies abdominal pain. Tolerating diet. No nausea or vomiting. Objective:    Vitals:    20 0352   BP: 129/60   Pulse: 63   Resp: 16   Temp: 97.6 °F (36.4 °C)   SpO2: 96%     A&O x's 3, no respiratory distress. Heart:  RRR, no murmurs, gallops, or rubs. Lungs:  Coarse sounds bilaterally, no wheeze, rales or rhonchi  Abd: bowel sounds present, nontender, nondistended, no masses  Extrem:  No clubbing, cyanosis, or edema  Tele: NSR    Lab Results   Component Value Date     2020    K 4.9 2020     2020    CO2 27 2020    BUN 16 2020    CREATININE 0.8 2020    CALCIUM 9.0 2020      Lab Results   Component Value Date    WBC 13.6 2020    RBC 3.50 2020    HGB 10.7 2020    HCT 34.2 2020    MCV 97.7 2020    MCH 30.6 2020    MCHC 31.3 2020    RDW 13.7 2020     2020    MPV 9.9 2020     PT/INR:    Lab Results   Component Value Date    PROTIME 11.6 2018    INR 1.0 2018     No results for input(s): POCGLU in the last 72 hours. Recent Labs     20  0738      K 4.9      CO2 27   BUN 16   CREATININE 0.8   CALCIUM 9.0   GFRAA >60   LABGLOM >60   GLUCOSE 158*       Xr Chest Portable    Result Date: 2020  Patient MRN:  [id number] : 1950 Age: 78 years Gender: Male Order Date:  2020 7:30 AM EXAM: XR CHEST PORTABLE One image INDICATION:  SOB SOB COMPARISON: 2018 FINDINGS: There is borderline cardiac size. There is COPD with the patchy and hazy perihilar and bibasilar infiltrates predominantly in the periphery of the lungs. COPD with  superimposed patchy and groundglass infiltrates in the lung bases which may be due to pneumonia including viral infection and/or edema.        Scheduled Meds:   methylPREDNISolone  40 mg Intravenous Daily    isosorbide mononitrate  60 mg Oral Daily    metoprolol succinate  75 mg Oral BID    ipratropium-albuterol  1 ampule Inhalation 4x daily    ranolazine  500 mg Oral BID    furosemide  20 mg Oral Daily    sodium chloride flush  10 mL Intravenous 2 times per day    enoxaparin  40 mg Subcutaneous Daily    amLODIPine  5 mg Oral BID    aspirin  81 mg Oral Daily    atorvastatin  80 mg Oral Daily    cilostazol  100 mg Oral BID    fluticasone  1 spray Nasal Daily    lisinopril  20 mg Oral BID    montelukast  10 mg Oral Nightly    tamsulosin  0.4 mg Oral Daily    sodium chloride flush  10 mL Intravenous 2 times per day    guaiFENesin-dextromethorphan  10 mL Oral 4x Daily    budesonide  0.5 mg Nebulization BID    Arformoterol Tartrate  15 mcg Nebulization BID     Continuous Infusions:   nitroGLYCERIN Stopped (07/17/20 1628)     PRN Meds:.zolpidem, traMADol, perflutren lipid microspheres, sodium chloride flush, acetaminophen, sodium chloride flush, polyethylene glycol, promethazine **OR** ondansetron, nitroGLYCERIN    I/O last 3 completed shifts:   In: 12 [P.O.:960]  Out: 0 [Urine:600]  I/O this shift:  In: 100 [P.O.:100]  Out: 200 [Urine:200]    Intake/Output Summary (Last 24 hours) at 7/19/2020 7089  Last data filed at 7/19/2020 0352  Gross per 24 hour   Intake 1060 ml   Output 800 ml   Net 260 ml       Assessment:    Active Hospital Problems    Diagnosis    Acute on chronic congestive heart failure (HCC) [I50.9]    Chronic obstructive pulmonary disease with acute exacerbation (HCC) [J44.1]    Hypertension [I10]    PVD (peripheral vascular disease) with claudication ([facility] [address] [I73.9]    CAD (coronary artery disease) [I25.10]       Plan:  Consult notes reviewed             Bump in troponin with chest pain             BP well controlled with NTG drip             Cardiac eval requested             Continue steroids, nebs, O2 and ATB             Viral or bacterial pathogens not identified thus far              EKG showing marked subendo depression              Ambien for sleep             Tramadol for headache             Ranexa started per cardiology             Cardiac cath tomorrow           Blayne Osborne DO  6:20 AM  7/19/2020

## 2020-07-19 NOTE — PLAN OF CARE
Problem: Falls - Risk of:  Goal: Absence of physical injury  Description: Absence of physical injury  7/19/2020 1708 by Anu Alvarez RN  Outcome: Met This Shift     Problem: Pain:  Goal: Pain level will decrease  Description: Pain level will decrease  7/19/2020 1708 by Anu Alvarez RN  Outcome: Met This Shift

## 2020-07-20 NOTE — PROGRESS NOTES
RN from Dr. Charleen Olmstead office called and wanted updated on patient's condition, stated she was calling for patient's PCP, however patient's PCP listed as Dr. Richard Polanco. Patient was sleeping at that time so I was not able to verify if this was correct information, informed RN to call at a later time to make sure it was okay for me to release information. Patient has since clarified that he has a new PCP which is Dr. Charleen Olmstead and that when nurse returned call I could update on patient's condition.

## 2020-07-20 NOTE — PLAN OF CARE
Problem: Falls - Risk of:  Goal: Will remain free from falls  Description: Will remain free from falls  Outcome: Met This Shift  Goal: Absence of physical injury  Description: Absence of physical injury  Outcome: Met This Shift     Problem: Pain:  Description: Pain management should include both nonpharmacologic and pharmacologic interventions.   Goal: Pain level will decrease  Description: Pain level will decrease  Outcome: Met This Shift  Goal: Control of acute pain  Description: Control of acute pain  Outcome: Met This Shift  Goal: Control of chronic pain  Description: Control of chronic pain  Outcome: Met This Shift

## 2020-07-20 NOTE — PROGRESS NOTES
Chief Complaint:  SOB and chest pain    Subjective: The patient is alert. No intermittent chest pain or SOB(rest). No problems overnight. Denies abdominal pain. Tolerating diet. No nausea or vomiting. Objective:    Vitals:    20 0426   BP: (!) 153/70   Pulse: 61   Resp: 16   Temp: 97.8 °F (36.6 °C)   SpO2: 95%     A&O x's 3, no respiratory distress. Heart:  RRR, no murmurs, gallops, or rubs. Lungs:  Clear bilaterally, no wheeze, rales or rhonchi  Abd: bowel sounds present, nontender, nondistended, no masses  Extrem:  No clubbing, cyanosis, or edema  Tele: NSR    Lab Results   Component Value Date     2020    K 5.5 2020    K 4.9 2020     2020    CO2 27 2020    BUN 61 2020    CREATININE 1.4 2020    CALCIUM 8.6 2020      Lab Results   Component Value Date    WBC 14.1 2020    RBC 3.61 2020    HGB 11.1 2020    HCT 35.1 2020    MCV 97.2 2020    MCH 30.7 2020    MCHC 31.6 2020    RDW 13.2 2020     2020    MPV 10.3 2020     PT/INR:    Lab Results   Component Value Date    PROTIME 11.6 2018    INR 1.0 2018     No results for input(s): POCGLU in the last 72 hours. Recent Labs     20  0324      K 5.5*      CO2 27   BUN 61*   CREATININE 1.4*   CALCIUM 8.6   GFRAA >60   LABGLOM 50   GLUCOSE 119*       Xr Chest Portable    Result Date: 2020  Patient MRN:  [id number] : 1950 Age: 78 years Gender: Male Order Date:  2020 7:30 AM EXAM: XR CHEST PORTABLE One image INDICATION:  SOB SOB COMPARISON: 2018 FINDINGS: There is borderline cardiac size. There is COPD with the patchy and hazy perihilar and bibasilar infiltrates predominantly in the periphery of the lungs. COPD with  superimposed patchy and groundglass infiltrates in the lung bases which may be due to pneumonia including viral infection and/or edema.        Scheduled thus far              EKG showing marked subendo depression              Ambien for sleep             Tramadol for headache             Ranexa started per cardiology             Cardiac cath delayed             Bump in RFT noted--defer to cardiology             IVF started                                       Marquis Mateus DO  6:05 AM  7/20/2020

## 2020-07-20 NOTE — CARE COORDINATION
SW confirmed with Pt that Discharge plan is to return home with no needs at this time. SW/CM to follow for discharge needs.    Jake Pollack, L.S.W.  [phone]

## 2020-07-20 NOTE — PROGRESS NOTES
cooperative  Lungs: rhonchi bibasilar  Heart: regular rate and rhythm, S1, S2 normal, no murmur, click, rub or gallop  Abdomen: soft, non-tender; bowel sounds normal; no masses,  no organomegaly  Extremities: extremities normal, atraumatic, no cyanosis or edema  Neurologic: Mental status: Alert, oriented, thought content appropriate    Data    CBC:   Recent Labs     07/20/20  0324   WBC 14.1*   HGB 11.1*   HCT 35.1*   MCV 97.2          BMP:  Recent Labs     07/20/20  0324      K 5.5*      CO2 27   BUN 61*   CREATININE 1.4*    ALB:3,BILIDIR:3,BILITOT:3,ALKPHOS:3)@    PT/INR: No results for input(s): PROTIME, INR in the last 72 hours. ABG:   No results for input(s): PH, PO2, PCO2, HCO3, BE, O2SAT, METHB, O2HB, COHB, O2CON, HHB, THB in the last 72 hours. FiO2 : 40 %       Radiology/Other tests reviewed: CXR reviewed with improved aeration bilateral lung fields    Assessment:     Active Problems:    CAD (coronary artery disease)    PVD (peripheral vascular disease) with claudication (HCC)    Chronic obstructive pulmonary disease with acute exacerbation (HCC)    Hypertension    Acute on chronic congestive heart failure ([facility] [address]  Resolved Problems:    * No resolved hospital problems. *      Plan:       1. Await cardiology input regarding cardiac cath, may be postponed, chest pain as per Cardiology  2. Cont with steroids, taper as tolerated  3. Cont with nebs, observe respiratory status  4. Watch fluid balance, diurese as needed  5. Cont with oxygen, taper as tolerated      Time at the bedside, reviewing labs and radiographs, reviewing notes and consultations, discussing with staff and family was more than 35 minutes. Thanks for letting us see this patient in consultation. Please contact us with any questions. Office [phone] or after hours through [employer], x [phone].

## 2020-07-21 NOTE — PLAN OF CARE
Problem: Pain:  Goal: Pain level will decrease  Description: Pain level will decrease  7/20/2020 2208 by Triston Linder RN

## 2020-07-21 NOTE — PLAN OF CARE
Problem: Pain:  Goal: Pain level will decrease  Description: Pain level will decrease  7/20/2020 2208 by Nenita García RN  Outcome: Met This Shift  Goal: Control of acute pain  Description: Control of acute pain  Outcome: Ongoing

## 2020-07-21 NOTE — PROGRESS NOTES
Physician Progress Note      PATIENT:               Shlomo Severance  CSN #:                  [id number]  :                       1950  ADMIT DATE:       2020 7:25 AM  DISCH DATE:  RESPONDING  PROVIDER #:        Sarah Walton MD          QUERY TEXT:    Pt admitted with CHF, COPD, Respiratory failure. Noted documentation of    questionable Pneumonia  on   by ordered Cardiology consultant. If   possible, please document in progress notes and discharge summary:    The medical record reflects the following:  Risk Factors: AEcopd, Acute on chronic hypoxic Resp failure, Acute on chronic   diastolic CHF, CAD with Angina. Per Pulmonology pn  Bibasilar infiltrates: These were not pneumonic. Procalcitonin is negligible and with clearing in   48 hours this suggest simple volume overload which is cleared  . Clinical Indicators: Procalcitonin 0.08, Cxr COPD with ?superimposed patchy   and groundglass infiltrates in the lung  bases which may be due to pneumonia including viral infection and/or edema. Treatment: IV Zmax/Rocephin single doses, Nebulizers, Inhalers, Solu Medrol,   Prednisone,  Robitussin, Singulair,    Thank you,  Can Amin RN [facility]  [phone]  Options provided:  -- Pneumonia confirmed  -- Pneumonia ruled out  -- Other - I will add my own diagnosis  -- Dismiss - Clinically unable to determine / Unknown  -- Refer to Clinical Documentation Reviewer    PROVIDER RESPONSE TEXT:    The diagnosis of Pneumonia was ruled out.     Query created by: Milana Mcneil on 2020 1:26 PM      Electronically signed by:  Sarah Walton MD 2020 4:09 PM

## 2020-07-21 NOTE — PROGRESS NOTES
[facility]  [address], [address]  [facility], [address] [facility]      Pulmonary Progress Note      SUBJECTIVE:  Cath done today, mention of medical treatment for now. On 4 li NC, similar/stable with respiratory function.     OBJECTIVE    Medications    Continuous Infusions:   sodium chloride      nitroGLYCERIN Stopped (20 1628)       Scheduled Meds:   predniSONE  20 mg Oral Daily    isosorbide mononitrate  60 mg Oral Daily    metoprolol succinate  75 mg Oral BID    ipratropium-albuterol  1 ampule Inhalation 4x daily    ranolazine  500 mg Oral BID    sodium chloride flush  10 mL Intravenous 2 times per day    amLODIPine  5 mg Oral BID    aspirin  81 mg Oral Daily    atorvastatin  80 mg Oral Daily    cilostazol  100 mg Oral BID    fluticasone  1 spray Nasal Daily    lisinopril  20 mg Oral BID    montelukast  10 mg Oral Nightly    tamsulosin  0.4 mg Oral Daily    sodium chloride flush  10 mL Intravenous 2 times per day    guaiFENesin-dextromethorphan  10 mL Oral 4x Daily    budesonide  0.5 mg Nebulization BID    Arformoterol Tartrate  15 mcg Nebulization BID       PRN Meds:acetaminophen, zolpidem, traMADol, perflutren lipid microspheres, sodium chloride flush, acetaminophen, sodium chloride flush, polyethylene glycol, promethazine **OR** ondansetron, nitroGLYCERIN    Physical    VITALS:  /62   Pulse 56   Temp 97.7 °F (36.5 °C) (Temporal)   Resp 18   Ht 5' 10\" (1.778 m)   Wt 185 lb 9.6 oz (84.2 kg)   SpO2 96%   BMI 26.63 kg/m²     24HR INTAKE/OUTPUT:      Intake/Output Summary (Last 24 hours) at 2020 1554  Last data filed at 2020 1413  Gross per 24 hour   Intake 1790.75 ml   Output 1675 ml   Net 115.75 ml       24HR PULSE OXIMETRY RANGE:    SpO2  Av %  Min: 92 %  Max: 96 %    General appearance: alert, appears stated age and cooperative  Lungs: rhonchi bibasilar  Heart: regular rate and rhythm, S1, S2 normal, no murmur, click, rub or gallop  Abdomen: soft, non-tender; bowel sounds normal; no masses,  no organomegaly  Extremities: extremities normal, atraumatic, no cyanosis or edema  Neurologic: Mental status: Alert, oriented, thought content appropriate    Data    CBC:   Recent Labs     07/20/20  0324   WBC 14.1*   HGB 11.1*   HCT 35.1*   MCV 97.2          BMP:  Recent Labs     07/20/20 0324 07/21/20  0329    140   K 5.5* 5.3*    101   CO2 27 27   BUN 61* 56*   CREATININE 1.4* 1.5*    ALB:3,BILIDIR:3,BILITOT:3,ALKPHOS:3)@    PT/INR: No results for input(s): PROTIME, INR in the last 72 hours. ABG:   No results for input(s): PH, PO2, PCO2, HCO3, BE, O2SAT, METHB, O2HB, COHB, O2CON, HHB, THB in the last 72 hours. FiO2 : 40 %       Radiology/Other tests reviewed: CXR reviewed with improved aeration bilateral lung fields    Assessment:     Active Problems:    CAD (coronary artery disease)    PVD (peripheral vascular disease) with claudication (HCC)    Chronic obstructive pulmonary disease with acute exacerbation (HCC)    Hypertension    Acute on chronic congestive heart failure ([facility] [address]  Resolved Problems:    * No resolved hospital problems. *      Plan:       1. Cont with steroids, taper as tolerated  2. Cont with nebs, observe respiratory status  3. Watch fluid balance, diurese as needed  4. Cont with oxygen, taper as tolerated  5. CAD as per Cardiology      Time at the bedside, reviewing labs and radiographs, reviewing notes and consultations, discussing with staff and family was more than 35 minutes. Thanks for letting us see this patient in consultation. Please contact us with any questions. Office [phone] or after hours through [employer], x [phone].

## 2020-07-21 NOTE — PROCEDURES
[address]                  [address] [place],  [facility]                            CARDIAC CATHETERIZATION    PATIENT NAME: Maia Aldridge                     :        1950  MED REC NO:   [id number]                            ROOM:       [id number]  ACCOUNT NO:   [de-identified]                           ADMIT DATE: 2020  PROVIDER:     Anand Bailey MD    DATE OF PROCEDURE:  2020    LEFT HEART CATHETERIZATION    INDICATION:  Worsening angina with known CAD. PROCEDURE NOTE:  The patient was brought to the cardiac cath lab in his  usual fasting state. Under sterile condition and under local anesthetic  using conscious sedation, a 6-Niuean Terumo Slender sheath was inserted  into the right radial artery. The patient received 3000 units of  intravenous heparin. He also received 200 mcg of intraarterial  nitroglycerin and 2.5 mg of diluted verapamil via the right radial  sheath. Then, a 5-Niuean TIG diagnostic catheter was advanced to the  ascending aorta without difficulty. The left main coronary artery was  engaged and a coronary angiogram was done. This catheter was used to  engage the right coronary artery and a coronary angiogram was done. This catheter was exchanged over a guidewire to a 6-Niuean JL-4  diagnostic catheter which was used to engage the left main and a  coronary angiogram was done. No left ventriculogram was done due to  mild aortic stenosis and known ejection fraction by echocardiogram with  elevated creatinine. At the end of the procedure, the patient received  2000 units of intravenous heparin. The JL-4 catheter was pulled out. The Terumo Slender sheath was pulled out and a Vasc Band was applied  over the right radial artery with good hemostasis. The patient  tolerated the procedure very well and no complications were noted. No  significant blood loss occurred during the procedure.     FINDINGS:  HEMODYNAMICS: Aortic pressure 165/60 mmHg. CORONARY ANATOMY:  LEFT MAIN:  It is a long and large artery which is heavily calcified. There is 30% diffuse mid to distal stenosis. LAD:  It is medium in size and wraps around the apex and gives rise to  three small diagonal branches and several septal perforators. The LAD  was heavily calcified from its proximal to distal segment. There was  50% discrete mid LAD stenosis. The LAD gives collateral to the PDA and  to the PLV branch. LEFT CIRCUMFLEX:  It is medium in size and giving rise to a large  trifurcating first obtuse marginal branch, a second small obtuse  marginal branch and two small branches going to the AV groove. It also  gives rise to a large SA mayela or AV mayela branch. There was 80%  tubular mid stenosis involving the takeoff of the second small obtuse  marginal branch. RCA:  It is a large dominant artery with evidence of multiple stents  placed from the proximal to distal segment. There was moderate diffuse  in-stent restenosis with 100% occlusion of the distal RCA with MAR-0  flow to the PDA and to the PLV branch. IMPRESSION:  1. Heavily calcified LAD with 50% discrete mid stenosis. 2.  80% tubular mid circumflex stenosis involving the ostium of a small  second obtuse marginal branch. 3.   of the distal RCA with moderate diffuse in-stent restenosis of  the stents placed from the proximal to distal artery. RECOMMENDATIONS:  1. Maximal medical therapy. 2.  Consider PCI of the mid circumflex ( with risk of losing the second  obtuse marginal branch) if the patient continues to complain of chest  discomfort despite maximal medical therapy. PROCEDURE START TIME:  1320. PROCEDURE END TIME:  1352. CONTRAST VOLUME:  88 mL of Optiray. FLUOROSCOPY TIME:  3.5 minutes. CONSCIOUS SEDATION:  1 mg of intravenous Versed and 50 mcg of  intravenous fentanyl.         Padilla Viera MD    D: 07/21/2020 14:14:03       T: 07/21/2020 14:19:03

## 2020-07-21 NOTE — PROGRESS NOTES
Chief Complaint:  SOB and chest pain    Subjective: The patient is alert. No intermittent chest pain or SOB(rest). No problems overnight. Denies abdominal pain. Tolerating diet. No nausea or vomiting. Objective:  Slight improvement in renal function  Glycemia is satisfactory  Last white cell count is 14  Vitals:    20 0830   BP: 113/66   Pulse: 60   Resp: 20   Temp: 97.5 °F (36.4 °C)   SpO2: 96%     A&O x's 3, no respiratory distress. Heart:  RRR, no murmurs, gallops, or rubs. Lungs:  Clear bilaterally, no wheeze, rales or rhonchi  Abd: bowel sounds present, nontender, nondistended, no masses  Extrem:  No clubbing, cyanosis, or edema  Tele: NSR    Lab Results   Component Value Date     2020    K 5.3 2020     2020    CO2 27 2020    BUN 56 2020    CREATININE 1.5 2020    CALCIUM 8.7 2020      Lab Results   Component Value Date    WBC 14.1 2020    RBC 3.61 2020    HGB 11.1 2020    HCT 35.1 2020    MCV 97.2 2020    MCH 30.7 2020    MCHC 31.6 2020    RDW 13.2 2020     2020    MPV 10.3 2020     PT/INR:    Lab Results   Component Value Date    PROTIME 11.6 2018    INR 1.0 2018     No results for input(s): POCGLU in the last 72 hours. Recent Labs     20  0324 20  0329    140   K 5.5* 5.3*    101   CO2 27 27   BUN 61* 56*   CREATININE 1.4* 1.5*   CALCIUM 8.6 8.7   GFRAA >60 56   LABGLOM 50 46   GLUCOSE 119* 128*       Xr Chest Portable    Result Date: 2020  Patient MRN:  [id number] : 1950 Age: 78 years Gender: Male Order Date:  2020 7:30 AM EXAM: XR CHEST PORTABLE One image INDICATION:  SOB SOB COMPARISON: 2018 FINDINGS: There is borderline cardiac size. There is COPD with the patchy and hazy perihilar and bibasilar infiltrates predominantly in the periphery of the lungs.      COPD with  superimposed patchy and groundglass infiltrates in the lung bases which may be due to pneumonia including viral infection and/or edema. Scheduled Meds:   predniSONE  20 mg Oral Daily    isosorbide mononitrate  60 mg Oral Daily    metoprolol succinate  75 mg Oral BID    ipratropium-albuterol  1 ampule Inhalation 4x daily    ranolazine  500 mg Oral BID    sodium chloride flush  10 mL Intravenous 2 times per day    amLODIPine  5 mg Oral BID    aspirin  81 mg Oral Daily    atorvastatin  80 mg Oral Daily    cilostazol  100 mg Oral BID    fluticasone  1 spray Nasal Daily    lisinopril  20 mg Oral BID    montelukast  10 mg Oral Nightly    tamsulosin  0.4 mg Oral Daily    sodium chloride flush  10 mL Intravenous 2 times per day    guaiFENesin-dextromethorphan  10 mL Oral 4x Daily    budesonide  0.5 mg Nebulization BID    Arformoterol Tartrate  15 mcg Nebulization BID     Continuous Infusions:   nitroGLYCERIN Stopped (07/17/20 1628)     PRN Meds:.zolpidem, traMADol, perflutren lipid microspheres, sodium chloride flush, acetaminophen, sodium chloride flush, polyethylene glycol, promethazine **OR** ondansetron, nitroGLYCERIN    I/O last 3 completed shifts: In: 2090.8 [P.O.:500;  I.V.:1590.8]  Out: 1325 [Urine:1325]  I/O this shift:  In: -   Out: 200 [Urine:200]    Intake/Output Summary (Last 24 hours) at 7/21/2020 1044  Last data filed at 7/21/2020 1012  Gross per 24 hour   Intake 1970.75 ml   Output 1525 ml   Net 445.75 ml       Assessment:    Active Hospital Problems    Diagnosis    Acute on chronic congestive heart failure (HCC) [I50.9]    Chronic obstructive pulmonary disease with acute exacerbation (HCC) [J44.1]    Hypertension [I10]    PVD (peripheral vascular disease) with claudication ([facility] [address] [I73.9]    CAD (coronary artery disease) [I25.10]       Plan:  Consult notes reviewed-pulmonary steroid taper continue with nebulizers fluid balance             Bump in troponin with chest pain-awaiting cardiology decision for intervention             BP well controlled with NTG drip             Cardiac eval requested             Continue steroids, nebs, O2 and ATB             Viral or bacterial pathogens not identified thus far              EKG showing marked subendo depression              Ambien for sleep             Tramadol for headache             Ranexa started per cardiology             Cardiac cath delayed             Bump in RFT noted--defer to cardiology             IVF started-and   Patient is it is on relatively high-dose ACE  Renal function needs to be closely observed            Also of cardiac catheterization is planned-renal function may be an issue  Patient is off diuretics at this time    Joyce Hernandez MD  10:44 AM  2020

## 2020-07-22 NOTE — PROGRESS NOTES
[facility]  [address], [address]  [facility] HERNAN[facility], [facility]      Pulmonary Progress Note      SUBJECTIVE:  On 4 li NC, similar/stable with respiratory function, claims ambulating in room and does ok as long as wearing oxygen, not much cough/congestion.     OBJECTIVE    Medications    Continuous Infusions:   nitroGLYCERIN Stopped (20 1628)       Scheduled Meds:   predniSONE  20 mg Oral Daily    isosorbide mononitrate  60 mg Oral Daily    metoprolol succinate  75 mg Oral BID    ipratropium-albuterol  1 ampule Inhalation 4x daily    ranolazine  500 mg Oral BID    sodium chloride flush  10 mL Intravenous 2 times per day    amLODIPine  5 mg Oral BID    aspirin  81 mg Oral Daily    atorvastatin  80 mg Oral Daily    cilostazol  100 mg Oral BID    fluticasone  1 spray Nasal Daily    lisinopril  20 mg Oral BID    montelukast  10 mg Oral Nightly    tamsulosin  0.4 mg Oral Daily    sodium chloride flush  10 mL Intravenous 2 times per day    guaiFENesin-dextromethorphan  10 mL Oral 4x Daily    budesonide  0.5 mg Nebulization BID    Arformoterol Tartrate  15 mcg Nebulization BID       PRN Meds:acetaminophen, zolpidem, traMADol, perflutren lipid microspheres, sodium chloride flush, acetaminophen, sodium chloride flush, polyethylene glycol, promethazine **OR** ondansetron, nitroGLYCERIN    Physical    VITALS:  BP (!) 108/59   Pulse 66   Temp 97.9 °F (36.6 °C) (Oral)   Resp 16   Ht 5' 10\" (1.778 m)   Wt 187 lb (84.8 kg)   SpO2 92%   BMI 26.83 kg/m²     24HR INTAKE/OUTPUT:      Intake/Output Summary (Last 24 hours) at 2020 1455  Last data filed at 2020 1444  Gross per 24 hour   Intake 1502 ml   Output 1200 ml   Net 302 ml       24HR PULSE OXIMETRY RANGE:    SpO2  Av %  Min: 92 %  Max: 99 %    General appearance: alert, appears stated age and cooperative  Lungs: rhonchi bibasilar  Heart: regular rate and rhythm, S1, S2 normal, no murmur, click, rub or gallop  Abdomen: soft, non-tender; bowel sounds normal; no masses,  no organomegaly  Extremities: extremities normal, atraumatic, no cyanosis or edema  Neurologic: Mental status: Alert, oriented, thought content appropriate    Data    CBC:   Recent Labs     07/20/20  0324 07/22/20  0511   WBC 14.1* 10.0   HGB 11.1* 11.0*   HCT 35.1* 36.0*   MCV 97.2 100.6*    247       BMP:  Recent Labs     07/20/20  0324 07/21/20  0329 07/22/20  0515    140 141   K 5.5* 5.3* 5.4*    101 104   CO2 27 27 29   BUN 61* 56* 36*   CREATININE 1.4* 1.5* 1.1    ALB:3,BILIDIR:3,BILITOT:3,ALKPHOS:3)@    PT/INR: No results for input(s): PROTIME, INR in the last 72 hours. ABG:   No results for input(s): PH, PO2, PCO2, HCO3, BE, O2SAT, METHB, O2HB, COHB, O2CON, HHB, THB in the last 72 hours. FiO2 : 40 %       Radiology/Other tests reviewed: CXR reviewed with improved aeration bilateral lung fields    Assessment:     Active Problems:    CAD (coronary artery disease)    PVD (peripheral vascular disease) with claudication (HCC)    Chronic obstructive pulmonary disease with acute exacerbation (HCC)    Hypertension    Acute on chronic congestive heart failure ([facility] [address]  Resolved Problems:    * No resolved hospital problems. *      Plan:       1. Cont with steroids, taper as tolerated, should be able to stop by end of the week  2. Cont with nebs, observe respiratory status, can switch back to usual medications upon discharge  3. Watch fluid balance, diurese as needed  4. Cont with oxygen, taper as tolerated, will need oxygen saturation checked to see if will require out-pt oxygen use  5. CAD as per Cardiology  6. Can be discharged from pulmonary pov    *order for home oxygen use placed due to noted hypoxia with ambulation. Will need to use oxygen at 4 li NC with ambulation and at night.       Time at the bedside, reviewing labs and radiographs, reviewing notes and consultations, discussing with staff and family was more than 35 minutes. Thanks for letting us see this patient in consultation. Please contact us with any questions. Office [phone] or after hours through [employer], x [phone].

## 2020-07-22 NOTE — PROGRESS NOTES
Inpatient Cardiology Progress note     PATIENT IS BEING FOLLOWED FOR: Chest pain     Diann Dejesus is a 78 y.o. male who is known to Dr. Kiah Florence. [facility] 7/17/2020 with complaints of sudden onset of mid-sternal chest discomfort (usually relieved with nitroglycerin x 1) -->persistes for \"hours. \" Presented to ED, started on Tridil gtt and has remained CP free. Troponin <0.01, 0.07, 0.11 (no rise in Total CK; rise in CKMB 9.3--9.8). Hypoxic on arrival ~68% / placed on BiPAP therapy. COVID-19 negative. WBC 13. 7. proBNP 1648. CXR: bilateral lobe infiltrates and possible edema. SUBJECTIVE: Denies CP and SOB. OBJECTIVE: No apparent distress     ROS:  Consist: Denies fevers, chills or night sweats  Heart: Denies chest pain, palpitations, lightheadedness, dizziness or syncope  Lungs: Denies SOB, cough, wheezing, orthopnea or PND  GI: Denies abdominal pain, vomiting or diarrhea    PHYSICAL EXAM:   BP (!) 108/59   Pulse 66   Temp 97.9 °F (36.6 °C) (Oral)   Resp 16   Ht 5' 10\" (1.778 m)   Wt 187 lb (84.8 kg)   SpO2 92%   BMI 26.83 kg/m²    B/P Range last 24 hours: Systolic (82CYQ), XQQ:210 , Min:86 , WSF:595    Diastolic (01THJ), OLB:98, Min:52, Max:64    CONST: Well developed, well nourished elderly male who appears stated age. Awake, alert and cooperative. No apparent distress  HEENT:   Head- Normocephalic, atraumatic   Eyes- Conjunctivae pink, anicteric  Throat- Oral mucosa pink and moist  Neck-  No stridor, trachea midline, no jugular venous distention. No carotid bruit  CHEST: Chest symmetrical and non-tender to palpation. No accessory muscle use or intercostal retractions  RESPIRATORY:  Lung sounds - bibasilar rhonchi. CARDIOVASCULAR:     Heart Inspection- shows no noted pulsations  Heart Palpation- no heaves or thrills; PMI is non-displaced   Heart Ausculation- Regular rate and rhythm, no murmur. No s3, s4 or rub   PV: Right radial artery - intact, no hematoma, good radial pulse.  No lower extremity edema. No varicosities. Pedal pulses palpable, no clubbing or cyanosis   ABDOMEN: Soft, obese, non-tender to light palpation. Bowel sounds present. No palpable masses no organomegaly; no abdominal bruit  MS: Good muscle strength and tone. No atrophy or abnormal movements. : Deferred  SKIN: Warm and dry no statis dermatitis or ulcers   NEURO / PSYCH: Oriented to person, place and time. Speech clear and appropriate. Follows all commands.  Pleasant affect       Intake/Output Summary (Last 24 hours) at 7/22/2020 1451  Last data filed at 7/22/2020 1444  Gross per 24 hour   Intake 1502 ml   Output 1200 ml   Net 302 ml       Weight:   Wt Readings from Last 3 Encounters:   07/22/20 187 lb (84.8 kg)   06/10/20 174 lb (78.9 kg)   01/28/20 180 lb (81.6 kg)     Current Inpatient Medications:   predniSONE  20 mg Oral Daily    isosorbide mononitrate  60 mg Oral Daily    metoprolol succinate  75 mg Oral BID    ipratropium-albuterol  1 ampule Inhalation 4x daily    ranolazine  500 mg Oral BID    sodium chloride flush  10 mL Intravenous 2 times per day    amLODIPine  5 mg Oral BID    aspirin  81 mg Oral Daily    atorvastatin  80 mg Oral Daily    cilostazol  100 mg Oral BID    fluticasone  1 spray Nasal Daily    lisinopril  20 mg Oral BID    montelukast  10 mg Oral Nightly    tamsulosin  0.4 mg Oral Daily    sodium chloride flush  10 mL Intravenous 2 times per day    guaiFENesin-dextromethorphan  10 mL Oral 4x Daily    budesonide  0.5 mg Nebulization BID    Arformoterol Tartrate  15 mcg Nebulization BID       IV Infusions (if any):   nitroGLYCERIN Stopped (07/17/20 1628)       DIAGNOSTIC/ LABORATORY DATA:  Labs:   CBC:   Recent Labs     07/20/20  0324 07/22/20  0511   WBC 14.1* 10.0   HGB 11.1* 11.0*   HCT 35.1* 36.0*    247     BMP:   Recent Labs     07/21/20  0329 07/22/20  0515    141   K 5.3* 5.4*   CO2 27 29   BUN 56* 36*   CREATININE 1.5* 1.1   LABGLOM 46 >60   CALCIUM 8.7 8.3* Mag: No results for input(s): MG in the last 72 hours. Phos: No results for input(s): PHOS in the last 72 hours. TSH: No results for input(s): TSH in the last 72 hours. HgA1c:   Lab Results   Component Value Date    LABA1C 5.8 04/25/2013     CARDIAC ENZYMES:  Recent Labs     07/20/20  1537 07/20/20  2150 07/21/20  0329   CKTOTAL 68 68 71   CKMB 2.7 2.8 3.0     FASTING LIPID PANEL:  Lab Results   Component Value Date    CHOL 179 03/11/2017    HDL 49 03/11/2017    LDLCALC 111 03/11/2017    TRIG 97 03/11/2017     CXR: 7/18/2020   Tortuous aorta    There are patchy infiltrates seen throughout both the lung fields. Pneumonia could give this appearance. The chest appears significantly improved in the interval             Telemetry:    Echo: 7/18/2020 (Dr. Cris Koyanagi)  Ejection fraction is visually estimated at 60%. No regional wall motion abnormalities seen. Mild left ventricular concentric hypertrophy noted. Normal right ventricle structure and function. Left atrial volume index of 53 ml per meters squared BSA. Physiologic and/or trace mitral regurgitation is present. Cardiac catheterization: 7/21/2020  LEFT MAIN:  It is a long and large artery which is heavily calcified. There is 30% diffuse mid to distal stenosis. LAD:  It is medium in size and wraps around the apex and gives rise to  three small diagonal branches and several septal perforators. The LAD  was heavily calcified from its proximal to distal segment. There was  50% discrete mid LAD stenosis. The LAD gives collateral to the PDA and  to the PLV branch. LEFT CIRCUMFLEX:  It is medium in size and giving rise to a large  trifurcating first obtuse marginal branch, a second small obtuse  marginal branch and two small branches going to the AV groove. It also  gives rise to a large SA mayela or AV mayela branch. There was 80%  tubular mid stenosis involving the takeoff of the second small obtuse  marginal branch.   RCA:  It is a large dominant artery with evidence of multiple stents  placed from the proximal to distal segment. There was moderate diffuse  in-stent restenosis with 100% occlusion of the distal RCA with MAR-0  flow to the PDA and to the PLV branch. IMPRESSION:  1. Heavily calcified LAD with 50% discrete mid stenosis. 2.  80% tubular mid circumflex stenosis involving the ostium of a small  second obtuse marginal branch. 3.   of the distal RCA with moderate diffuse in-stent restenosis of  the stents placed from the proximal to distal artery.         ASSESSMENT:   1. Chest pain with typical features: Troponin <0.01, 0.07, 0.11. No acute EKG changes. 2. CAD with history of multiple stents (2007, 2008, 2013, 2014) s/p cath (7/21/2020) showing heavily calcified LAD with 50% discrete mid stenosis, 80% tubular mid Cx stenosis involving ostium of a small second obtuse marginal branch,  of distral RCA with moderate diffuse in-stent restenosis of the stents placed from the proximal to distal artery. Clinically stable. 3. Chronic diastolic HFpEF: clinically compensated. HTN  4. HLD  5. COPD / nocturnal O2  6. GERD  7. PAD with BLE with claudication pain (chronic)  8. Left subclavian artery stenosis    PLAN:  1. Medical management. If pain persists, consider PCI of the mid Cx (with risk of losing the second obtuse marginal branch). 2. Continue ASA, Lipitor, Norvasc, Imdur, Toprol, Lisinopril and Ranexa (started this admission). 3. No further cardiac testing needed or planned. 4. Okay for discharge from cardiology standpoint when okay with others. 5. Follow-up with Dr. Gaile Litten upon discharge. 6. Cardiology will sign off; please call if needed. Above assessment/plan as per Dr. Hawa Valdez.    Electronically signed by BARBI Guaman CNP on 7/22/2020 at 2:51 PM

## 2020-07-22 NOTE — PROGRESS NOTES
Pulse ox on room air at rest 90%  Pulse ox on room air ambulating 85%  Pulse ox on 4L ambulating 90%  Pulse ox on 4L recovery sitting 92%

## 2020-07-22 NOTE — PROGRESS NOTES
Chief Complaint:  SOB and chest pain    Subjective: The patient is alert. No intermittent chest pain or SOB(rest). No problems overnight. Denies abdominal pain. Tolerating diet. No nausea or vomiting. Patient had cardiac catheterization yesterday  He voices no complaints today    Objective:  Further improvement in renal function  Glycemia is satisfactory  Last white cell count is 14-  Vitals:    20 1030   BP: (!) 98/52   Pulse: 62   Resp:    Temp:    SpO2:    Head is normal  Eyes are clear normal range of motion normal reaction to light  Neck no masses or nodes  A&O x's 3, no respiratory distress. Heart:  RRR, no murmurs, gallops, or rubs. Lungs:  Clear bilaterally, no wheeze, rales or rhonchi  Abd: bowel sounds present, nontender, nondistended, no masses  Extrem:  No clubbing, cyanosis, or edema  Neurologically there is no focal neurologic symptom or sign  Tele: NSR    Lab Results   Component Value Date     2020    K 5.4 2020    K 5.3 2020     2020    CO2 29 2020    BUN 36 2020    CREATININE 1.1 2020    CALCIUM 8.3 2020      Lab Results   Component Value Date    WBC 14.1 2020    RBC 3.61 2020    HGB 11.1 2020    HCT 35.1 2020    MCV 97.2 2020    MCH 30.7 2020    MCHC 31.6 2020    RDW 13.2 2020     2020    MPV 10.3 2020     PT/INR:    Lab Results   Component Value Date    PROTIME 11.6 2018    INR 1.0 2018     No results for input(s): POCGLU in the last 72 hours.     Recent Labs     20  0324 20  0329 20  0515    140 141   K 5.5* 5.3* 5.4*    101 104   CO2 27 27 29   BUN 61* 56* 36*   CREATININE 1.4* 1.5* 1.1   CALCIUM 8.6 8.7 8.3*   GFRAA >60 56 >60   LABGLOM 50 46 >60   GLUCOSE 119* 128* 89       Xr Chest Portable    Result Date: 2020  Patient MRN:  [id number] : 1950 Age: 78 years Gender: Male Order Date:  2020 7:30 AM EXAM: XR CHEST PORTABLE One image INDICATION:  SOB SOB COMPARISON: December 19, 2018 FINDINGS: There is borderline cardiac size. There is COPD with the patchy and hazy perihilar and bibasilar infiltrates predominantly in the periphery of the lungs. COPD with  superimposed patchy and groundglass infiltrates in the lung bases which may be due to pneumonia including viral infection and/or edema. Scheduled Meds:   predniSONE  20 mg Oral Daily    isosorbide mononitrate  60 mg Oral Daily    metoprolol succinate  75 mg Oral BID    ipratropium-albuterol  1 ampule Inhalation 4x daily    ranolazine  500 mg Oral BID    sodium chloride flush  10 mL Intravenous 2 times per day    amLODIPine  5 mg Oral BID    aspirin  81 mg Oral Daily    atorvastatin  80 mg Oral Daily    cilostazol  100 mg Oral BID    fluticasone  1 spray Nasal Daily    lisinopril  20 mg Oral BID    montelukast  10 mg Oral Nightly    tamsulosin  0.4 mg Oral Daily    sodium chloride flush  10 mL Intravenous 2 times per day    guaiFENesin-dextromethorphan  10 mL Oral 4x Daily    budesonide  0.5 mg Nebulization BID    Arformoterol Tartrate  15 mcg Nebulization BID     Continuous Infusions:   nitroGLYCERIN Stopped (07/17/20 1628)     PRN Meds:.acetaminophen, zolpidem, traMADol, perflutren lipid microspheres, sodium chloride flush, acetaminophen, sodium chloride flush, polyethylene glycol, promethazine **OR** ondansetron, nitroGLYCERIN    I/O last 3 completed shifts: In: 1082 [P.O.:480; I.V.:602]  Out: 1150 [Urine:1150]  No intake/output data recorded.     Intake/Output Summary (Last 24 hours) at 7/22/2020 1103  Last data filed at 7/22/2020 0549  Gross per 24 hour   Intake 1082 ml   Output 950 ml   Net 132 ml       Assessment:    Active Hospital Problems    Diagnosis    Acute on chronic congestive heart failure (HCC) [I50.9]    Chronic obstructive pulmonary disease with acute exacerbation ([facility] [address] [J44.1]    Hypertension [I10]    PVD (peripheral vascular disease) with claudication ([facility] [address] [I73.9]    CAD (coronary artery disease) [I25.10]       Plan:  Consult notes reviewed-pulmonary steroid taper continue with nebulizers fluid balance             Cardiac catheterization reviewed maximal medical therapy             Blood pressure controlled-now on the low side             Continue steroids, nebs, O2 and ATB             Viral or bacterial pathogens not identified thus far             Tramadol for headache             Ranexa started per cardiology  Patient is it is on relatively high-dose ACE  Renal function needs to be closely observed this seems to be improving           Cardiac catheterization results reviewed PCI is being considered    Daniel Dove MD  11:03 AM  7/22/2020

## 2020-07-22 NOTE — PLAN OF CARE
7/22/2020- Pt with admitted with A/C HFpEF, COPD exac. I provided him with the following Heart Failure educational material,  which included:      *The Heart Failure book- \"Caring for Your Heart: Living Well with Heart Failure\"      *The Heart Failure Zones       *The Sodium Content pamphlet      *\"What Foods Should You Avoid? \" information sheet. He has a scale at home for daily weights         We reviewed the introduction to Heart Failure, the HF zones, signs and symptoms to report on day 1 of onset, medications, medication compliance, the importance of obtaining daily weights, following a low sodium diet, reading food labels for the sodium content, and keeping physician appointments. He quit smoking in 2013. We discussed writing / tracking his weights on a calendar / paper because 5# in 1 week can sneak up if you are not tracking it. He can also take his charted weights to his doctor appointments to be reviewed. We discussed self-managed care which includes the following:  to take his medications as prescribed, if he experiences any intolerable side effects to any medications to please call his cardiologist / doctor, do not just stop taking the medication; follow a cardiac heart healthy / low sodium diet; weigh himself daily,and exercise regularly- per doctor recommendation. We discussed calling his cardiologist / doctor with a weight gain of 3# in one day or a total of 5# or more in one week. Also, if he would have a significant weight loss of 3# or more in one day to call his doctor, he may have to decrease or hold his diuretic dose. On days he feels nauseated and not eating / drinking, having emesis or diarrhea,  informed to call his cardiologist  / doctor, he may have to decrease or hold his diuretic to avoid dehydration. I stressed the importance of informing his cardiologist the first day of onset of any of the signs and symptoms in the \"Yellow Zone\" of the HF Zones.  He verbalizes understanding. 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 has a past surgical history that includes Coronary angioplasty with stent (10/07/2012); ECHO Compl W Dop Color Flow (10/9/2012); Colonoscopy; Coronary angioplasty; Diagnostic Cardiac Cath Lab Procedure (1998); Diagnostic Cardiac Cath Lab Procedure (1999); Diagnostic Cardiac Cath Lab Procedure (2002); Coronary angioplasty with stent (4/24/13); ECHO Compl W Dop Color Flow (4/25/2013); Coronary angioplasty (03/13/2017); and Cardiac catheterization (07/21/2020).     Medications:    predniSONE  20 mg Oral Daily    isosorbide mononitrate  60 mg Oral Daily    metoprolol succinate  75 mg Oral BID    ipratropium-albuterol  1 ampule Inhalation 4x daily    ranolazine  500 mg Oral BID    sodium chloride flush  10 mL Intravenous 2 times per day    amLODIPine  5 mg Oral BID    aspirin  81 mg Oral Daily    atorvastatin  80 mg Oral Daily    cilostazol  100 mg Oral BID    fluticasone  1 spray Nasal Daily    lisinopril  20 mg Oral BID    montelukast  10 mg Oral Nightly    tamsulosin  0.4 mg Oral Daily    sodium chloride flush  10 mL Intravenous 2 times per day    guaiFENesin-dextromethorphan  10 mL Oral 4x Daily    budesonide  0.5 mg Nebulization BID    Arformoterol Tartrate  15 mcg Nebulization BID      nitroGLYCERIN Stopped (07/17/20 1628)       Code Status:Full Code    The patient is ordered:  Diet: DIET CARDIAC; No Caffeine   Sodium/fluid restriction daily ordered (fluid restriction recommended if patient is hyponatremic and/or diuretic is initiated or increased):  [] Yes     [] No  FR:   Daily Weights:   Patient Vitals for the past 96 hrs (Last 3 readings):   Weight   07/22/20 0542 187 lb (84.8 kg)   07/21/20 0424 185 lb 9.6 oz (84.2 kg)   07/20/20 0646 181 lb (82.1 kg)     I/O:     Intake/Output Summary (Last 24 hours) at 7/22/2020 1401  Last data filed at 7/22/2020 0549  Gross per 24 hour   Intake 1082 ml   Output 950 ml   Net 132 ml          The Heart Failure Booklet was given to the patient, which addresses:  · What is Heart Failure? · Things You Can Do to Live Well with HF  · How to Take Your Medications  · How to Eat Less Salt  · Exercising Well with Heart Failure  · Signs and symptoms of HF to report  · Weight Yourself Each Day  · Home Self Management- activity, weight tracking, taking medications as prescribed, meals /diet planning (sodium and fluid restriction), how to read food labels, keeping physician follow ups, smoking cessation, follow the Heart Failure Zones    Instructed  to call 911 if you have any of the following symptoms:    ·    Struggling to breathe unrelieved with rest,  ·    Having chest pain, confusion or can't think clearly  ·    Have confusion or cant think clearly    Readmission Risk Score: 19        Discharge Plan:  I placed the Heart Failure Home Instructions in his discharge instructions. Per Heart Failure GWTG, the patient should have a follow-up appointment made within 7 days of discharge.     Piper LESTERN, RN, CHFN    Heart Failure Navigator        CONGESTIVE HEART FAILURE (CHF) GUIDELINES  (Must be completed for Primary Diagnosis CHF or History of CHF)    Type of CHF:    [] Acute   [] Chronic     [] Acute on Chronic     Ventricular Function Assessment (check):             [x] HFpEF  [] HFpEF, borderline  [] HFpEF, improved  [] HFrEF  Ejection Fraction (%): Angiotensin-Converting-Enzyme (ACE) inhibitor ordered:  [x] Yes  [] No (specify contraindication):  [] Renal Insufficiency  [] Cough  [] Hypotension  [] Allergy/angioedema  [] No left ventricular systolic dysfunction (LVSD)  [] Hyperkalemia  [] Moderate to severe aortic stenosis  [] Other (Specify):     Angiotensin II receptor blockers (ARB) ordered:  [] Yes  [x] No (specify contraindication):  [] Renal Insufficiency  [] Hypotension  [] Allergy/angioedema  [x] No LVSD  [] Hyperkalemia  [] Moderate to severe aortic stenosis  [] Other (Specify):      ARNI - Angiotensin Receptor Neprilysin Inhibitor ordered:  [] Yes  [x] No      ACC/AHA Guidelines Beta Blocker (Carvedilol, Metoprolol Succinate, or Bisoprolol) ordered:    [x] Yes  [] No (specify contraindication):  [] Bradycardia  [] Hypotension  [] LVD  [] 2nd or 3rd degree heart block  [] Bronchospastic airway disease  [] Decompensated CHF  [] Other (Specify):

## 2020-07-22 NOTE — DISCHARGE SUMMARY
Discharge Summary    Rhoda Goldsmith  :  1950  MRN:  [id number]    Admit date:  2020  Discharge date:  2020 3:31 PM    Admitting Physician:  Lidia Capps DO    Discharge Diagnoses:    Patient Active Problem List    Diagnosis Date Noted    Acute on chronic congestive heart failure ([address]     Chronic obstructive pulmonary disease with acute exacerbation ([address] 2020    Hypertension     Bilateral carotid artery stenosis 2019    Atherosclerosis 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 ([address] 2019    Chest pain 2018    Atrial fibrillation with RVR ([address] 2017    Chest pain in adult 03/10/2017    Carotid bruit 2013    Carotid stenosis, right 2013    PVD (peripheral vascular disease) with claudication ([address] 2013    Subclavian artery stenosis, left ([address] 2013    CAD (coronary artery disease) 2012    Arthritis        Past Medical Hx :   Past Medical History:   Diagnosis Date    (HFpEF) heart failure with preserved ejection fraction ([address] 2020- echo- LVEF 60%    Acute MI (HCC)     Anxiety     Arthritis     Arthritis     hands, back     Atherosclerosis 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 ([address] 2019    Bilateral carotid artery stenosis 2019    CAD (coronary artery disease)     Carotid artery stenosis     Carotid bruit 2013    Carotid stenosis, right 2013    CHF (congestive heart failure) (HCC)     COPD (chronic obstructive pulmonary disease) ([address]     Headache(784.0)     Hyperlipidemia     Hypertension     PVD (peripheral vascular disease) ([address] 2013    PVD (peripheral vascular disease) with claudication ([address] 2013    STEMI (ST elevation myocardial infarction) ([address] 10/7/2012    Subclavian artery stenosis, left ([address] 2013    Traumatic retroperitoneal hematoma 2013       Past Surgical Hx :   Past Surgical History:   Procedure Laterality Date    needed. He was evaluated by Pulmonary Service as well:  Cont with steroids, taper as tolerated, should be able to stop by end of the week  Cont with nebs, observe respiratory status, can switch back to usual medications upon discharge  Watch fluid balance, diurese as needed  Cont with oxygen, taper as tolerated  CAD as per Cardiology  Can be discharged from pulmonary pov      Discharge Medications:    Edi Johnson. Home Medication Instructions [facility]:[id number]    Printed on:07/22/20 1846   Medication Information                      acetaminophen (TYLENOL) 500 MG tablet  Take 1,000 mg by mouth 2 times daily as needed for Pain             amLODIPine (NORVASC) 5 MG tablet  Take 5 mg by mouth 2 times daily             aspirin 81 MG chewable tablet  Take 1 tablet by mouth daily. atorvastatin (LIPITOR) 80 MG tablet  Take 1 tablet by mouth daily             cilostazol (PLETAL) 100 MG tablet  Take 1 tablet by mouth 2 times daily             fluticasone (FLONASE) 50 MCG/ACT nasal spray  1 spray by Nasal route daily              Fluticasone-Umeclidin-Vilant (TRELEGY ELLIPTA) 100-62.5-25 MCG/INH AEPB  Inhale 1 puff into the lungs daily              ipratropium-albuterol (DUONEB) 0.5-2.5 (3) MG/3ML SOLN nebulizer solution  Inhale 3 mLs into the lungs every 6 hours as needed for Shortness of Breath             isosorbide mononitrate (IMDUR) 60 MG extended release tablet  Take 1 tablet by mouth daily             lisinopril (PRINIVIL;ZESTRIL) 20 MG tablet  Take 1 tablet by mouth 2 times daily.              metoprolol succinate (TOPROL XL) 25 MG extended release tablet  Take 3 tablets by mouth 2 times daily             montelukast (SINGULAIR) 10 MG tablet  Take 10 mg by mouth nightly              nitroGLYCERIN (NITROSTAT) 0.4 MG SL tablet  Place 1 tablet under the tongue every 5 minutes as needed for Chest pain             predniSONE (DELTASONE) 10 MG tablet  2 tabs daily x2 days then 1 tab daily x3 days then stop             ranolazine (RANEXA) 500 MG extended release tablet  Take 1 tablet by mouth 2 times daily             tamsulosin (FLOMAX) 0.4 MG capsule  Take 0.4 mg by mouth daily                  Discharge Exam  HEENT:   Head- Normocephalic, atraumatic   Eyes- Conjunctivae pink, anicteric  Throat- Oral mucosa pink and moist  Neck-  No stridor, trachea midline, no jugular venous distention. No carotid bruit  CHEST: Chest symmetrical and non-tender to palpation. No accessory muscle use or intercostal retractions  RESPIRATORY:  Lung sounds - bibasilar rhonchi. CARDIOVASCULAR:     Heart Inspection- shows no noted pulsations  Heart Palpation- no heaves or thrills; PMI is non-displaced   Heart Ausculation- Regular rate and rhythm, no murmur. No s3, s4 or rub   PV: Right radial artery - intact, no hematoma, good radial pulse. No lower extremity edema. No varicosities. Pedal pulses palpable, no clubbing or cyanosis   ABDOMEN: Soft, obese, non-tender to light palpation. Bowel sounds present. No palpable masses no organomegaly; no abdominal bruit  MS: Good muscle strength and tone. No atrophy or abnormal movements. : Deferred  SKIN: Warm and dry no statis dermatitis or ulcers   NEURO / PSYCH: Oriented to person, place and time. Speech clear and appropriate. Follows all commands.  Pleasant affect          Disposition: home      Patient Instructions:   REFER TO AVR or KAYLYN document    Signed:  Carri Mayfield  [facility] [employer]  American Board of Geriatric Medicine  7/22/2020, 3:31 PM

## 2020-07-22 NOTE — CARE COORDINATION
TRAMAINE called [facility] Neville for a portable tank. Faith Lesches reported that Pt stopped making payments and was discharged from services august 201 for non payment. Gladys Ellison would like the, equipment back. TRAMAINE informed Pt that Gladys Ellison would like them equipment back. Pt does not  Recall company name on [facility] that he was trying to get oxygen from. TRAMAINE called [employer] d/t them accepting Pt's insurance. Referral accepted. Once oxygen order is obtained will need faxed to [employer] ( [phone]). TRAMAINE notified Pt that once ordered is received [employer] will deliver portable tank.    Porter Seat, L.S.W.  [phone]

## 2020-07-23 NOTE — CARE COORDINATION
Spoke with patient who stated he felt much better since he had been in the hospital.  Patient had just been up  Walking, eating and bathroom. Ask when was the last time he used the pulse ox. He stated it was 93 this morning. Took it for me it was 89. Ask him to get a reading in the mornings, after activity, again at rest and of course if he feels SOB. Went over covid precautions. He had been tested negative and is following covid restrictions. Patient contacted regarding recent visit for viral symptoms. This Gm Hidalgo contacted the patient by telephone to perform post discharge call. Verified name and  with patient as identifiers. Provided introduction to self, and reason for call due to viral symptoms of infection and/or exposure to COVID-19. Patient presented to emergency department/flu clinic with complaints of viral symptoms/exposure to COVID. Patient reports symptoms are the same. Due to no no new or worsening symptoms the RN CTN/ARCENIO was not notified for escalation. Discussed exposure protocols and quarantine with CDC Guidelines What To Do If You Are Sick    Patient was given an opportunity for questions and concerns. Stay home except to get medical care    Separate yourself from other people and animals in your home    Call ahead before visiting your doctor    Wear a facemask    Cover your coughs and sneezes    Clean your hands often    Avoid sharing personal household items    Clean all high-touch surfaces everyday    Monitor your symptoms  Seek prompt medical attention if your illness is worsening (e.g., difficulty breathing). Before seeking care, call your healthcare provider and tell them that you have, or are being evaluated for, COVID-19. Put on a facemask before you enter the facility. These steps will help the healthcare provider's office to keep other people in the office or waiting room from getting infected or exposed.  Ask your healthcare provider to call the local or [facility] department. Persons who are placed under If you have a medical emergency and need to call 911, notify the dispatch personnel that you have, or are being evaluated for COVID-19. If possible, put on a facemask before emergency medical services arrive. The patient agrees to contact the [facility] exposure line [phone], [facility]: ([phone]) and PCP office for questions related to their healthcare. Author provided contact information for future reference. Refused loop.     Aviva Wyatt, [address]   Care Coordination Transition  stated

## 2020-07-30 NOTE — CARE COORDINATION
Covid-19 sub Outreach call, no answer.   Left VM with contact information and request  for return call at [phone]    Final outreach call      Aden Vale, [address] [facility] Coordination Transition

## 2020-08-10 NOTE — PROGRESS NOTES
[facility]  Grecia Scott. Amari Mayer M.D. Jairon Krishna M.D.  [facility]. Dave Mark M.D. Dada Thomas M.D. Rickey Rodriguez M.D. Yamilet Leone MD                [address]   1950  Mindi Bowser       62-year-old man is seen for outpatient cardiac assessment today. He has a history of chronic ischemic heart disease, COPD and claudication. He was admitted with dyspnea and decompensated CHF 07/16/2020. proBNP 1648. He had recurrent chest pain and a coronary arteriogram 07/21/2020. The LM has a 30% distal stenosis. LAD calcified throughout with to 50% mid vessel stenosis. The circumflex artery 80% tubular stenosis small OM 2. RCA dominant with diffuse in-stent stenosis and 100% distal occlusion. Trial of medications was advised. He states he has not had recurrent symptoms. He continues to wear O2 continuously via nasal cannula      Medical History:  1. Cardiac cath and stents to RCA, 1998.    2. Cardiac cath with balloon angioplasty proximal RCA unsuccessful, 1999.  3. Cardiac cath with PTCA cutting balloon angioplasty to ostium of PDA branch of RCA, 2002. 4. Multiple percutaneous coronary artery interventions (possibly up to four stents RCA), 2007. 5. Cardiac cath, 10/25/2008. RCA treated with 2.75 x 12 mm Vision stent. 6. Hypertension. 7. Hyperlipidemia. 8. GERD. 9. COPD. 10. No history of diabetes mellitus. 11. Allergy to penicillin. 12. Cigarette abuse. 37.5 pack years. Quit 01/20/2013. 13. Echo, 2007. Mild aortic stenosis. 14. Carotid US, 2007. Mild carotid stenosis. 15. Presentation with inferior STEMI. Coronary stenting/PCI, 10/07/2012 (Dr. Maribel Krishna). ION KILEY x 2 to RCA. 16. [facility] admission for \"angina\", 04/2013. Catheterization/PCI to mid RCA and proximal VLV. Post catheterization right groin hematoma/retroperitoneal hematoma with hypotension and tachycardia. NSTEMI secondary to hypotension.      17. Echo, 04/2013. Suboptimal.    18. Coronary angioplasty with stent mid RCA 3.5 x 38 and 2.5 x 15 RPL, 04/23/2013. 19. History of noncompliance with medications. 20. Lexiscan MPS, 08/2014. Large fixed inferior defect. Moderate lateral reversible defect. EF normal.    21. Cardiac catheterization, 09/25/2014. Balloon angioplasty of mid and distal RCA and right PLV. No stents ([facility]). 22. OP evaluation, Dr. Baltazar Rosado, 10/2014. Palpitations. Holter monitor showed frequent PVCs. 23. OP evaluation, Dr. Becki Andrea, 01/14/2015. 24. Echo, 03/31/2015. LVEF normal.  Moderate CLVH. Small pericardial effusion. AV not well demonstrated. Maximal gradient 8.4.    25. Cardiac CTA, [facility], 04/02/2015. Extensive stenting of RCA, cannot assess lumen of vessel. Extensive multifocal plaque and stenosis of LAD and left CX.    26. Lexiscan/exercise MPS 04/23/2015. EF 54%. Fixed inferior and lateral defects. Small reversible defect. No significant WMA. Low-to-intermediate risk. 27. Echo, 05/10/2016. Normal EF. LA dilatation (index 49). Mild calcific aortic stenosis. Peak velocity 2.2. Mean gradient 9. MUKUL by continuity equation 1.5 and by telemetry 1.6 cm² consistent with mild stenosis. 28. Admitted with chest pain March 11, 2017, no acute coronary syndrome, symptoms consistent with unstable angina  29. Catheterization 03/13, 2017 Left main: 0% stenosisLAD: 50 % stenosisCircumflex: 80%stenosisAfter OM1RCA: Dominant. 70 % mid vessel in stent stenosis. 99% ostial stenosis of RCA-PL branch ( in stent ) and 100% stenosis of RCA-PDA  LV angio: 50-55% EF.  30. Lexiscan  MPS, 08/28/2019. EF 47%.  Inferolateral scar.  No reversible defect.  Intermediate risk. 31.  admitted with dyspnea and decompensated CHF 07/16/2020. proBNP 1648. 28. coronary arteriogram 07/16/2020. LM  30% distal stenosis. LAD calcified throughout with to 50% mid vessel stenosis. The circumflex artery 80% tubular stenosis small OM 2.   RCA dominant with diffuse in-stent stenosis and 100% distal occlusion. Review of Systems:  Constitutional: negative for fever and chills  Respiratory: negative for cough and hemoptysis  Cardiovascular:   Gastrointestinal: negative for abdominal pain, diarrhea, nausea and vomiting  Genitourinary:negative for dysuria and hematuria  Derm: negative for rash and skin lesion(s)  Neurological: negative for seizures and tremors  Endocrine: negative for diabetic symptoms including polydipsia and polyuria  Musculoskeletal: negative for CTD  Psychiatric: negative for psychosis and major depression    On examination, he is an alert pleasant elderly  man in no acute distress skin is warm and dry. Respirations are unlabored. /64   Pulse 63   Resp 18   Ht 5' 10\" (1.778 m)   Wt 175 lb 3.2 oz (79.5 kg)   BMI 25.14 kg/m² . HEENT negative for scleral icterus. Extraocular muscles intact. No facial asymmetry or central cyanosis. Neck without masses or goiter. No bruit or JVD. Cardiac apex not displaced. Rhythm regular. Abdomen normal.  Extremities without edema. Lungs are clear    EKG today shows sinus rhythm. Low QRS voltage frontal plane. Nonspecific lateral T abnormality. The patient is symptomatically and functionally stable from a cardiac perspective. Medications are reviewed today and no changes made. He will have cardiac follow-up in 3 months. He is asked to call the office prior to that should there be any change in his symptoms or functional capacity.     At completion of today's visit, medications include the following:    Current Outpatient Medications:     ipratropium-albuterol (DUONEB) 0.5-2.5 (3) MG/3ML SOLN nebulizer solution, Inhale 3 mLs into the lungs every 6 hours as needed for Shortness of Breath, Disp: 360 mL, Rfl: 2    isosorbide mononitrate (IMDUR) 60 MG extended release tablet, Take 1 tablet by mouth daily, Disp: 30 tablet, Rfl: 3    ranolazine (RANEXA) 500 MG extended release tablet, Take 1 tablet by mouth 2 times daily, Disp: 60 tablet, Rfl: 3    metoprolol succinate (TOPROL XL) 25 MG extended release tablet, Take 3 tablets by mouth 2 times daily, Disp: 30 tablet, Rfl: 3    amLODIPine (NORVASC) 5 MG tablet, Take 5 mg by mouth 2 times daily, Disp: , Rfl:     acetaminophen (TYLENOL) 500 MG tablet, Take 1,000 mg by mouth 2 times daily as needed for Pain, Disp: , Rfl:     fluticasone (FLONASE) 50 MCG/ACT nasal spray, 1 spray by Nasal route daily , Disp: , Rfl:     montelukast (SINGULAIR) 10 MG tablet, Take 10 mg by mouth nightly , Disp: , Rfl:     cilostazol (PLETAL) 100 MG tablet, Take 1 tablet by mouth 2 times daily, Disp: 180 tablet, Rfl: 3    nitroGLYCERIN (NITROSTAT) 0.4 MG SL tablet, Place 1 tablet under the tongue every 5 minutes as needed for Chest pain, Disp: 25 tablet, Rfl: 3    atorvastatin (LIPITOR) 80 MG tablet, Take 1 tablet by mouth daily, Disp: 90 tablet, Rfl: 3    Fluticasone-Umeclidin-Vilant (TRELEGY ELLIPTA) 100-62.5-25 MCG/INH AEPB, Inhale 1 puff into the lungs daily , Disp: , Rfl:     tamsulosin (FLOMAX) 0.4 MG capsule, Take 0.4 mg by mouth daily , Disp: , Rfl:     lisinopril (PRINIVIL;ZESTRIL) 20 MG tablet, Take 1 tablet by mouth 2 times daily. , Disp: 180 tablet, Rfl: 8    aspirin 81 MG chewable tablet, Take 1 tablet by mouth daily. , Disp: 30 tablet, Rfl: 3    predniSONE (DELTASONE) 10 MG tablet, 2 tabs daily x2 days then 1 tab daily x3 days then stop (Patient not taking: Reported on 8/11/2020), Disp: 7 tablet, Rfl: 0      Note: This report was completed utilizing computer voice recognition software. Every effort has been made to ensure accuracy, however; inadvertent computerized transcription errors may be present.     --Nallely Hill MD on 8/11/2020 at 10:07 AM

## 2020-11-03 PROBLEM — R07.9 CHEST PAIN: Status: RESOLVED | Noted: 2018-12-19 | Resolved: 2020-01-01

## 2020-11-30 NOTE — ED PROVIDER NOTES
(10/07/2012); ECHO Compl W Dop Color Flow (10/9/2012); Colonoscopy; Coronary angioplasty; Diagnostic Cardiac Cath Lab Procedure (1998); Diagnostic Cardiac Cath Lab Procedure (1999); Diagnostic Cardiac Cath Lab Procedure (2002); Coronary angioplasty with stent (4/24/13); ECHO Compl W Dop Color Flow (4/25/2013); Coronary angioplasty (03/13/2017); and Cardiac catheterization (07/21/2020). Social History:  reports that he quit smoking about 7 years ago. His smoking use included cigarettes. He started smoking about 53 years ago. He has a 45.00 pack-year smoking history. His smokeless tobacco use includes chew. He reports current alcohol use. He reports that he does not use drugs. Family History: family history includes Cancer in his father and paternal uncle; Heart Disease in his father and mother. Allergies: Bee pollen and Penicillins    Physical Exam           ED Triage Vitals [11/30/20 1714]   BP Temp Temp src Pulse Resp SpO2 Height Weight   (!) 182/80 98.1 °F (36.7 °C) -- 79 16 98 % 5' 10\" (1.778 m) 177 lb (80.3 kg)      Oxygen Saturation Interpretation: Abnormal - but at baseline. General Appearance/Constitutional:  Alert, development consistent with age, NAD. HEENT:  NC/NT. PERRLA. Airway patent. Neck:  Normal ROM. Supple. Respiratory: Diminished sounds bilaterally, scattered wheezing  CV:  Regular rate and rhythm, normal heart sounds, without pathological murmurs, ectopy, gallops, or rubs. Chest:  Symmetrical without visible rash or tenderness. GI:  Abdomen Soft, nontender, good bowel sounds. No firm or pulsatile mass. Back:  No costovertebral tenderness. Extremities: No tenderness or edema noted. Lymphatics: no lymphadenopathy noted  Integument:  Normal turgor. Warm, dry, without visible rash, unless noted elsewhere. Neurological:  Oriented. Motor functions intact.    Psychiatric:  Affect normal.    Lab / Imaging Results   (All laboratory and radiology results have been personally reviewed by myself)  Labs:  Results for orders placed or performed during the hospital encounter of 11/30/20   Respiratory Panel, Molecular, with COVID-19 (Restricted: peds pts or suitable admitted adults)    Specimen: Nasopharyngeal   Result Value Ref Range    Adenovirus by PCR Not Detected Not Detected    Bordetella parapertussis by PCR Not Detected Not Detected    Bordetella pertussis by PCR Not Detected Not Detected    Chlamydophilia pneumoniae by PCR Not Detected Not Detected    Coronavirus 229E by PCR Not Detected Not Detected    Coronavirus HKU1 by PCR Not Detected Not Detected    Coronavirus NL63 by PCR Not Detected Not Detected    Coronavirus OC43 by PCR Not Detected Not Detected    SARS-CoV-2, PCR DETECTED (A) Not Detected    Human Metapneumovirus by PCR Not Detected Not Detected    Human Rhinovirus/Enterovirus by PCR Not Detected Not Detected    Influenza A by PCR Not Detected Not Detected    Influenza B by PCR Not Detected Not Detected    Mycoplasma pneumoniae by PCR Not Detected Not Detected    Parainfluenza Virus 1 by PCR Not Detected Not Detected    Parainfluenza Virus 2 by PCR Not Detected Not Detected    Parainfluenza Virus 3 by PCR Not Detected Not Detected    Parainfluenza Virus 4 by PCR Not Detected Not Detected    Respiratory Syncytial Virus by PCR Not Detected Not Detected   CBC Auto Differential   Result Value Ref Range    WBC 6.5 4.5 - 11.5 E9/L    RBC 3.85 3.80 - 5.80 E12/L    Hemoglobin 11.6 (L) 12.5 - 16.5 g/dL    Hematocrit 36.9 (L) 37.0 - 54.0 %    MCV 95.8 80.0 - 99.9 fL    MCH 30.1 26.0 - 35.0 pg    MCHC 31.4 (L) 32.0 - 34.5 %    RDW 13.6 11.5 - 15.0 fL    Platelets 637 995 - 166 E9/L    MPV 10.1 7.0 - 12.0 fL    Neutrophils % 61.6 43.0 - 80.0 %    Immature Granulocytes % 0.3 0.0 - 5.0 %    Lymphocytes % 16.1 (L) 20.0 - 42.0 %    Monocytes % 20.6 (H) 2.0 - 12.0 %    Eosinophils % 1.1 0.0 - 6.0 %    Basophils % 0.3 0.0 - 2.0 %    Neutrophils Absolute 3.97 1.80 - 7.30 E9/L    Immature Granulocytes # 0.02 E9/L    Lymphocytes Absolute 1.04 (L) 1.50 - 4.00 E9/L    Monocytes Absolute 1.33 (H) 0.10 - 0.95 E9/L    Eosinophils Absolute 0.07 0.05 - 0.50 E9/L    Basophils Absolute 0.02 0.00 - 0.20 E9/L   Comprehensive Metabolic Panel   Result Value Ref Range    Sodium 141 132 - 146 mmol/L    Potassium 4.5 3.5 - 5.0 mmol/L    Chloride 102 98 - 107 mmol/L    CO2 27 22 - 29 mmol/L    Anion Gap 12 7 - 16 mmol/L    Glucose 90 74 - 99 mg/dL    BUN 23 8 - 23 mg/dL    CREATININE 1.5 (H) 0.7 - 1.2 mg/dL    GFR Non-African American 46 >=60 mL/min/1.73    GFR African American 56     Calcium 9.1 8.6 - 10.2 mg/dL    Total Protein 7.1 6.4 - 8.3 g/dL    Alb 3.9 3.5 - 5.2 g/dL    Total Bilirubin 0.2 0.0 - 1.2 mg/dL    Alkaline Phosphatase 91 40 - 129 U/L    ALT 30 0 - 40 U/L    AST 35 0 - 39 U/L   Troponin   Result Value Ref Range    Troponin <0.01 0.00 - 0.03 ng/mL   Brain Natriuretic Peptide   Result Value Ref Range    Pro-BNP 1,760 (H) 0 - 125 pg/mL   Lactic Acid, Plasma   Result Value Ref Range    Lactic Acid 0.9 0.5 - 2.2 mmol/L   Urinalysis   Result Value Ref Range    Color, UA Yellow Straw/Yellow    Clarity, UA Clear Clear    Glucose, Ur Negative Negative mg/dL    Bilirubin Urine Negative Negative    Ketones, Urine Negative Negative mg/dL    Specific Gravity, UA 1.010 1.005 - 1.030    Blood, Urine Negative Negative    pH, UA 7.0 5.0 - 9.0    Protein, UA Negative Negative mg/dL    Urobilinogen, Urine 0.2 <2.0 E.U./dL    Nitrite, Urine Negative Negative    Leukocyte Esterase, Urine Negative Negative       Imaging: All Radiology results interpreted by Radiologist unless otherwise noted. XR CHEST PORTABLE   Final Result   Focal atelectasis, left lung base. Mild cardiomegaly. EKG #1:  Interpreted by emergency department physician unless otherwise noted. Time:  17:45    Rate: 65  Rhythm: Junctional.  Interpretation: unchanged from previous tracings.     ED Course / Medical Decision Making     Medications furosemide (LASIX) injection 40 mg (40 mg Intravenous Given 11/30/20 2124)   acetaminophen (TYLENOL) tablet 650 mg (650 mg Oral Given 11/30/20 2124)        Re-Evaluations:  11/30/20       Patients symptoms show no change. Consultations:             None    Procedures:   none    MDM: Patient presents to the ED for evaluation of generalized malaise and chest tightness. He did spike a fever while here in the emergency department. He had some wheezing on physical exam, he does have history of COPD on home oxygen. Cardiac work-up was negative for ischemia. He did have mild elevation of his BNP and was given a dose of Lasix. He was found to be positive for COVID-19 infection. Patient was able to ambulate here in the ED with only mild dyspnea. He is requesting to go home as he states he needs to take care of his wife. He states that if he is going to die from the virus, he would want them to be together. He is refusing admission at this time but knows to come back to the ED if his symptoms worsen. He will be prescribed prednisone for COPD exacerbation. He is advised to follow-up with his family doctor. Counseling:   I have spoken with the patient and discussed todays results, in addition to providing specific details for the plan of care and counseling regarding the diagnosis and prognosis and are agreeable with the plan. Assessment      1. COVID-19 virus infection    2. COPD exacerbation ([address]      This patient's ED course included: a personal history and physicial examination  This patient has remained hemodynamically stable during their ED course. Plan   Discharge to home. Patient condition is stable.     New Medications     New Prescriptions    DEXTROMETHORPHAN-GUAIFENESIN  MG TB12    Take 1 tablet by mouth 2 times daily as needed (Congestion)    PREDNISONE (DELTASONE) 20 MG TABLET    Take 2 tablets by mouth daily for 5 days     Electronically signed by Izaiah Crawford DO   DD: 11/30/20  **This report was transcribed using voice recognition software. Every effort was made to ensure accuracy; however, inadvertent computerized transcription errors may be present.   END OF PROVIDER NOTE        Kyra Pacheco DO  11/30/20 8881

## 2020-12-01 PROBLEM — U07.1 COVID-19: Status: ACTIVE | Noted: 2020-01-01

## 2020-12-01 NOTE — ED PROVIDER NOTES
Department of Emergency Medicine   ED  Provider Note  Admit Date/RoomTime: 12/1/2020 12:18 PM  ED Room: 21/21          History of Present Illness:  12/1/20, Time: 12:35 PM EST  Chief Complaint   Patient presents with    Shortness of Breath     was seen here yesterday for weakness, + covid, 85% on normal 4L NC, L sided CP with exertion, fevers/chills    Abnormal Lab     called for + blood cultures                Emani Uriarte Sr. is a 69 y.o. male presenting to the ED for shortness of breath. Patient states he been short of breath the past couple of days. Became worse yesterday. Worse with exertion, improves with rest, no associated pain. Does wear 4 L nasal cannula chronically for COPD. He was evaluated yesterday, and subsequently discharged. He said he felt fine want to go home. He said he woke up this morning feeling worse. He also was called as he had abnormal blood cultures. Did take Tylenol about 4 hours prior to arrival.  Denies any nausea, vomiting, back pain, change in bowel bladder, neck pain or stiffness, paresthesias, cough, sputum, or any other symptoms or complaints.     Review of Systems:   Pertinent positives and negatives are stated within HPI, all other systems reviewed and are negative.        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 ([address] Bilateral carotid artery stenosis, CAD (coronary artery disease), Carotid artery stenosis, Carotid bruit, Carotid stenosis, right, CHF (congestive heart failure) ([address] COPD (chronic obstructive pulmonary disease) ([address] Headache(784.0), Hyperlipidemia, Hypertension, PVD (peripheral vascular disease) ([address] PVD (peripheral vascular disease) with claudication ([facility] STEMI (ST elevation myocardial infarction) [facility]), Subclavian artery stenosis, left ([address] and Traumatic retroperitoneal hematoma. Past Surgical History:  has a past surgical history that includes Coronary angioplasty with stent (10/07/2012); ECHO Compl W Dop Color Flow (10/9/2012); Colonoscopy; Coronary angioplasty; Diagnostic Cardiac Cath Lab Procedure (1998); Diagnostic Cardiac Cath Lab Procedure (1999); Diagnostic Cardiac Cath Lab Procedure (2002); Coronary angioplasty with stent (4/24/13); ECHO Compl W Dop Color Flow (4/25/2013); Coronary angioplasty (03/13/2017); and Cardiac catheterization (07/21/2020). Social History:  reports that he quit smoking about 7 years ago. His smoking use included cigarettes. He started smoking about 53 years ago. He has a 45.00 pack-year smoking history. His smokeless tobacco use includes chew. He reports current alcohol use. He reports that he does not use drugs. Family History: family history includes Cancer in his father and paternal uncle; Heart Disease in his father and mother. . Unless otherwise noted, family history is non contributory    The patients home medications have been reviewed. Allergies: Bee pollen and Penicillins        ---------------------------------------------------PHYSICAL EXAM--------------------------------------    Constitutional/General: Alert and oriented x3, ill-appearing  Head: Normocephalic and atraumatic  Eyes: PERRL, EOMI, sclera non icteric  Mouth: Oropharynx clear, handling secretions, no trismus, no asymmetry of the posterior oropharynx or uvular edema  Neck: Supple, full ROM, no stridor, no meningeal signs  Respiratory: Diminished diffusely, moderate distress  Cardiovascular:  Regular rate. Regular rhythm. 2+ distal pulses. Equal extremity pulses. Chest: No chest wall tenderness  GI:  Abdomen Soft, Non tender, Non distended. No rebound, guarding, or rigidity. No pulsatile masses. Musculoskeletal: Moves all extremities x 4.  Warm and well perfused, no clubbing, cyanosis, or edema. Capillary refill <3 seconds  Integument: skin warm and dry. No rashes. Neurologic: GCS 15, no focal deficits, symmetric strength 5/5 in the upper and lower extremities bilaterally  Psychiatric: Normal Affect          -------------------------------------------------- RESULTS -------------------------------------------------  I have personally reviewed all laboratory and imaging results for this patient. Results are listed below.      LABS: (Lab results interpreted by me)  Results for orders placed or performed during the hospital encounter of 12/01/20   CBC Auto Differential   Result Value Ref Range    WBC 8.1 4.5 - 11.5 E9/L    RBC 3.83 3.80 - 5.80 E12/L    Hemoglobin 11.7 (L) 12.5 - 16.5 g/dL    Hematocrit 35.9 (L) 37.0 - 54.0 %    MCV 93.7 80.0 - 99.9 fL    MCH 30.5 26.0 - 35.0 pg    MCHC 32.6 32.0 - 34.5 %    RDW 13.5 11.5 - 15.0 fL    Platelets 611 885 - 029 E9/L    MPV 9.9 7.0 - 12.0 fL    Neutrophils % 68.8 43.0 - 80.0 %    Immature Granulocytes % 0.5 0.0 - 5.0 %    Lymphocytes % 13.6 (L) 20.0 - 42.0 %    Monocytes % 16.9 (H) 2.0 - 12.0 %    Eosinophils % 0.1 0.0 - 6.0 %    Basophils % 0.1 0.0 - 2.0 %    Neutrophils Absolute 5.58 1.80 - 7.30 E9/L    Immature Granulocytes # 0.04 E9/L    Lymphocytes Absolute 1.10 (L) 1.50 - 4.00 E9/L    Monocytes Absolute 1.37 (H) 0.10 - 0.95 E9/L    Eosinophils Absolute 0.01 (L) 0.05 - 0.50 E9/L    Basophils Absolute 0.01 0.00 - 0.20 E9/L   Comprehensive Metabolic Panel   Result Value Ref Range    Sodium 136 132 - 146 mmol/L    Potassium 4.0 3.5 - 5.0 mmol/L    Chloride 98 98 - 107 mmol/L    CO2 26 22 - 29 mmol/L    Anion Gap 12 7 - 16 mmol/L    Glucose 117 (H) 74 - 99 mg/dL    BUN 29 (H) 8 - 23 mg/dL    CREATININE 1.4 (H) 0.7 - 1.2 mg/dL    GFR Non-African American 50 >=60 mL/min/1.73    GFR African American >60     Calcium 8.7 8.6 - 10.2 mg/dL    Total Protein 7.0 6.4 - 8.3 g/dL    Alb 3.6 3.5 - 5.2 g/dL    Total Bilirubin 6 mg (6 mg Intravenous Given 12/1/20 1241)   vancomycin 1.5 g in dextrose 5% 300 mL IVPB (1.5 g Intravenous New Bag 12/1/20 1305)   iopamidol (ISOVUE-370) 76 % injection 75 mL (75 mLs Intravenous Given 12/1/20 1364)           The cardiac monitor revealed sinus with a heart rate in the 60s as interpreted by me. The cardiac monitor was ordered secondary to the patient's SOB and to monitor the patient for dysrhythmia. CPT [id number]         Medical Decision Making: On arrival, the patient was 85% on his 4 L nasal cannula. He was in moderate distress. This was increased to 6, he did tolerate this. Did receive Decadron. Labs and imaging reviewed. Reevaluation, patient's resting comfortably. Tolerating his nasal cannula, no need to escalate his airway at this time. Given his findings, discussed with the hospitalist, patient was admitted. Critical Care Time: 33 minutes     Counseling: The emergency provider has spoken with the patient and discussed todays results, in addition to providing specific details for the plan of care and counseling regarding the diagnosis and prognosis. Questions are answered at this time and they are agreeable with the plan.       --------------------------------- IMPRESSION AND DISPOSITION ---------------------------------    IMPRESSION  1. COVID-19 virus detected    2. Acute respiratory failure with hypoxia (HCC)        DISPOSITION  Disposition: Admit to telemetry  Patient condition is stable        NOTE: This report was transcribed using voice recognition software.  Every effort was made to ensure accuracy; however, inadvertent computerized transcription errors may be present        Geovanna Valentino MD  12/01/20 [id number]

## 2020-12-01 NOTE — ED NOTES
Bed: 21  Expected date:   Expected time:   Means of arrival:   Comments:  Terminal clean Winnie Seip, RN  12/01/20 7422

## 2020-12-01 NOTE — H&P
Hospitalist History & Physical      PCP: Travis Cantu DO    Date of Admission: 12/1/2020    Date of Service: Pt seen/examined on 12/1/2020     Chief Complaint:  had concerns including Shortness of Breath (was seen here yesterday for weakness, + covid, 85% on normal 4L NC, L sided CP with exertion, fevers/chills) and Abnormal Lab (called for + blood cultures). History Of Present Illness:    Mr. Chantal Salmon, a 69y.o. year old male with past medical history which includes heart failure with preserved ejection fraction, coronary artery disease, COPD and chronically oxygen dependent on 3 L at baseline who presents emergency department with complaints of shortness of breath worsening over the last several days. He was seen in this ER yesterday for similar complaint. At that time he was noted to have a fever and wheezing on exam.  He was given a prescription for prednisone and discharged home. This morning when he was called about blood cultures that were drawn yesterday and told that they were positive. Otherwise denies any chest pain, headache, dizziness, body aches, abdominal pain, nausea, vomiting, diarrhea. Patient states his wife is also COVID-19 positive and negative infection from her. She works at [employer]. On arrival to the emergency department patient's vital signs were assessed and found to be within normal limits and stable. He is satting 99% on 4 L nasal cannula which is his baseline. Physical exam was unremarkable. Laboratory studies notable for creatinine 1.4, BNP 1754, D-dimer 462. Chest x-ray unremarkable. CTA chest is pending. EKG reveals an incomplete left bundle branch block.     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 ([address] 7/18/2019    Bilateral carotid artery stenosis 7/18/2019    CAD (coronary artery disease)     Carotid artery stenosis     Carotid bruit 6/5/2013    Carotid stenosis, right 6/5/2013    [facility]estive [facility] failure) [facility]     COPD (chronic obstructive pulmonary disease) ([facility])     Headache(784.0)     Hyperlipidemia     Hypertension     PVD (peripheral vascular disease) ([facility] 4/26/2013    PVD (peripheral vascular disease) with claudication ([facility] 6/5/2013    STEMI (ST elevation myocardial infarction) ([facility] 10/7/2012    Subclavian artery stenosis, left ([facility] 4/26/2013    Traumatic retroperitoneal hematoma 4/26/2013       Past Surgical History:        Procedure Laterality Date    CARDIAC CATHETERIZATION  07/21/2020    Dr. Clay Vigil  03/13/2017    2.5/15 [place] balloon to RCA and RPL    CORONARY ANGIOPLASTY WITH STENT PLACEMENT  10/07/2012    Ion KILEY x2 to RCA per Dr. Guicho Olivo  4/24/13    Stent MID RCA 3.5x38, 2.5X15 RPL    DIAGNOSTIC CARDIAC CATH LAB PROCEDURE  1998    [place] stent to the RCA    DIAGNOSTIC CARDIAC CATH LAB PROCEDURE  1999    balloon to prox RCA unsuccessful PTC of the ostium of the posterior descending bracnh of the RCA    DIAGNOSTIC CARDIAC CATH LAB PROCEDURE  2002    cardiac cath with PTCA cutting balloon angioplasty to the ostium of the posterior descending artery branch of the RC    ECHO COMPL W DOP COLOR FLOW  10/9/2012    Mod concentric LVH, EF 55%, stage II diastolic dysfunction    ECHO COMPL W DOP COLOR FLOW  4/25/2013            Medications Prior to Admission:      Prior to Admission medications    Medication Sig Start Date End Date Taking?  Authorizing Provider   predniSONE (DELTASONE) 20 MG tablet Take 2 tablets by mouth daily for 5 days 11/30/20 12/5/20  Maia Pro, DO   Dextromethorphan-guaiFENesin  MG TB12 Take 1 tablet by mouth 2 times daily as needed (Congestion) 11/30/20 12/7/20  [facility], DO   ipratropium-albuterol (DUONEB) 0.5-2.5 (3) MG/3ML SOLN nebulizer solution Inhale 3 mLs into the lungs every 6 hours as needed for Shortness of Breath 7/22/20   Jacquelyn Edwards MD   isosorbide mononitrate (IMDUR) 60 MG extended release tablet Take 1 tablet by mouth daily 7/23/20   Ezra Strong MD   ranolazine (RANEXA) 500 MG extended release tablet Take 1 tablet by mouth 2 times daily 7/22/20   Ezra Strong MD   metoprolol succinate (TOPROL XL) 25 MG extended release tablet Take 3 tablets by mouth 2 times daily 7/22/20   Ezra Strong MD   amLODIPine (NORVASC) 5 MG tablet Take 5 mg by mouth 2 times daily    Historical Provider, MD   acetaminophen (TYLENOL) 500 MG tablet Take 1,000 mg by mouth 2 times daily as needed for Pain    Historical Provider, MD   fluticasone (FLONASE) 50 MCG/ACT nasal spray 1 spray by Nasal route daily  5/13/20   Historical Provider, MD   montelukast (SINGULAIR) 10 MG tablet Take 10 mg by mouth nightly  5/13/20   Historical Provider, MD   cilostazol (PLETAL) 100 MG tablet Take 1 tablet by mouth 2 times daily 1/3/20   Martha Espinoza MD   nitroGLYCERIN (NITROSTAT) 0.4 MG SL tablet Place 1 tablet under the tongue every 5 minutes as needed for Chest pain 8/26/19   Matthew Julian MD   atorvastatin (LIPITOR) 80 MG tablet Take 1 tablet by mouth daily 8/26/19   Matthew Julian MD   Fluticasone-Umeclidin-Vilant (TRELEGY ELLIPTA) 611-41.1-73 MCG/INH AEPB Inhale 1 puff into the lungs daily     Historical Provider, MD   tamsulosin (FLOMAX) 0.4 MG capsule Take 0.4 mg by mouth daily  9/8/15   Historical Provider, MD   lisinopril (PRINIVIL;ZESTRIL) 20 MG tablet Take 1 tablet by mouth 2 times daily. 1/14/15   Junior Morse MD   aspirin 81 MG chewable tablet Take 1 tablet by mouth daily.  9/27/14   Marco Abraham MD       Allergies:  Bee pollen and Penicillins    Social History:    TOBACCO:   reports that he quit smoking about 7 years ago. His smoking use included cigarettes. He started smoking about 53 years ago. He has a 45.00 pack-year smoking history. His smokeless tobacco use includes chew. ETOH:   reports current alcohol use. Family History:    Reviewed in detail and negative for DM, CAD, Cancer, CVA. Positive as follows\"      Problem Relation Age of Onset    Heart Disease Mother          age 50    Heart Disease Father          66's     Cancer Father         kidney    Cancer Paternal Uncle         kidney       REVIEW OF SYSTEMS:   Pertinent positives as noted in the HPI. All other systems reviewed and negative. PHYSICAL EXAM:  BP (!) 137/52   Pulse 55   Temp 98.9 °F (37.2 °C) (Oral)   Resp 16   Ht 5' 10.5\" (1.791 m)   Wt 177 lb (80.3 kg)   SpO2 99%   BMI 25.04 kg/m²   General appearance: No apparent distress, appears stated age and cooperative. HEENT: Normal cephalic, atraumatic without obvious deformity. Pupils equal, round, and reactive to light. Extra ocular muscles intact. Conjunctivae/corneas clear. Neck: Supple, with full range of motion. No jugular venous distention. Trachea midline. Respiratory: Diminished bilaterally  Cardiovascular: Bradycardic  Abdomen: Soft, nontender, nondistended  Musculoskeletal: No clubbing, cyanosis, edema of bilateral lower extremities. Brisk capillary refill. Skin: Normal skin color. No rashes or lesions. Neurologic:  Neurovascularly intact without any focal sensory/motor deficits.  Cranial nerves: II-XII intact, grossly non-focal.    Reviewed EKG and CXR personally      CBC:   Recent Labs     20  1238   WBC 6.5 8.1   RBC 3.85 3.83   HGB 11.6* 11.7*   HCT 36.9* 35.9*   MCV 95.8 93.7   RDW 13.6 13.5    229     BMP:   Recent Labs     20  1238    136   K 4.5 4.0    98   CO2 27 26   BUN 23 29*   CREATININE 1.5* 1.4*     LFT:  Recent Labs     20  1238   PROT 7.1 7.0   ALKPHOS 91 80   ALT 30 22   AST 35 29   BILITOT 0.2 0.3     CE:  Recent Labs     11/30/20  1836 12/01/20  1238   TROPONINI <0.01 <0.01     PT/INR: No results for input(s): INR, APTT in the last 72 hours. BNP: No results for input(s): BNP in the last 72 hours. ESR:   Lab Results   Component Value Date    SEDRATE 8 03/04/2013     CRP: No results found for: CRP  D Dimer:   Lab Results   Component Value Date    DDIMER 462 12/01/2020      Folate and B12: No results found for: [id number], No results found for: FOLATE  Lactic Acid:   Lab Results   Component Value Date    LACTA 1.3 12/01/2020     Thyroid Studies:   Lab Results   Component Value Date    TSH 1.411 04/25/2013       Oupatient labs:  Lab Results   Component Value Date    CHOL 179 03/11/2017    TRIG 97 03/11/2017    HDL 49 03/11/2017    LDLCALC 111 (H) 03/11/2017    TSH 1.411 04/25/2013    INR 1.0 12/19/2018    LABA1C 5.8 04/25/2013       Urinalysis:    Lab Results   Component Value Date    NITRU Negative 11/30/2020    WBCUA 0-1 09/23/2014    BACTERIA RARE 09/23/2014    RBCUA 0-1 09/23/2014    BLOODU Negative 11/30/2020    SPECGRAV 1.010 11/30/2020    GLUCOSEU Negative 11/30/2020       Imaging:  Xr Chest Portable    Result Date: 12/1/2020  EXAMINATION: ONE XRAY VIEW OF THE CHEST 12/1/2020 12:24 pm COMPARISON: 11/30/2020 HISTORY: ORDERING SYSTEM PROVIDED HISTORY: Shortness of breath TECHNOLOGIST PROVIDED HISTORY: Reason for exam:->Shortness of breath What reading provider will be dictating this exam?->CRC FINDINGS: Cardiomediastinal silhouette is stable. There is stable atelectasis or scarring at the left lung base. No acute infiltrate, effusion, or pneumothorax is seen. No acute osseous abnormalities. Stable left lung base atelectasis or scarring.   No acute infiltrates are seen    Xr Chest Portable    Result Date: 11/30/2020  EXAMINATION: ONE XRAY VIEW OF THE CHEST 11/30/2020 7:09 pm COMPARISON: July 18, 2020 HISTORY: ORDERING SYSTEM PROVIDED HISTORY: chest pain TECHNOLOGIST PROVIDED HISTORY: Reason for exam:->chest pain What reading provider will be dictating this exam?->CRC FINDINGS: The heart is mildly enlarged. Focal atelectasis noted at the left lung base. The right lung is clear. The costophrenic angles are clear. Focal atelectasis, left lung base. Mild cardiomegaly. ASSESSMENT:  COVID-19 infection  Acute on chronic hypoxic respiratory failure  COPD exacerbation  Chronically oxygen dependent  Bradycardia  History of coronary artery disease  History of heart failure with preserved ejection fraction  Chronic renal insufficiency stage III      PLAN:  Admit to medicine service  Pharmacy to dose remdesivir  Decadron 6 mg daily  Bronchodilators scheduled and as needed  Hold metoprolol  Monitor renal function, avoid nephrotoxic medications  Continue amlodipine, aspirin, atorvastatin, Pletal, Imdur, lisinopril, Singulair, Ranexa  DVT/PE prophylaxis with Lovenox 40 mg daily      Diet: No diet orders on file  Code Status: Prior  Surrogate decision maker confirmed with patient:   Extended Emergency Contact Information  Primary Emergency Contact: Kalin Gonsalves  Address: [address] [place] [address] Phone: [phone]  Relation: Spouse  Secondary Emergency Contact: Zaria Pierre [address] Phone: [phone]  Relation: Child      DVT Prophylaxis: []Lovenox []Heparin []PCD [] [address] []Encouraged ambulation  Disposition: []Med/Surg [] Intermediate [] ICU/CCU  Admit status: [] Observation [] Inpatient     NOTE: This report was transcribed using voice recognition software. Every effort was made to ensure accuracy; however, inadvertent computerized transcription errors may be present.

## 2020-12-01 NOTE — ED NOTES
Blood cultures obtained from left FA, per policy, set two of two drawn at this time.             Dania Hargrove RN  12/01/20 [id number]

## 2020-12-01 NOTE — ED NOTES
Positive blood culture reported to DR. Lyn Suero. Patient called by this RN and states he is \"still not feeling well\". Informed he should return for further treatment.  Patient states \" I am going to try my PCP first\"     Kevan Martin RN  12/01/20 [id number]

## 2020-12-02 NOTE — PROGRESS NOTES
Pharmacy Consultation Note  (Antibiotic Dosing and Monitoring)    Initial consult date: 20  Consulting physician: Dr. Mary Alvarez  Drug: Vancomycin  Indication:     Age/  Gender Height Weight IBW Dosing weight  Allergy Information   70 y.o./male 5' 10.5\" (179.1 cm) 177 lb (80.3 kg)     Ideal body weight: 74.1 kg (163 lb 7.5 oz)  Adjusted ideal body weight: 76.6 kg (168 lb 14.1 oz)  80 kg  Bee pollen and Penicillins      Other anti-infectives Start date Stop date   Cefepime 2 grams IV BID                 Temp (24hrs), Av.5 °F (36.9 °C), Min:97.6 °F (36.4 °C), Max:98.9 °F (37.2 °C)          Date  WBC BUN SCr CrCl  (mL/min) Drug/Dose Time   Given Level(s)   (Time) Comments    5.3 44 2 36 Vancomycin 1500 mg IV x1 1300                                        No intake or output data in the 24 hours ending 20 1129    Cultures:    Site Date Result   Blood 1  Staphylococcus   Blood 2  Gram + cocci          Assessment:  · Pharmacy consulted to dose vancomycin for this 69 y.o. male for pneumonia COVID-19 .   · Goal trough level = 15-20 mcg/mL    Plan:  · SCr 2 (up from 1.5 on ). · Will need to order random vancomycin level tomorrow. · Pharmacist will follow and monitor/adjust dosing as necessary.      Елена Shah [facility] 2020 11:29 AM

## 2020-12-02 NOTE — PROGRESS NOTES
Hospitalist Progress Note      SYNOPSIS: Patient admitted on 2020   Patient came to the ER been notified that one of his blood cultures did come back positive. Was found to be COVID-19 positive. SUBJECTIVE:    Patient seen and examined  Records reviewed. 2 of 2 cultures have come back positive      Temp (24hrs), Av.5 °F (36.9 °C), Min:97.6 °F (36.4 °C), Max:98.9 °F (37.2 °C)    DIET: DIET GENERAL;  CODE: Full Code  No intake or output data in the 24 hours ending 20 1106    OBJECTIVE:    BP (!) 126/58   Pulse 73   Temp 98.7 °F (37.1 °C) (Oral)   Resp 20   Ht 5' 10.5\" (1.791 m)   Wt 177 lb (80.3 kg)   SpO2 93%   BMI 25.04 kg/m²     General appearance: No apparent distress, appears stated age and cooperative. HEENT:  Conjunctivae/corneas clear. Neck: Supple. No jugular venous distention. Respiratory: Diminished bilaterally  Cardiovascular: Regular rate rhythm, normal S1-S2  Abdomen: Soft, nontender, nondistended  Musculoskeletal: No clubbing, cyanosis, no bilateral lower extremity edema. Brisk capillary refill.    Skin:  No rashes  on visible skin  Neurologic: awake, alert and following commands     ASSESSMENT:  COVID-19 pneumonia  Acute hypoxic respiratory failure  COPD exacerbation  Chronically oxygen dependent  Coronary artery disease  History of heart failure with preserved ejection fraction  Chronic renal insufficiency stage III       PLAN:  Consult infectious disease  Consult pulmonology  Start vancomycin dosed per pharmacy  Cefepime 2 g twice daily  Decadron 6 mg daily  Continue remdesivir  Vitamin C, D and zinc  Follow cultures  Continue to hold metoprolol for now  Monitor renal function  DVT/PE prophylaxis with Lovenox 30 mg twice daily         Medications:  REVIEWED DAILY    Infusion Medications   Scheduled Medications    cefepime  2 g Intravenous Q12H    amLODIPine  5 mg Oral BID    aspirin  81 mg Oral Daily    atorvastatin  80 mg Oral Daily    cilostazol  100 mg Oral BID    fluticasone  1 spray Nasal Daily    isosorbide mononitrate  60 mg Oral Daily    lisinopril  20 mg Oral BID    montelukast  10 mg Oral Nightly    ranolazine  500 mg Oral BID    tamsulosin  0.4 mg Oral Daily    sodium chloride flush  10 mL Intravenous 2 times per day    enoxaparin  40 mg Subcutaneous Daily    dexamethasone  6 mg Oral Daily    remdesivir IVPB  100 mg Intravenous Q24H    budesonide-formoterol  2 puff Inhalation BID    And    tiotropium  2 puff Inhalation Daily     PRN Meds: dextromethorphan-guaiFENesin, ipratropium-albuterol, sodium chloride flush, acetaminophen **OR** acetaminophen, polyethylene glycol, promethazine **OR** ondansetron, potassium chloride **OR** potassium alternative oral replacement **OR** potassium chloride, magnesium sulfate, sodium chloride    Labs:     Recent Labs     11/30/20  1836 12/01/20  1238 12/02/20  0653   WBC 6.5 8.1 5.3   HGB 11.6* 11.7* 10.0*   HCT 36.9* 35.9* 30.8*    229 191       Recent Labs     11/30/20  1836 12/01/20  1238 12/02/20  0653    136 134   K 4.5 4.0 4.2  4.2    98 99   CO2 27 26 22   BUN 23 29* 44*   CREATININE 1.5* 1.4* 2.0*   CALCIUM 9.1 8.7 8.4*       Recent Labs     11/30/20  1836 12/01/20  1238 12/02/20  0653   PROT 7.1 7.0 6.3*   ALKPHOS 91 80 63   ALT 30 22 19   AST 35 29 24   BILITOT 0.2 0.3 <0.2       No results for input(s): INR in the last 72 hours.     Recent Labs     11/30/20  1836 12/01/20  1238   TROPONINI <0.01 <0.01       Chronic labs:    Lab Results   Component Value Date    CHOL 179 03/11/2017    TRIG 97 03/11/2017    HDL 49 03/11/2017    LDLCALC 111 (H) 03/11/2017    TSH 1.411 04/25/2013    INR 1.0 12/19/2018    LABA1C 5.8 04/25/2013       Radiology: REVIEWED DAILY    +++++++++++++++++++++++++++++++++++++++++++++++++  [address], [place]  +++++++++++++++++++++++++++++++++++++++++++++++++  NOTE: This report was transcribed using voice recognition software. Every effort was made to ensure accuracy; however, inadvertent computerized transcription errors may be present.

## 2020-12-02 NOTE — CONSULTS
[facility]  [address], [address]  [address]    Pulmonary Consultation      Reason for Consult:  sob    Requesting Physician:  Brandyn Alvarez DO    CHIEF COMPLAINT:  sob    History Obtained From:  patient    HISTORY OF PRESENT ILLNESS:                The patient is a 69 y.o. male with significant past medical history of COPD oxygen dependent who presents with increased sob. Started getting worse with breathing and coughing for the past week, wife had testing done for COVID then but didn't find out what results were. Pt continued to get worse and was seen in ER on 11/30, tested (+) for COVID but went/sent home with prednisone. Came back as blood cultures were (+) but when asked about his respiratory function, felt that was actually getting better. Currently on 4 li which is what he was on when discharged from hospital in July but (?) dropped down to 3 li past 1-2 months, close to baseline with breathing and with minimal cough/sputum production. Was walking to bathroom and back when I saw him and said he was ok with breathing.     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 ([address] 7/18/2019    Bilateral carotid artery stenosis 7/18/2019    CAD (coronary artery disease)     Carotid artery stenosis     Carotid bruit 6/5/2013    Carotid stenosis, right 6/5/2013    CHF (congestive heart failure) ([address]     COPD (chronic obstructive pulmonary disease) ([address]     Headache(784.0)     Hyperlipidemia     Hypertension     PVD (peripheral vascular disease) ([address] 4/26/2013    PVD (peripheral vascular disease) with claudication ([address] 6/5/2013    STEMI (ST elevation myocardial infarction) ([address] 10/7/2012    Subclavian artery stenosis, left (HCC) 4/26/2013    Traumatic retroperitoneal hematoma 4/26/2013       Past Surgical History:        Procedure Laterality Date    CARDIAC CATHETERIZATION  07/21/2020    Dr. Ray Babcock  03/13/2017    2.5/15 [place] balloon to RCA and RPL    CORONARY ANGIOPLASTY WITH STENT PLACEMENT  10/07/2012    Ion KILEY x2 to RCA per Dr. Ann Alexander  4/24/13    Stent MID RCA 3.5x38, 2.5X15 RPL    DIAGNOSTIC CARDIAC CATH LAB PROCEDURE  1998    [place] stent to the RCA    DIAGNOSTIC CARDIAC CATH LAB PROCEDURE  1999    balloon to prox RCA unsuccessful PTC of the ostium of the posterior descending bracnh of the RCA    DIAGNOSTIC CARDIAC CATH LAB PROCEDURE  2002    cardiac cath with PTCA cutting balloon angioplasty to the ostium of the posterior descending artery branch of the RC    ECHO COMPL W DOP COLOR FLOW  10/9/2012    Mod concentric LVH, EF 55%, stage II diastolic dysfunction    ECHO COMPL W DOP COLOR FLOW  4/25/2013            Current Medications:    Current Facility-Administered Medications: cefepime (MAXIPIME) 2 g IVPB extended (mini-bag), 2 g, Intravenous, Q12H  enoxaparin (LOVENOX) injection 30 mg, 30 mg, Subcutaneous, Q12H  zinc sulfate (ZINCATE) capsule 50 mg, 50 mg, Oral, Daily  vitamin C (ASCORBIC ACID) tablet 500 mg, 500 mg, Oral, Daily  Vitamin D (CHOLECALCIFEROL) tablet 1,000 Units, 1,000 Units, Oral, Daily  amLODIPine (NORVASC) tablet 5 mg, 5 mg, Oral, BID  aspirin chewable tablet 81 mg, 81 mg, Oral, Daily  atorvastatin (LIPITOR) tablet 80 mg, 80 mg, Oral, Daily  cilostazol (PLETAL) tablet 100 mg, 100 mg, Oral, BID  dextromethorphan-guaiFENesin 1 tablet, 1 tablet, Oral, TID PRN  fluticasone (FLONASE) 50 MCG/ACT nasal spray 1 spray, 1 spray, Nasal, Daily  ipratropium-albuterol (DUONEB) nebulizer solution 3 mL, 3 mL, Inhalation, Q6H PRN  isosorbide mononitrate (IMDUR) extended release tablet 60 mg, 60 mg, Oral, Daily  lisinopril (PRINIVIL;ZESTRIL) tablet 20 mg, 20 mg, Oral, BID  montelukast (SINGULAIR) tablet 10 mg, 10 mg, Oral, Nightly  ranolazine (RANEXA) extended release tablet 500 mg, 500 mg, Oral, BID  tamsulosin (FLOMAX) capsule 0.4 mg, 0.4 mg, Oral, Daily  sodium chloride flush 0.9 % injection 10 mL, 10 mL, Intravenous, 2 times per day  sodium chloride flush 0.9 % injection 10 mL, 10 mL, Intravenous, PRN  acetaminophen (TYLENOL) tablet 650 mg, 650 mg, Oral, Q6H PRN **OR** acetaminophen (TYLENOL) suppository 650 mg, 650 mg, Rectal, Q6H PRN  polyethylene glycol (GLYCOLAX) packet 17 g, 17 g, Oral, Daily PRN  promethazine (PHENERGAN) tablet 12.5 mg, 12.5 mg, Oral, Q6H PRN **OR** ondansetron (ZOFRAN) injection 4 mg, 4 mg, Intravenous, Q6H PRN  dexamethasone (DECADRON) tablet 6 mg, 6 mg, Oral, Daily  potassium chloride (KLOR-CON M) extended release tablet 40 mEq, 40 mEq, Oral, PRN **OR** potassium bicarb-citric acid (EFFER-K) effervescent tablet 40 mEq, 40 mEq, Oral, PRN **OR** potassium chloride 10 mEq/100 mL IVPB (Peripheral Line), 10 mEq, Intravenous, PRN  magnesium sulfate 2 g in 50 mL IVPB premix, 2 g, Intravenous, PRN  [COMPLETED] remdesivir 200 mg in sodium chloride 0.9 % 250 mL IVPB, 200 mg, Intravenous, Once **FOLLOWED BY** remdesivir 100 mg in sodium chloride 0.9 % 250 mL IVPB, 100 mg, Intravenous, Q24H  0.9 % sodium chloride bolus, 30 mL, Intravenous, PRN  budesonide-formoterol (SYMBICORT) 160-4.5 MCG/ACT inhaler 2 puff, 2 puff, Inhalation, BID **AND** tiotropium (SPIRIVA RESPIMAT) 2.5 MCG/ACT inhaler 2 puff, 2 puff, Inhalation, Daily    Allergies:  Bee pollen and Penicillins    Social History:    TOBACCO:   reports that he quit smoking about 7 years ago. His smoking use included cigarettes. He started smoking about 53 years ago. He has a 45.00 pack-year smoking history. His smokeless tobacco use includes chew.     Family History:       Problem Relation Age of Onset  Heart Disease Mother          age 54    Heart Disease Father          66's     Cancer Father         kidney    Cancer Paternal Uncle         kidney       REVIEW OF SYSTEMS:    RESPIRATORY:  Sob and cough  Remainder of complete ROS is negative. PHYSICAL EXAM:      Vitals:    BP (!) 126/58   Pulse 73   Temp 98.7 °F (37.1 °C) (Oral)   Resp 20   Ht 5' 10.5\" (1.791 m)   Wt 177 lb (80.3 kg)   SpO2 93%   BMI 25.04 kg/m²     EYES:  Lids and lashes normal, pupils equal, round and reactive to light, extra ocular muscles intact, sclera clear, conjunctiva normal  ENT:  Normocephalic, without obvious abnormality, atraumatic, sinuses nontender on palpation, external ears without lesions, oral pharynx with moist mucus membranes, tonsils without erythema or exudates, gums normal and good dentition. NECK:  Supple, symmetrical, trachea midline, no adenopathy, thyroid symmetric, not enlarged and no tenderness, skin normal  LUNGS:  Minimal bilateral ronchi with cough  CARDIOVASCULAR:  Normal apical impulse, regular rate and rhythm, normal S1 and S2, no S3 or S4, and no murmur noted  ABDOMEN:  No scars, normal bowel sounds, soft, non-distended, non-tender, no masses palpated, no hepatosplenomegally  MUSCULOSKELETAL:  There is no redness, warmth, or swelling of the joints. Full range of motion noted. NEUROLOGIC:  Awake, alert, oriented to name, place and time. Cranial nerves II-XII are grossly intact. DATA:    CBC:   Recent Labs     20  1836 20  1238 20  0653   WBC 6.5 8.1 5.3   HGB 11.6* 11.7* 10.0*   HCT 36.9* 35.9* 30.8*   MCV 95.8 93.7 93.6    229 191       BMP:  Recent Labs     20  1836 20  1238 20  0653    136 134   K 4.5 4.0 4.2  4.2    98 99   CO2 27 26 22   BUN 23 29* 44*   CREATININE 1.5* 1.4* 2.0*    ALB:3,BILIDIR:3,BILITOT:3,ALKPHOS:3)@    PT/INR: No results for input(s): PROTIME, INR in the last 72 hours.     ABG:   No results for input(s): PH, PO2, PCO2, HCO3, BE, O2SAT, METHB, O2HB, COHB, O2CON, HHB, THB in the last 72 hours. Radiology Review:  CTA chest reviewed with (-) PE, subsegmental atelectasis with tiny effusions bilateral lower lobes, bilateral emphysema upper lobes, bilateral small areas of GG haziness both middle lobes above fissures    IMPRESSION/RECOMMENDATIONS:      COVID  COPD  Hypoxia    1. Cont with remdesivir and decadron  2. Cont with mdi for now, observe respiratory function  3. Cont with oxygen, at baseline, observe saturations  4. Incentive spirometer to see if helps with cough and respiratory function  5. OOB to chair, ambulate with assistance      Time at the bedside, reviewing labs and radiographs, reviewing notes and consultations, discussing with staff and family was more than 50 minutes. Thanks for letting us see this patient in consultation. Please contact us with any questions. Office [phone] or after hours through [employer], x [phone].

## 2020-12-02 NOTE — CARE COORDINATION
Pt admitted for SOB, COVID+. Pt currently on 4LO2 at 93%, he uses home O2 through [address] at home with 4LO2. Called pt in room due to COVID+ he states he lives with his wife in a one story home with 2 steps to get into home. He states he hasn't really left his house since 1/2020. Pt's spouse is also COVID+. Pt has all DME if needed (it was his mother-n-laws)  But does not use any DME other than O2. Dr. Chrissy Moreno is PCP and goes to [facility] on [address]. Per pt spouse to transport pt home. Discharge plan is home at discharge, pt has portable O2 tanks in room, and spouse to transport home. Puma CHERRY

## 2020-12-02 NOTE — CONSULTS
Arthritis     hands, back     Atherosclerosis 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 ([address] 7/18/2019    Bilateral carotid artery stenosis 7/18/2019    CAD (coronary artery disease)     Carotid artery stenosis     Carotid bruit 6/5/2013    Carotid stenosis, right 6/5/2013    [facility]     COPD (chronic obstructive pulmonary disease) [facility]     Headache(784.0)     Hyperlipidemia     Hypertension     PVD (peripheral vascular disease) ([address] 4/26/2013    PVD (peripheral vascular disease) with claudication ([address] 6/5/2013    STEMI (ST elevation myocardial infarction) ([address] 10/7/2012    Subclavian artery stenosis, left ([address] 4/26/2013    Traumatic retroperitoneal hematoma 4/26/2013     Past Surgical History:        Procedure Laterality Date    CARDIAC CATHETERIZATION  07/21/2020    Dr. Lynda Perez  03/13/2017    2.5/15 [place] balloon to RCA and RPL    CORONARY ANGIOPLASTY WITH STENT PLACEMENT  10/07/2012    Ion KILEY x2 to RCA per Dr. Jacque Messina  4/24/13    Stent MID RCA 3.5x38, 2.5X15 RPL    DIAGNOSTIC CARDIAC CATH LAB PROCEDURE  1998    [place] stent to the RCA    DIAGNOSTIC CARDIAC CATH LAB PROCEDURE  1999    balloon to prox RCA unsuccessful PTC of the ostium of the posterior descending bracnh of the RCA    DIAGNOSTIC CARDIAC CATH LAB PROCEDURE  2002    cardiac cath with PTCA cutting balloon angioplasty to the ostium of the posterior descending artery branch of the RC    ECHO COMPL W DOP COLOR FLOW  10/9/2012    Mod concentric LVH, EF 55%, stage II diastolic dysfunction    ECHO COMPL W DOP COLOR FLOW  4/25/2013          Current Medications:   Scheduled Meds:   cefepime  2 g Intravenous Q12H    enoxaparin  30 mg Subcutaneous Q12H    zinc sulfate  50 mg Oral Daily    vitamin C  500 mg Oral Daily    Vitamin D  1,000 Units Oral Daily    amLODIPine  5 mg Oral BID  aspirin  81 mg Oral Daily    atorvastatin  80 mg Oral Daily    cilostazol  100 mg Oral BID    fluticasone  1 spray Nasal Daily    isosorbide mononitrate  60 mg Oral Daily    lisinopril  20 mg Oral BID    montelukast  10 mg Oral Nightly    ranolazine  500 mg Oral BID    tamsulosin  0.4 mg Oral Daily    sodium chloride flush  10 mL Intravenous 2 times per day    dexamethasone  6 mg Oral Daily    remdesivir IVPB  100 mg Intravenous Q24H    budesonide-formoterol  2 puff Inhalation BID    And    tiotropium  2 puff Inhalation Daily     Continuous Infusions:  PRN Meds:dextromethorphan-guaiFENesin, ipratropium-albuterol, sodium chloride flush, acetaminophen **OR** acetaminophen, polyethylene glycol, promethazine **OR** ondansetron, potassium chloride **OR** potassium alternative oral replacement **OR** potassium chloride, magnesium sulfate, sodium chloride    Allergies:  Bee pollen and Penicillins    Social History:   Social History     Socioeconomic History    Marital status:      Spouse name: None    Number of children: None    Years of education: None    Highest education level: None   Occupational History    None   Social Needs    Financial resource strain: None    Food insecurity     Worry: None     Inability: None    Transportation needs     Medical: None     Non-medical: None   Tobacco Use    Smoking status: Former Smoker     Packs/day: 1.50     Years: 30.00     Pack years: 45.00     Types: Cigarettes     Start date: 1967     Last attempt to quit: 2013     Years since quittin.9    Smokeless tobacco: Current User     Types: Chew   Substance and Sexual Activity    Alcohol use: Yes     Comment: occ.  1 cup half and half coffee per day    Drug use: No    Sexual activity: None   Lifestyle    Physical activity     Days per week: None     Minutes per session: None    Stress: None   Relationships    Social connections     Talks on phone: None     Gets together: None Attends Restorationism service: None     Active member of club or organization: None     Attends meetings of clubs or organizations: None     Relationship status: None    Intimate partner violence     Fear of current or ex partner: None     Emotionally abused: None     Physically abused: None     Forced sexual activity: None   Other Topics Concern    None   Social History Narrative    None     Tobacco: No; formed  Alcohol: No  Pets: 2 dogs, feeds the deer and squirrels  Travel: No    Family History:       Problem Relation Age of Onset    Heart Disease Mother          age 54    Heart Disease Father          66's     Cancer Father         kidney    Cancer Paternal Uncle         kidney   . Otherwise non-pertinent to the chief complaint. REVIEW OF SYSTEMS:    CONSTITUTIONAL: Positive chills, positive fevers positive night sweats. No loss of weight. EYES:  No double vision or drainage from eyes, ears or throat. HEENT:  No neck stiffness. No dysphagia. No drainage from eyes, ears or throat  RESPIRATORY: Positive cough, productive sputum or hemoptysis. CARDIOVASCULAR:  No chest pain, palpitations, positive orthopnea positive dyspnea on exertion. GASTROINTESTINAL:  No nausea, vomiting, diarrhea or constipation or hematochezia   GENITOURINARY:  No frequency burning dysuria or hematuria. INTEGUMENT/BREAST:  No rash or breast masses. HEMATOLOGIC/LYMPHATIC:  No lymphadenopathy or blood dyscrasics. ALLERGIC/IMMUNOLOGIC:  No anaphylaxis. ENDOCRINE:  No polyuria or polydipsia or temperature intolerance. MUSCULOSKELETAL: Positive myalgia or arthralgia. Full ROM. NEUROLOGICAL:  No focal motor sensory deficit. BEHAVIOR/PSYCH:  No psychosis. PHYSICAL EXAM:    Vitals:    BP (!) 126/58   Pulse 73   Temp 98.7 °F (37.1 °C) (Oral)   Resp 20   Ht 5' 10.5\" (1.791 m)   Wt 177 lb (80.3 kg)   SpO2 93%   BMI 25.04 kg/m²   Constitutional: The patient is awake, alert, and oriented.    Skin: Warm and LEUKOCYTESUR Negative 11/30/2020    UROBILINOGEN 0.2 11/30/2020    BILIRUBINUR Negative 11/30/2020    BLOODU Negative 11/30/2020    GLUCOSEU Negative 11/30/2020       No results found for: [place], BEART, W4OOUOLM, PHART, THGBART, YVX0SXQ, PO2ART, TMF7NTS  Radiology:  CTA PULMONARY W CONTRAST   Final Result   1. No pulmonary embolism. 2.  Emphysematous changes. 3.  Peribronchial inflammatory changes with distal areas of atelectasis   notable in lower lobes. XR CHEST PORTABLE   Final Result   Stable left lung base atelectasis or scarring. No acute infiltrates are seen      XR CHEST PORTABLE    (Results Pending)       Microbiology:  Pending  Recent Labs     11/30/20 1836   BC Gram stain performed from blood culture bottle media  Gram positive cocci in clusters  *  Identification and sensitivity to follow     Recent Labs     11/30/20 1836 11/30/20 1840   ORG Staphylococcus species* Gram positive cocci*     Recent Labs     11/30/20 1840   BLOODCULT2 Gram stain performed from blood culture bottle media  Gram positive cocci in chains  *  Identification and sensitivity to follow     No results for input(s): STREPNEUMAGU in the last 72 hours. No results for input(s): LP1UAG in the last 72 hours. No results for input(s): ASO in the last 72 hours. No results for input(s): CULTRESP in the last 72 hours. Assessment:  · COVID-19 viremia in an immunocompromised host having COPD and on home O2  · Coag negative staph bacteremia is contaminant    Plan:    · Cont off antibiotics  · Remdesivir until his O2 requirements are similar to that at home; currently has no significant pulmonary involvement at this time  · Check cultures  · Baseline ESR, CRP  · Monitor labs  · Will follow with you    Thank you for having us see this patient in consultation. I will be discussing this case with the treating physicians.       Electronically signed by Caridad Calderon MD on 12/2/2020 at 12:45 PM

## 2020-12-03 NOTE — PROGRESS NOTES
Pharmacy Consultation Note  (Antibiotic Dosing and Monitoring)    Initial consult date: 12-3-20  Consulting physician: Karis Moore  Drug: Vancomycin  Indication:     Age/  Gender Height Weight IBW Dosing weight  Allergy Information   70 y.o./male @FLOW(11:first:1)@ @FLOW[14:FIRST:1@     Ideal body weight: 74.1 kg (163 lb 7.5 oz)  Adjusted ideal body weight: 76.6 kg (168 lb 14.1 oz)  80.3kg  Bee pollen and Penicillins      @TMAX(24)@        Date  WBC BUN SCr CrCl  (mL/min) Drug/Dose Time   Given Level(s)   (Time) Comments        Vancomycin  mg IV                                            No intake or output data in the 24 hours ending 12/03/20 0032    Historical Cultures:  Organism   Date Value Ref Range Status   11/30/2020 Gram positive cocci (A)  Preliminary     Recent Labs     12/01/20  1257   BC 24 Hours no growth       Cultures:  available culture and sensitivity results were reviewed in EPIC    Assessment:  69 y.o. male has been initiated Vancomycin. Estimated CrCl = 36 mL/min  Goal trough level = 15-20 mcg/mL    Plan:   Will initiate vancomycin at a dose of 1500mg ONCE  Monitor renal function   Clinical pharmacy to follow      Erin Bo [facility] 12/3/2020 12:32 AM

## 2020-12-03 NOTE — CONSULTS
Nephrology Consult  [facility]    CC:   Fever and weakness    HPI:   Tess Angel is a 69 y.o. old male who presents to the emergency department for evaluation of fever and generalized malaise. He has a PMH of HTN, HLD, HFpEF, COPD, CAD, MI and PVD. Review of labs show a baseline Cr 0.8-1.1mg/dl. His Cr has been worsening each day and today it is 2.2, hence the renal consult. He is COVOD-19+. Per nursing he is eating and drinking. He is on 4L NC, which is what he wears chronically at home. He was taking lisinopril before admission and presently, I have placed it on hold.      PMH:    Past Medical History:   Diagnosis Date    (HFpEF) heart failure with preserved ejection fraction ([address] 07/22/2020 7/18/2020- echo- LVEF 60%    Acute MI (HCC)     Anxiety     Arthritis     Arthritis     hands, back     Atherosclerosis 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 ([address] 7/18/2019    Bilateral carotid artery stenosis 7/18/2019    CAD (coronary artery disease)     Carotid artery stenosis     Carotid bruit 6/5/2013    Carotid stenosis, right 6/5/2013    CHF (congestive heart failure) (HCC)     COPD (chronic obstructive pulmonary disease) ([address]     Headache(784.0)     Hyperlipidemia     Hypertension     PVD (peripheral vascular disease) ([address] 4/26/2013    PVD (peripheral vascular disease) with claudication ([address] 6/5/2013    STEMI (ST elevation myocardial infarction) ([address] 10/7/2012    Subclavian artery stenosis, left ([address] 4/26/2013    Traumatic retroperitoneal hematoma 4/26/2013       Patient Active Problem List   Diagnosis    Arthritis    CAD (coronary artery disease)    Subclavian artery stenosis, left (HCC)    Carotid bruit    Carotid stenosis, right    PVD (peripheral vascular disease) with claudication ([address]    Chest pain in adult    Atrial fibrillation with RVR ([address]    Bilateral carotid artery stenosis    Atherosclerosis 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

## 2020-12-03 NOTE — PROGRESS NOTES
Hospitalist Progress Note      SYNOPSIS: Patient admitted on 2020   Patient came to the ER been notified that one of his blood cultures did come back positive. Was found to be COVID-19 positive. SUBJECTIVE:    Patient seen and examined  Records reviewed. Creatinine increased 2.2  Remains on 4 liters NC      Temp (24hrs), Av.8 °F (36.6 °C), Min:97.6 °F (36.4 °C), Max:98 °F (36.7 °C)    DIET: DIET GENERAL;  CODE: Full Code    Intake/Output Summary (Last 24 hours) at 12/3/2020 1018  Last data filed at 12/3/2020 0524  Gross per 24 hour   Intake 120 ml   Output 0 ml   Net 120 ml       OBJECTIVE:    /65   Pulse 65   Temp 97.7 °F (36.5 °C) (Temporal)   Resp 18   Ht 5' 10.5\" (1.791 m)   Wt 177 lb (80.3 kg)   SpO2 95%   BMI 25.04 kg/m²     General appearance: No apparent distress, appears stated age and cooperative. HEENT:  Conjunctivae/corneas clear. Neck: Supple. No jugular venous distention. Respiratory: Diminished bilaterally  Cardiovascular: Regular rate rhythm, normal S1-S2  Abdomen: Soft, nontender, nondistended  Musculoskeletal: No clubbing, cyanosis, no bilateral lower extremity edema. Brisk capillary refill.    Skin:  No rashes  on visible skin  Neurologic: awake, alert and following commands     ASSESSMENT:  COVID-19 pneumonia  Acute hypoxic respiratory failure  COPD exacerbation  Chronically oxygen dependent  Coronary artery disease  History of heart failure with preserved ejection fraction  Chronic renal insufficiency stage III       PLAN:  Infectious disease following  Pulmonology following  Abx stopped  Decadron 6 mg daily  Continue remdesivir  Vitamin C, D and zinc  Follow cultures  Continue to hold metoprolol for now  Monitor renal function  DVT/PE prophylaxis with Lovenox 30 mg twice daily         Medications:  REVIEWED DAILY    Infusion Medications   Scheduled Medications    cefepime  2 g Intravenous Q12H    enoxaparin  30 mg Subcutaneous Q12H    zinc sulfate  50 mg DAILY    +++++++++++++++++++++++++++++++++++++++++++++++++  [address], [place]  +++++++++++++++++++++++++++++++++++++++++++++++++  NOTE: This report was transcribed using voice recognition software. Every effort was made to ensure accuracy; however, inadvertent computerized transcription errors may be present.

## 2020-12-03 NOTE — PROGRESS NOTES
[address] [place] [address] [facility]  Progress Note      Chief Complaint   Patient presents with    Shortness of Breath     was seen here yesterday for weakness, + covid, 85% on normal 4L NC, L sided CP with exertion, fevers/chills    Abnormal Lab     called for + blood cultures       SUBJECTIVE:  Patient is tolerating medications. No reported adverse drug reactions. No nausea, vomiting, diarrhea. Sob at baseline, cough increased. 95% on 4 liters. Review of systems:  As stated above in the chief complaint, otherwise negative.     Medications:  Scheduled Meds:   cefepime  2 g Intravenous Q12H    enoxaparin  30 mg Subcutaneous Q12H    zinc sulfate  50 mg Oral Daily    vitamin C  500 mg Oral Daily    Vitamin D  1,000 Units Oral Daily    melatonin  6 mg Oral Nightly    amLODIPine  5 mg Oral BID    aspirin  81 mg Oral Daily    atorvastatin  80 mg Oral Daily    cilostazol  100 mg Oral BID    fluticasone  1 spray Nasal Daily    isosorbide mononitrate  60 mg Oral Daily    [Held by provider] lisinopril  20 mg Oral BID    montelukast  10 mg Oral Nightly    ranolazine  500 mg Oral BID    tamsulosin  0.4 mg Oral Daily    sodium chloride flush  10 mL Intravenous 2 times per day    dexamethasone  6 mg Oral Daily    remdesivir IVPB  100 mg Intravenous Q24H    budesonide-formoterol  2 puff Inhalation BID    And    tiotropium  2 puff Inhalation Daily     Continuous Infusions:  PRN Meds:zolpidem, dextromethorphan-guaiFENesin, ipratropium-albuterol, sodium chloride flush, acetaminophen **OR** acetaminophen, polyethylene glycol, promethazine **OR** ondansetron, potassium chloride **OR** potassium alternative oral replacement **OR** potassium chloride, magnesium sulfate, sodium chloride    OBJECTIVE:  /65   Pulse 65   Temp 97.7 °F (36.5 °C) (Temporal)   Resp 18   Ht 5' 10.5\" (1.791 m)   Wt 177 lb (80.3 kg)   SpO2 95%   BMI 25.04 kg/m²   Temp  Av.8 °F (36.6 °C)  Min: 97.6 °F (36.4 °C)  Max: 98 °F (36.7 °C)  Constitutional: The patient is awake, alert, and oriented. Skin: Warm and dry. No rashes were noted. HEENT: Round and reactive pupils. Moist mucous membranes. No ulcerations or thrush. Neck: Supple to movements. Chest: No use of accessory muscles to breathe. Symmetrical expansion. No wheezing, crackles or rhonchi. Poor air exchange  Cardiovascular: S1 and S2 are rhythmic and regular. No murmurs appreciated. Abdomen: Positive bowel sounds to auscultation. Benign to palpation. No masses felt. No hepatosplenomegaly. Genitourinary: male  Extremities: No clubbing, no cyanosis, no edema.   Lines: peripheral    Laboratory and Tests Review:  Lab Results   Component Value Date    WBC 9.7 12/03/2020    WBC 5.3 12/02/2020    WBC 8.1 12/01/2020    HGB 10.5 (L) 12/03/2020    HCT 31.3 (L) 12/03/2020    MCV 92.3 12/03/2020     12/03/2020     Lab Results   Component Value Date    NEUTROABS 8.28 (H) 12/03/2020    NEUTROABS 4.77 12/02/2020    NEUTROABS 5.58 12/01/2020     No results found for: CRPHS  Lab Results   Component Value Date    ALT 20 12/03/2020    AST 27 12/03/2020    ALKPHOS 60 12/03/2020    BILITOT <0.2 12/03/2020     Lab Results   Component Value Date     12/03/2020    K 4.5 12/03/2020    K 4.5 12/03/2020     12/03/2020    CO2 21 12/03/2020    BUN 58 12/03/2020    CREATININE 2.2 12/03/2020    CREATININE 2.0 12/02/2020    CREATININE 1.4 12/01/2020    GFRAA 36 12/03/2020    LABGLOM 30 12/03/2020    GLUCOSE 137 12/03/2020    PROT 6.1 12/03/2020    LABALBU 3.0 12/03/2020    CALCIUM 8.3 12/03/2020    BILITOT <0.2 12/03/2020    ALKPHOS 60 12/03/2020    AST 27 12/03/2020    ALT 20 12/03/2020     No results found for: CRP  Lab Results   Component Value Date    SEDRATE 8 03/04/2013     Radiology:  Reviewed    Microbiology:   Lab Results   Component Value Date    BC 24 Hours no growth 12/01/2020    BC  11/30/2020     Gram stain performed from blood culture bottle media  Gram positive cocci in clusters      [facility] Identification and sensitivity to follow 11/30/2020    ORG Gram positive cocci 11/30/2020    ORG Staphylococcus species 11/30/2020     Lab Results   Component Value Date    BLOODCULT2 24 Hours no growth 12/01/2020    BLOODCULT2  11/30/2020     Gram stain performed from blood culture bottle media  Gram positive cocci in chains      BLOODCULT2 Identification and sensitivity to follow 11/30/2020    ORG Gram positive cocci 11/30/2020    ORG Staphylococcus species 11/30/2020     No results found for: WNDABS  No results found for: RESPSMEAR  No results found for: MPNEUMO, CLAMYDCU, LABLEGI, AFBCX, FUNGSM, LABFUNG  No results found for: CULTRESP  No results found for: CXCATHTIP  No results found for: BFCS  No results found for: CXSURG  No results found for: LABURIN  No results found for: [address]       Assessment:  · COVID-19 viremia in an immunocompromised host having COPD and on home O2  · Coag negative staph bacteremia is contaminant    Plan:    · Cont off antibiotics  · Remdesivir  Day 3 until his O2 requirements are similar to that at home; currently has no significant pulmonary involvement at this time  · Check cultures  · On steroid  · Monitor labs  · Will follow with you    Thank you for having us see this patient in consultation  Electronically signed by Cherylene Sauger, APRN - CNP on 12/3/2020 at 10:53 AM     Pt seen and examined. Above discussed agree with advanced practice nurse. Labs, cultures, and radiographs reviewed. Face to Face encounter occurred. Changes made as necessary.      Gypsy Last MD

## 2020-12-03 NOTE — PROGRESS NOTES
 Vancomycin has been discontinued   [address] to [address] Physician to re-consult pharmacy if future dosing is needed    Thank you for the consult.

## 2020-12-03 NOTE — PROGRESS NOTES
[facility]  [address] [facility], [address], [address] [facility]      Pulmonary Progress Note      SUBJECTIVE:  Claims stable with respiratory function, less cough and sputum production, ambulating in room and tolerating, on 3.5 li NC.     OBJECTIVE    Medications    Continuous Infusions:    Scheduled Meds:   enoxaparin  30 mg Subcutaneous Q12H    zinc sulfate  50 mg Oral Daily    vitamin C  500 mg Oral Daily    Vitamin D  1,000 Units Oral Daily    melatonin  6 mg Oral Nightly    amLODIPine  5 mg Oral BID    aspirin  81 mg Oral Daily    atorvastatin  80 mg Oral Daily    cilostazol  100 mg Oral BID    fluticasone  1 spray Nasal Daily    isosorbide mononitrate  60 mg Oral Daily    [Held by provider] lisinopril  20 mg Oral BID    montelukast  10 mg Oral Nightly    ranolazine  500 mg Oral BID    tamsulosin  0.4 mg Oral Daily    sodium chloride flush  10 mL Intravenous 2 times per day    dexamethasone  6 mg Oral Daily    remdesivir IVPB  100 mg Intravenous Q24H    budesonide-formoterol  2 puff Inhalation BID    And    tiotropium  2 puff Inhalation Daily       PRN Meds:zolpidem, dextromethorphan-guaiFENesin, ipratropium-albuterol, sodium chloride flush, acetaminophen **OR** acetaminophen, polyethylene glycol, promethazine **OR** ondansetron, potassium chloride **OR** potassium alternative oral replacement **OR** potassium chloride, magnesium sulfate, sodium chloride    Physical    VITALS:  BP (!) 103/51   Pulse 70   Temp 97.3 °F (36.3 °C) (Temporal)   Resp 18   Ht 5' 10.5\" (1.791 m)   Wt 177 lb (80.3 kg)   SpO2 94%   BMI 25.04 kg/m²     24HR INTAKE/OUTPUT:      Intake/Output Summary (Last 24 hours) at 12/3/2020 1730  Last data filed at 12/3/2020 1437  Gross per 24 hour   Intake 360 ml   Output 0 ml   Net 360 ml       24HR PULSE OXIMETRY RANGE:    SpO2  Av.5 %  Min: 93 %  Max: 96 %    General appearance: alert, appears stated age and cooperative  Lungs: rhonchi bibasilar  Heart: regular rate and rhythm, S1, S2 normal, no murmur, click, rub or gallop  Abdomen: soft, non-tender; bowel sounds normal; no masses,  no organomegaly  Extremities: extremities normal, atraumatic, no cyanosis or edema  Neurologic: Mental status: Alert, oriented, thought content appropriate    Data    CBC:   Recent Labs     12/01/20  1238 12/02/20  0653 12/03/20  0405   WBC 8.1 5.3 9.7   HGB 11.7* 10.0* 10.5*   HCT 35.9* 30.8* 31.3*   MCV 93.7 93.6 92.3    191 205       BMP:  Recent Labs     12/01/20  1238 12/02/20  0653 12/03/20  0405    134 137   K 4.0 4.2  4.2 4.5  4.5   CL 98 99 103   CO2 26 22 21*   BUN 29* 44* 58*   CREATININE 1.4* 2.0* 2.2*    ALB:3,BILIDIR:3,BILITOT:3,ALKPHOS:3)@    PT/INR: No results for input(s): PROTIME, INR in the last 72 hours. ABG:   No results for input(s): PH, PO2, PCO2, HCO3, BE, O2SAT, METHB, O2HB, COHB, O2CON, HHB, THB in the last 72 hours. Radiology/Other tests reviewed: none    Assessment:     Active Problems:    COVID-19  Resolved Problems:    * No resolved hospital problems. *  COPD  Hypoxia      Plan:       1. Cont with remdesivir and decadron  2. Cont with mdi, observe respiratory function  3. Cont with oxygen, taper as tolerated  4. OOB to chair, ambulate as tolerated      Time at the bedside, reviewing labs and radiographs, reviewing notes and consultations, discussing with staff and family was more than 35 minutes. Thanks for letting us see this patient in consultation. Please contact us with any questions. Office [phone] or after hours through [employer], x [phone].

## 2020-12-04 NOTE — PROGRESS NOTES
[facility]  Nephrology Attending Progress Note    SUBJECTIVE: Patient is being followed for RAMON     12/4/20: Not seen in person due to COVID-19 restrictions.  Remains on 4L NC.    PROBLEM LIST:    Patient Active Problem List   Diagnosis    Arthritis    CAD (coronary artery disease)    Subclavian artery stenosis, left (HCC)    Carotid bruit    Carotid stenosis, right    PVD (peripheral vascular disease) with claudication (HCC)    Chest pain in adult    Atrial fibrillation with RVR ([address]    Bilateral carotid artery stenosis    Atherosclerosis 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 ([address]    Chronic obstructive pulmonary disease with acute exacerbation ([address]    Hypertension    Acute on chronic congestive heart failure ([address]    COVID-19        PAST MEDICAL HISTORY:    Past Medical History:   Diagnosis Date    (HFpEF) heart failure with preserved ejection fraction ([address] 07/22/2020 7/18/2020- echo- LVEF 60%    Acute MI (HCC)     Anxiety     Arthritis     Arthritis     hands, back     Atherosclerosis 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 ([address] 7/18/2019    Bilateral carotid artery stenosis 7/18/2019    CAD (coronary artery disease)     Carotid artery stenosis     Carotid bruit 6/5/2013    Carotid stenosis, right 6/5/2013    CHF (congestive heart failure) (HCC)     COPD (chronic obstructive pulmonary disease) (HCC)     Headache(784.0)     Hyperlipidemia     Hypertension     PVD (peripheral vascular disease) ([address] 4/26/2013    PVD (peripheral vascular disease) with claudication ([address] 6/5/2013    STEMI (ST elevation myocardial infarction) ([address] 10/7/2012    Subclavian artery stenosis, left ([address] 4/26/2013    Traumatic retroperitoneal hematoma 4/26/2013       DIET:    DIET GENERAL;     PHYSICAL EXAM:     Patient Vitals for the past 24 hrs:   BP Temp Temp src Pulse Resp SpO2   12/04/20 0930 (!) 154/67 96.4 °F (35.8 °C) Axillary 72 17 95 %   12/03/20 2130 139/78 97 °F (36.1 °C) Temporal 70 18 94 %   12/03/20 1600 (!) 103/51 97.3 °F (36.3 °C) Temporal 70 18 94 %   @      Intake/Output Summary (Last 24 hours) at 12/4/2020 0958  Last data filed at 12/4/2020 1891  Gross per 24 hour   Intake 720 ml   Output 0 ml   Net 720 ml         Wt Readings from Last 3 Encounters:   12/01/20 177 lb (80.3 kg)   11/30/20 177 lb (80.3 kg)   08/11/20 175 lb 3.2 oz (79.5 kg)       PE not performed due to COVID-19 restrictions    MEDS (scheduled):    enoxaparin  30 mg Subcutaneous Q12H    zinc sulfate  50 mg Oral Daily    vitamin C  500 mg Oral Daily    Vitamin D  1,000 Units Oral Daily    melatonin  6 mg Oral Nightly    amLODIPine  5 mg Oral BID    aspirin  81 mg Oral Daily    atorvastatin  80 mg Oral Daily    cilostazol  100 mg Oral BID    fluticasone  1 spray Nasal Daily    isosorbide mononitrate  60 mg Oral Daily    [Held by provider] lisinopril  20 mg Oral BID    montelukast  10 mg Oral Nightly    ranolazine  500 mg Oral BID    tamsulosin  0.4 mg Oral Daily    sodium chloride flush  10 mL Intravenous 2 times per day    dexamethasone  6 mg Oral Daily    remdesivir IVPB  100 mg Intravenous Q24H    budesonide-formoterol  2 puff Inhalation BID    And    tiotropium  2 puff Inhalation Daily       MEDS (infusions):      MEDS (prn):  zolpidem, dextromethorphan-guaiFENesin, ipratropium-albuterol, sodium chloride flush, acetaminophen **OR** acetaminophen, polyethylene glycol, promethazine **OR** ondansetron, potassium chloride **OR** potassium alternative oral replacement **OR** potassium chloride, magnesium sulfate, sodium chloride    DATA:    Recent Labs     12/02/20  0653 12/03/20  0405 12/04/20  0522   WBC 5.3 9.7 11.2   HGB 10.0* 10.5* 10.2*   HCT 30.8* 31.3* 31.4*   MCV 93.6 92.3 92.6    205 190     Recent Labs     12/02/20  0653 12/03/20  0405 12/04/20  0522    137 131*   K 4.2  4.2 4.5  4.5 4.4  4.4   CL 99 103 102   CO2 22 21* 20*   BUN 44* 58* 64*   CREATININE 2. 0* 2.2* 2.2*   LABGLOM 33 30 30   GLUCOSE 153* 137* 125*   CALCIUM 8.4* 8.3* 8.3*   ALT 19 20 21   AST 24 27 29   BILITOT <0.2 <0.2 <0.2   ALKPHOS 63 60 58       Lab Results   Component Value Date    LABALBU 2.8 (L) 12/04/2020    LABALBU 3.0 (L) 12/03/2020    LABALBU 3.4 (L) 12/02/2020     Lab Results   Component Value Date    TSH 1.411 04/25/2013       Iron Studies  Lab Results   Component Value Date    IRON 87 12/04/2020    TIBC 200 (L) 12/04/2020    FERRITIN 335 12/04/2020     Vitamin B-12   Date Value Ref Range Status   12/04/2020 452 211 - 946 pg/mL Final     Folate   Date Value Ref Range Status   12/04/2020 16.8 4.8 - 24.2 ng/mL Final       Vit D, 25-Hydroxy   Date Value Ref Range Status   12/04/2020 26 (L) 30 - 100 ng/mL Final     Comment:     <20 ng/mL. ........... [place] Deficient  20-30 ng/mL. ......... [place] Insufficient   ng/mL. ........ [place] Sufficient  >100 ng/mL. .......... [place] Toxic       PTH   Date Value Ref Range Status   12/04/2020 37 15 - 65 pg/mL Final       No components found for: URIC    Lab Results   Component Value Date    COLORU Yellow 11/30/2020    NITRU Negative 11/30/2020    GLUCOSEU Negative 11/30/2020    KETUA Negative 11/30/2020    UROBILINOGEN 0.2 11/30/2020    BILIRUBINUR Negative 11/30/2020       No results found for: Meir Sinha      IMPRESSION/RECOMMENDATIONS:    1. Stage II RAMON  In the setting of +COVID and ACEI  Baseline Cr 0.8-1.1mg/dl  Lisinopril held  Cr 1.5->1.4->2.0->2.2  urine lytes and Cr not completed  JAKE with small right renal cyst, otherwise unremarkable  Avoid nephrotoxic medications  Strict I&O  Follow daily BMP     2. HTN  At/near goal <130/80  Lisinopril on hold  Follow and adjust medications PRN     3. +COVID  ID and pulmonary following  On decadron and remdesivir     4. Hypocalcemia with hypoalbuminemia  Ca 8.3, alb 2.8, PTH 37, Vit D 26     5. Anemia  Ferritin 335, Iron sat 44, Folate 16.8.  B12 452  Hgb stable at 10.2    Wall [place], APRN - NP     Pt not examined due to covid  Chart reviewed  Agree with above  Follow cr  Holding wilder La MD

## 2020-12-04 NOTE — PROGRESS NOTES
[facility]  [address], [address], [address] [place]       Pulmonary Progress Note      SUBJECTIVE:  Claims stable with respiratory function, less cough and sputum production, ambulating in room and tolerating, on 4 li NC.     OBJECTIVE    Medications    Continuous Infusions:    Scheduled Meds:   enoxaparin  30 mg Subcutaneous Q12H    zinc sulfate  50 mg Oral Daily    vitamin C  500 mg Oral Daily    Vitamin D  1,000 Units Oral Daily    melatonin  6 mg Oral Nightly    amLODIPine  5 mg Oral BID    aspirin  81 mg Oral Daily    atorvastatin  80 mg Oral Daily    cilostazol  100 mg Oral BID    fluticasone  1 spray Nasal Daily    isosorbide mononitrate  60 mg Oral Daily    [Held by provider] lisinopril  20 mg Oral BID    montelukast  10 mg Oral Nightly    ranolazine  500 mg Oral BID    tamsulosin  0.4 mg Oral Daily    sodium chloride flush  10 mL Intravenous 2 times per day    dexamethasone  6 mg Oral Daily    remdesivir IVPB  100 mg Intravenous Q24H    budesonide-formoterol  2 puff Inhalation BID    And    tiotropium  2 puff Inhalation Daily       PRN Meds:zolpidem, dextromethorphan-guaiFENesin, ipratropium-albuterol, sodium chloride flush, acetaminophen **OR** acetaminophen, polyethylene glycol, promethazine **OR** ondansetron, potassium chloride **OR** potassium alternative oral replacement **OR** potassium chloride, magnesium sulfate, sodium chloride    Physical    VITALS:  BP (!) 123/59   Pulse 76   Temp 97.8 °F (36.6 °C) (Temporal)   Resp 18   Ht 5' 10.5\" (1.791 m)   Wt 177 lb (80.3 kg)   SpO2 94%   BMI 25.04 kg/m²     24HR INTAKE/OUTPUT:      Intake/Output Summary (Last 24 hours) at 2020 1535  Last data filed at 2020 1430  Gross per 24 hour   Intake 960 ml   Output --   Net 960 ml       24HR PULSE OXIMETRY RANGE:    SpO2  Av.3 %  Min: 94 %  Max: 95 %    General appearance: alert, appears stated age and cooperative  Lungs: rhonchi bibasilar  Heart: regular rate and rhythm, S1, S2 normal, no murmur, click, rub or gallop  Abdomen: soft, non-tender; bowel sounds normal; no masses,  no organomegaly  Extremities: extremities normal, atraumatic, no cyanosis or edema  Neurologic: Mental status: Alert, oriented, thought content appropriate    Data    CBC:   Recent Labs     12/02/20  0653 12/03/20  0405 12/04/20  0522   WBC 5.3 9.7 11.2   HGB 10.0* 10.5* 10.2*   HCT 30.8* 31.3* 31.4*   MCV 93.6 92.3 92.6    205 190       BMP:  Recent Labs     12/02/20  0653 12/03/20  0405 12/04/20  0522    137 131*   K 4.2  4.2 4.5  4.5 4.4  4.4   CL 99 103 102   CO2 22 21* 20*   BUN 44* 58* 64*   CREATININE 2.0* 2.2* 2.2*    ALB:3,BILIDIR:3,BILITOT:3,ALKPHOS:3)@    PT/INR: No results for input(s): PROTIME, INR in the last 72 hours. ABG:   No results for input(s): PH, PO2, PCO2, HCO3, BE, O2SAT, METHB, O2HB, COHB, O2CON, HHB, THB in the last 72 hours. Radiology/Other tests reviewed: none    Assessment:     Active Problems:    COVID-19  Resolved Problems:    * No resolved hospital problems. *  COPD  Hypoxia      Plan:       1. Cont with remdesivir and decadron  2. Cont with mdi, observe respiratory function  3. Cont with oxygen, taper as tolerated  4. OOB to chair, ambulate as tolerated  5. Possible discharge if remains stable with oxygen requirements and physical activity and remdesivir ends      Time at the bedside, reviewing labs and radiographs, reviewing notes and consultations, discussing with staff and family was more than 35 minutes. Thanks for letting us see this patient in consultation. Please contact us with any questions. Office [phone] or after hours through "[employer] x [phone].

## 2020-12-04 NOTE — PROGRESS NOTES
Hospitalist Progress Note      SYNOPSIS: Patient admitted on 2020   Patient came to the ER been notified that one of his blood cultures did come back positive. Was found to be COVID-19 positive. SUBJECTIVE:    Patient seen and examined  Records reviewed. Creatinine remains at 2.2      Temp (24hrs), Av.3 °F (36.3 °C), Min:97 °F (36.1 °C), Max:97.7 °F (36.5 °C)    DIET: DIET GENERAL;  CODE: Full Code    Intake/Output Summary (Last 24 hours) at 2020  Last data filed at 2020 0924  Gross per 24 hour   Intake 720 ml   Output 0 ml   Net 720 ml       OBJECTIVE:    /78   Pulse 70   Temp 97 °F (36.1 °C) (Temporal)   Resp 18   Ht 5' 10.5\" (1.791 m)   Wt 177 lb (80.3 kg)   SpO2 94%   BMI 25.04 kg/m²     General appearance: No apparent distress, appears stated age and cooperative. HEENT:  Conjunctivae/corneas clear. Neck: Supple. No jugular venous distention. Respiratory: Diminished bilaterally  Cardiovascular: Regular rate rhythm, normal S1-S2  Abdomen: Soft, nontender, nondistended  Musculoskeletal: No clubbing, cyanosis, no bilateral lower extremity edema. Brisk capillary refill.    Skin:  No rashes  on visible skin  Neurologic: awake, alert and following commands     ASSESSMENT:  COVID-19 pneumonia  Acute hypoxic respiratory failure  COPD exacerbation  Chronically oxygen dependent  Coronary artery disease  History of heart failure with preserved ejection fraction  Chronic renal insufficiency stage III  Sinus bradycardia       PLAN:  Infectious disease following  Pulmonology following  Nephrology following  Decadron 6 mg daily  Continue remdesivir  Vitamin C, D and zinc  Hold metoprolol  Hold lisinopril  Monitor renal function  DVT/PE prophylaxis with Lovenox 30 mg twice daily         Medications:  REVIEWED DAILY    Infusion Medications   Scheduled Medications    enoxaparin  30 mg Subcutaneous Q12H    zinc sulfate  50 mg Oral Daily    vitamin C  500 mg Oral Daily    Vitamin D  1,000 Units Oral Daily    melatonin  6 mg Oral Nightly    amLODIPine  5 mg Oral BID    aspirin  81 mg Oral Daily    atorvastatin  80 mg Oral Daily    cilostazol  100 mg Oral BID    fluticasone  1 spray Nasal Daily    isosorbide mononitrate  60 mg Oral Daily    [Held by provider] lisinopril  20 mg Oral BID    montelukast  10 mg Oral Nightly    ranolazine  500 mg Oral BID    tamsulosin  0.4 mg Oral Daily    sodium chloride flush  10 mL Intravenous 2 times per day    dexamethasone  6 mg Oral Daily    remdesivir IVPB  100 mg Intravenous Q24H    budesonide-formoterol  2 puff Inhalation BID    And    tiotropium  2 puff Inhalation Daily     PRN Meds: zolpidem, dextromethorphan-guaiFENesin, ipratropium-albuterol, sodium chloride flush, acetaminophen **OR** acetaminophen, polyethylene glycol, promethazine **OR** ondansetron, potassium chloride **OR** potassium alternative oral replacement **OR** potassium chloride, magnesium sulfate, sodium chloride    Labs:     Recent Labs     12/02/20  0653 12/03/20  0405 12/04/20  0522   WBC 5.3 9.7 11.2   HGB 10.0* 10.5* 10.2*   HCT 30.8* 31.3* 31.4*    205 190       Recent Labs     12/02/20  0653 12/03/20  0405 12/04/20  0522    137 131*   K 4.2  4.2 4.5  4.5 4.4  4.4   CL 99 103 102   CO2 22 21* 20*   BUN 44* 58* 64*   CREATININE 2.0* 2.2* 2.2*   CALCIUM 8.4* 8.3* 8.3*       Recent Labs     12/02/20  0653 12/03/20  0405 12/04/20  0522   PROT 6.3* 6.1* 5.7*   ALKPHOS 63 60 58   ALT 19 20 21   AST 24 27 29   BILITOT <0.2 <0.2 <0.2       No results for input(s): INR in the last 72 hours.     Recent Labs     12/01/20  1238   TROPONINI <0.01       Chronic labs:    Lab Results   Component Value Date    CHOL 179 03/11/2017    TRIG 97 03/11/2017    HDL 49 03/11/2017    LDLCALC 111 (H) 03/11/2017    TSH 1.411 04/25/2013    INR 1.0 12/19/2018    LABA1C 5.8 04/25/2013       Radiology: REVIEWED DAILY    +++++++++++++++++++++++++++++++++++++++++++++++++  [address] [facility], [place]  +++++++++++++++++++++++++++++++++++++++++++++++++  NOTE: This report was transcribed using voice recognition software. Every effort was made to ensure accuracy; however, inadvertent computerized transcription errors may be present.

## 2020-12-04 NOTE — PROGRESS NOTES
[address] [place] [address] [facility]  Progress Note      Chief Complaint   Patient presents with    Shortness of Breath     was seen here yesterday for weakness, + covid, 85% on normal 4L NC, L sided CP with exertion, fevers/chills    Abnormal Lab     called for + blood cultures       SUBJECTIVE:  Patient is tolerating medications. No reported adverse drug reactions. No nausea, vomiting, diarrhea. Sob at baseline, cough improved. 95% on 4 liters. Review of systems:  As stated above in the chief complaint, otherwise negative.     Medications:  Scheduled Meds:   enoxaparin  30 mg Subcutaneous Q12H    zinc sulfate  50 mg Oral Daily    vitamin C  500 mg Oral Daily    Vitamin D  1,000 Units Oral Daily    melatonin  6 mg Oral Nightly    amLODIPine  5 mg Oral BID    aspirin  81 mg Oral Daily    atorvastatin  80 mg Oral Daily    cilostazol  100 mg Oral BID    fluticasone  1 spray Nasal Daily    isosorbide mononitrate  60 mg Oral Daily    [Held by provider] lisinopril  20 mg Oral BID    montelukast  10 mg Oral Nightly    ranolazine  500 mg Oral BID    tamsulosin  0.4 mg Oral Daily    sodium chloride flush  10 mL Intravenous 2 times per day    dexamethasone  6 mg Oral Daily    remdesivir IVPB  100 mg Intravenous Q24H    budesonide-formoterol  2 puff Inhalation BID    And    tiotropium  2 puff Inhalation Daily     Continuous Infusions:  PRN Meds:zolpidem, dextromethorphan-guaiFENesin, ipratropium-albuterol, sodium chloride flush, acetaminophen **OR** acetaminophen, polyethylene glycol, promethazine **OR** ondansetron, potassium chloride **OR** potassium alternative oral replacement **OR** potassium chloride, magnesium sulfate, sodium chloride    OBJECTIVE:  BP (!) 154/67   Pulse 72   Temp 96.4 °F (35.8 °C) (Axillary)   Resp 17   Ht 5' 10.5\" (1.791 m)   Wt 177 lb (80.3 kg)   SpO2 95%   BMI 25.04 kg/m²   Temp  Av.9 °F (36.1 °C)  Min: 96.4 °F (35.8 °C)  Max: 97.3 °F (36.3 °C)  Constitutional: The patient is awake, alert, and oriented. Skin: Warm and dry. No rashes were noted. HEENT: Round and reactive pupils. Moist mucous membranes. No ulcerations or thrush. Neck: Supple to movements. Chest: No use of accessory muscles to breathe. Symmetrical expansion. No wheezing, crackles or rhonchi. Poor air exchange  Cardiovascular: S1 and S2 are rhythmic and regular. No murmurs appreciated. Abdomen: Positive bowel sounds to auscultation. Benign to palpation. No masses felt. No hepatosplenomegaly. Genitourinary: male  Extremities: No clubbing, no cyanosis, no edema.   Lines: peripheral    Laboratory and Tests Review:  Lab Results   Component Value Date    WBC 11.2 12/04/2020    WBC 9.7 12/03/2020    WBC 5.3 12/02/2020    HGB 10.2 (L) 12/04/2020    HCT 31.4 (L) 12/04/2020    MCV 92.6 12/04/2020     12/04/2020     Lab Results   Component Value Date    NEUTROABS 9.71 (H) 12/04/2020    NEUTROABS 8.28 (H) 12/03/2020    NEUTROABS 4.77 12/02/2020     No results found for: [facility]  Lab Results   Component Value Date    ALT 21 12/04/2020    AST 29 12/04/2020    ALKPHOS 58 12/04/2020    BILITOT <0.2 12/04/2020     Lab Results   Component Value Date     12/04/2020    K 4.4 12/04/2020    K 4.4 12/04/2020     12/04/2020    CO2 20 12/04/2020    BUN 64 12/04/2020    CREATININE 2.2 12/04/2020    CREATININE 2.2 12/03/2020    CREATININE 2.0 12/02/2020    GFRAA 36 12/04/2020    LABGLOM 30 12/04/2020    GLUCOSE 125 12/04/2020    PROT 5.7 12/04/2020    LABALBU 2.8 12/04/2020    CALCIUM 8.3 12/04/2020    BILITOT <0.2 12/04/2020    ALKPHOS 58 12/04/2020    AST 29 12/04/2020    ALT 21 12/04/2020     No results found for: CRP  Lab Results   Component Value Date    SEDRATE 8 03/04/2013     Radiology:  Reviewed    Microbiology:   Lab Results   Component Value Date    BC 24 Hours no growth 12/01/2020    BC  11/30/2020     Gram stain performed from blood culture bottle media  Gram positive cocci in clusters      [facility] Identification and sensitivity to follow 11/30/2020    ORG Enterococcus faecalis 11/30/2020    ORG Staphylococcus epidermidis 11/30/2020     Lab Results   Component Value Date    BLOODCULT2 24 Hours no growth 12/01/2020    BLOODCULT2  11/30/2020     Gram stain performed from blood culture bottle media  Gram positive cocci in chains      BLOODCULT2 Identification and sensitivity to follow 11/30/2020    ORG Enterococcus faecalis 11/30/2020    ORG Staphylococcus epidermidis 11/30/2020     No results found for: WNDABS  No results found for: RESPSMEAR  No results found for: MPNEUMO, CLAMYDCU, LABLEGI, AFBCX, FUNGSM, LABFUNG  No results found for: CULTRESP  No results found for: CXCATHTIP  No results found for: BFCS  No results found for: CXSURG  No results found for: LABURIN  No results found for: [facility]      Assessment:  · COVID-19 viremia in an immunocompromised host having COPD and on home O2  · Coag negative staph bacteremia is contaminant    Plan:    · Cont off antibiotics  · Remdesivir  Day 4 until his O2 requirements are similar to that at home; currently has no significant pulmonary involvement at this time  · Check cultures  · On steroid  · Monitor labs  · Will follow with you    Thank you for having us see this patient in consultation  Electronically signed by BARBI Moody CNP on 12/4/2020 at 10:52 AM   Pt seen and examined. Above discussed agree with advanced practice nurse. Labs, cultures, and radiographs reviewed. Face to Face encounter occurred. Changes made as necessary.      Renaldo Christianson MD

## 2020-12-04 NOTE — PROGRESS NOTES
Patient with c/o chest, PS'd Dr. Lukasz Sapp. Obtained Nitro, troponin and EKG order. Administered nitro, chest pain subsided.

## 2020-12-05 NOTE — PROGRESS NOTES
[address] [place] [address] [facility]  Progress Note      Chief Complaint   Patient presents with    Shortness of Breath     was seen here yesterday for weakness, + covid, 85% on normal 4L NC, L sided CP with exertion, fevers/chills    Abnormal Lab     called for + blood cultures       SUBJECTIVE:  Patient is tolerating medications. No reported adverse drug reactions. No nausea, vomiting, diarrhea. Some SOB - but not out of ordinary. On 4LNC which is what he wears at home - 96%. Afebrile. Review of systems:  As stated above in the chief complaint, otherwise negative.     Medications:  Scheduled Meds:   metoprolol succinate  75 mg Oral BID    enoxaparin  30 mg Subcutaneous Q12H    zinc sulfate  50 mg Oral Daily    vitamin C  500 mg Oral Daily    Vitamin D  1,000 Units Oral Daily    melatonin  6 mg Oral Nightly    amLODIPine  5 mg Oral BID    aspirin  81 mg Oral Daily    atorvastatin  80 mg Oral Daily    cilostazol  100 mg Oral BID    fluticasone  1 spray Nasal Daily    isosorbide mononitrate  60 mg Oral Daily    [Held by provider] lisinopril  20 mg Oral BID    montelukast  10 mg Oral Nightly    ranolazine  500 mg Oral BID    tamsulosin  0.4 mg Oral Daily    sodium chloride flush  10 mL Intravenous 2 times per day    dexamethasone  6 mg Oral Daily    remdesivir IVPB  100 mg Intravenous Q24H    budesonide-formoterol  2 puff Inhalation BID    And    tiotropium  2 puff Inhalation Daily     Continuous Infusions:  PRN Meds:calcium carbonate, nitroGLYCERIN, zolpidem, dextromethorphan-guaiFENesin, ipratropium-albuterol, sodium chloride flush, acetaminophen **OR** acetaminophen, polyethylene glycol, promethazine **OR** ondansetron, potassium chloride **OR** potassium alternative oral replacement **OR** potassium chloride, magnesium sulfate, sodium chloride    OBJECTIVE:  BP (!) 173/74   Pulse 81   Temp 97.8 °F (36.6 °C) (Temporal)   Resp 18   Ht 5' 10.5\" (1.791 m)   Wt 177 lb (80.3 kg)   SpO2 96%   BMI 25.04 kg/m²   Temp  Av.2 °F (36.2 °C)  Min: 96.3 °F (35.7 °C)  Max: 97.8 °F (36.6 °C)  Constitutional: The patient is awake, alert, and oriented. Skin: Warm and dry. No rashes were noted. HEENT: Round and reactive pupils. Moist mucous membranes. No ulcerations or thrush. Neck: Supple to movements. Chest: No use of accessory muscles to breathe. Symmetrical expansion. No wheezing, crackles or rhonchi. Poor air exchange bilaterally   Cardiovascular: S1 and S2 are rhythmic and regular. No murmurs appreciated. Abdomen: Positive bowel sounds to auscultation. Benign to palpation. No masses felt. No hepatosplenomegaly. Genitourinary: male  Extremities: No clubbing, no cyanosis, no edema.   Lines: peripheral    Laboratory and Tests Review:  Lab Results   Component Value Date    WBC 9.6 2020    WBC 11.2 2020    WBC 9.7 2020    HGB 10.7 (L) 2020    HCT 32.2 (L) 2020    MCV 91.7 2020     2020     Lab Results   Component Value Date    NEUTROABS 7.86 (H) 2020    NEUTROABS 9.71 (H) 2020    NEUTROABS 8.28 (H) 2020     No results found for: [facility]  Lab Results   Component Value Date    ALT 28 2020    AST 33 2020    ALKPHOS 54 2020    BILITOT <0.2 2020     Lab Results   Component Value Date     2020    K 4.6 2020    K 4.6 2020     2020    CO2 18 2020    BUN 57 2020    CREATININE 1.6 2020    CREATININE 2.2 2020    CREATININE 2.2 2020    GFRAA 52 2020    LABGLOM 43 2020    GLUCOSE 124 2020    PROT 5.8 2020    LABALBU 3.1 2020    CALCIUM 8.5 2020    BILITOT <0.2 2020    ALKPHOS 54 2020    AST 33 2020    ALT 28 2020     No results found for: CRP  Lab Results   Component Value Date    SEDRATE 8 2013     Radiology:  Reviewed    Microbiology:   Lab Results   Component Value Date    BC 24 Hours no growth 12/01/2020    BC  11/30/2020     Gram stain performed from blood culture bottle media  Gram positive cocci in clusters      [facility] Identification and sensitivity to follow 11/30/2020    ORG Enterococcus faecalis 11/30/2020    ORG Staphylococcus epidermidis 11/30/2020     Lab Results   Component Value Date    BLOODCULT2 24 Hours no growth 12/01/2020    BLOODCULT2  11/30/2020     Gram stain performed from blood culture bottle media  Gram positive cocci in chains      BLOODCULT2 Identification and sensitivity to follow 11/30/2020    ORG Enterococcus faecalis 11/30/2020    ORG Staphylococcus epidermidis 11/30/2020     No results found for: WNDABS  No results found for: RESPSMEAR  No results found for: MPNEUMO, CLAMYDCU, LABLEGI, AFBCX, FUNGSM, LABFUNG  No results found for: CULTRESP  No results found for: CXCATHTIP  No results found for: BFCS  No results found for: CXSURG  No results found for: LABURIN  No results found for: [facility]      Assessment:  · COVID-19 viremia in an immunocompromised host having COPD and on home O2  · Coag negative staph bacteremia is contaminant    Plan:    · Cont off antibiotics  · Remdesivir  Day 5   · his O2 requirements are at his home baseline  · Check cultures  · On steroid  · Monitor labs  · Will follow with you      Electronically signed by BARBI Lira CNP on 12/5/2020 at 1:21 PM   Pt seen and examined. Above discussed agree with advanced practice nurse. Labs, cultures, and radiographs reviewed. Face to Face encounter occurred. Changes made as necessary.      Nohemi Castillo MD

## 2020-12-05 NOTE — PROGRESS NOTES
decadron and remdesivir     4. Hypocalcemia with hypoalbuminemia  Ca 8.3, alb 2.8, PTH 37, Vit D 26     5. Anemia  Ferritin 335, Iron sat 44, Folate 16.8.  B12 452  Hgb stable at 10.2    Minerva Kat MD

## 2020-12-05 NOTE — PROGRESS NOTES
Pulmonary Progress Note    Admit Date: 2020  Hospital day                               PCP: Zabrina Izquierdo DO    Chief Complaint (s):  Patient Active Problem List   Diagnosis    Arthritis    CAD (coronary artery disease)    Subclavian artery stenosis, left (HCC)    Carotid bruit    Carotid stenosis, right    PVD (peripheral vascular disease) with claudication [facility]    Chest pain in adult    Atrial fibrillation with RVR ([facility]    Bilateral carotid artery stenosis    Atherosclerosis 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 ([facility]    Chronic obstructive pulmonary [facility] acute exacerbation [facility]    Hypertension    Acute on chronic congestive heart failure ([facility]    COVID-19       Subjective:  · Doing pretty well, no respiratory distress. On 4 L per nasal cannula with a saturation is probably too high.       Vitals:  VITALS:  BP (!) 173/74   Pulse 81   Temp 97.8 °F (36.6 °C) (Temporal)   Resp 18   Ht 5' 10.5\" (1.791 m)   Wt 177 lb (80.3 kg)   SpO2 96%   BMI 25.04 kg/m²     24HR INTAKE/OUTPUT:    No intake or output data in the 24 hours ending 20 1440    24HR PULSE OXIMETRY RANGE:    SpO2  Av %  Min: 94 %  Max: 97 %    Medications:  IV:      Scheduled Meds:   metoprolol succinate  75 mg Oral BID    enoxaparin  30 mg Subcutaneous Q12H    zinc sulfate  50 mg Oral Daily    vitamin C  500 mg Oral Daily    Vitamin D  1,000 Units Oral Daily    melatonin  6 mg Oral Nightly    amLODIPine  5 mg Oral BID    aspirin  81 mg Oral Daily    atorvastatin  80 mg Oral Daily    cilostazol  100 mg Oral BID    fluticasone  1 spray Nasal Daily    isosorbide mononitrate  60 mg Oral Daily    [Held by provider] lisinopril  20 mg Oral BID    montelukast  10 mg Oral Nightly    ranolazine  500 mg Oral BID    tamsulosin  0.4 mg Oral Daily    sodium chloride flush  10 mL Intravenous 2 times per day    dexamethasone  6 mg Oral Daily    remdesivir IVPB  100 mg Intravenous Q24H    budesonide-formoterol  2 puff Inhalation BID    And    tiotropium  2 puff Inhalation Daily       Diet:   DIET GENERAL;     EXAM:  General: No distress. Alert. Eyes: PERRL. No sclera icterus. No conjunctival injection. ENT: No discharge. Pharynx clear. Neck: Trachea midline. Normal thyroid. Resp: No accessory muscle use. No rales. No wheezing. No rhonchi. CV: Regular rate. Regular rhythm. No murmur or rub. Abd: Non-tender. Non-distended. No masses. No organomegaly. Normal bowel sounds. Skin: Warm and dry. No nodule on exposed extremities. No rash on exposed extremities. Ext: No cyanosis, clubbing, edema  Lymph: No cervical LAD. No supraclavicular LAD. M/S: No cyanosis. No joint deformity. No clubbing. Neuro: Awake. Follows commands. Positive pupils/gag/corneals. Normal pain response. Results:  CBC:   Recent Labs     12/03/20 0405 12/04/20 0522 12/05/20  0600   WBC 9.7 11.2 9.6   HGB 10.5* 10.2* 10.7*   HCT 31.3* 31.4* 32.2*   MCV 92.3 92.6 91.7    190 203     BMP:   Recent Labs     12/03/20 0405 12/04/20 0522 12/05/20  0600    131* 135   K 4.5  4.5 4.4  4.4 4.6  4.6    102 102   CO2 21* 20* 18*   BUN 58* 64* 57*   CREATININE 2.2* 2.2* 1.6*     LIVER PROFILE:   Recent Labs     12/03/20 0405 12/04/20 0522 12/05/20  0600   AST 27 29 33   ALT 20 21 28   BILITOT <0.2 <0.2 <0.2   ALKPHOS 60 58 54     PT/INR: No results for input(s): PROTIME, INR in the last 72 hours. APTT: No results for input(s): APTT in the last 72 hours. Pathology:  1. N/A      Microbiology:  1. None    Recent ABG:   No results for input(s): PH, PO2, PCO2, HCO3, BE, O2SAT, METHB, O2HB, COHB, O2CON, HHB, THB in the last 72 hours. Recent Films:  US RETROPERITONEAL COMPLETE   Final Result   1. Small right renal cyst.  Otherwise, unremarkable sonogram of the kidneys. 2. Unremarkable sonogram of the bladder. XR CHEST PORTABLE   Final Result   1.  Improved aeration of the right and left lungs with resolving pulmonary   infiltrates. XR CHEST PORTABLE   Final Result   Mild focal infiltrates, left lung base. Cardiomegaly. CTA PULMONARY W CONTRAST   Final Result   1. No pulmonary embolism. 2.  Emphysematous changes. 3.  Peribronchial inflammatory changes with distal areas of atelectasis   notable in lower lobes. XR CHEST PORTABLE   Final Result   Stable left lung base atelectasis or scarring. No acute infiltrates are seen          Assessment:  1. COVID-19 pneumonia with pretty good response to remdesivir and Decadron    Plan:  1. Wean FiO2 with a saturation greater than 90-92. Time at the bedside, reviewing labs and radiographs, reviewing updated notes and consultations, discussing with staff and family was more than 35 minutes. Please note that voice recognition technology was used in the preparation of this note and make therefore it may contain inadvertent transcription errors. If the patient is a COVID 19 isolation patient, the above physical exam reflects that of the examining physician for the day. Chela Roberts M.D., F.C.C.P.     [facility], [address] [place] [address], [address] [place] [address]

## 2020-12-05 NOTE — PROGRESS NOTES
Hospitalist Progress Note      SYNOPSIS: Patient admitted on 2020   Patient came to the ER been notified that one of his blood cultures did come back positive. Was found to be COVID-19 positive. SUBJECTIVE:    Patient seen and examined  Records reviewed. Creatinine 1.6      Temp (24hrs), Av.1 °F (36.2 °C), Min:96.3 °F (35.7 °C), Max:97.8 °F (36.6 °C)    DIET: DIET GENERAL;  CODE: Full Code    Intake/Output Summary (Last 24 hours) at 2020 1034  Last data filed at 2020 1430  Gross per 24 hour   Intake 480 ml   Output --   Net 480 ml       OBJECTIVE:    BP (!) 154/67   Pulse 80   Temp 97.7 °F (36.5 °C) (Oral)   Resp 16   Ht 5' 10.5\" (1.791 m)   Wt 177 lb (80.3 kg)   SpO2 97%   BMI 25.04 kg/m²     General appearance: No apparent distress, appears stated age and cooperative. HEENT:  Conjunctivae/corneas clear. Neck: Supple. No jugular venous distention. Respiratory: Diminished bilaterally  Cardiovascular: Regular rate rhythm, normal S1-S2  Abdomen: Soft, nontender, nondistended  Musculoskeletal: No clubbing, cyanosis, no bilateral lower extremity edema. Brisk capillary refill.    Skin:  No rashes  on visible skin  Neurologic: awake, alert and following commands     ASSESSMENT:  COVID-19 pneumonia  Acute hypoxic respiratory failure  COPD exacerbation  Chronically oxygen dependent  Coronary artery disease  History of heart failure with preserved ejection fraction  Chronic renal insufficiency stage III  Sinus bradycardia, resolved       PLAN:  Infectious disease following  Pulmonology following  Nephrology following  Decadron 6 mg daily  Continue remdesivir  Vitamin C, D and zinc  Restart metoprolol  Hold lisinopril  Monitor renal function  DVT/PE prophylaxis with Lovenox 30 mg twice daily         Medications:  REVIEWED DAILY    Infusion Medications   Scheduled Medications    enoxaparin  30 mg Subcutaneous Q12H    zinc sulfate  50 mg Oral Daily    vitamin C  500 mg Oral Daily    Vitamin D  1,000 Units Oral Daily    melatonin  6 mg Oral Nightly    amLODIPine  5 mg Oral BID    aspirin  81 mg Oral Daily    atorvastatin  80 mg Oral Daily    cilostazol  100 mg Oral BID    fluticasone  1 spray Nasal Daily    isosorbide mononitrate  60 mg Oral Daily    [Held by provider] lisinopril  20 mg Oral BID    montelukast  10 mg Oral Nightly    ranolazine  500 mg Oral BID    tamsulosin  0.4 mg Oral Daily    sodium chloride flush  10 mL Intravenous 2 times per day    dexamethasone  6 mg Oral Daily    remdesivir IVPB  100 mg Intravenous Q24H    budesonide-formoterol  2 puff Inhalation BID    And    tiotropium  2 puff Inhalation Daily     PRN Meds: nitroGLYCERIN, zolpidem, dextromethorphan-guaiFENesin, ipratropium-albuterol, sodium chloride flush, acetaminophen **OR** acetaminophen, polyethylene glycol, promethazine **OR** ondansetron, potassium chloride **OR** potassium alternative oral replacement **OR** potassium chloride, magnesium sulfate, sodium chloride    Labs:     Recent Labs     12/03/20 0405 12/04/20 0522 12/05/20  0600   WBC 9.7 11.2 9.6   HGB 10.5* 10.2* 10.7*   HCT 31.3* 31.4* 32.2*    190 203       Recent Labs     12/03/20 0405 12/04/20  0522 12/05/20  0600    131* 135   K 4.5  4.5 4.4  4.4 4.6  4.6    102 102   CO2 21* 20* 18*   BUN 58* 64* 57*   CREATININE 2.2* 2.2* 1.6*   CALCIUM 8.3* 8.3* 8.5*       Recent Labs     12/03/20  0405 12/04/20  0522 12/05/20  0600   PROT 6.1* 5.7* 5.8*   ALKPHOS 60 58 54   ALT 20 21 28   AST 27 29 33   BILITOT <0.2 <0.2 <0.2       No results for input(s): INR in the last 72 hours.     Recent Labs     12/04/20  1824   TROPONINI <0.01       Chronic labs:    Lab Results   Component Value Date    CHOL 179 03/11/2017    TRIG 97 03/11/2017    HDL 49 03/11/2017    LDLCALC 111 (H) 03/11/2017    TSH 1.411 04/25/2013    INR 1.0 12/19/2018    LABA1C 5.8 04/25/2013       Radiology: REVIEWED DAILY    +++++++++++++++++++++++++++++++++++++++++++++++++  [address], [place]  +++++++++++++++++++++++++++++++++++++++++++++++++  NOTE: This report was transcribed using voice recognition software. Every effort was made to ensure accuracy; however, inadvertent computerized transcription errors may be present.

## 2020-12-06 NOTE — PLAN OF CARE
Problem: Airway Clearance - Ineffective  Goal: Achieve or maintain patent airway  Outcome: Met This Shift     Problem: Falls - Risk of:  Goal: Absence of physical injury  Description: Absence of physical injury  Outcome: Met This Shift     Problem: Fatigue  Goal: Verbalize increase energy and improved vitality  Outcome: Met This Shift     Problem: Breathing Pattern - Ineffective  Goal: Ability to achieve and maintain a regular respiratory rate  Outcome: Met This Shift
Problem: Falls - Risk of:  Goal: Will remain free from falls  Description: Will remain free from falls  Outcome: Met This Shift  Goal: Absence of physical injury  Description: Absence of physical injury  Outcome: Met This Shift     Problem: Airway Clearance - Ineffective  Goal: Achieve or maintain patent airway  Outcome: Met This Shift
Problem: Falls - Risk of:  Goal: Will remain free from falls  Description: Will remain free from falls  Outcome: Met This Shift  Goal: Absence of physical injury  Description: Absence of physical injury  Outcome: Met This Shift     Problem: Airway Clearance - Ineffective  Goal: Achieve or maintain patent airway  Outcome: Met This Shift     Problem: Gas Exchange - Impaired  Goal: Absence of hypoxia  Outcome: Met This Shift  Goal: Promote optimal lung function  Outcome: Met This Shift     Problem: Breathing Pattern - Ineffective  Goal: Ability to achieve and maintain a regular respiratory rate  Outcome: Met This Shift     Problem:  Body Temperature -  Risk of, Imbalanced  Goal: Ability to maintain a body temperature within defined limits  Outcome: Met This Shift  Goal: Will regain or maintain usual level of consciousness  Outcome: Met This Shift  Goal: Complications related to the disease process, condition or treatment will be avoided or minimized  Outcome: Met This Shift     Problem: Isolation Precautions - Risk of Spread of Infection  Goal: Prevent transmission of infection  Outcome: Met This Shift     Problem: Nutrition Deficits  Goal: Optimize nutrtional status  Outcome: Met This Shift     Problem: Risk for Fluid Volume Deficit  Goal: Maintain normal heart rhythm  Outcome: Met This Shift  Goal: Maintain absence of muscle cramping  Outcome: Met This Shift  Goal: Maintain normal serum potassium, sodium, calcium, phosphorus, and pH  Outcome: Met This Shift     Problem: Loneliness or Risk for Loneliness  Goal: Demonstrate positive use of time alone when socialization is not possible  Outcome: Met This Shift     Problem: Fatigue  Goal: Verbalize increase energy and improved vitality  Outcome: Met This Shift     Problem: Patient Education: Go to Patient Education Activity  Goal: Patient/Family Education  Outcome: Met This Shift
Problem: Falls - Risk of:  Goal: Will remain free from falls  Outcome: Met This Shift  Goal: Absence of physical injury  Outcome: Met This Shift     Problem: Airway Clearance - Ineffective  Goal: Achieve or maintain patent airway  Outcome: Met This Shift     Problem: Gas Exchange - Impaired  Goal: Absence of hypoxia  Outcome: Met This Shift  Goal: Promote optimal lung function  Outcome: Met This Shift     Problem: Breathing Pattern - Ineffective  Goal: Ability to achieve and maintain a regular respiratory rate  Outcome: Met This Shift     Problem:  Body Temperature -  Risk of, Imbalanced  Goal: Ability to maintain a body temperature within defined limits  Outcome: Met This Shift  Goal: Will regain or maintain usual level of consciousness  Outcome: Met This Shift  Goal: Complications related to the disease process, condition or treatment will be avoided or minimized  Outcome: Met This Shift     Problem: Isolation Precautions - Risk of Spread of Infection  Goal: Prevent transmission of infection  Outcome: Met This Shift     Problem: Nutrition Deficits  Goal: Optimize nutrtional status  Outcome: Met This Shift     Problem: Risk for Fluid Volume Deficit  Goal: Maintain absence of muscle cramping  Outcome: Met This Shift  Goal: Maintain normal serum potassium, sodium, calcium, phosphorus, and pH  Outcome: Met This Shift     Problem: Loneliness or Risk for Loneliness  Goal: Demonstrate positive use of time alone when socialization is not possible  Outcome: Met This Shift     Problem: Fatigue  Goal: Verbalize increase energy and improved vitality  Outcome: Met This Shift     Problem: Patient Education: Go to Patient Education Activity  Goal: Patient/Family Education  Outcome: Met This Shift
Fatigue  Goal: Verbalize increase energy and improved vitality  Outcome: Met This Shift     Problem: Patient Education: Go to Patient Education Activity  Goal: Patient/Family Education  Outcome: Met This Shift

## 2020-12-07 NOTE — DISCHARGE SUMMARY
Hospitalist Discharge Summary    Patient ID: Nydia Narayanan Sr.   Patient : 1950  Patient's PCP: Brock Bhatti DO    Admit Date: 2020   Admitting Physician: Monica Walters DO    Discharge Date:  2020   Discharge Physician: Monica Walters DO   Discharge Condition: Stable  Discharge Disposition: [address]  course in brief:  (Please refer to daily progress notes for a comprehensive review of the hospitalization by requesting medical records)  Mr. Darnell Schofield, a 69y.o. year old male with past medical history which includes heart failure with preserved ejection fraction, coronary artery disease, COPD and chronically oxygen dependent on 3 L at baseline who presents emergency department with complaints of shortness of breath worsening over the last several days. He was seen in this ER yesterday for similar complaint. At that time he was noted to have a fever and wheezing on exam.  He was given a prescription for prednisone and discharged home. This morning when he was called about blood cultures that were drawn yesterday and told that they were positive. Otherwise denies any chest pain, headache, dizziness, body aches, abdominal pain, nausea, vomiting, diarrhea. Patient states his wife is also COVID-19 positive and negative infection from her. She works at [address] [place] [address] [place].     On arrival to the emergency department patient's vital signs were assessed and found to be within normal limits and stable. He is satting 99% on 4 L nasal cannula which is his baseline. Physical exam was unremarkable. Laboratory studies notable for creatinine 1.4, BNP 1754, D-dimer 462. Chest x-ray unremarkable. CTA chest is pending. EKG reveals an incomplete left bundle branch block.     Patient was admitted. Seen by pulmonology, infectious disease, nephrology. Was started on remdesivir, steroids and vitamins.   Over the course of hospital admission was able to maintain oxygen saturations at baseline home O2 of 4 L nasal cannula. Renal function improved. Clinically patient improved and was discharged home on 12/6/2020        Consults:   IP CONSULT TO HOSPITALIST  IP CONSULT TO PHARMACY  IP CONSULT TO INFECTIOUS DISEASES  IP CONSULT TO PULMONOLOGY  IP CONSULT TO NEPHROLOGY    Discharge Diagnoses:  COVID-19 pneumonia  Stage II RAMON      Discharge Instructions / Follow up:    No future appointments. Continued appropriate risk factor modification of blood pressure, diabetes and serum lipids will remain essential to reducing risk of future atherosclerotic development    Activity: activity as tolerated    Significant labs:  CBC:   Recent Labs     12/05/20  0600 12/06/20  0415   WBC 9.6 8.1   RBC 3.51* 3.52*   HGB 10.7* 10.6*   HCT 32.2* 32.1*   MCV 91.7 91.2   RDW 13.2 13.3    198     BMP:   Recent Labs     12/05/20  0600 12/06/20  0415    136   K 4.6  4.6 4.6  4.6    106   CO2 18* 25   BUN 57* 47*   CREATININE 1.6* 1.2     LFT:  Recent Labs     12/05/20  0600 12/06/20  0415   PROT 5.8* 5.6*   ALKPHOS 54 54   ALT 28 36   AST 33 37   BILITOT <0.2 0.2     PT/INR: No results for input(s): INR, APTT in the last 72 hours. BNP: No results for input(s): BNP in the last 72 hours.   Hgb A1C:   Lab Results   Component Value Date    LABA1C 5.8 04/25/2013     Folate and B12:   Lab Results   Component Value Date    CSOVYMGI58 927 12/04/2020   ,   Lab Results   Component Value Date    FOLATE 16.8 12/04/2020     Thyroid Studies:   Lab Results   Component Value Date    TSH 1.411 04/25/2013       Urinalysis:    Lab Results   Component Value Date    NITRU Negative 11/30/2020    WBCUA 0-1 09/23/2014    BACTERIA RARE 09/23/2014    RBCUA 0-1 09/23/2014    BLOODU Negative 11/30/2020    SPECGRAV 1.010 11/30/2020    GLUCOSEU Negative 11/30/2020       Imaging:  Xr Chest Portable    Result Date: 12/3/2020  EXAMINATION: ONE XRAY VIEW OF THE CHEST 12/3/2020 12:23 pm COMPARISON: 12/02/2020 HISTORY: ORDERING SYSTEM PROVIDED HISTORY: f/u covid TECHNOLOGIST PROVIDED HISTORY: Reason for exam:->f/u covid What reading provider will be dictating this exam?->CRC FINDINGS: There is improved aeration of the right and left lungs. There are resolving patchy infiltrates seen within the lateral aspect of the right and left lungs. There is hyperinflation of the lungs consistent with COPD. The heart is mildly enlarged. There is no evidence of failure. 1. Improved aeration of the right and left lungs with resolving pulmonary infiltrates. Xr Chest Portable    Result Date: 12/2/2020  EXAMINATION: ONE XRAY VIEW OF THE CHEST 12/2/2020 8:53 pm COMPARISON: None. HISTORY: ORDERING SYSTEM PROVIDED HISTORY: f/u covid TECHNOLOGIST PROVIDED HISTORY: Reason for exam:->f/u covid What reading provider will be dictating this exam?->CRC FINDINGS: Mild focal infiltrates noted at the lateral left lung base. The right lung is clear. The heart is mildly enlarged. No pneumothorax. The costophrenic angles are clear bilaterally. Mild focal infiltrates, left lung base. Cardiomegaly. Xr Chest Portable    Result Date: 12/1/2020  EXAMINATION: ONE XRAY VIEW OF THE CHEST 12/1/2020 12:24 pm COMPARISON: 11/30/2020 HISTORY: ORDERING SYSTEM PROVIDED HISTORY: Shortness of breath TECHNOLOGIST PROVIDED HISTORY: Reason for exam:->Shortness of breath What reading provider will be dictating this exam?->CRC FINDINGS: Cardiomediastinal silhouette is stable. There is stable atelectasis or scarring at the left lung base. No acute infiltrate, effusion, or pneumothorax is seen. No acute osseous abnormalities. Stable left lung base atelectasis or scarring.   No acute infiltrates are seen    Xr Chest Portable    Result Date: 11/30/2020  EXAMINATION: ONE XRAY VIEW OF THE CHEST 11/30/2020 7:09 pm COMPARISON: July 18, 2020 HISTORY: ORDERING SYSTEM PROVIDED HISTORY: chest pain TECHNOLOGIST PROVIDED HISTORY: Reason for exam:->chest pain What reading provider will be dictating this exam?->CRC FINDINGS: The heart is mildly enlarged. Focal atelectasis noted at the left lung base. The right lung is clear. The costophrenic angles are clear. Focal atelectasis, left lung base. Mild cardiomegaly. Cta Pulmonary W Contrast    Result Date: 12/1/2020  EXAMINATION: CTA OF THE CHEST 12/1/2020 3:06 pm TECHNIQUE: CTA of the chest was performed after the administration of intravenous contrast.  Multiplanar reformatted images are provided for review. MIP images are provided for review. Dose modulation, iterative reconstruction, and/or weight based adjustment of the mA/kV was utilized to reduce the radiation dose to as low as reasonably achievable. COMPARISON: None. HISTORY: ORDERING SYSTEM PROVIDED HISTORY: pe TECHNOLOGIST PROVIDED HISTORY: Reason for exam:->pe What reading provider will be dictating this exam?->CRC FINDINGS: No filling defect in the pulmonary arteries to suggest pulmonary embolism. The heart is normal in size. No pericardial effusion. Atherosclerotic calcifications are associated with the coronary arteries. Calcifications are associated with the aortic valve. There is hyperinflation of airspaces throughout both lungs notable in the upper lobes. There is peribronchial thickening in perihilar locations extending into right and left lower lobes with areas of atelectasis in lung bases. There are scattered areas of scarring. There is no pneumothorax. View of the upper abdomen shows normal bilateral adrenal glands. 1.  No pulmonary embolism. 2.  Emphysematous changes. 3.  Peribronchial inflammatory changes with distal areas of atelectasis notable in lower lobes.     Us Retroperitoneal Complete    Result Date: 12/3/2020  EXAMINATION: RETROPERITONEAL ULTRASOUND OF THE KIDNEYS AND URINARY BLADDER 12/3/2020 COMPARISON: CT abdomen and pelvis, 08/18/2019 HISTORY: ORDERING SYSTEM PROVIDED HISTORY: RAMON TECHNOLOGIST PROVIDED HISTORY: Reason for exam:->RAMON What reading provider will be dictating this exam?->CRC FINDINGS: Kidneys: The right kidney measures 11.0 cm in length and the left kidney measures 10.6 cm in length. Kidneys demonstrate normal cortical echogenicity. No evidence of hydronephrosis or intrarenal stones. Small 1 cm cyst is seen in in the right kidney. Bladder: Bladder is unremarkable in appearance. 1. Small right renal cyst.  Otherwise, unremarkable sonogram of the kidneys. 2. Unremarkable sonogram of the bladder. Discharge Medications:      Medication List      START taking these medications    ascorbic acid 500 MG tablet  Commonly known as:  VITAMIN C  Take 1 tablet by mouth daily     Vitamin D3 25 MCG Tabs  Take 1 tablet by mouth daily     zinc sulfate 220 (50 Zn) MG capsule  Commonly known as:  ZINCATE  Take 1 capsule by mouth daily        CONTINUE taking these medications    acetaminophen 500 MG tablet  Commonly known as:  TYLENOL     amLODIPine 5 MG tablet  Commonly known as:  NORVASC     aspirin 81 MG chewable tablet  Take 1 tablet by mouth daily.      atorvastatin 80 MG tablet  Commonly known as:  LIPITOR  Take 1 tablet by mouth daily     cilostazol 100 MG tablet  Commonly known as:  PLETAL  Take 1 tablet by mouth 2 times daily     Dextromethorphan-guaiFENesin  MG Tb12  Take 1 tablet by mouth 2 times daily as needed (Congestion)     fluticasone 50 MCG/ACT nasal spray  Commonly known as:  FLONASE     ipratropium-albuterol 0.5-2.5 (3) MG/3ML Soln nebulizer solution  Commonly known as:  DUONEB  Inhale 3 mLs into the lungs every 6 hours as needed for Shortness of Breath     isosorbide mononitrate 60 MG extended release tablet  Commonly known as:  IMDUR  Take 1 tablet by mouth daily     metoprolol succinate 25 MG extended release tablet  Commonly known as:  TOPROL XL  Take 3 tablets by mouth 2 times daily     montelukast 10 MG tablet  Commonly known as:  SINGULAIR     nitroGLYCERIN 0.4 MG SL tablet  Commonly known as:  NITROSTAT  Place 1 tablet under the

## 2020-12-08 NOTE — CARE COORDINATION
[facility] Transitions Initial Follow Up Call    Call within 2 business days of discharge: Yes    Patient: Raisa Ferreira Patient : 1950   MRN: [id number]  Reason for Admission: Covid 19 Virus detected  Discharge Date: 20 RARS: Readmission Risk Score: 28      Last Discharge [address] [address]       Complaint Diagnosis Description Type Department Provider    20 Shortness of Breath; Abnormal Lab COVID-19 virus detected . .. ED to Hosp-Admission (Discharged) (ADMITTED) [facility] [address] [address], DO; [address]. .. Spoke with: Raisa Ferreira., patient    [place] [facility] [place]       Patient contacted regarding [id number] diagnosis\". Discussed COVID-19 related testing which was available at this time. Test results were positive. Patient informed of results, if available? Yes    Care Transition Nurse/ Ambulatory Care Manager contacted the patient by telephone to perform post discharge assessment. Call within 2 business days of discharge: Yes. Verified name and  with patient as identifiers. Provided introduction to self, and explanation of the CTN/ACM role, and reason for call due to risk factors for infection and/or exposure to COVID-19. Symptoms reviewed with patient who verbalized the following symptoms: fatigue, cough, shortness of breath, loss of taste or smell, diarrhea, chest pain and weakness. Due to no new or worsening symptoms encounter was not routed to provider for escalation. Discussed follow-up appointments. If no appointment was previously scheduled, appointment scheduling offered: Patient has appt with Dr Janice Braden today at [phone], telephone visit  [facility] follow up appointment(s): No future appointments.   Non-[facility] follow up appointment(s): Patient has appt with Dr Janice Braden today at [phone], telephone visit    Non-face-to-face services provided:  Scheduled appointment with PCP-Patient has appt with Dr Janice Braden today at [phone], telephone visit  Scheduled appointment with Specialist-Patient to schedule follow up with Dr. Elizabeth Perez per AVS  Obtained and reviewed discharge summary and/or continuity of care documents     Patient has following risk factors of: heart failure and COPD. CTN/ACM reviewed discharge instructions, medical action plan and red flags such as increased shortness of breath, increasing fever and signs of decompensation with patient who verbalized understanding. Discussed exposure protocols and quarantine with CDC Guidelines What to do if you are sick with coronavirus disease 2019.  Patient was given an opportunity for questions and concerns. The patient agrees to contact the [facility] exposure line [phone], local health department [place] Department of Health: ([phone]) and PCP office for questions related to their healthcare. Patient reports he has phone numbers. Reviewed and educated patient on any new and changed medications related to discharge diagnosis     Plan for follow-up call in 5-7 days based on severity of symptoms and risk factors. Patient presented to the emergency department on 12/1/20 for sob, fever/chills, left sided chest pain. Patient reports breathing is not real good; however, oxygen is helping. Patient is utilizing oxygen 4l/min via nc continuous. Reviewed oxygen safety with patient. Patient reports sob upon exertion. Patient is doing deep breathing exercises. Patient reports a little cough with a little white sputum. Patient encouraged to lay prone. Patient denies fever/chills. Patient reports left chest pain is improving. Patient reports some diarrhea and weakness. Patient reports he is resting as well as ambulating throughout home. Patient reports he is using his nebulizer and Trelegy. Patient states he rinses after Trelegy. Patient denies cigarette smoking. Patient reports spouse covid positive. Patient reports brother-in-law delivering groceries.   Patient reports he needs to  Vitamin C; encouraged to eat foods high in Vitamin C. Patient states he did not know he was to discontinue Lisinopril. Patient to discuss with Dr. Hiram Mccarty today during telephone visit. Patient encouraged to drink lots of water and eat a high protein diet. Patient reports loss of taste. Patient states his wife just brought him food and he is hungry, requesting to end call. Patient denies any further needs, questions, or concerns at this time. Patient is agreeable to future follow up calls. Follow Up  No future appointments.     Tyler Trimble RN

## 2020-12-10 PROBLEM — U07.1 ACUTE RESPIRATORY FAILURE DUE TO SEVERE ACUTE RESPIRATORY SYNDROME CORONAVIRUS 2 (SARS-COV-2) INFECTION (HCC): Status: ACTIVE | Noted: 2020-01-01

## 2020-12-10 PROBLEM — J96.00 ACUTE RESPIRATORY FAILURE DUE TO SEVERE ACUTE RESPIRATORY SYNDROME CORONAVIRUS 2 (SARS-COV-2) INFECTION (HCC): Status: ACTIVE | Noted: 2020-01-01

## 2020-12-10 NOTE — ED NOTES
Pt O2 Saturation while standing dropped to 86%%. Pt C/O SOB. Pt still on 4L O2. Pt seated back in bed, 02% 90% at this time.       Danyelle Michelle RN  12/10/20 [address], RN  12/10/20 [id number]

## 2020-12-11 PROBLEM — U07.1 PNEUMONIA DUE TO COVID-19 VIRUS: Status: ACTIVE | Noted: 2020-01-01

## 2020-12-11 PROBLEM — D64.9 ANEMIA: Status: ACTIVE | Noted: 2020-01-01

## 2020-12-11 PROBLEM — J12.82 PNEUMONIA DUE TO COVID-19 VIRUS: Status: ACTIVE | Noted: 2020-01-01

## 2020-12-11 NOTE — ACP (ADVANCE CARE PLANNING)
Advance Care Planning     Advance Care Planning Activator (Inpatient)  Conversation Note      Date of ACP Conversation: 12/10/2020    Conversation Conducted with: Patient with Decision Making Capacity    ACP Activator: Maryanne Lim    *When Decision Maker makes decisions on behalf of the incapacitated patient: Decision Maker is asked to consider and make decisions based on patient values, known preferences, or best interests. Health Care Decision Maker:     Current Designated Health Care Decision Maker:   (If there is a valid [place] named in the [address] [place] [address] Makers\" box in the ACP activity, but it is not visible above, be sure to open that field and then select the health care decision maker relationship (ie \"primary\") in the blank space to the right of the name.) Validate  this information as still accurate & up-to-date; edit [place] field as needed.)    Note: Assess and validate information in current ACP documents, as indicated. If no Decision Maker listed above or available through scanned documents, then:    If no Authorized Decision Maker has previously been identified, then patient chooses [place]:  \"Who would you like to name as your primary health care decision-maker? \"               Name: Willian Walker        Relationship: son          Phone number: [phone]  Aneita Severin this person be reached easily? \" Yes  \"Who would you like to name as your back-up decision maker? \"   Name: Daisy Lora        Relationship: Wife          Phone number: [phone]  Aneita Severin this person be reached easily? \" Yes    Note: If the relationship of these Decision-Makers to the patient does NOT follow your state's Next of Kin hierarchy, recommend that patient complete ACP document that meets state-specific requirements to allow them to act on the patient's behalf when appropriate. Care Preferences    Ventilation:   \"If you were in your present state of health and suddenly became very ill and were unable to breathe on your own, what would your preference be about the use of a ventilator (breathing machine) if it were available to you? \"      Would the patient desire the use of ventilator (breathing machine)?: yes    \"If your health worsens and it becomes clear that your chance of recovery is unlikely, what would your preference be about the use of a ventilator (breathing machine) if it were available to you? \"     Would the patient desire the use of ventilator (breathing machine)?: No      Resuscitation  \"CPR works best to restart the heart when there is a sudden event, like a heart attack, in someone who is otherwise healthy. Unfortunately, CPR does not typically restart the heart for people who have serious health conditions or who are very sick. \"    \"In the event your heart stopped as a result of an underlying serious health condition, would you want attempts to be made to restart your heart (answer \"yes\" for attempt to resuscitate) or would you prefer a natural death (answer \"no\" for do not attempt to resuscitate)? \" yes      NOTE: If the patient has a valid advance directive AND now provides care preference(s) that are inconsistent with that prior directive, advise the patient to consider either: creating a new advance directive that complies with state-specific requirements; or, if that is not possible, orally revoking that prior directive in accordance with state-specific requirements, which must be documented in the EHR. [x] Yes   [] No   Educated Patient / Luisa Apgar regarding differences between Advance Directives and portable DNR orders.     Length of ACP Conversation in minutes:      Conversation Outcomes:  [] ACP discussion completed  [] Existing advance directive reviewed with patient; no changes to patient's previously recorded wishes  [] New Advance Directive completed  [] Portable Do Not Rescitate prepared for Provider review and signature  []

## 2020-12-11 NOTE — PROGRESS NOTES
Subjective:    Chief complaint:    Complaining of severity of complaints including diarrhea, shortness of breath on exertion, fatigue    Objective:    BP (!) 155/69   Pulse 72   Temp 97.6 °F (36.4 °C) (Infrared)   Resp 18   Ht 5' 10\" (1.778 m)   Wt 177 lb (80.3 kg)   SpO2 96%   BMI 25.40 kg/m²   General : Awake ,alert,no distress. Heart:  RRR, no murmurs, gallops, or rubs. Lungs:  CTA bilaterally, no wheeze, rales or rhonchi  Abd: bowel sounds present, nontender, nondistended, no masses  Extrem:  No clubbing, cyanosis, or edema    CBC:   Lab Results   Component Value Date    WBC 5.3 12/11/2020    RBC 3.91 12/11/2020    HGB 11.8 12/11/2020    HCT 36.2 12/11/2020    MCV 92.6 12/11/2020    MCH 30.2 12/11/2020    MCHC 32.6 12/11/2020    RDW 13.8 12/11/2020     12/11/2020    MPV 10.1 12/11/2020     BMP:    Lab Results   Component Value Date     12/11/2020    K 4.6 12/11/2020     12/11/2020    CO2 27 12/11/2020    BUN 28 12/11/2020    LABALBU 3.2 12/11/2020    CREATININE 0.8 12/11/2020    CALCIUM 8.6 12/11/2020    GFRAA >60 12/11/2020    LABGLOM >60 12/11/2020    GLUCOSE 133 12/11/2020     PT/INR:    Lab Results   Component Value Date    PROTIME 11.6 12/19/2018    INR 1.0 12/19/2018     Troponin:    Lab Results   Component Value Date    TROPONINI <0.01 12/10/2020       No results for input(s): LABURIN in the last 72 hours. No results for input(s): BC in the last 72 hours. No results for input(s): Maya Holes in the last 72 hours.       Current Facility-Administered Medications:     zinc sulfate (ZINCATE) capsule 50 mg, 50 mg, Oral, Daily, Mckenzie Potter MD, 50 mg at 12/11/20 1008    vitamin C (ASCORBIC ACID) tablet 500 mg, 500 mg, Oral, BID, Rebekah Fabry, MD, 500 mg at 12/11/20 0912    amLODIPine (NORVASC) tablet 5 mg, 5 mg, Oral, BID, Rebekah Fabry, MD, 5 mg at 12/11/20 0913    aspirin chewable tablet 81 mg, 81 mg, Oral, Daily, Rebekah Fabry, MD, 81 mg at 12/11/20 0912   atorvastatin (LIPITOR) tablet 80 mg, 80 mg, Oral, Daily, Tessa Helton MD, 80 mg at 12/11/20 0913    cilostazol (PLETAL) tablet 100 mg, 100 mg, Oral, BID, Tessa Helton MD, 100 mg at 12/11/20 0913    fluticasone (FLONASE) 50 MCG/ACT nasal spray 1 spray, 1 spray, Nasal, Daily, Tessa Helton MD, 1 spray at 12/11/20 0913    isosorbide mononitrate (IMDUR) extended release tablet 60 mg, 60 mg, Oral, Daily, Tessa Helton MD, 60 mg at 12/11/20 0913    metoprolol succinate (TOPROL XL) extended release tablet 75 mg, 75 mg, Oral, BID, Tessa Helton MD, 75 mg at 12/11/20 0913    montelukast (SINGULAIR) tablet 10 mg, 10 mg, Oral, Nightly, Tessa Helton MD    nitroGLYCERIN (NITROSTAT) SL tablet 0.4 mg, 0.4 mg, Sublingual, Q5 Min PRN, Tessa Helton MD    ranolazine (RANEXA) extended release tablet 500 mg, 500 mg, Oral, BID, Tessa Helton MD, 500 mg at 12/11/20 0913    tamsulosin (FLOMAX) capsule 0.4 mg, 0.4 mg, Oral, Daily, Tessa Helton MD, 0.4 mg at 12/11/20 0912    budesonide-formoterol (SYMBICORT) 160-4.5 MCG/ACT inhaler 2 puff, 2 puff, Inhalation, BID, 2 puff at 12/11/20 1152 **AND** tiotropium (SPIRIVA RESPIMAT) 2.5 MCG/ACT inhaler 2 puff, 2 puff, Inhalation, Daily, Tessa Helton MD, 2 puff at 12/11/20 1152    sodium chloride flush 0.9 % injection 10 mL, 10 mL, Intravenous, 2 times per day, Claudean Dicker, MD, 10 mL at 12/11/20 0913    sodium chloride flush 0.9 % injection 10 mL, 10 mL, Intravenous, PRN, Claudean Dicker, MD    acetaminophen (TYLENOL) tablet 650 mg, 650 mg, Oral, Q6H PRN **OR** acetaminophen (TYLENOL) suppository 650 mg, 650 mg, Rectal, Q6H PRN, Claudean Dicker, MD    polyethylene glycol (GLYCOLAX) packet 17 g, 17 g, Oral, Daily PRN, Claudean Dicker, MD    promethazine (PHENERGAN) tablet 12.5 mg, 12.5 mg, Oral, Q6H PRN **OR** ondansetron (ZOFRAN) injection 4 mg, 4 mg, Intravenous, Q6H PRN, Claudean Dicker, MD    enoxaparin (LOVENOX) injection 30 mg, 30 mg,

## 2020-12-11 NOTE — H&P
Hospital Medicine History & Physical      PCP: Radu Soto DO    Date of Admission: 12/10/2020    Date of Service: Pt seen/examined on 12/10/2020 and Admitted to Inpatient with expected LOS greater than two midnights due to medical therapy. Chief Complaint: Shortness of breath      History Of Present Illness:      69 y.o. male who presented to [facility] with past medical history of coronary artery disease status post stent, coronary disease, atrial fibrillation not anticoagulated, CHF, COPD, chronic respiratory failure on 3 L at home, hypertension, peripheral vascular disease, left subclavian artery stenosis, alcohol and tobacco dependence. Patient tested positive for COVID-19 viral infection on 12/1/2020. He was hospitalized from 12/1 through 12/6/2020 with Covid pneumonia and acute kidney injury. He was treated with remdesivir for 5 days and steroids. Was discharged on prednisone 40 mg daily for 5 days. Patient states that he started having shortness of breath day after discharge. This has been constant, moderate in intensity and worsened with activity, associated with cough productive of clear sputum. He has had chest pain on occasion. Reports diarrhea. No fever. No nausea or vomiting. No leg swelling. Appetite is preserved. Vital signs notable for blood pressure of 164/89. Saturation of 83% currently on 4 L, labs showed procalcitonin 0.09, proBNP 4425, hemoglobin 11.2 and negative troponin. Chest x-ray shows left lower lobe pneumonia and COPD EKG shows atrial fibrillation rate of 85. He is being admitted for further management.     Past Medical History:          Diagnosis Date    (HFpEF) heart failure with preserved ejection fraction ([facility] 07/22/2020 7/18/2020- echo- LVEF 60%    Acute MI (HCC)     Anxiety     Arthritis     Arthritis     hands, back     Atherosclerosis 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 ([address] 7/18/2019    Bilateral carotid artery stenosis 7/18/2019  CAD (coronary artery disease)     Carotid artery stenosis     Carotid bruit 6/5/2013    Carotid stenosis, right 6/5/2013    [facility]congestive heart failure) [facility]     COPD (chronic obstructive pulmonary disease) ([facility])     Headache(784.0)     Hyperlipidemia     Hypertension     PVD (peripheral vascular disease) ([facility] 4/26/2013    PVD (peripheral vascular disease) with claudication ([facility] [facility] 6/5/2013    STEMI (ST elevation myocardial infarction) ([facility] 10/7/2012    Subclavian artery stenosis, left ([facility] 4/26/2013    Traumatic retroperitoneal hematoma 4/26/2013       Past Surgical History:          Procedure Laterality Date    CARDIAC CATHETERIZATION  07/21/2020    Dr. Dinora Batista  03/13/2017    2.5/15 [place] balloon to RCA and RPL    CORONARY ANGIOPLASTY WITH STENT PLACEMENT  10/07/2012    Ion KILEY x2 to RCA per Dr. Joradn Villagomez  4/24/13    Stent MID RCA 3.5x38, 2.5X15 RPL    DIAGNOSTIC CARDIAC CATH LAB PROCEDURE  1998    [place] stent to the RCA    DIAGNOSTIC CARDIAC CATH LAB PROCEDURE  1999    balloon to prox RCA unsuccessful PTC of the ostium of the posterior descending bracnh of the RCA    DIAGNOSTIC CARDIAC CATH LAB PROCEDURE  2002    cardiac cath with PTCA cutting balloon angioplasty to the ostium of the posterior descending artery branch of the RC    ECHO COMPL W DOP COLOR FLOW  10/9/2012    Mod concentric LVH, EF 55%, stage II diastolic dysfunction    ECHO COMPL W DOP COLOR FLOW  4/25/2013            Medications Prior to Admission:      Prior to Admission medications    Medication Sig Start Date End Date Taking?  Authorizing Provider   zinc sulfate (ZINCATE) 220 (50 Zn) MG capsule Take 1 capsule by mouth daily 12/4/20  Yes Jorje Corbin DO   vitamin C (VITAMIN C) 500 MG tablet Take 1 tablet by mouth daily 12/4/20  Yes Jorje Corbin DO   Cholecalciferol (VITAMIN D) 25 MCG TABS Take 1 tablet by mouth daily 12/4/20  Yes Adonica Lety, DO   ipratropium-albuterol (DUONEB) 0.5-2.5 (3) MG/3ML SOLN nebulizer solution Inhale 3 mLs into the lungs every 6 hours as needed for Shortness of Breath 7/22/20  Yes Keira Patton MD   isosorbide mononitrate (IMDUR) 60 MG extended release tablet Take 1 tablet by mouth daily 7/23/20  Yes Ferny Washington MD   ranolazine (RANEXA) 500 MG extended release tablet Take 1 tablet by mouth 2 times daily 7/22/20  Yes Ferny Washington MD   metoprolol succinate (TOPROL XL) 25 MG extended release tablet Take 3 tablets by mouth 2 times daily 7/22/20  Yes Ferny Washington MD   amLODIPine (NORVASC) 5 MG tablet Take 5 mg by mouth 2 times daily   Yes Historical Provider, MD   acetaminophen (TYLENOL) 500 MG tablet Take 1,000 mg by mouth 2 times daily as needed for Pain   Yes Historical Provider, MD   fluticasone (FLONASE) 50 MCG/ACT nasal spray 1 spray by Nasal route daily  5/13/20  Yes Historical Provider, MD   montelukast (SINGULAIR) 10 MG tablet Take 10 mg by mouth nightly  5/13/20  Yes Historical Provider, MD   cilostazol (PLETAL) 100 MG tablet Take 1 tablet by mouth 2 times daily 1/3/20  Yes Jimmie Elaine MD   nitroGLYCERIN (NITROSTAT) 0.4 MG SL tablet Place 1 tablet under the tongue every 5 minutes as needed for Chest pain 8/26/19  Yes Juan Manuel Garcia MD   atorvastatin (LIPITOR) 80 MG tablet Take 1 tablet by mouth daily 8/26/19  Yes Juan Manuel Garcia MD   Fluticasone-Umeclidin-Vilant (TRELEGY ELLIPTA) 100-62.5-25 MCG/INH AEPB Inhale 1 puff into the lungs daily    Yes Historical Provider, MD   aspirin 81 MG chewable tablet Take 1 tablet by mouth daily. 9/27/14  Yes Roman Chairez MD   tamsulosin (FLOMAX) 0.4 MG capsule Take 0.4 mg by mouth daily  9/8/15   Historical Provider, MD       Allergies:  Bee pollen and Penicillins    Social History:      The patient currently lives at home. TOBACCO:   reports that he quit smoking about 7 years ago.  His smoking use included cigarettes. He started smoking about 53 years ago. He has a 45.00 pack-year smoking history. His smokeless tobacco use includes chew. ETOH:   reports current alcohol use. Family History:     Reviewed in detail Positive as follows:        Problem Relation Age of Onset    Heart Disease Mother          age 54    Heart Disease Father          66's     Cancer Father         kidney    Cancer Paternal Uncle         kidney       REVIEW OF SYSTEMS:   Pertinent positives as noted in the HPI. All other systems reviewed and negative. PHYSICAL EXAM:    BP (!) 180/70   Pulse 97   Temp 98.8 °F (37.1 °C) (Infrared)   Resp 20   Ht 5' 10\" (1.778 m)   Wt 177 lb (80.3 kg)   SpO2 96%   BMI 25.40 kg/m²     General appearance: Elderly male, moderate respiratory distress, appears stated age and cooperative. Afebrile. HEENT:  Normal cephalic, atraumatic without obvious deformity. Pupils equal, round, and reactive to light. Extra ocular muscles intact. Conjunctivae/corneas clear. Neck: Supple, with full range of motion. No jugular venous distention. Trachea midline. Respiratory: Increased work of breathing with conversational dyspnea. Diffuse crackles/wheezes/Rhonchi. Cardiovascular:  Regular rate and rhythm with normal S1/S2 without murmurs, rubs or gallops. Abdomen: Soft, non-tender, non-distended with normal bowel sounds. Musculoskeletal:  No clubbing, cyanosis or edema bilaterally. Full range of motion without deformity. Skin: Skin color, texture, turgor normal.  No rashes or lesions. Neurologic:  Neurovascularly intact without any focal sensory/motor deficits.  Cranial nerves: II-XII intact, grossly non-focal.  Psychiatric:  Alert and oriented, thought content appropriate, normal insight  Capillary Refill: Brisk,< 3 seconds   Peripheral Pulses: +2 palpable, equal bilaterally       Labs:     Recent Labs     12/10/20  1252   WBC 10.0   HGB 11.2*   HCT 33.9*        Recent Labs 12/10/20  1252      K 4.1      CO2 24   BUN 31*   CREATININE 1.0   CALCIUM 8.5*     Recent Labs     12/10/20  1252   AST 27   ALT 35   BILITOT 0.3   ALKPHOS 55     No results for input(s): INR in the last 72 hours. Recent Labs     12/10/20  1252   TROPONINI <0.01       Urinalysis:      Lab Results   Component Value Date    NITRU Negative 11/30/2020    WBCUA 0-1 09/23/2014    BACTERIA RARE 09/23/2014    RBCUA 0-1 09/23/2014    BLOODU Negative 11/30/2020    SPECGRAV 1.010 11/30/2020    GLUCOSEU Negative 11/30/2020       Radiology:   Reviewed and documented  XR CHEST (2 VW)   Final Result   1. Possible left lower lobe pneumonia. 2. COPD changes. ASSESSMENT:    Active Hospital Problems    Diagnosis Date Noted    Pneumonia due to COVID-19 virus [U07.1, J12.89] 12/11/2020    Anemia [D64.9] 12/11/2020    Acute respiratory failure due to severe acute respiratory syndrome coronavirus 2 (SARS-CoV-2) infection (HCC) [U07.1, J96.00] 12/10/2020    Chronic obstructive pulmonary disease with acute exacerbation (HCC) [J44.1] 07/16/2020    HTN (hypertension) [I10]     Atrial fibrillation (HCC) [I48.91] 05/26/2017    PVD [facility]peripheral vascular disease) [facility] [I73.9] 06/05/2013    CAD (coronary artery disease) [I25.10] 11/04/2012         PLAN:  1. Pneumonia secondary to COVID-19 viral infection. Patient has been restarted on remdesivir. Steroids. ID consult as needed. Supportive care. 2.  Acute on chronic respiratory failure, oxygen to keep sat above 92%. ABGs as needed. Pulmonology has been consulted. 3.  Anemia, monitor hemoglobin  4. COPD, mild acute exacerbation, inhalers and steroids. 5.  Hypertension, blood pressure is not elevated, continue home medication  6. Paroxysmal atrial fibrillation currently rate controlled. 7.  Peripheral vascular disease  8. Coronary artery disease status post stents.       DVT Prophylaxis: Lovenox  Diet: DIET CARDIAC;  Code Status: Full Code    PT/OT Eval Status: As needed    Dispo -inpatient/telemetry       Stephanie Vidales MD    Thank you Lewis Stacy DO for the opportunity to be involved in this patient's care.

## 2020-12-12 NOTE — PLAN OF CARE
Problem: Airway Clearance - Ineffective  Goal: Achieve or maintain patent airway  Outcome: Met This Shift     Problem: Gas Exchange - Impaired  Goal: Absence of hypoxia  Outcome: Met This Shift  Goal: Promote optimal lung function  Outcome: Met This Shift     Problem: Breathing Pattern - Ineffective  Goal: Ability to achieve and maintain a regular respiratory rate  Outcome: Met This Shift     Problem:  Body Temperature -  Risk of, Imbalanced  Goal: Ability to maintain a body temperature within defined limits  Outcome: Met This Shift  Goal: Will regain or maintain usual level of consciousness  Outcome: Met This Shift  Goal: Complications related to the disease process, condition or treatment will be avoided or minimized  Outcome: Met This Shift     Problem: Isolation Precautions - Risk of Spread of Infection  Goal: Prevent transmission of infection  Outcome: Met This Shift     Problem: Nutrition Deficits  Goal: Optimize nutrtional status  Outcome: Met This Shift     Problem: Risk for Fluid Volume Deficit  Goal: Maintain normal heart rhythm  Outcome: Met This Shift  Goal: Maintain absence of muscle cramping  Outcome: Met This Shift  Goal: Maintain normal serum potassium, sodium, calcium, phosphorus, and pH  Outcome: Met This Shift     Problem: Loneliness or Risk for Loneliness  Goal: Demonstrate positive use of time alone when socialization is not possible  Outcome: Met This Shift     Problem: Fatigue  Goal: Verbalize increase energy and improved vitality  Outcome: Met This Shift     Problem: Patient Education: Go to Patient Education Activity  Goal: Patient/Family Education  Outcome: Met This Shift     Problem: Falls - Risk of:  Goal: Will remain free from falls  Description: Will remain free from falls  Outcome: Met This Shift  Goal: Absence of physical injury  Description: Absence of physical injury  Outcome: Met This Shift

## 2020-12-12 NOTE — PROGRESS NOTES
Hospitalist Progress Note      Synopsis: Patient admitted on 12/10/2020     Subjective    Clinically improving. Feeling better. Stable overnight. No other overnight issues reported. He has had a great appetite  O2 needs now at 4 L  Exam:  BP (!) 160/72   Pulse 73   Temp 96.9 °F (36.1 °C) (Oral)   Resp 16   Ht 5' 10\" (1.778 m)   Wt 177 lb (80.3 kg)   SpO2 96%   BMI 25.40 kg/m²   General appearance: No apparent distress, appears stated age and cooperative. HEENT: Pupils equal, round, and reactive to light. Conjunctivae/corneas clear. Neck: Supple. No jugular venous distention. Trachea midline. Respiratory: Decreased but clear   cardiovascular: Regular rate and rhythm with normal S1/S2 without murmurs, rubs or gallops. Abdomen: Soft, non-tender, non-distended with normal bowel sounds. Musculoskeletal: No clubbing, cyanosis or edema bilaterally.  Skin:  No rashes    Neurologic: awake, alert and following commands     Medications:  Reviewed    Infusion Medications   Scheduled Medications    zinc sulfate  50 mg Oral Daily    vitamin C  500 mg Oral BID    amLODIPine  5 mg Oral BID    aspirin  81 mg Oral Daily    atorvastatin  80 mg Oral Daily    cilostazol  100 mg Oral BID    fluticasone  1 spray Nasal Daily    isosorbide mononitrate  60 mg Oral Daily    metoprolol succinate  75 mg Oral BID    montelukast  10 mg Oral Nightly    ranolazine  500 mg Oral BID    tamsulosin  0.4 mg Oral Daily    budesonide-formoterol  2 puff Inhalation BID    And    tiotropium  2 puff Inhalation Daily    sodium chloride flush  10 mL Intravenous 2 times per day    enoxaparin  30 mg Subcutaneous BID    dexamethasone  6 mg Oral Daily    remdesivir IVPB  100 mg Intravenous Q24H     PRN Meds: nitroGLYCERIN, loperamide, sodium chloride flush, acetaminophen **OR** acetaminophen, polyethylene glycol, promethazine **OR** ondansetron, sodium chloride    I/O    Intake/Output Summary (Last 24 hours) at 12/12/2020 [facility] filed at 12/12/2020 0857  Gross per 24 hour   Intake 120 ml   Output 1250 ml   Net -1130 ml       Labs:   Recent Labs     12/10/20  1252 12/11/20  0450   WBC 10.0 5.3   HGB 11.2* 11.8*   HCT 33.9* 36.2*    196       Recent Labs     12/10/20  1252 12/11/20  0450    137   K 4.1 4.6    103   CO2 24 27   BUN 31* 28*   CREATININE 1.0 0.8   CALCIUM 8.5* 8.6       Recent Labs     12/10/20  1252 12/11/20  0450   PROT 5.9* 6.3*   ALKPHOS 55 54   ALT 35 34   AST 27 27   BILITOT 0.3 0.3       No results for input(s): INR in the last 72 hours. Recent Labs     12/10/20  1252   TROPONINI <0.01       Chronic labs:  Lab Results   Component Value Date    CHOL 179 03/11/2017    TRIG 97 03/11/2017    HDL 49 03/11/2017    LDLCALC 111 (H) 03/11/2017    TSH 1.411 04/25/2013    INR 1.0 12/19/2018    LABA1C 5.8 04/25/2013       Radiology:   Xr Chest (2 Vw)    Result Date: 12/10/2020  EXAMINATION: TWO XRAY VIEWS OF THE CHEST 12/10/2020 1:32 pm COMPARISON: 12/03/2020 HISTORY: ORDERING SYSTEM PROVIDED HISTORY: sob TECHNOLOGIST PROVIDED HISTORY: Reason for exam:->sob What reading provider will be dictating this exam?->CRC FINDINGS: There is subtle opacity in the retrocardiac portion of the left lower lobe that could represent pneumonia. Otherwise, no acute airspace disease is seen. The lungs are hyperinflated with flattening of the diaphragm, consistent with COPD. The heart and mediastinum are unchanged and there is no evidence of vascular congestion. No significant effusion is seen. 1. Possible left lower lobe pneumonia. 2. COPD changes. Xr Chest Portable    Result Date: 12/3/2020  EXAMINATION: ONE XRAY VIEW OF THE CHEST 12/3/2020 12:23 pm COMPARISON: 12/02/2020 HISTORY: ORDERING SYSTEM PROVIDED HISTORY: f/u covid TECHNOLOGIST PROVIDED HISTORY: Reason for exam:->f/u covid What reading provider will be dictating this exam?->CRC FINDINGS: There is improved aeration of the right and left lungs. Mild cardiomegaly. Cta Pulmonary W Contrast    Result Date: 12/1/2020  EXAMINATION: CTA OF THE CHEST 12/1/2020 3:06 pm TECHNIQUE: CTA of the chest was performed after the administration of intravenous contrast.  Multiplanar reformatted images are provided for review. MIP images are provided for review. Dose modulation, iterative reconstruction, and/or weight based adjustment of the mA/kV was utilized to reduce the radiation dose to as low as reasonably achievable. COMPARISON: None. HISTORY: ORDERING SYSTEM PROVIDED HISTORY: pe TECHNOLOGIST PROVIDED HISTORY: Reason for exam:->pe What reading provider will be dictating this exam?->CRC FINDINGS: No filling defect in the pulmonary arteries to suggest pulmonary embolism. The heart is normal in size. No pericardial effusion. Atherosclerotic calcifications are associated with the coronary arteries. Calcifications are associated with the aortic valve. There is hyperinflation of airspaces throughout both lungs notable in the upper lobes. There is peribronchial thickening in perihilar locations extending into right and left lower lobes with areas of atelectasis in lung bases. There are scattered areas of scarring. There is no pneumothorax. View of the upper abdomen shows normal bilateral adrenal glands. 1.  No pulmonary embolism. 2.  Emphysematous changes. 3.  Peribronchial inflammatory changes with distal areas of atelectasis notable in lower lobes. Us Retroperitoneal Complete    Result Date: 12/3/2020  EXAMINATION: RETROPERITONEAL ULTRASOUND OF THE KIDNEYS AND URINARY BLADDER 12/3/2020 COMPARISON: CT abdomen and pelvis, 08/18/2019 HISTORY: ORDERING SYSTEM PROVIDED HISTORY: RAMON TECHNOLOGIST PROVIDED HISTORY: Reason for exam:->RAMON What reading provider will be dictating this exam?->CRC FINDINGS: Kidneys: The right kidney measures 11.0 cm in length and the left kidney measures 10.6 cm in length. Kidneys demonstrate normal cortical echogenicity.   No evidence of hydronephrosis or intrarenal stones. Small 1 cm cyst is seen in in the right kidney. Bladder: Bladder is unremarkable in appearance. 1. Small right renal cyst.  Otherwise, unremarkable sonogram of the kidneys. 2. Unremarkable sonogram of the bladder. ASSESSMENT:    Active Problems:    CAD (coronary artery disease)    PVD (peripheral vascular disease) with claudication (HCC)    Atrial fibrillation (HCC)    Chronic obstructive pulmonary disease with acute exacerbation (HCC)    HTN (hypertension)    Acute respiratory failure due to severe acute respiratory syndrome coronavirus 2 (SARS-CoV-2) infection ([address]    Pneumonia due to COVID-19 virus    Anemia  Resolved Problems:    * No resolved hospital problems. *       PLAN:    1. Patient was admitted and discharged from 12/1 to 12/6/2020 and did get remdesivir. He came back to the ER on the 10th with worsening shortness of breath. Last admission infectious disease was consulted. Patient did finish his remdesivir but he has qualified for it again and he is getting it again  2. Pulmonary has been reconsulted  3. Is this a reinfection or is this just COPD exacerbated more due to the previous recent COVID-19  4. Premorbid meds for A. fib coronary artery disease  5. Previous RAMON which is now stayed resolved  Await pulmonary's decision regarding if he is reinfected or reactive bronchospasm and lack of pulmonary hygiene due to viral infection  Diet: DIET CARDIAC;  Code Status: Full Code  PT/OT Eval Status:   Can order  DVT Prophylaxis:   In place  Recommended disposition at discharge:   Would recommend skilled    +++++++++++++++++++++++++++++++++++++++++++++++++  Yamil Pandey MD   [facility].  +++++++++++++++++++++++++++++++++++++++++++++++++  NOTE: This report was transcribed using voice recognition software. Every effort was made to ensure accuracy; however, inadvertent computerized transcription errors may be present.

## 2020-12-12 NOTE — CONSULTS
Pulmonary [facility]                             Pulmonary Consult/Progress Note :          Patient: Jono Martinez Sr.   MRN: [id number]  : 1950      Date of Admission: .12/10/2020  4:47 PM    Consulting Physician:Dr Surendra Bee     Reason for Consultation:  AKQOV-85 positive test (U07.1, COVID-19) with Acute Pneumonia (J12.89, Other viral pneumonia)  (If respiratory failure or sepsis present, add as separate assessment)      CC : Shortness of breath from unknown  HPI:   Valentine Sevilla. is a 69y.o. year old with about 15 PPY smoking who presented with worsening SOB and and ROSADO  ,he is known A fib on anticoagulation and chronic hypoxic respiratory failure on 3 L at home,    He had Covid seems to be diagnosed end of November, was hospitalized from  to , and be discharged, he received remdesivir and prednisone, he again developed worsening shortness of breath after discharge get worse over the last 24 hours before admission so he decided to come to the hospital where he was seen in the ER and saturation was 83 on 4 L, had blood work that showed slightly elevated and his proBNP 4000, normal pro-Pratik's, chest x-ray compatible with left lower lobe pneumonia      PAST MEDICAL HISTORY:   Past Medical History:   Diagnosis Date    (HFpEF) heart failure with preserved ejection fraction ([address] 2020- echo- LVEF 60%    Acute MI ([address]     Anxiety     Arthritis     Arthritis     hands, back     Atherosclerosis 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 ([address] 2019    Bilateral carotid artery stenosis 2019    CAD (coronary artery disease)     Carotid artery stenosis     Carotid bruit 2013    Carotid stenosis, right 2013    CHF (congestive heart failure) ([address]     COPD (chronic obstructive pulmonary disease) ([address]     Headache(784.0)     Hyperlipidemia     Hypertension     PVD [facility]peripheral vascular disease) [facility] 2013    PVD (peripheral vascular disease) with claudication ([facility] 2013    STEMI (ST elevation myocardial infarction) [facility] 10/7/2012    Subclavian artery stenosis, left ([facility] 2013    Traumatic retroperitoneal hematoma 2013       PAST SURGICAL HISTORY:   Past Surgical History:   Procedure Laterality Date    CARDIAC CATHETERIZATION  2020    Dr. Dionisio Mao  2017    2.5/15 [place] balloon to RCA and RPL    CORONARY ANGIOPLASTY WITH STENT PLACEMENT  10/07/2012    Ion KILEY x2 to RCA per Dr. Rick Li  13    Stent MID RCA 3.5x38, 2.5X15 RPL    DIAGNOSTIC CARDIAC CATH LAB PROCEDURE      [place] stent to the RCA    DIAGNOSTIC CARDIAC CATH LAB PROCEDURE      balloon to prox RCA unsuccessful PTC of the ostium of the posterior descending bracnh of the RCA    DIAGNOSTIC CARDIAC CATH LAB PROCEDURE      cardiac cath with PTCA cutting balloon angioplasty to the ostium of the posterior descending artery branch of the RC    ECHO COMPL W DOP COLOR FLOW  10/9/2012    Mod concentric LVH, EF 55%, stage II diastolic dysfunction    ECHO COMPL W DOP COLOR FLOW  2013            FAMILY HISTORY:   Family History   Problem Relation Age of Onset    Heart Disease Mother          age 54    Heart Disease Father          66's     Cancer Father         kidney    Cancer Paternal Uncle         kidney       SOCIAL HISTORY:   Social History     Socioeconomic History    Marital status:      Spouse name: Not on file    Number of children: Not on file    Years of education: Not on file    Highest education level: Not on file   Occupational History    Not on file   Social Needs    Financial resource strain: Not on file    Food insecurity     Worry: Not on file     Inability: Not on file    Transportation needs     Medical: Not on file Non-medical: Not on file   Tobacco Use    Smoking status: Former Smoker     Packs/day: 1.50     Years: 30.00     Pack years: 45.00     Types: Cigarettes     Start date: 1967     Quit date: 2013     Years since quittin.9    Smokeless tobacco: Current User     Types: Chew   Substance and Sexual Activity    Alcohol use: Yes     Comment: occ. 1 cup half and half coffee per day    Drug use: No    Sexual activity: Not on file   Lifestyle    Physical activity     Days per week: Not on file     Minutes per session: Not on file    Stress: Not on file   Relationships    Social connections     Talks on phone: Not on file     Gets together: Not on file     Attends Worship service: Not on file     Active member of club or organization: Not on file     Attends meetings of clubs or organizations: Not on file     Relationship status: Not on file    Intimate partner violence     Fear of current or ex partner: Not on file     Emotionally abused: Not on file     Physically abused: Not on file     Forced sexual activity: Not on file   Other Topics Concern    Not on file   Social History Narrative    Not on file     Social History     Tobacco Use   Smoking Status Former Smoker    Packs/day: 1.50    Years: 30.00    Pack years: 45.00    Types: Cigarettes    Start date: 1967   [address] [place] [address] Quit date: 2013    Years since quittin.9   Smokeless Tobacco Current User    Types: Chew     Social History     Substance and Sexual Activity   Alcohol Use Yes    Comment: occ. 1 cup half and half coffee per day     Social History     Substance and Sexual Activity   Drug Use No       OCCUPATIONAL HISTORY:            HOME MEDICATIONS:  Prior to Admission medications    Medication Sig Start Date End Date Taking?  Authorizing Provider   zinc sulfate (ZINCATE) 220 (50 Zn) MG capsule Take 1 capsule by mouth daily 20  Yes Carlos Gibson DO   vitamin C (VITAMIN C) 500 MG tablet Take 1 tablet by mouth daily 20  Yes Daily  vitamin C (ASCORBIC ACID) tablet 500 mg, 500 mg, Oral, BID  amLODIPine (NORVASC) tablet 5 mg, 5 mg, Oral, BID  aspirin chewable tablet 81 mg, 81 mg, Oral, Daily  atorvastatin (LIPITOR) tablet 80 mg, 80 mg, Oral, Daily  cilostazol (PLETAL) tablet 100 mg, 100 mg, Oral, BID  fluticasone (FLONASE) 50 MCG/ACT nasal spray 1 spray, 1 spray, Nasal, Daily  isosorbide mononitrate (IMDUR) extended release tablet 60 mg, 60 mg, Oral, Daily  metoprolol succinate (TOPROL XL) extended release tablet 75 mg, 75 mg, Oral, BID  montelukast (SINGULAIR) tablet 10 mg, 10 mg, Oral, Nightly  nitroGLYCERIN (NITROSTAT) SL tablet 0.4 mg, 0.4 mg, Sublingual, Q5 Min PRN  ranolazine (RANEXA) extended release tablet 500 mg, 500 mg, Oral, BID  tamsulosin (FLOMAX) capsule 0.4 mg, 0.4 mg, Oral, Daily  budesonide-formoterol (SYMBICORT) 160-4.5 MCG/ACT inhaler 2 puff, 2 puff, Inhalation, BID **AND** tiotropium (SPIRIVA RESPIMAT) 2.5 MCG/ACT inhaler 2 puff, 2 puff, Inhalation, Daily  loperamide (IMODIUM) capsule 2 mg, 2 mg, Oral, 4x Daily PRN  sodium chloride flush 0.9 % injection 10 mL, 10 mL, Intravenous, 2 times per day  sodium chloride flush 0.9 % injection 10 mL, 10 mL, Intravenous, PRN  acetaminophen (TYLENOL) tablet 650 mg, 650 mg, Oral, Q6H PRN **OR** acetaminophen (TYLENOL) suppository 650 mg, 650 mg, Rectal, Q6H PRN  polyethylene glycol (GLYCOLAX) packet 17 g, 17 g, Oral, Daily PRN  promethazine (PHENERGAN) tablet 12.5 mg, 12.5 mg, Oral, Q6H PRN **OR** ondansetron (ZOFRAN) injection 4 mg, 4 mg, Intravenous, Q6H PRN  enoxaparin (LOVENOX) injection 30 mg, 30 mg, Subcutaneous, BID  dexamethasone (DECADRON) tablet 6 mg, 6 mg, Oral, Daily  [COMPLETED] remdesivir 200 mg in sodium chloride 0.9 % 250 mL IVPB, 200 mg, Intravenous, Once **FOLLOWED BY** remdesivir 100 mg in sodium chloride 0.9 % 250 mL IVPB, 100 mg, Intravenous, Q24H  0.9 % sodium chloride bolus, 30 mL, Intravenous, PRN    IV MEDICATIONS:      ALLERGIES:  Allergies   Allergen Reactions    Bee Pollen Swelling     Severe reaction, hypotensive , tongue swelling    Penicillins Hives       REVIEW OF SYSTEMS:  General ROS:  No weight loss ,no fatigue     ENT ROS:   No Sore throat ,no lymphoadenopathy,no nasal stuffiness     Hematological and Lymphatic ROS:   No ecchymosis ,no tendency to bleed  Respiratory ROS:   Shortness of breath  Cardiovascular ROS:   No CP,No Palpitation   Gastrointestinal ROS:   No Gi bleed,no nausea or vomiting      - Musculoskeletal ROS:      - no joint swelling ,no joint pain   Neurological ROS:     -no weakness or numbness    Dermatological ROS:   No skin rash ,no urticaria     PHYSICAL EXAMINATION:     VITAL SIGNS:  BP (!) 160/72   Pulse 73   Temp 96.9 °F (36.1 °C) (Oral)   Resp 16   Ht 5' 10\" (1.778 m)   Wt 177 lb (80.3 kg)   SpO2 96%   BMI 25.40 kg/m²   Wt Readings from Last 3 Encounters:   12/10/20 177 lb (80.3 kg)   12/01/20 177 lb (80.3 kg)   11/30/20 177 lb (80.3 kg)     Temp Readings from Last 3 Encounters:   12/12/20 96.9 °F (36.1 °C) (Oral)   12/06/20 98.6 °F (37 °C) (Temporal)   11/30/20 100.7 °F (38.2 °C)     TMAX:  BP Readings from Last 3 Encounters:   12/12/20 (!) 160/72   12/06/20 (!) 168/75   11/30/20 (!) 168/82     Pulse Readings from Last 3 Encounters:   12/12/20 73   12/06/20 59   11/30/20 86           INTAKE/OUTPUTS:  I/O last 3 completed shifts:   In: 840 [P.O.:840]  Out: 1500 [Urine:1500]    Intake/Output Summary (Last 24 hours) at 12/12/2020 1136  Last data filed at 12/12/2020 0857  Gross per 24 hour   Intake 480 ml   Output 1600 ml   Net -1120 ml       General Appearance: alert and oriented to person, place and time, well-developed and   well-nourished, in no acute distress   Eyes: pupils equal, round, and reactive to light, extraocular eye movements intact, conjunctivae normal and sclera anicteric   Neck: neck supple and non tender without mass, no thyromegaly, no thyroid nodules and no cervical adenopathy   Pulmonary/Chest: Rhonchi bilaterally  Cardiovascular: normal rate, regular rhythm, normal S1 and S2, no murmurs, rubs, clicks or gallops, distal pulses intact, no carotid bruits, no murmurs, no gallops, no carotid bruits and no JVD   Abdomen: obese, soft, non-tender, non-distended, normal bowel sounds, no masses or organomegaly   Extremities: No edema  Musculoskeletal: normal range of motion, no joint swelling, deformity or tenderness   Neurologic: reflexes normal and symmetric, no cranial nerve deficit noted    LABS/IMAGING:    CBC:  Lab Results   Component Value Date    WBC 5.3 12/11/2020    HGB 11.8 (L) 12/11/2020    HCT 36.2 (L) 12/11/2020    MCV 92.6 12/11/2020     12/11/2020    LYMPHOPCT 6.5 (L) 12/10/2020    RBC 3.91 12/11/2020    MCH 30.2 12/11/2020    MCHC 32.6 12/11/2020    RDW 13.8 12/11/2020    NEUTOPHILPCT 83.1 (H) 12/10/2020    MONOPCT 8.0 12/10/2020    BASOPCT 0.2 12/10/2020    NEUTROABS 8.31 (H) 12/10/2020    LYMPHSABS 0.65 (L) 12/10/2020    MONOSABS 0.80 12/10/2020    EOSABS 0.07 12/10/2020    BASOSABS 0.02 12/10/2020       Recent Labs     12/11/20  0450 12/10/20  1252 12/06/20  0415   WBC 5.3 10.0 8.1   HGB 11.8* 11.2* 10.6*   HCT 36.2* 33.9* 32.1*   MCV 92.6 90.4 91.2    216 198       BMP:   Recent Labs     12/10/20  1252 12/11/20  0450    137   K 4.1 4.6    103   CO2 24 27   BUN 31* 28*   CREATININE 1.0 0.8       MG:   Lab Results   Component Value Date    MG 2.0 09/26/2014     Ca/Phos:   Lab Results   Component Value Date    CALCIUM 8.6 12/11/2020    PHOS 4.1 10/07/2012     Amylase:   Lab Results   Component Value Date    AMYLASE 49 05/26/2017     Lipase:   Lab Results   Component Value Date    LIPASE 24 05/26/2017     LIVER PROFILE:   Recent Labs     12/10/20  1252 12/11/20  0450   AST 27 27   ALT 35 34   BILIDIR  --  <0.2   BILITOT 0.3 0.3   ALKPHOS 55 54       PT/INR: No results for input(s): PROTIME, INR in the last 72 hours. APTT: No results for input(s):  APTT in the last 72 hours.     Cardiac Enzymes:  Lab Results   Component Value Date    CKTOTAL 71 07/21/2020    CKMB 3.0 07/21/2020    TROPONINI <0.01 12/10/2020               PROBLEM LIST:  Patient Active Problem List   Diagnosis    Arthritis    CAD (coronary artery disease)    Subclavian artery stenosis, left (HCC)    Carotid bruit    Carotid stenosis, right    PVD (peripheral vascular disease) [facility]audication [facility]    Chest pain in adult    Atrial fibrillation ([facility]    Bilateral carotid artery stenosis    Atherosclerosis 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 ([facility])    Chronic obstructive pulmonary disease with acute exacerbation ([facility])    HTN (hypertension)    Chronic diastolic congestive heart failure ([facility]    COVID-19    Acute respiratory failure due to severe acute respiratory syndrome coronavirus 2 (SARS-CoV-2) infection (HCC)    Pneumonia due to COVID-19 virus    Anemia               ASSESSMENT:  1.)  Acute COPD exacerbation   2.)questionable pneumonia left lower lobe  3.)  Recent Covid infection  4.)  A. fib  5.)  Moderate hypoxia      PLAN:  *-Patient presented with worsening shortness of breath , I really do not see pneumonia left lower lobe, procalcitonin has been normal, still its work-up for 5 days with antibiotics    *-I feel his symptoms more COPD exacerbation given how clear his chest x-ray  *-The infiltrate in the left lower lobe could be more atelectasis  *-Already on Symbicort  *_I will defer remdesivir to primary I do not feel that the patient needs it at this time  I will change the Decadron to 4 mg IV twice daily and then wean to 2 mg twice daily on discharge    Ambulate the patient and if at baseline oxygen can be discharged in the next 2 days    Thank you very much for allowing me to participate in the care of this pleasant patient , should you have any questions ,please do not hesitate to contact me      Luzma Richey MD,[facility]  Pulmonary&Critical Care Medicine   Director of Lung Nodule [place]  Medical Director of [address] [place] [address]    Sonia Montesinos    NOTE: This report was transcribed using voice recognition software. Every effort was made to ensure accuracy; however, inadvertent computerized transcription errors may be present.

## 2020-12-13 NOTE — PROGRESS NOTES
Hospitalist Progress Note      Synopsis: Patient admitted on 12/10/2020     Subjective    Clinically improving. Feeling better. Stable overnight. No other overnight issues reported. He has had a great appetite  O2 needs now at 4 L  December 13, 2020  No change in appetite  Feels good and oxygen still at 4 L  Is room temperature is at 67 degrees and is extremely cold  Exam:  BP (!) 160/74   Pulse 71   Temp 97.2 °F (36.2 °C)   Resp 20   Ht 5' 10\" (1.778 m)   Wt 177 lb (80.3 kg)   SpO2 95%   BMI 25.40 kg/m²   General appearance: No apparent distress, appears stated age and cooperative. HEENT: Pupils equal, round, and reactive to light. Conjunctivae/corneas clear. Neck: Supple. No jugular venous distention. Trachea midline. Respiratory: Decreased but clear   cardiovascular: Regular rate and rhythm with normal S1/S2 without murmurs, rubs or gallops. Abdomen: Soft, non-tender, non-distended with normal bowel sounds. Musculoskeletal: No clubbing, cyanosis or edema bilaterally.  Skin:  No rashes    Neurologic: awake, alert and following commands     Medications:  Reviewed    Infusion Medications   Scheduled Medications    dexamethasone  4 mg Intravenous Q12H    zinc sulfate  50 mg Oral Daily    vitamin C  500 mg Oral BID    amLODIPine  5 mg Oral BID    aspirin  81 mg Oral Daily    atorvastatin  80 mg Oral Daily    cilostazol  100 mg Oral BID    fluticasone  1 spray Nasal Daily    isosorbide mononitrate  60 mg Oral Daily    metoprolol succinate  75 mg Oral BID    montelukast  10 mg Oral Nightly    ranolazine  500 mg Oral BID    tamsulosin  0.4 mg Oral Daily    budesonide-formoterol  2 puff Inhalation BID    And    tiotropium  2 puff Inhalation Daily    sodium chloride flush  10 mL Intravenous 2 times per day    enoxaparin  30 mg Subcutaneous BID    remdesivir IVPB  100 mg Intravenous Q24H     PRN Meds: benzonatate, guaiFENesin-dextromethorphan, melatonin, nitroGLYCERIN, loperamide, sodium chloride flush, acetaminophen **OR** acetaminophen, polyethylene glycol, promethazine **OR** ondansetron, sodium chloride    I/O    Intake/Output Summary (Last 24 hours) at 12/13/2020 1427  Last data filed at 12/12/2020 2136  Gross per 24 hour   Intake 240 ml   Output 225 ml   Net 15 ml       Labs:   Recent Labs     12/11/20  0450   WBC 5.3   HGB 11.8*   HCT 36.2*          Recent Labs     12/11/20  0450      K 4.6      CO2 27   BUN 28*   CREATININE 0.8   CALCIUM 8.6       Recent Labs     12/11/20  0450   PROT 6.3*   ALKPHOS 54   ALT 34   AST 27   BILITOT 0.3       No results for input(s): INR in the last 72 hours. No results for input(s): Reyne Pashto in the last 72 hours. Chronic labs:  Lab Results   Component Value Date    CHOL 179 03/11/2017    TRIG 97 03/11/2017    HDL 49 03/11/2017    LDLCALC 111 (H) 03/11/2017    TSH 1.411 04/25/2013    INR 1.0 12/19/2018    LABA1C 5.8 04/25/2013       Radiology:   Xr Chest (2 Vw)    Result Date: 12/10/2020  EXAMINATION: TWO XRAY VIEWS OF THE CHEST 12/10/2020 1:32 pm COMPARISON: 12/03/2020 HISTORY: ORDERING SYSTEM PROVIDED HISTORY: sob TECHNOLOGIST PROVIDED HISTORY: Reason for exam:->sob What reading provider will be dictating this exam?->CRC FINDINGS: There is subtle opacity in the retrocardiac portion of the left lower lobe that could represent pneumonia. Otherwise, no acute airspace disease is seen. The lungs are hyperinflated with flattening of the diaphragm, consistent with COPD. The heart and mediastinum are unchanged and there is no evidence of vascular congestion. No significant effusion is seen. 1. Possible left lower lobe pneumonia. 2. COPD changes.     Xr Chest Portable    Result Date: 12/3/2020  EXAMINATION: ONE XRAY VIEW OF THE CHEST 12/3/2020 12:23 pm COMPARISON: 12/02/2020 HISTORY: ORDERING SYSTEM PROVIDED HISTORY: f/u covid TECHNOLOGIST PROVIDED HISTORY: Reason for exam:->f/u covid What reading provider will be dictating this exam?->CRC FINDINGS: There is improved aeration of the right and left lungs. There are resolving patchy infiltrates seen within the lateral aspect of the right and left lungs. There is hyperinflation of the lungs consistent with COPD. The heart is mildly enlarged. There is no evidence of failure. 1. Improved aeration of the right and left lungs with resolving pulmonary infiltrates. Xr Chest Portable    Result Date: 12/2/2020  EXAMINATION: ONE XRAY VIEW OF THE CHEST 12/2/2020 8:53 pm COMPARISON: None. HISTORY: ORDERING SYSTEM PROVIDED HISTORY: f/u covid TECHNOLOGIST PROVIDED HISTORY: Reason for exam:->f/u covid What reading provider will be dictating this exam?->CRC FINDINGS: Mild focal infiltrates noted at the lateral left lung base. The right lung is clear. The heart is mildly enlarged. No pneumothorax. The costophrenic angles are clear bilaterally. Mild focal infiltrates, left lung base. Cardiomegaly. Xr Chest Portable    Result Date: 12/1/2020  EXAMINATION: ONE XRAY VIEW OF THE CHEST 12/1/2020 12:24 pm COMPARISON: 11/30/2020 HISTORY: ORDERING SYSTEM PROVIDED HISTORY: Shortness of breath TECHNOLOGIST PROVIDED HISTORY: Reason for exam:->Shortness of breath What reading provider will be dictating this exam?->CRC FINDINGS: Cardiomediastinal silhouette is stable. There is stable atelectasis or scarring at the left lung base. No acute infiltrate, effusion, or pneumothorax is seen. No acute osseous abnormalities. Stable left lung base atelectasis or scarring. No acute infiltrates are seen    Xr Chest Portable    Result Date: 11/30/2020  EXAMINATION: ONE XRAY VIEW OF THE CHEST 11/30/2020 7:09 pm COMPARISON: July 18, 2020 HISTORY: ORDERING SYSTEM PROVIDED HISTORY: chest pain TECHNOLOGIST PROVIDED HISTORY: Reason for exam:->chest pain What reading provider will be dictating this exam?->CRC FINDINGS: The heart is mildly enlarged. Focal atelectasis noted at the left lung base.  The right lung is clear. The costophrenic angles are clear. Focal atelectasis, left lung base. Mild cardiomegaly. Cta Pulmonary W Contrast    Result Date: 12/1/2020  EXAMINATION: CTA OF THE CHEST 12/1/2020 3:06 pm TECHNIQUE: CTA of the chest was performed after the administration of intravenous contrast.  Multiplanar reformatted images are provided for review. MIP images are provided for review. Dose modulation, iterative reconstruction, and/or weight based adjustment of the mA/kV was utilized to reduce the radiation dose to as low as reasonably achievable. COMPARISON: None. HISTORY: ORDERING SYSTEM PROVIDED HISTORY: pe TECHNOLOGIST PROVIDED HISTORY: Reason for exam:->pe What reading provider will be dictating this exam?->CRC FINDINGS: No filling defect in the pulmonary arteries to suggest pulmonary embolism. The heart is normal in size. No pericardial effusion. Atherosclerotic calcifications are associated with the coronary arteries. Calcifications are associated with the aortic valve. There is hyperinflation of airspaces throughout both lungs notable in the upper lobes. There is peribronchial thickening in perihilar locations extending into right and left lower lobes with areas of atelectasis in lung bases. There are scattered areas of scarring. There is no pneumothorax. View of the upper abdomen shows normal bilateral adrenal glands. 1.  No pulmonary embolism. 2.  Emphysematous changes. 3.  Peribronchial inflammatory changes with distal areas of atelectasis notable in lower lobes. Us Retroperitoneal Complete    Result Date: 12/3/2020  EXAMINATION: RETROPERITONEAL ULTRASOUND OF THE KIDNEYS AND URINARY BLADDER 12/3/2020 COMPARISON: CT abdomen and pelvis, 08/18/2019 HISTORY: ORDERING SYSTEM PROVIDED HISTORY: RAMON TECHNOLOGIST PROVIDED HISTORY: Reason for exam:->RAMON What reading provider will be dictating this exam?->CRC FINDINGS: Kidneys:  The right kidney measures 11.0 cm in length and the left kidney measures 10.6 cm in length. Kidneys demonstrate normal cortical echogenicity. No evidence of hydronephrosis or intrarenal stones. Small 1 cm cyst is seen in in the right kidney. Bladder: Bladder is unremarkable in appearance. 1. Small right renal cyst.  Otherwise, unremarkable sonogram of the kidneys. 2. Unremarkable sonogram of the bladder. ASSESSMENT:    Active Problems:    CAD (coronary artery disease)    PVD (peripheral vascular disease) with claudication (HCC)    Atrial fibrillation (HCC)    Chronic obstructive pulmonary disease with acute exacerbation (HCC)    HTN (hypertension)    Acute respiratory failure due to severe acute respiratory syndrome coronavirus 2 (SARS-CoV-2) infection ([facility]    Pneumonia due to COVID-19 virus    Anemia  Resolved Problems:    * No resolved hospital problems. *       PLAN:    1. Patient was admitted and discharged from 12/1 to 12/6/2020 and did get remdesivir. He came back to the ER on the 10th with worsening shortness of breath. Last admission infectious disease was consulted. Patient did finish his remdesivir but he has qualified for it again and he is getting it again  2. Pulmonary has been reconsulted  3. Is this a reinfection or is this just COPD exacerbated more due to the previous recent COVID-19  4. Premorbid meds for A. fib coronary artery disease  5. Previous RAMON which is now stayed resolved  Await pulmonary's decision regarding if he is reinfected or reactive bronchospasm and lack of pulmonary hygiene due to viral infection  December 13, 2020  Patient appears to be stable and likely okay for discharge by tomorrow. Eating well.   His baseline is 4 L and he is at that for now  On IV steroids and likely appropriate for discharge by tomorrow  Diet: DIET CARDIAC;  Code Status: Full Code  PT/OT Eval Status:   Can order  DVT Prophylaxis:   In place  Recommended disposition at discharge:   Would recommend

## 2020-12-14 NOTE — PROGRESS NOTES
Hospitalist Progress Note      Synopsis: Patient admitted on 12/10/2020     Subjective    Clinically improving. Feeling better. Stable overnight. No other overnight issues reported. He has had a great appetite  O2 needs now at 4 L  December 13, 2020  No change in appetite  Feels good and oxygen still at 4 L  Is room temperature is at 67 degrees and is extremely cold    December 14, 2020  Continues to feel remarkably well. Exam:  BP (!) 162/70   Pulse 73   Temp 96.9 °F (36.1 °C) (Oral)   Resp 18   Ht 5' 10\" (1.778 m)   Wt 177 lb (80.3 kg)   SpO2 93%   BMI 25.40 kg/m²   General appearance: No apparent distress, appears stated age and cooperative. HEENT: Pupils equal, round, and reactive to light. Conjunctivae/corneas clear. Neck: Supple. No jugular venous distention. Trachea midline. Respiratory: Decreased but clear   cardiovascular: Regular rate and rhythm with normal S1/S2 without murmurs, rubs or gallops. Abdomen: Soft, non-tender, non-distended with normal bowel sounds. Musculoskeletal: No clubbing, cyanosis or edema bilaterally.  Skin:  No rashes    Neurologic: awake, alert and following commands     Medications:  Reviewed    Infusion Medications   Scheduled Medications    sodium chloride  1 spray Each Nostril BID    dexamethasone  4 mg Intravenous Q12H    zinc sulfate  50 mg Oral Daily    vitamin C  500 mg Oral BID    amLODIPine  5 mg Oral BID    aspirin  81 mg Oral Daily    atorvastatin  80 mg Oral Daily    cilostazol  100 mg Oral BID    fluticasone  1 spray Nasal Daily    isosorbide mononitrate  60 mg Oral Daily    metoprolol succinate  75 mg Oral BID    montelukast  10 mg Oral Nightly    ranolazine  500 mg Oral BID    tamsulosin  0.4 mg Oral Daily    budesonide-formoterol  2 puff Inhalation BID    And    tiotropium  2 puff Inhalation Daily    sodium chloride flush  10 mL Intravenous 2 times per day    enoxaparin  30 mg Subcutaneous BID    remdesivir IVPB  100 mg this exam?->CRC FINDINGS: The heart is mildly enlarged. Focal atelectasis noted at the left lung base. The right lung is clear. The costophrenic angles are clear. Focal atelectasis, left lung base. Mild cardiomegaly. Cta Pulmonary W Contrast    Result Date: 12/1/2020  EXAMINATION: CTA OF THE CHEST 12/1/2020 3:06 pm TECHNIQUE: CTA of the chest was performed after the administration of intravenous contrast.  Multiplanar reformatted images are provided for review. MIP images are provided for review. Dose modulation, iterative reconstruction, and/or weight based adjustment of the mA/kV was utilized to reduce the radiation dose to as low as reasonably achievable. COMPARISON: None. HISTORY: ORDERING SYSTEM PROVIDED HISTORY: pe TECHNOLOGIST PROVIDED HISTORY: Reason for exam:->pe What reading provider will be dictating this exam?->CRC FINDINGS: No filling defect in the pulmonary arteries to suggest pulmonary embolism. The heart is normal in size. No pericardial effusion. Atherosclerotic calcifications are associated with the coronary arteries. Calcifications are associated with the aortic valve. There is hyperinflation of airspaces throughout both lungs notable in the upper lobes. There is peribronchial thickening in perihilar locations extending into right and left lower lobes with areas of atelectasis in lung bases. There are scattered areas of scarring. There is no pneumothorax. View of the upper abdomen shows normal bilateral adrenal glands. 1.  No pulmonary embolism. 2.  Emphysematous changes. 3.  Peribronchial inflammatory changes with distal areas of atelectasis notable in lower lobes.     Us Retroperitoneal Complete    Result Date: 12/3/2020  EXAMINATION: RETROPERITONEAL ULTRASOUND OF THE KIDNEYS AND URINARY BLADDER 12/3/2020 COMPARISON: CT abdomen and pelvis, 08/18/2019 HISTORY: ORDERING SYSTEM PROVIDED HISTORY: RAMON TECHNOLOGIST PROVIDED HISTORY: Reason for exam:->RAMON What reading provider will be dictating this exam?->CRC FINDINGS: Kidneys: The right kidney measures 11.0 cm in length and the left kidney measures 10.6 cm in length. Kidneys demonstrate normal cortical echogenicity. No evidence of hydronephrosis or intrarenal stones. Small 1 cm cyst is seen in in the right kidney. Bladder: Bladder is unremarkable in appearance. 1. Small right renal cyst.  Otherwise, unremarkable sonogram of the kidneys. 2. Unremarkable sonogram of the bladder. ASSESSMENT:    Active Problems:    CAD (coronary artery disease)    PVD (peripheral vascular disease) with claudication (HCC)    Atrial fibrillation (HCC)    Chronic obstructive pulmonary disease with acute exacerbation (HCC)    HTN (hypertension)    Acute respiratory failure due to severe acute respiratory syndrome coronavirus 2 (SARS-CoV-2) infection ([facility] [address]    Pneumonia due to COVID-19 virus    Anemia  Resolved Problems:    * No resolved hospital problems. *       PLAN:    1. Patient was admitted and discharged from 12/1 to 12/6/2020 and did get remdesivir. He came back to the ER on the 10th with worsening shortness of breath. Last admission infectious disease was consulted. Patient did finish his remdesivir but he has qualified for it again and he is getting it again  2. Pulmonary has been reconsulted  3. Is this a reinfection or is this just COPD exacerbated more due to the previous recent COVID-19  4. Premorbid meds for A. fib coronary artery disease  5. Previous RAMON which is now stayed resolved  Await pulmonary's decision regarding if he is reinfected or reactive bronchospasm and lack of pulmonary hygiene due to viral infection  December 13, 2020  Patient appears to be stable and likely okay for discharge by tomorrow. Eating well. His baseline is 4 L and he is at that for now  On IV steroids and likely appropriate for discharge by tomorrow  December 14, 2020 possibly plan discharge for tomorrow.   He could get his last infusion a little bit earlier. Overall well and stable. Diet: DIET CARDIAC;  Code Status: Full Code  PT/OT Eval Status:   Can order  DVT Prophylaxis:   In place  Recommended disposition at discharge:   Would recommend skilled    +++++++++++++++++++++++++++++++++++++++++++++++++  Aidan Mckeon MD   [facility].  +++++++++++++++++++++++++++++++++++++++++++++++++  NOTE: This report was transcribed using voice recognition software. Every effort was made to ensure accuracy; however, inadvertent computerized transcription errors may be present.

## 2020-12-14 NOTE — PROGRESS NOTES
Physical Therapy  Physical Therapy Initial Assessment     Name: Christiane Perkins  : 1950  MRN: [id number]    Referring Provider: Rigo Louis MD    Date of Service: 2020    Evaluating PT: Vivienfoster Zamorano PT, DPT, [id number]    Room #: 5455/1264-H  Diagnosis: Acute respiratory failure due to severe respiratory syndrome coronavirus 2  PMHx/PSHx: HTN, OA, acute MI, CAD, HLD, anxiety, PVD, carotid artery stenosis, carotid bruit, COPD, CHF  Precautions: Fall risk, Droplet Plus Isolation (COVID-19), 4 L O2/min    SUBJECTIVE:    Pt lives with wife in a single story house with 2 stair(s) and 1 rail(s) to enter. Bed is on first floor and bath is on first floor. Full flight of stairs to basement. Pt ambulated without AD prior to admission. Pt is on 4 L O2/min at home. OBJECTIVE:   Initial Evaluation  Date: 20 Treatment Date: Short Term/ Long Term   Goals   AM-PAC 6 Clicks /     Was pt agreeable to Eval/treatment? Yes     Does pt have pain? No current complaints of pain     Bed Mobility  Rolling: NT  Supine to sit: NT  Sit to supine: NT  Scooting: NT  Rolling: Independent   Supine to sit: Independent   Sit to supine: Independent   Scooting: Independent    Transfers Sit to stand: Supervision  Stand to sit: Supervision  Stand pivot: Supervision without AD  Sit to stand: Independent   Stand to sit: Independent   Stand pivot: Independent    Ambulation   30, 50 feet without AD with Supervision  200 feet Independent    Stair negotiation: ascended and descended NT  10 step(s) with 1 rail(s) Mod Independent    ROM BUE: Refer to OT note  BLE: WFL     Strength BUE: Refer to OT note  BLE: WFL     Balance Sitting EOB: Independent   Dynamic Standing: Supervision without AD  Dynamic Standing: Independent      Pt is A & O x: 4 to person, place, month/year, and situation. Sensation: Pt denies numbness and tingling of extremities. Edema: Unremarkable.     Therapeutic Exercises:   5x STS from EOB with Supervision    Patient education  Pt educated on PT role in acute care setting. Patient response to education:   Pt verbalized understanding Pt demonstrated skill Pt requires further education in this area   Yes NA No     ASSESSMENT:    Vitals on 4 L O2/min:  After ambulating 10 feet without AD: O2 sat 94%, HR 76 bpm  After ambulating 50 feet without AD: O2 sat 93-94%, HR 82 bpm  After 5x STS exercise: O2 sat 93%, HR 86 bpm  Conclusion of session: O2 sat 92-94%, HR 83 bpm    Comments:   Pt was seated at EOB upon room entry, agreeable to PT evaluation. Vitals were monitored throughout session. Pt ambulates with slow gait speed with good steadiness. Pt completed transfers from multiple surfaces without difficulty. Pt reported feeling slightly SOB following ambulation but symptoms improved with time. Dyspnea was rated 6/10. Pt completed 5x STS exercise. Pt was left seated at EOB with all needs met at conclusion of session. Treatment:  Patient practiced and was instructed in the following treatment:     Therapeutic activities: Pt completed all therapeutic activities noted above. Vitals and symptoms were skillfully monitored with all activity and during rest breaks. Pt completed multiple transfers from surfaces of varying heights (EOB x7, commode x1). Pt ambulated x2 reps as endurance was challenged. Pt's/family goals:   1. To return home. Patient and or family understand(s) diagnosis, prognosis, and plan of care. Yes. PLAN:    Current Treatment Recommendations   [x] Strengthening     [] ROM   [x] Balance Training   [x] Endurance Training   [x] Transfer Training   [x] Gait Training   [x] Stair Training   [] Positioning   [] Safety and Education Training   [] Patient/Caregiver Education   [x] HEP  [] Other     PT care will be provided in accordance with the objectives noted above.  Exercises and functional mobility practice will be used as well as appropriate assistive devices or modalities to obtain goals. Patient and family education will also be administered as needed. Frequency of treatments: 2-5x/week x 2-3 days. Time in: 0910  Time out: 0930    Total Treatment Time 10 minutes     Evaluation Time includes thorough review of current medical information, gathering information on past medical history/social history and prior level of function, completion of standardized testing/informal observation of tasks, assessment of data and education on plan of care and goals.     CPT codes:  [] Low Complexity PT evaluation 79690  [x] Moderate Complexity PT evaluation 04628  [] High Complexity PT evaluation 51432  [] PT Re-evaluation 10378  [] Gait training 70086 0 minutes  [] Manual therapy 56655 0 minutes  [x] Therapeutic activities 92785 10 minutes  [] Therapeutic exercises 34185 0 minutes  [] Neuromuscular reeducation 23433 0 minutes     Matthew Lincoln, PT, DPT  [id number]

## 2020-12-14 NOTE — PROGRESS NOTES
Occupational Therapy  OCCUPATIONAL THERAPY INITIAL EVALUATION      Date:2020  Patient Name: Lennie Marr Sr. 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 ([address] 2019    Bilateral carotid artery stenosis 2019    CAD (coronary artery disease)     Carotid artery stenosis     Carotid bruit 2013    Carotid stenosis, right 2013    CHF (congestive heart failure) ([address]     COPD (chronic obstructive pulmonary disease) ([address]     Headache(784.0)     Hyperlipidemia     Hypertension     PVD (peripheral vascular disease) ([address] 2013    PVD (peripheral vascular disease) with claudication ([address] 2013    STEMI (ST elevation myocardial infarction) ([address] 10/7/2012    Subclavian artery stenosis, left ([address] 2013    Traumatic retroperitoneal hematoma 2013      Precautions:  Falls, Droplet plus (COVID-19),  O2     Home Living: Pt lives with wife in a 1 story home with 2 KOMAL and 1 hand rail.     Bathroom setup: walk-in shower and tub/shower combo   Equipment owned: none    Prior Level of Function: Independent with ADLs , Independent with IADLs; ambulated without AD  Driving: yes  Occupation: builds violins and guitars    Pain Level: Pt denies pain this session    Cognition: A&O: 4/4; Follows 2 step directions   Memory:  good   Sequencing:  good   Problem solving:  good   Judgement/safety: Fair+     Functional Assessment:   Initial Eval Status  Date: 12/14/20 Treatment Status  Date: Short Term Goals = Long Term Goals  Treatment frequency: 1-4x/wk; PRN   Feeding Independent       Grooming Supervision   Independent    UB Dressing Independent       LB Dressing Supervision   Independent    Bathing Supervision  Independent    Toileting Modified [place]       Bed Mobility  Supine to sit: NT   Sit to supine: NT   Supine to sit: Independent   Sit to supine: Independent    Functional Transfers Supervision    Independent    Functional Mobility Supervision     Ambulated in room without AD;  O2 sats   Independent    Balance Sitting:     Static:  indep    Dynamic:indep  Standing: mod I/ sup     Activity Tolerance F  Moderate activity  G   Visual/  Perceptual wfl                  Vitals: On 4L  Rest: O2 sat 94%, HR 74 bpm   ADLs, functional transfers, ambulation; O2 sat 92-93%, HR 78-88 bpm  Rest : O2 sat 94%, HR 78 bpm      Hand dominance: right     Strength ROM Additional Info:    RUE   4/5 wfl good  and wfl FMC/dexterity noted during ADL tasks     LUE 4/5 wfl good  and wfl FMC/dexterity noted during ADL tasks     Hearing: wfl  Sensation:wfl  Tone: wfl  Edema:none noted                             Comments: Upon arrival, patient seated at EOB and agreeable to OT Session. Pt demonstrating fair+ understanding of education/techniques, requiring additional training / education. At end of session, patient seated at EOB with call light and phone within reach, all lines intact. Pt would benefit from continued skilled OT to increase safety,  independence and quality of life.     Treatment:  OT services provided: Facilitation of functional transfers (various surfaces: bed, toilet), standing balance (impacting ADLs; addressing posture, weight shifting, dynamic reaching), functional transfers (various surfaces) and functional ambulation tasks without AD in room (including to/from bathroom ; cuing on posture and safety) - skilled cuing on sequencing, hand placement, posture, body mechanics, energy conservation techniques and safety. Therapist facilitated self-care retraining: simulated UB/LB self-care tasks,  toileting task and standing grooming tasks while educating pt on modified techniques, posture, safety and energy conservation techniques. Skilled monitoring of HR, O2 sats and pts response to treatment. Assessment of current deficits    Functional mobility [x]  ADLs [x] Strength [x]  Cognition []  Functional transfers  [x] IADLs [x] Safety Awareness [x]  Endurance [x]  Fine Motor Coordination [] Balance [x] Vision/perception [] Sensation []   Gross Motor Coordination [] ROM [] Delirium []                  Motor Control []      Plan of Care:  1-4 days/wk for 1-2 weeks PRN   Instruction/training on adapted ADL techniques and AE recommendations to increase functional independence within precautions  Training on energy conservation strategies/techniques to improve independence/tolerance for self-care routine  Functional transfer/mobility training/DME recommendations for increased independence, safety, and fall prevention  Patient/Family education to increase follow through with safety techniques and functional independence  Therapeutic exercise to improve motor endurance, ROM, and functional strength for ADLs/functional transfers  Therapeutic activities to facilitate/challenge dynamic balance, stand tolerance, fine motor dexterity/in-hand manipulation for increased independence with ADLs      Rehab Potential: Good for established goals     Patient / Family Goal: return home      Patient and/or family were instructed on functional diagnosis, prognosis/goals and OT plan of care. Demonstrated fair+ understanding.       AM-PAC Daily Activity Inpatient   How much help for putting on and taking off regular lower body clothing?: A Little  How much help for Bathing?: A Little  How much help for Toileting?: None  How much help for putting on and taking off regular upper body clothing?: None  How much help for taking care of personal grooming?: None  How much help for eating meals?: None  AM-PAC Inpatient Daily Activity Raw Score: 22  AM-PAC Inpatient ADL T-Scale Score : 47.1  ADL Inpatient CMS 0-100% Score: 25.8  ADL Inpatient CMS G-Code Modifier : CJ         Eval Complexity: Low    Time In: 940  Time Out: 1000  Total Treatment Time: 11    Min Units   OT Eval Low 97165  x  1   OT Eval Medium 79191      OT Eval High 49361       OT Re-Eval [id number]       Therapeutic Ex 80515       Therapeutic Activities 17040 8  1   ADL/Self Care 85313  3     Orthotic Management 25847       Neuro Re-Ed 24421       Non-Billable Time          Evaluation Time includes thorough review of current medical information, gathering information on past medical history/social history and prior level of function, completion of standardized testing/informal observation of tasks, assessment of data and education on plan of care and goals.            Maria Victoria Barnes OTR/L #[id number]

## 2020-12-14 NOTE — PROGRESS NOTES
Pulmonary [facility]                             Pulmonary Consult/Progress Note :                Covering Dr Alisa Hardy     HPI   Patient has been doing better  He is on 4 L and states his shortness of breath has improved      PHYSICAL EXAMINATION:     VITAL SIGNS:  BP (!) 178/88   Pulse 71   Temp 97.2 °F (36.2 °C)   Resp 20   Ht 5' 10\" (1.778 m)   Wt 177 lb (80.3 kg)   SpO2 93%   BMI 25.40 kg/m²   Wt Readings from Last 3 Encounters:   12/10/20 177 lb (80.3 kg)   12/01/20 177 lb (80.3 kg)   11/30/20 177 lb (80.3 kg)     Temp Readings from Last 3 Encounters:   12/13/20 97.2 °F (36.2 °C)   12/06/20 98.6 °F (37 °C) (Temporal)   11/30/20 100.7 °F (38.2 °C)     TMAX:  BP Readings from Last 3 Encounters:   12/13/20 (!) 178/88   12/06/20 (!) 168/75   11/30/20 (!) 168/82     Pulse Readings from Last 3 Encounters:   12/13/20 71   12/06/20 59   11/30/20 86           INTAKE/OUTPUTS:  No intake/output data recorded.   No intake or output data in the 24 hours ending 12/13/20 0801    General Appearance: alert and oriented to person, place and time, well-developed and   well-nourished, in no acute distress   Eyes: pupils equal, round, and reactive to light, extraocular eye movements intact, conjunctivae normal and sclera anicteric   Neck: neck supple and non tender without mass, no thyromegaly, no thyroid nodules and no cervical adenopathy   Pulmonary/Chest: Rhonchi bilaterally  Cardiovascular: normal rate, regular rhythm, normal S1 and S2, no murmurs, rubs, clicks or gallops, distal pulses intact, no carotid bruits, no murmurs, no gallops, no carotid bruits and no JVD   Abdomen: obese, soft, non-tender, non-distended, normal bowel sounds, no masses or organomegaly   Extremities: No edema  Musculoskeletal: normal range of motion, no joint swelling, deformity or tenderness   Neurologic: reflexes normal and symmetric, no cranial nerve deficit noted    LABS/IMAGING:    CBC:  Lab Results Component Value Date    WBC 5.3 12/11/2020    HGB 11.8 (L) 12/11/2020    HCT 36.2 (L) 12/11/2020    MCV 92.6 12/11/2020     12/11/2020    LYMPHOPCT 6.5 (L) 12/10/2020    RBC 3.91 12/11/2020    MCH 30.2 12/11/2020    MCHC 32.6 12/11/2020    RDW 13.8 12/11/2020    NEUTOPHILPCT 83.1 (H) 12/10/2020    MONOPCT 8.0 12/10/2020    BASOPCT 0.2 12/10/2020    NEUTROABS 8.31 (H) 12/10/2020    LYMPHSABS 0.65 (L) 12/10/2020    MONOSABS 0.80 12/10/2020    EOSABS 0.07 12/10/2020    BASOSABS 0.02 12/10/2020       Recent Labs     12/11/20  0450 12/10/20  1252 12/06/20  0415   WBC 5.3 10.0 8.1   HGB 11.8* 11.2* 10.6*   HCT 36.2* 33.9* 32.1*   MCV 92.6 90.4 91.2    216 198       BMP:   Recent Labs     12/11/20  0450      K 4.6      CO2 27   BUN 28*   CREATININE 0.8       MG:   Lab Results   Component Value Date    MG 2.0 09/26/2014     Ca/Phos:   Lab Results   Component Value Date    CALCIUM 8.6 12/11/2020    PHOS 4.1 10/07/2012     Amylase:   Lab Results   Component Value Date    AMYLASE 49 05/26/2017     Lipase:   Lab Results   Component Value Date    LIPASE 24 05/26/2017     LIVER PROFILE:   Recent Labs     12/11/20  0450   AST 27   ALT 34   BILIDIR <0.2   BILITOT 0.3   ALKPHOS 54       PT/INR: No results for input(s): PROTIME, INR in the last 72 hours. APTT: No results for input(s): APTT in the last 72 hours.     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)    Chronic obstructive pulmonary disease with acute exacerbation [facility]    HTN (hypertension)    Chronic diastolic congestive heart failure ([facility]    COVID-19    Acute respiratory failure due to severe acute respiratory syndrome coronavirus 2 (SARS-CoV-2) infection (HCC)    Pneumonia due to COVID-19 virus    Anemia               ASSESSMENT:  1.)  Acute COPD exacerbation   2.)questionable pneumonia left lower lobe  3.)  Recent Covid infection  4.)  A. fib  5.)  Moderate hypoxia      PLAN:  *-Continue to wean oxygen   *-Antibiotic for 5 days  *-I feel his symptoms more COPD exacerbation given how clear his chest x-ray  *-The infiltrate in the left lower lobe could be more atelectasis  *-Already on Symbicort  *_I will defer remdesivir to primary I do not feel that the patient needs it at this time  I will change the Decadron to 4 mg IV twice daily and then wean to 2 mg twice daily on discharge    Ambulate the patient and if at baseline oxygen can be discharged in the next 2 days    Dr. Naomy Gloria will follow in the morning    Luzma Richey MD,[facility]  Pulmonary&Critical Care Medicine   Director of [address] [place] [address] Director of [address] [place] [address]    Enrrique Zamarripa    NOTE: This report was transcribed using voice recognition software. Every effort was made to ensure accuracy; however, inadvertent computerized transcription errors may be present.

## 2020-12-14 NOTE — PLAN OF CARE
Problem: Gas Exchange - Impaired  Goal: Absence of hypoxia  Outcome: Met This Shift     Problem: Breathing Pattern - Ineffective  Goal: Ability to achieve and maintain a regular respiratory rate  Outcome: Met This Shift     Problem:  Body Temperature -  Risk of, Imbalanced  Goal: Ability to maintain a body temperature within defined limits  Outcome: Met This Shift  Goal: Will regain or maintain usual level of consciousness  Outcome: Met This Shift     Problem: Falls - Risk of:  Goal: Will remain free from falls  Description: Will remain free from falls  Outcome: Met This Shift  Goal: Absence of physical injury  Description: Absence of physical injury  Outcome: Met This Shift     Problem: Pain:  Goal: Pain level will decrease  Description: Pain level will decrease  Outcome: Ongoing  Goal: Control of acute pain  Description: Control of acute pain  Outcome: Ongoing

## 2020-12-15 NOTE — PROGRESS NOTES
CLINICAL PHARMACY: DISCHARGE MED RECONCILIATION/REVIEW    [facility]) Select Patient?: No  Total # of Interventions Recommended: 2 -   - Discontinued Medication #: 2   -   Total # Interventions Accepted: 2  Intervention Severity:   - Level 1 Intervention Present?: No   - Level 2 #: 2   - Level 3 #: 0   Time Spent (min): 30    Additional Documentation: See progress note.

## 2020-12-15 NOTE — PROGRESS NOTES
[facility] [address]   Department of Pharmacy   Pharmacist Transition of Care Services         Patient Demographics  Name:  Suad Poster Record Number:  [id number]  Gender:  male   Age:  69 y.o. YOB: 1950    Primary Care Physician: Lynn Valdivia DO  Primary Care Physician phone number:  [phone]  Readmission Risk (% from [facility] Patient List): 30%       Pharmacist Review and Summary of Medications     Date of last reviewed/update: 12/15/20     Category Comments   New Medication Started   1. Enoxaparin 30 mg sq BID x 10 days  2. Dexamethasone 4 mg q12h x 10 days         Change in Outpatient Medication        Discontinued/On hold Outpatient Medication         Other          Documentation of Pharmacist Interventions and Follow-up Plan: The following Pharmacist Transition of Care Services were completed:   [x]  Reviewed and summarized medication changes  []  Entire home medication list was reviewed for accuracy (sources: **)  []  Home medication list was updated or corrected     [x]  Reviewed discharge medication reconciliation  [x]  Discharge medication list was updated or corrected    [x]  Added information to AVS   []  Patient education was provided on new medications  []  Patient education was provided on medication changes  []  Reviewed the After Visit Summary (AVS) with patient    Additional Interventions:  [x]  Inpatient prescriber was contacted and the following pharmacy recommendations        were accepted: Clarified that patient can resume Trelegy Ellipta inhaler and patient does not need to start Symbicort/Spiriva. [x] Other interventions: [address] to notify them that Symbicort/Spiriva scripts are discontinued.          Pharmacist: Raul Cruz PharmD, [facility]  Date:  12/15/2020 2:29 PM  Time spent counseling patient face-to-face OR over the phone on medications: 0 minutes

## 2020-12-15 NOTE — PROGRESS NOTES
[facility]  [address], [address], [address] [place]       Pulmonary Progress Note      SUBJECTIVE:  Claims feeling some better with breathing close to baseline, on 4 li NC sitting up in bed, some weakness with physical activity.     OBJECTIVE    Medications    Continuous Infusions:    Scheduled Meds:   sodium chloride  1 spray Each Nostril BID    dexamethasone  4 mg Oral 2 times per day    zinc sulfate  50 mg Oral Daily    vitamin C  500 mg Oral BID    amLODIPine  5 mg Oral BID    aspirin  81 mg Oral Daily    atorvastatin  80 mg Oral Daily    cilostazol  100 mg Oral BID    fluticasone  1 spray Nasal Daily    isosorbide mononitrate  60 mg Oral Daily    metoprolol succinate  75 mg Oral BID    montelukast  10 mg Oral Nightly    ranolazine  500 mg Oral BID    tamsulosin  0.4 mg Oral Daily    budesonide-formoterol  2 puff Inhalation BID    And    tiotropium  2 puff Inhalation Daily    sodium chloride flush  10 mL Intravenous 2 times per day    enoxaparin  30 mg Subcutaneous BID       PRN Meds:benzonatate, guaiFENesin-dextromethorphan, melatonin, nitroGLYCERIN, loperamide, sodium chloride flush, acetaminophen **OR** acetaminophen, polyethylene glycol, promethazine **OR** ondansetron, sodium chloride    Physical    VITALS:  BP (!) 170/82   Pulse 78   Temp 97.7 °F (36.5 °C) (Infrared)   Resp 18   Ht 5' 10\" (1.778 m)   Wt 177 lb (80.3 kg)   SpO2 96%   BMI 25.40 kg/m²     24HR INTAKE/OUTPUT:      Intake/Output Summary (Last 24 hours) at 12/15/2020 1648  Last data filed at 12/15/2020 1318  Gross per 24 hour   Intake 540 ml   Output --   Net 540 ml       24HR PULSE OXIMETRY RANGE:    SpO2  Av %  Min: 96 %  Max: 96 %    General appearance: alert, appears stated age and cooperative  Lungs: rhonchi bilaterally with cough  Heart: regular rate and rhythm, S1, S2 normal, no murmur, click, rub or gallop  Abdomen: soft, non-tender;

## 2020-12-15 NOTE — DISCHARGE SUMMARY
Hospital Medicine Discharge Summary    Patient ID: Malachi James      Patient's PCP: Candance Bottcher, DO    Admit Date: 12/10/2020     Discharge Date:   12/15/2020    Admitting Physician: Cresencio Dean MD     Discharge Physician: Sacha Yanez MD     Discharge Diagnoses: Active Hospital Problems    Diagnosis Date Noted    Pneumonia due to COVID-19 virus [U07.1, J12.89] 12/11/2020    Anemia [D64.9] 12/11/2020    Acute respiratory failure due to severe acute respiratory syndrome coronavirus 2 (SARS-CoV-2) infection ([facility]) [U07.1, J96.00] 12/10/2020    Chronic obstructive pulmonary disease with acute exacerbation ([facility]) [J44.1] 07/16/2020    HTN (hypertension) [I10]     Atrial fibrillation (HCC) [I48.91] 05/26/2017    PVD (peripheral vascular disease) with claudication ([facility]) [I73.9] 06/05/2013    CAD (coronary artery disease) [I25.10] 11/04/2012         Hospital Course:     y.o. male who presented to [facility] with past medical history of coronary artery disease status post stent, coronary disease, atrial fibrillation not anticoagulated, CHF, COPD, chronic respiratory failure on 3 L at home, hypertension, peripheral vascular disease, left subclavian artery stenosis, alcohol and tobacco dependence. Patient tested positive for COVID-19 viral infection on 12/1/2020. He was hospitalized from 12/1 through 12/6/2020 with Covid pneumonia and acute kidney injury. He was treated with remdesivir for 5 days and steroids. Was discharged on prednisone 40 mg daily for 5 days. Patient states that he started having shortness of breath day after discharge. This has been constant, moderate in intensity and worsened with activity, associated with cough productive of clear sputum. He has had chest pain on occasion. Reports diarrhea. No fever. No nausea or vomiting. No leg swelling. Appetite is preserved.     Vital signs notable for blood pressure of 164/89.   Saturation of 83% currently on 4 L, labs showed procalcitonin 0.09, proBNP 4425, hemoglobin 11.2 and negative troponin. Chest x-ray shows left lower lobe pneumonia and COPD EKG shows atrial fibrillation rate of 85.     He is being admitted for further management. He was managed and seen by pulmonary services. He did well with remdesivir. He is being discharged today      Exam:     BP (!) 194/76 Comment: manual   Pulse 78   Temp 97.7 °F (36.5 °C) (Infrared)   Resp 18   Ht 5' 10\" (1.778 m)   Wt 177 lb (80.3 kg)   SpO2 96%   BMI 25.40 kg/m²     General appearance: No apparent distress, appears stated age and cooperative. HEENT: Pupils equal, round, and reactive to light. Conjunctivae/corneas clear. Neck: Supple, with full range of motion. No jugular venous distention. Trachea midline. Respiratory: Decreased but clear   cardiovascular: Regular rate and rhythm with normal S1/S2 without murmurs, rubs or gallops. Abdomen: Soft, non-tender, non-distended with normal bowel sounds. Musculoskeletal: No clubbing, cyanosis or edema bilaterally. Full range of motion without deformity. Skin: Skin color, texture, turgor normal.  No rashes or lesions. Neurologic:  Neurovascularly intact without any focal sensory/motor deficits. Cranial nerves: II-XII intact, grossly non-focal.  Psychiatric: Alert and oriented, thought content appropriate, normal insight      Consults:     IP CONSULT TO HOSPITALIST  IP CONSULT TO PULMONOLOGY  IP CONSULT TO PHARMACY    Significant Diagnostic Studies:   Xr Chest (2 Vw)    Result Date: 12/10/2020  1. Possible left lower lobe pneumonia. 2. COPD changes. Xr Chest Portable    Result Date: 12/3/2020  1. Improved aeration of the right and left lungs with resolving pulmonary infiltrates. Xr Chest Portable    Result Date: 12/2/2020  Mild focal infiltrates, left lung base. Cardiomegaly. Xr Chest Portable    Result Date: 12/1/2020  Stable left lung base atelectasis or scarring.   No acute infiltrates are seen    Xr Chest Portable    Result Date: 11/30/2020  Focal atelectasis, left lung base. Mild cardiomegaly. Cta Pulmonary W Contrast    Result Date: 12/1/2020  1. No pulmonary embolism. 2.  Emphysematous changes. 3.  Peribronchial inflammatory changes with distal areas of atelectasis notable in lower lobes. Us Retroperitoneal Complete    Result Date: 12/3/2020  1. Small right renal cyst.  Otherwise, unremarkable sonogram of the kidneys. 2. Unremarkable sonogram of the bladder. Disposition: Stable for home    Discharge Instructions/Follow-up: Given    Code Status:  Full Code     Activity: activity as tolerated    Diet: cardiac diet    Labs:  For convenience and continuity at follow-up the following most recent labs are provided:      CBC:    Lab Results   Component Value Date    WBC 13.6 12/14/2020    HGB 10.2 12/14/2020    HCT 30.3 12/14/2020     12/14/2020       Renal:    Lab Results   Component Value Date     12/14/2020    K 4.8 12/14/2020    K 4.6 12/11/2020     12/14/2020    CO2 27 12/14/2020    BUN 22 12/14/2020    CREATININE 0.7 12/14/2020    CALCIUM 8.5 12/14/2020    PHOS 4.1 10/07/2012       Discharge Medications:     Current Discharge Medication List           Details   guaiFENesin-dextromethorphan (ROBITUSSIN DM) 100-10 MG/5ML syrup Take 5 mLs by mouth every 4 hours as needed for Cough (use at night or 2nd after tessalon)  Qty: 120 mL, Refills: 0      enoxaparin (LOVENOX) 30 MG/0.3ML injection Inject 0.3 mLs into the skin 2 times daily  Qty: 20 Syringe, Refills: 0      benzonatate (TESSALON) 100 MG capsule Take 1 capsule by mouth 3 times daily as needed for Cough  Qty: 45 capsule, Refills: 0      budesonide-formoterol (SYMBICORT) 160-4.5 MCG/ACT AERO Inhale 2 puffs into the lungs 2 times daily  Qty: 1 Inhaler, Refills: 3      tiotropium (SPIRIVA RESPIMAT) 2.5 MCG/ACT AERS inhaler Inhale 2 puffs into the lungs daily  Qty: 2 Inhaler, Refills: 0      dexamethasone (DECADRON) 4 MG tablet Take 1 tablet by mouth every 12 hours for 10 days  Qty: 20 tablet, Refills: 0              Details   zinc sulfate (ZINCATE) 220 (50 Zn) MG capsule Take 1 capsule by mouth daily  Qty: 30 capsule, Refills: 3      vitamin C (VITAMIN C) 500 MG tablet Take 1 tablet by mouth daily  Qty: 30 tablet, Refills: 3      Cholecalciferol (VITAMIN D) 25 MCG TABS Take 1 tablet by mouth daily  Qty: 60 tablet, Refills: 0    Comments: Labeling may look different. 25 mcg=1000 Units. Please double check dosages. ipratropium-albuterol (DUONEB) 0.5-2.5 (3) MG/3ML SOLN nebulizer solution Inhale 3 mLs into the lungs every 6 hours as needed for Shortness of Breath  Qty: 360 mL, Refills: 2      isosorbide mononitrate (IMDUR) 60 MG extended release tablet Take 1 tablet by mouth daily  Qty: 30 tablet, Refills: 3      ranolazine (RANEXA) 500 MG extended release tablet Take 1 tablet by mouth 2 times daily  Qty: 60 tablet, Refills: 3      metoprolol succinate (TOPROL XL) 25 MG extended release tablet Take 3 tablets by mouth 2 times daily  Qty: 30 tablet, Refills: 3      amLODIPine (NORVASC) 5 MG tablet Take 5 mg by mouth 2 times daily      acetaminophen (TYLENOL) 500 MG tablet Take 1,000 mg by mouth 2 times daily as needed for Pain      fluticasone (FLONASE) 50 MCG/ACT nasal spray 1 spray by Nasal route daily       montelukast (SINGULAIR) 10 MG tablet Take 10 mg by mouth nightly       cilostazol (PLETAL) 100 MG tablet Take 1 tablet by mouth 2 times daily  Qty: 180 tablet, Refills: 3      nitroGLYCERIN (NITROSTAT) 0.4 MG SL tablet Place 1 tablet under the tongue every 5 minutes as needed for Chest pain  Qty: 25 tablet, Refills: 3      atorvastatin (LIPITOR) 80 MG tablet Take 1 tablet by mouth daily  Qty: 90 tablet, Refills: 3      Fluticasone-Umeclidin-Vilant (TRELEGY ELLIPTA) 100-62.5-25 MCG/INH AEPB Inhale 1 puff into the lungs daily       aspirin 81 MG chewable tablet Take 1 tablet by mouth daily.   Qty: 30 tablet, Refills: 3      tamsulosin (FLOMAX) 0.4 MG capsule Take 0.4 mg by mouth daily              Time Spent on discharge is more than 45 minutes in the examination, evaluation, counseling and review of medications and discharge plan.       Signed:    Landry Charles MD   12/15/2020

## 2020-12-16 NOTE — CARE COORDINATION
[facility] Transitions Initial Follow Up Call    Call within 2 business days of discharge: Yes    Patient: Joe Macias Patient : 1950   MRN: [id number]  Reason for Admission: Acute respiratory failure with hypoxia  Discharge Date: 12/15/20 RARS: Readmission Risk Score: 30      Last Discharge [facility]       Complaint Diagnosis Description Type Department Provider    12/10/20 Shortness of Breath; Positive For Covid-19 Acute respiratory failure with hypoxia ([address] . .. ED to Hosp-Admission (Discharged) (ADMITTED) JERARDO 6WE Sue Ferguson MD; STEPH Michel Spoke with: Joe Macias., patient    Facility: [facility]       Patient contacted regarding SKERS-21 diagnosis\". Discussed COVID-19 related testing which was available at this time. Test results were positive. Patient informed of results, if available? Yes    Care Transition Nurse/ Ambulatory Care Manager contacted the patient by telephone to perform post discharge assessment. Call within 2 business days of discharge: Yes. Verified name and  with patient as identifiers. Provided introduction to self, and explanation of the CTN/ACM role, and reason for call due to risk factors for infection and/or exposure to COVID-19. Symptoms reviewed with patient who verbalized the following symptoms: shortness of breath. Due to no new or worsening symptoms encounter was not routed to provider for escalation. Discussed follow-up appointments. If no appointment was previously scheduled, appointment scheduling offered: Patient reports appt with Dr. Pramod Holguin on 20  [facility] follow up appointment(s): No future appointments.   Non-[facility] follow up appointment(s): Dr. Pramod Holguin on 20    Non-face-to-face services provided:  Scheduled appointment with PCP-Patient reports appt with Dr. Pramod Holguin on 20  Obtained and reviewed discharge summary and/or continuity of care documents     Patient has following risk factors of: heart failure and COPD.     Plan for follow-up call in 3-5 days based on severity of symptoms and risk factors. Patient presented to the emergency department on 12/7/20 for shortness of breath. Patient c/o sob upon exertion. Patient reports he cannot take a breath. Patient states he has been laying prone. Patient raising voice and using profanity due to a cold room in the hospital, the  and other complaints. Patient reports he doesn't have his meds because last time it took days to obtain meds due to hospital.  This CTN contacted [facility] who reports all meds are available for  including one that patient has been ready since 12/9/20 that patient did not . Denice at [facility] also reports that patient does get notified when meds are ready. Because patient told this CTN he did not get notification from [facility] will send another notification to patient that meds are ready for . Patient agreeable to call from patient advocate; however, requests call not be made today. Due to patient's escalating agitation, this CTN politely ended call after asking if patient if a follow up call in the future would be acceptable. Patient agreeable. Follow Up  No future appointments.     Jackie Keating, RN

## 2020-12-16 NOTE — PROGRESS NOTES
Discharge instructions reviewed with patient. Patient demonstrated administrating lovenox to self. All questions answered.    Severo Lincoln

## 2020-12-21 NOTE — CARE COORDINATION
[facility] Transitions Follow Up Call    2020    Patient: Lazarus Ramsay Sr.  Patient : 1950   MRN: [id number]  Reason for Admission: Acute respiratory failure with hypoxia  Discharge Date: 12/15/20 RARS: Readmission Risk Score: 30         Spoke with: Lazarus Ramsay Sr., patient    Patient states he's ok. Then patient states he is not feeling well. When asked for clarification from patient, patient states \"it's everything. \"  Patient politely lets this CTN know he prefers not to talk; states \"thank you\" for calling though. No further outreach scheduled at this time due to inability to contact patient. Episode resolved. CTN to sign off. Follow Up  No future appointments.     Ladona Galeazzi, RN

## 2020-12-25 PROBLEM — R06.03 RESPIRATORY DISTRESS: Status: ACTIVE | Noted: 2020-01-01

## 2020-12-25 NOTE — ED NOTES
Bed: 10  Expected date:   Expected time:   Means of arrival:   Comments:  EMS     Ismael Reyes RN  12/25/20 3604

## 2020-12-25 NOTE — ED PROVIDER NOTES
Department of Emergency Medicine   ED  Provider Note  Admit Date/RoomTime: 12/25/2020  6:18 PM  ED Room: 10/10          History of Present Illness:  12/25/20, Time: 6:48 PM EST  Chief Complaint   Patient presents with    Shortness of Breath     pt discharged on the 15th from hospital. pt reports increased sob since discharge. pt was 78% on room air upon EMS arrival                Marlow Claude. is a 69 y.o. male presenting to the ED for SOB, beginning a few days. The complaint has been persistent, severe in severity, and worsened by nothing. Patient presents for shortness of breath. History of recent COVID-19 diagnosis, discharged home after be admitted for acute on chronic hypoxic respiratory failure on 12/15/2020. He reports he been doing well until 2 days ago when he started to develop some worsening shortness of breath. Normally on 4 L of oxygen, follows with Dr. Lita Medrano from pulmonology. Had turned himself up. Today called EMS when he started felt some left-sided chest discomfort and worsening shortness of breath. Was 78% on his home oxygen. EMS placed on nonrebreather and brought him in. He states cough is sounding \"wet\" and is having worsening swelling of his legs. Complains of worsening chills myalgias.     Review of Systems:   Pertinent positives and negatives are stated within HPI, all other systems reviewed and are negative.        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 ([address] Bilateral carotid artery stenosis, CAD (coronary artery disease), Carotid artery stenosis, Carotid bruit, Carotid stenosis, right, CHF (congestive heart failure) ([address] COPD (chronic obstructive pulmonary disease) ([address] Headache(784.0), Hyperlipidemia, Hypertension, PVD (peripheral vascular disease) ([address] PVD (peripheral vascular disease) with claudication ([address] STEMI (ST elevation myocardial infarction) ([facility] Subclavian artery stenosis, left ([address] and Traumatic retroperitoneal hematoma. Past Surgical History:  has a past surgical history that includes Coronary angioplasty with stent (10/07/2012); ECHO Compl W Dop Color Flow (10/9/2012); Colonoscopy; Coronary angioplasty; Diagnostic Cardiac Cath Lab Procedure (1998); Diagnostic Cardiac Cath Lab Procedure (1999); Diagnostic Cardiac Cath Lab Procedure (2002); Coronary angioplasty with stent (4/24/13); ECHO Compl W Dop Color Flow (4/25/2013); Coronary angioplasty (03/13/2017); and Cardiac catheterization (07/21/2020). Social History:  reports that he quit smoking about 7 years ago. His smoking use included cigarettes. He started smoking about 53 years ago. He has a 45.00 pack-year smoking history. His smokeless tobacco use includes chew. He reports current alcohol use. He reports that he does not use drugs. Family History: family history includes Cancer in his father and paternal uncle; Heart Disease in his father and mother. . Unless otherwise noted, family history is non contributory    The patients home medications have been reviewed. Allergies: Bee pollen and Penicillins        ---------------------------------------------------PHYSICAL EXAM--------------------------------------    Constitutional/General: Alert and oriented x3 currently on nonrebreather mask  Head: Normocephalic and atraumatic  Eyes: PERRL, EOMI, sclera non icteric  Mouth: Oropharynx clear, handling secretions, no trismus, no asymmetry of the posterior oropharynx or uvular edema  Neck: Supple, full ROM, no stridor, no meningeal signs mild bilateral JVD  Respiratory: Tachypnea, coarse lung sounds with rhonchi throughout. Not in respiratory distress  Cardiovascular: Tachycardic. 2+ distal pulses. Equal extremity pulses. Chest: No chest wall tenderness  GI:  Abdomen Soft, Non tender, Non distended. No rebound, guarding, or rigidity. Musculoskeletal: Moves all extremities x 4. Warm and well perfused, no clubbing, cyanosis, or +1 edema bilateral lower extremities. Capillary refill <3 seconds  Integument: skin warm and pale. Neurologic: GCS 15,   Psychiatric: Normal Affect      EKG #1 @ 1855: Interpreted by emergency department physician, Dr. Shante Wise   This EKG is signed and interpreted by me. Rate: 103  Rhythm: Sinus  Interpretation: Sinus rhythm with occasional PACs, NY is 150, QRS is 122, QTc is 453, no evidence of ST elevation, there are lateral ST depressions which appear worsened when compared prior EKG from December 1, 2020. Comparison: changes compared to previous EKG    EKG #2 @ 1948: This EKG is signed and interpreted by me. Rate: 107  Rhythm: Sinuschanges compared to previous EKGInterpretation: Sinus rhythm with occasional PVC. Incomplete left bundle branch block. NY is 176, QRS is 120. QTc is 493. Worsening lateral ST depressions without evidence of ST elevation. Comparison: changes as compared to patient's most recent EKG    EKG #2 @ 127 : This EKG is signed and interpreted by me. Rate: A fib   Rhythm: Atrial fibrillation  Interpretation: A. fib with a ventricular response, QRS is 118, QTc is 470. Diffuse this changes with lateral ST depressions. They have improved compared to EKG #1 but now in A. fib with compared to prior EKG  Comparison: changes compared to previous EKG      -------------------------------------------------- RESULTS -------------------------------------------------  I have personally reviewed all laboratory and imaging results for this patient. Results are listed below.      LABS: (Lab results interpreted by me)  Results for orders placed or performed during the hospital encounter of 12/25/20   Troponin   Result Value Ref Range    Troponin 0.20 (H) 0.00 - 0.03 ng/mL   CBC Auto Differential   Result Value Ref Range    WBC 7.2 4.5 - 11.5 E9/L    RBC 2.88 (L) 3.80 - 5.80 E12/L    Hemoglobin 8.7 (L) 12.5 - 16.5 g/dL    Hematocrit 27.2 (L) 37.0 - 54.0 %    MCV 94.4 80.0 - 99.9 fL    MCH 30.2 26.0 - 35.0 pg    MCHC 32.0 32.0 - 34.5 %    RDW 14.0 11.5 - 15.0 fL    Platelets 383 251 - 029 E9/L    MPV 9.7 7.0 - 12.0 fL    Neutrophils % 71.4 43.0 - 80.0 %    Immature Granulocytes % 0.8 0.0 - 5.0 %    Lymphocytes % 12.7 (L) 20.0 - 42.0 %    Monocytes % 13.0 (H) 2.0 - 12.0 %    Eosinophils % 2.0 0.0 - 6.0 %    Basophils % 0.1 0.0 - 2.0 %    Neutrophils Absolute 5.10 1.80 - 7.30 E9/L    Immature Granulocytes # 0.06 E9/L    Lymphocytes Absolute 0.91 (L) 1.50 - 4.00 E9/L    Monocytes Absolute 0.93 0.10 - 0.95 E9/L    Eosinophils Absolute 0.14 0.05 - 0.50 E9/L    Basophils Absolute 0.01 0.00 - 0.20 E9/L   Comprehensive Metabolic Panel   Result Value Ref Range    Sodium 140 132 - 146 mmol/L    Potassium 3.4 (L) 3.5 - 5.0 mmol/L    Chloride 99 98 - 107 mmol/L    CO2 33 (H) 22 - 29 mmol/L    Anion Gap 8 7 - 16 mmol/L    Glucose 132 (H) 74 - 99 mg/dL    BUN 24 (H) 8 - 23 mg/dL    CREATININE 1.1 0.7 - 1.2 mg/dL    GFR Non-African American >60 >=60 mL/min/1.73    GFR African American >60     Calcium 8.0 (L) 8.6 - 10.2 mg/dL    Total Protein 5.4 (L) 6.4 - 8.3 g/dL    Alb 2.9 (L) 3.5 - 5.2 g/dL    Total Bilirubin 0.4 0.0 - 1.2 mg/dL    Alkaline Phosphatase 62 40 - 129 U/L    ALT 31 0 - 40 U/L    AST 37 0 - 39 U/L   Protime-INR   Result Value Ref Range    Protime 16.5 (H) 9.3 - 12.4 sec    INR 1.5    APTT   Result Value Ref Range    aPTT 31.4 24.5 - 35.1 sec   Brain Natriuretic Peptide   Result Value Ref Range    Pro-BNP 4,001 (H) 0 - 125 pg/mL   Procalcitonin   Result Value Ref Range    Procalcitonin 0.08 0.00 - 0.08 ng/mL   Lactate, Sepsis   Result Value Ref Range    Lactic Acid, Sepsis 2.3 (H) 0.5 - 1.9 mmol/L   CORTISOL   Result Value Ref Range    Cortisol 17.07 2.68 - 18.40 mcg/dL Magnesium   Result Value Ref Range    Magnesium 2.0 1.6 - 2.6 mg/dL   Blood Gas, Arterial   Result Value Ref Range    Date Analyzed 20201225     Time Analyzed 1917     Source: Blood Arterial     pH, Blood Gas 7.476 (H) 7.350 - 7.450    PCO2 48.3 (H) 35.0 - 45.0 mmHg    PO2 92.7 75.0 - 100.0 mmHg    HCO3 34.8 (H) 22.0 - 26.0 mmol/L    B.E. 10.2 (H) -3.0 - 3.0 mmol/L    O2 Sat 97.0 92.0 - 98.5 %    O2Hb 96.3 94.0 - 97.0 %    COHb 0.4 0.0 - 1.5 %    MetHb 0.3 0.0 - 1.5 %    O2 Content 11.7 mL/dL    HHb 3.0 0.0 - 5.0 %    tHb (est) 8.5 (L) 11.5 - 16.5 g/dL    Mode HFNC  10 L     Date Of Collection      Time Collected      Pt Temp 37.0 C     ID [id number]     Lab [id number]     Critical(s) Notified . No Critical Values    CK   Result Value Ref Range    Total CK 88 20 - 200 U/L   CK-MB   Result Value Ref Range    CK-MB 4.8 0.0 - 7.7 ng/mL   POCT Venous   Result Value Ref Range    POC Sodium 140 132 - 146 mmol/L    POC Potassium 5.3 (H) 3.5 - 5.0 mmol/L    POC Chloride 101 100 - 108 mmol/L    POC Glucose 129 (H) 74 - 99 mg/dl    POC Creatinine 1.1 0.7 - 1.2 mg/dL    GFR Non-African American >60 >=60 mL/min/1.73    GFR  >60     Performed on SEE BELOW    EKG 12 Lead   Result Value Ref Range    Ventricular Rate 107 BPM    Atrial Rate 107 BPM    P-R Interval 176 ms    QRS Duration 120 ms    Q-T Interval 370 ms    QTc Calculation (Bazett) 493 ms    R Axis 36 degrees    T Axis 110 degrees   ,       RADIOLOGY:  Interpreted by Radiologist unless otherwise specified  CTA CHEST W CONTRAST   Final Result   No central pulmonary embolism or aortic dissection. Cardiomegaly with severe coronary artery calcification. Advanced emphysema with patchy bibasilar infiltrates and effusions likely   CHF/edema and or pneumonia. Indeterminate pulmonary nodules vaguely identified in the lungs. Surveillance is recommended according to Fleischner society guidelines.                        ------------------------- NURSING NOTES AND VITALS REVIEWED ---------------------------   The nursing notes within the ED encounter and vital signs as below have been reviewed by myself  BP (!) 77/52   Pulse 108   Temp 98.2 °F (36.8 °C)   Resp 28   Wt 177 lb (80.3 kg)   SpO2 95%   BMI 25.40 kg/m²     Oxygen Saturation Interpretation: Abnormal    The cardiac monitor revealed NSR with a heart rate in the 110s as interpreted by me. The cardiac monitor was ordered secondary to the patient's heart rate and to monitor the patient for dysrhythmia. CPT 84112    The patients available past medical records and past encounters were reviewed.         ------------------------------ ED COURSE/MEDICAL DECISION MAKING----------------------  Medications   sodium chloride flush 0.9 % injection 10 mL (has no administration in time range)   bumetanide (BUMEX) 12.5 mg in sodium chloride 0.9 % 125 mL infusion (0 mg/hr Intravenous Stopped 12/25/20 2106)   heparin (porcine) injection 4,000 Units (has no administration in time range)   heparin (porcine) injection 2,000 Units (has no administration in time range)   heparin 25,000 units in dextrose 5% 250 mL infusion (12 Units/kg/hr × 80.3 kg Intravenous New Bag 12/25/20 2021)   0.9 % sodium chloride bolus (2,409 mLs Intravenous New Bag 12/25/20 1842)   hydrocortisone sodium succinate PF (SOLU-CORTEF) injection 100 mg (100 mg Intravenous Given 12/25/20 1842)   iopamidol (ISOVUE-370) 76 % injection 75 mL (75 mLs Intravenous Given 12/25/20 1913)   fentaNYL (SUBLIMAZE) injection 50 mcg (50 mcg Intravenous Given 12/25/20 1916)   aspirin chewable tablet 324 mg (324 mg Oral Given 12/25/20 2038)   fentaNYL (SUBLIMAZE) injection 25 mcg (25 mcg Intravenous Given 12/25/20 2025)   heparin (porcine) injection 4,000 Units (4,000 Units Intravenous Given 12/25/20 2021)   LORazepam (ATIVAN) injection 0.5 mg (0.5 mg Intravenous Given 12/25/20 2042)                    Medical Decision Making:     I, Dr. Lynnie Romberg am the primary provider of

## 2020-12-26 NOTE — CONSULTS
unsuccessful. 9. 2002 Cardiac cath with PTCA cutting balloon angioplasty to ostium of PDA branch of RCA. 10. 2007 Multiple PCI's (possibly up to 4 stents in RCA)  11. 2007 Carotid US: Mild carotid stenosis  12. 2007 TTE: mild AS  13. 10/25/2008 [facility] RCA treated with 2.75 x 12 mm Vision stent.    14. COPD  13. 10/7/2012 Inferior STEMI s/p ION KILEY x 2 to RCA (Dr Monica Fernandez). 16. 4/2013 admission for Angina--> Catheterization/PCI to mid RCA and proximal VLV.   Post catheterization right groin hematoma/retroperitoneal hematoma with hypotension and tachycardia.  NSTEMI secondary to hypotension.     17. 4/2013 SANJANA sub-optimal  18. 4/23/2013 [facility] Coronary angioplasty with stent mid RCA 3.5 x 38 and 2.5 x 15 RPL  19. 4/24/2013 CTA Abdomen/Pelvis: Extensive atherosclerotic calcification of the aorta and iliac vessels. Multilevel calcific stenotic plaques within the bilateral external and common iliac arteries. Tight stenosis at the origin of the left renal artery. Replacement of the right hepatic artery from the SMA and left hepatic artery from left gastric artery  20. Hx non compliance  with medications   21. 6/2013 Bilateral Carotid US: DAJA 50% stenosis. LICA 13% stenosis  22. 8/2014 Lexiscan MPS-->Large fixed inferior defect.  Moderate lateral reversible defect. EF normal.    23. 9/25/2014 Dr Etelvina Coelho: Balloon angioplasty of mid and distal RCA and right PLV.  No stents. 24. 10/2014 Outpatient evaluation Dr Etelvina Coelho for palpitations--> Holter showed frequent PVC's  25. 1/14/2015 Outpatient evaluation by Dr Uriel Montero  26. 3/31/2015 TTE LVEF normal.  Moderate CLVH.  Small pericardial effusion.  AV not well demonstrated.  Maximal gradient 8.4.  27. 4/2/2015 Cardiac CTA [facility]: Extensive stenting of RCA, cannot assess lumen of vessel.  Extensive multifocal plaque and stenosis of LAD and LCx. 28. 4/23/2015 Lexiscan/Exercise: EF 54%.  Fixed inferior and lateral defects.  Small reversible defect.  No significant WMA.  Low-to-intermediate risk.  29. 5/10/2015 TTE: Normal EF.  LA dilatation (index 49).  Mild calcific aortic stenosis.  Peak velocity 2.2.  Mean gradient 9.  MUKUL by continuity equation 1.5 and by telemetry 1.6 cm² consistent with mild stenosis.    30. 3/2017 Admission for CP-->no acute coronary syndrome, symptoms consistent with unstable angina. 31. 3/11/2017 Bilateral carotis US: DAJA/LICA 69-55% stenosis  32. 3/13/2017 LHC: Left main: 0% stenosisLAD: 50 % stenosisCircumflex: 80%stenosisAfter OM1RCA: Dominant. 70 % mid vessel in stent stenosis. 99% ostial stenosis of RCA-PL branch ( in stent ) and 100% stenosis of RCA-PDA  LV angio: 50-55% EF.  33. 8/8/2019 CTA Aorta with run-off: Very advanced multicentric bilateral arteriosclerotic changes. Diminutive three-vessel outflow to both lower legs is seen. 34. 8/28/2019 Lexsican MPS: Inferolateral scar.  No reversible defect.  Intermediate risk. 35. 7/16/2020 Admission HFpEF p-BNP 1648  36. 7/16/2020 TTE Dr Kessler End: EF visually estimated at 60%. No regional wall motion abnormalities seen. Mild CLVH. Normal right ventricle structure and function. Left atrial volume index of 53 ml per meters squared BSA. Physiologic and/or trace mitral regurgitation is present  37. 7/21/2020 [facility] Dr Lynn Row: LM  30% distal stenosis. LAD calcified throughout with to 50% mid vessel stenosis. The circumflex artery 80% tubular stenosis small OM 2. RCA dominant with diffuse in-stent stenosis and 100% distal occlusion. Treat medically  38. Pulmonary fibrosis follows with Dr Christiano Constantino  44. Chronic O2  40. Aditted 12/1/2020-12/6/2020 for COVID-19 pneumonia and RAMON. Received 5 days remdesivir and discharged on steroids. 41. Admission 12/10/2020-12/15/2020 for AECOPD discharged on Decadron. 42. 12/11/2020 EKG AF (not placed on [address])  43. [id number]= 4 (Age, HFpEF, HTN, vascular disease)      Medications Prior to admit:  Prior to Admission medications    Medication Sig Start Date End Date Taking?  Authorizing Provider dexamethasone (DECADRON) 4 MG tablet 1 tab twice a day for 3 days then 1 tab daily x3 days then stop 12/15/20   Bhanu Ram MD   enoxaparin (LOVENOX) 30 MG/0.3ML injection Inject 0.3 mLs into the skin 2 times daily 12/14/20   Dejan Beltran MD   zinc sulfate (ZINCATE) 220 (50 Zn) MG capsule Take 1 capsule by mouth daily 12/4/20   Meridith Schaumann,    vitamin C (VITAMIN C) 500 MG tablet Take 1 tablet by mouth daily 12/4/20   Meridith Schaumann, DO   Cholecalciferol (VITAMIN D) 25 MCG TABS Take 1 tablet by mouth daily 12/4/20   Meridith Schaumann, DO   ipratropium-albuterol (DUONEB) 0.5-2.5 (3) MG/3ML SOLN nebulizer solution Inhale 3 mLs into the lungs every 6 hours as needed for Shortness of Breath 7/22/20   Bhanu Ram MD   isosorbide mononitrate (IMDUR) 60 MG extended release tablet Take 1 tablet by mouth daily 7/23/20   Ifeoma Puri MD   ranolazine (RANEXA) 500 MG extended release tablet Take 1 tablet by mouth 2 times daily 7/22/20   Ifeoma Puri MD   metoprolol succinate (TOPROL XL) 25 MG extended release tablet Take 3 tablets by mouth 2 times daily 7/22/20   Ifeoma Puri MD   amLODIPine (NORVASC) 5 MG tablet Take 5 mg by mouth 2 times daily    Historical Provider, MD   acetaminophen (TYLENOL) 500 MG tablet Take 1,000 mg by mouth 2 times daily as needed for Pain    Historical Provider, MD   fluticasone (FLONASE) 50 MCG/ACT nasal spray 1 spray by Nasal route daily  5/13/20   Historical Provider, MD   montelukast (SINGULAIR) 10 MG tablet Take 10 mg by mouth nightly  5/13/20   Historical Provider, MD   cilostazol (PLETAL) 100 MG tablet Take 1 tablet by mouth 2 times daily 1/3/20   Charlotte Ward MD   nitroGLYCERIN (NITROSTAT) 0.4 MG SL tablet Place 1 tablet under the tongue every 5 minutes as needed for Chest pain 8/26/19   Aurea Leyva MD   atorvastatin (LIPITOR) 80 MG tablet Take 1 tablet by mouth daily 8/26/19   Aurea Leyva MD   Fluticasone-Umeclidin-[place] (Srinivasa Quezada) 100-62.5-25 MCG/INH AEPB Inhale 1 puff into the lungs daily     Historical Provider, MD   tamsulosin (FLOMAX) 0.4 MG capsule Take 0.4 mg by mouth daily  9/8/15   Historical Provider, MD   aspirin 81 MG chewable tablet Take 1 tablet by mouth daily.  9/27/14   Nathaly Houston MD       Current Medications:    Current Facility-Administered Medications: sodium chloride flush 0.9 % injection 10 mL, 10 mL, Intravenous, 2 times per day  sodium chloride flush 0.9 % injection 10 mL, 10 mL, Intravenous, PRN  polyethylene glycol (GLYCOLAX) packet 17 g, 17 g, Oral, Daily PRN  acetaminophen (TYLENOL) tablet 650 mg, 650 mg, Oral, Q6H PRN **OR** acetaminophen (TYLENOL) suppository 650 mg, 650 mg, Rectal, Q6H PRN  guaiFENesin tablet 400 mg, 400 mg, Oral, BID PRN  0.9 % sodium chloride bolus, 20 mL, Intravenous, Once  metoprolol succinate (TOPROL XL) extended release tablet 25 mg, 25 mg, Oral, Daily  amLODIPine (NORVASC) tablet 5 mg, 5 mg, Oral, Daily  bumetanide (BUMEX) 12.5 mg in sodium chloride 0.9 % 125 mL infusion, 0.5 mg/hr, Intravenous, Continuous  heparin (porcine) injection 4,000 Units, 4,000 Units, Intravenous, PRN  heparin (porcine) injection 2,000 Units, 2,000 Units, Intravenous, PRN  heparin 25,000 units in dextrose 5% 250 mL infusion, 12 Units/kg/hr, Intravenous, Continuous  senna (SENOKOT) tablet 8.6 mg, 1 tablet, Oral, Daily PRN  aspirin chewable tablet 81 mg, 81 mg, Oral, Daily  atorvastatin (LIPITOR) tablet 80 mg, 80 mg, Oral, Nightly  Vitamin D (CHOLECALCIFEROL) tablet 1,000 Units, 1,000 Units, Oral, Daily  dexamethasone (DECADRON) tablet 6 mg, 6 mg, Oral, Daily  fluticasone (FLONASE) 50 MCG/ACT nasal spray 1 spray, 1 spray, Nasal, Daily  fluticasone-umeclidin-vilant (TRELEGY ELLIPTA) 100-62.5-25 MCG/INH inhaler 1 puff, 1 puff, Inhalation, Daily  ipratropium-albuterol (DUONEB) nebulizer solution 3 mL, 3 mL, Inhalation, Q6H PRN  montelukast (SINGULAIR) tablet 10 mg, 10 mg, Oral, Nightly    Allergies:  Bee pollen and --    TROPONINI 0.20* 0.21* 0.22*     FASTING LIPID PANEL:  Lab Results   Component Value Date    CHOL 179 03/11/2017    HDL 49 03/11/2017    LDLCALC 111 03/11/2017    TRIG 97 03/11/2017     LIVER PROFILE:  Recent Labs     12/26/20  0137 12/26/20  0535   AST 31 32   ALT 30 29   LABALBU 2.7* 2.5*     Coronary angiogram from July 2020 personally reviewed:  Severe calcific circumflex disease. Distal left main is not clearly delineated. Diffuse calcific disease in the LAD probably severe in the proximal segment. Overall angiogram is not very diagnostic      ASSESSMENT:  1. NSTEMI type II in context of respiratory failure but cannot rule out type I given known CAD and EKG changes (ST depression) but CE pattern not consistent with ACS. 2. CAD with  of RCA, Mid LCx. Hx of multiple PCI's to PCA. 3. Pulmonary fibrosis on chronic O2  4. COVID-19 diagnosed 12/1/2020, continues to be positive this admission   5. HFpEF appears euvolemic at present  6. HTN  7. HLD  8. PAD on Pletal  9. Paroxysmal atrial fibrillation note don EKG 12/11/2020 not placed on [address] [place] [address] at that time  10. Malnutrition/Hypoalbuminemia       PLAN:  1. Resume Norvasc  2. Toprol 50 mg QD  3. Plavix 300 mg PO today (loading dose) then Plavix 75 mg QD starting 12/27/2020  4. Nitroglycerin SL for CP if there is recurrence of CP may place on Nitroglycerin gtt (do not recommend Nitro paste). 5. Continue heparin gtt x 48 hours  6. Consider oral anticoagulation for AF based on his bleeding risk-may be deferred to outpatient setting  7. Recommend outpatient LHC when patient over acute illness. 8. Will follow peripherally    Case discussed with Dr Lorri Christian    Electronically signed by Bryan Dickerson. JOHNNA Del Valle on 12/26/2020 at 9:31 AM     PHYSICIAN ADDENDUM:  I independently interviewed and examined the patient. I have reviewed the above documentation completed by the FREDDIE.  Please see my additional contributions to the HPI, physical exam, and assessment / medical decision making. I independently reviewed the HPI, ROS, PMH, PSH, [address], [place], and medications independently and agree with above documentation. He demonstrated severe ischemia on his twelve-lead EKG with profound ST depressions in the setting of hypotension and hypoxia. He is known to have severe CAD with  of the distal RCA as well as severe circumflex and probably LAD disease and moderate left main disease. After he recovers from his acute illness and is evaluated in the outpatient setting for risks/benefits/alternatives he should be referred for repeat angiography and PCI as needed. In the meantime we will treat him conservatively for his non-ST elevation MI as long as he has no refractory angina or heart failure or arrhythmia.     Daron Hammans, MD, [facility]  Interventional Cardiology/Structural Heart Disease  Office: [phone]

## 2020-12-26 NOTE — PROGRESS NOTES
Date: 12/26/2020    Time: 5:39 PM    Patient Placed On BIPAP/CPAP/ Non-Invasive Ventilation? No on continuosly    If no must comment. Facial area red/color change? No           If YES are Blister/Lesion present? No   If yes must notify nursing staff  BIPAP/CPAP skin barrier?   Yes    Skin barrier type:mepilexlite       Comments:        Abigail Delgado

## 2020-12-26 NOTE — ED NOTES
Dr. Conteh Elvis made aware of BP 77/52 and at bedside. Verbal order to hold Bumex at this time.      Michoacano Zuniga RN  12/25/20 2072

## 2020-12-26 NOTE — PLAN OF CARE
Problem: Falls - Risk of:  Goal: Will remain free from falls  Description: Will remain free from falls  Outcome: Met This Shift  Goal: Absence of physical injury  Description: Absence of physical injury  Outcome: Met This Shift     Problem: Airway Clearance - Ineffective  Goal: Achieve or maintain patent airway  Outcome: Met This Shift     Problem: Gas Exchange - Impaired  Goal: Absence of hypoxia  Outcome: Met This Shift  Goal: Promote optimal lung function  Outcome: Met This Shift     Problem: Breathing Pattern - Ineffective  Goal: Ability to achieve and maintain a regular respiratory rate  Outcome: Met This Shift     Problem:  Body Temperature -  Risk of, Imbalanced  Goal: Ability to maintain a body temperature within defined limits  Outcome: Met This Shift  Goal: Will regain or maintain usual level of consciousness  Outcome: Met This Shift  Goal: Complications related to the disease process, condition or treatment will be avoided or minimized  Outcome: Met This Shift     Problem: Isolation Precautions - Risk of Spread of Infection  Goal: Prevent transmission of infection  Outcome: Met This Shift     Problem: Nutrition Deficits  Goal: Optimize nutrtional status  Outcome: Met This Shift     Problem: Risk for Fluid Volume Deficit  Goal: Maintain normal heart rhythm  Outcome: Met This Shift  Goal: Maintain absence of muscle cramping  Outcome: Met This Shift  Goal: Maintain normal serum potassium, sodium, calcium, phosphorus, and pH  Outcome: Met This Shift     Problem: Loneliness or Risk for Loneliness  Goal: Demonstrate positive use of time alone when socialization is not possible  Outcome: Met This Shift     Problem: Fatigue  Goal: Verbalize increase energy and improved vitality  Outcome: Met This Shift     Problem: Patient Education: Go to Patient Education Activity  Goal: Patient/Family Education  Outcome: Met This Shift

## 2020-12-26 NOTE — PLAN OF CARE
Problem: Falls - Risk of:  Goal: Will remain free from falls  Description: Will remain free from falls  12/26/2020 1822 by Scott De Oliveira RN  Outcome: Met This Shift  12/26/2020 0653 by Genet Thomas RN  Outcome: Met This Shift  Goal: Absence of physical injury  Description: Absence of physical injury  12/26/2020 1822 by Scott De Oliveira RN  Outcome: Met This Shift  12/26/2020 0653 by Genet Thomas RN  Outcome: Met This Shift     Problem: Airway Clearance - Ineffective  Goal: Achieve or maintain patent airway  12/26/2020 1822 by Scott De Oliveira RN  Outcome: Met This Shift  12/26/2020 0653 by Genet Thomas RN  Outcome: Met This Shift     Problem: Gas Exchange - Impaired  Goal: Absence of hypoxia  12/26/2020 1822 by Scott De Oliveira RN  Outcome: Met This Shift  12/26/2020 0653 by Genet Thomas RN  Outcome: Met This Shift  Goal: Promote optimal lung function  12/26/2020 1822 by Scott De Oliveira RN  Outcome: Met This Shift  12/26/2020 0653 by Genet Thomas RN  Outcome: Met This Shift     Problem: Breathing Pattern - Ineffective  Goal: Ability to achieve and maintain a regular respiratory rate  12/26/2020 1822 by Scott De Oliveira RN  Outcome: Met This Shift  12/26/2020 0653 by Genet Thomas RN  Outcome: Met This Shift     Problem:  Body Temperature -  Risk of, Imbalanced  Goal: Ability to maintain a body temperature within defined limits  12/26/2020 1822 by Scott De Oliveira RN  Outcome: Met This Shift  12/26/2020 0653 by Genet Thomas RN  Outcome: Met This Shift  Goal: Will regain or maintain usual level of consciousness  12/26/2020 1822 by Scott De Oliveira RN  Outcome: Met This Shift  12/26/2020 0653 by Genet Thomas RN  Outcome: Met This Shift  Goal: Complications related to the disease process, condition or treatment will be avoided or minimized  12/26/2020 1822 by Scott De Oliveira RN  Outcome: Met This Shift  12/26/2020 0653 by Genet Thomas RN  Outcome: Met This Shift     Problem: Isolation Precautions - Risk of Spread of Infection  Goal: Prevent transmission of infection  12/26/2020 1822 by Denice Us RN  Outcome: Met This Shift  12/26/2020 0653 by Leah Palma RN  Outcome: Met This Shift     Problem: Nutrition Deficits  Goal: Optimize nutrtional status  12/26/2020 1822 by Denice Us RN  Outcome: Met This Shift  12/26/2020 0653 by Leah Palma RN  Outcome: Met This Shift     Problem: Risk for Fluid Volume Deficit  Goal: Maintain normal heart rhythm  12/26/2020 1822 by Denice Us RN  Outcome: Met This Shift  12/26/2020 0653 by Leah Palma RN  Outcome: Met This Shift  Goal: Maintain absence of muscle cramping  12/26/2020 1822 by Denice Us RN  Outcome: Met This Shift  12/26/2020 0653 by Leah Palma RN  Outcome: Met This Shift  Goal: Maintain normal serum potassium, sodium, calcium, phosphorus, and pH  12/26/2020 1822 by Denice Us RN  Outcome: Met This Shift  12/26/2020 0653 by Leah Palma RN  Outcome: Met This Shift     Problem: Loneliness or Risk for Loneliness  Goal: Demonstrate positive use of time alone when socialization is not possible  12/26/2020 1822 by Denice Us RN  Outcome: Met This Shift  12/26/2020 0653 by Leah Palma RN  Outcome: Met This Shift     Problem: Fatigue  Goal: Verbalize increase energy and improved vitality  12/26/2020 1822 by Denice Us RN  Outcome: Met This Shift  12/26/2020 0653 by Leah Palma RN  Outcome: Met This Shift     Problem: Patient Education: Go to Patient Education Activity  Goal: Patient/Family Education  12/26/2020 1822 by Denice Us RN  Outcome: Met This Shift  12/26/2020 0653 by Leah Palma RN  Outcome: Met This Shift

## 2020-12-26 NOTE — H&P
Hospital Medicine History & Physical      PCP: Junior Khang DO    Date of Admission: 12/25/2020    Date of Service: Pt seen/examined on 12/25/2020 and Admitted to Inpatient with expected LOS greater than two midnights due to medical therapy. Chief Complaint:  Left sided chest pain and shortness of breath       History Of Present Illness:      69 y.o. male who presented to [facility] due to Shortness of breath that started a few days ago but worsened to the point at rest he could not move and he had to call EMS. Patient was recently discharged from [facility] for COVID-19 on 12/15/2020. He is normally on 4 liters of oxygen since discharge. Patient developed sudden onset left chest pain today and called EMS. He was found to be 78% on 4 Liters and brought to the hospital on non-rebreather. Patient was hypotensive and hypoxic in the ER. He was given fluid bolus in ER which worsened his oxygen status and is currently on bipap. Patient denies any radiation of his chest pain into his arm or up into his jaw. He denies any nausea, vomiting or diarrhea. He is breathing much better on bipap.      Past Medical History:          Diagnosis Date    (HFpEF) heart failure with preserved ejection fraction ([address] 07/22/2020 7/18/2020- echo- LVEF 60%    Acute MI (HCC)     Anxiety     Arthritis     Arthritis     hands, back     Atherosclerosis 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 ([address] 7/18/2019    Bilateral carotid artery stenosis 7/18/2019    CAD (coronary artery disease)     Carotid artery stenosis     Carotid bruit 6/5/2013    Carotid stenosis, right 6/5/2013    CHF (congestive heart failure) ([address]     COPD (chronic obstructive pulmonary disease) ([address]     Headache(784.0)     Hyperlipidemia     Hypertension     PVD (peripheral vascular disease) ([address] 4/26/2013    PVD (peripheral vascular disease) with claudication ([address] 6/5/2013    STEMI (ST elevation myocardial infarction) [facility] 10/7/2012    Subclavian artery stenosis, left ([facility] 4/26/2013    Traumatic retroperitoneal hematoma 4/26/2013       Past Surgical History:          Procedure Laterality Date    CARDIAC CATHETERIZATION  07/21/2020    Dr. Lauri Ellis  03/13/2017    2.5/15 [place] balloon to RCA and RPL    CORONARY ANGIOPLASTY WITH STENT PLACEMENT  10/07/2012    Ion KILEY x2 to RCA per Dr. Tevin Guillen  4/24/13    Stent MID RCA 3.5x38, 2.5X15 RPL    DIAGNOSTIC CARDIAC CATH LAB PROCEDURE  1998    [place] stent to the RCA    DIAGNOSTIC CARDIAC CATH LAB PROCEDURE  1999    balloon to prox RCA unsuccessful PTC of the ostium of the posterior descending bracnh of the RCA    DIAGNOSTIC CARDIAC CATH LAB PROCEDURE  2002    cardiac cath with PTCA cutting balloon angioplasty to the ostium of the posterior descending artery branch of the RC    ECHO COMPL W DOP COLOR FLOW  10/9/2012    Mod concentric LVH, EF 55%, stage II diastolic dysfunction    ECHO COMPL W DOP COLOR FLOW  4/25/2013            Medications Prior to Admission:      Prior to Admission medications    Medication Sig Start Date End Date Taking?  Authorizing Provider   dexamethasone (DECADRON) 4 MG tablet 1 tab twice a day for 3 days then 1 tab daily x3 days then stop 12/15/20   Marcos Garcia MD   enoxaparin (LOVENOX) 30 MG/0.3ML injection Inject 0.3 mLs into the skin 2 times daily 12/14/20   Kori Santos MD   zinc sulfate (ZINCATE) 220 (50 Zn) MG capsule Take 1 capsule by mouth daily 12/4/20   Mj Mauro DO   vitamin C (VITAMIN C) 500 MG tablet Take 1 tablet by mouth daily 12/4/20   Mj Mauro DO   Cholecalciferol (VITAMIN D) 25 MCG TABS Take 1 tablet by mouth daily 12/4/20   Mj Mauro DO   ipratropium-albuterol (DUONEB) 0.5-2.5 (3) MG/3ML SOLN nebulizer solution Inhale 3 mLs into the lungs every 6 hours as needed for Shortness of Breath 7/22/20 Osman Hussein MD   isosorbide mononitrate (IMDUR) 60 MG extended release tablet Take 1 tablet by mouth daily 7/23/20   Charlie Franco MD   ranolazine (RANEXA) 500 MG extended release tablet Take 1 tablet by mouth 2 times daily 7/22/20   Charlie Franco MD   metoprolol succinate (TOPROL XL) 25 MG extended release tablet Take 3 tablets by mouth 2 times daily 7/22/20   Charlie Franco MD   amLODIPine (NORVASC) 5 MG tablet Take 5 mg by mouth 2 times daily    Historical Provider, MD   acetaminophen (TYLENOL) 500 MG tablet Take 1,000 mg by mouth 2 times daily as needed for Pain    Historical Provider, MD   fluticasone (FLONASE) 50 MCG/ACT nasal spray 1 spray by Nasal route daily  5/13/20   Historical Provider, MD   montelukast (SINGULAIR) 10 MG tablet Take 10 mg by mouth nightly  5/13/20   Historical Provider, MD   cilostazol (PLETAL) 100 MG tablet Take 1 tablet by mouth 2 times daily 1/3/20   Fabian Guerrero MD   nitroGLYCERIN (NITROSTAT) 0.4 MG SL tablet Place 1 tablet under the tongue every 5 minutes as needed for Chest pain 8/26/19   Amee Marshall MD   atorvastatin (LIPITOR) 80 MG tablet Take 1 tablet by mouth daily 8/26/19   Amee Marshall MD   Fluticasone-Umeclidin-Vilant (TRELEGY ELLIPTA) 368-66.9-15 MCG/INH AEPB Inhale 1 puff into the lungs daily     Historical Provider, MD   tamsulosin (FLOMAX) 0.4 MG capsule Take 0.4 mg by mouth daily  9/8/15   Historical Provider, MD   aspirin 81 MG chewable tablet Take 1 tablet by mouth daily. 9/27/14   Preeti Badillo MD       Allergies:  Bee pollen and Penicillins    Social History:      The patient currently lives independently. TOBACCO:   reports that he quit smoking about 7 years ago. His smoking use included cigarettes. He started smoking about 53 years ago. He has a 45.00 pack-year smoking history. His smokeless tobacco use includes chew. ETOH:   reports current alcohol use.       Family History:          Problem Relation Age of Onset    Heart Disease Mother          age 54    Heart Disease Father          66's     Cancer Father         kidney    Cancer Paternal Uncle         kidney       REVIEW OF SYSTEMS:   Pertinent positives as noted in the HPI. All other systems reviewed and negative. PHYSICAL EXAM:    /66   Pulse 100   Temp 98.2 °F (36.8 °C) (Oral)   Resp 23   Ht 5' 10\" (1.778 m)   Wt 177 lb (80.3 kg)   SpO2 98%   BMI 25.40 kg/m²     General appearance:  No apparent distress, appears stated age and cooperative. HEENT:  Normal cephalic, atraumatic without obvious deformity. Pupils equal, round, and reactive to light. Extra ocular muscles intact. Conjunctivae/corneas clear. Neck: Supple, with full range of motion. + jugular venous distention. Trachea midline. Respiratory:  Normal respiratory effort. Bilateral rales  No Wheezes/Rhonchi. Cardiovascular:  Tachycardic rate and regular rhythm with normal S1/S2 without murmurs, rubs or gallops. Abdomen: Soft, non-tender, non-distended with normal bowel sounds. Musculoskeletal:  No clubbing, cyanosis. +2 pitting edema bilaterally. Full range of motion without deformity. Skin: Skin color, texture, turgor normal.  No rashes or lesions. Neurologic:  Neurovascularly intact without any focal sensory/motor deficits. Cranial nerves: II-XII intact, grossly non-focal.  Psychiatric:  Alert and oriented, thought content appropriate, normal insight    CXR:  I have reviewed the CXR with the following interpretation:   Impression   1. Possible left lower lobe pneumonia. 2. COPD changes.        EKG:  I have reviewed the EKG with the following interpretation: Sinus tachycardia with occasional premature ventricular complexes  Incomplete left bundle branch block  Marked ST abnormality, possible lateral subendocardial injury    Labs:     Recent Labs     20  1840   WBC 7.2   HGB 8.7*   HCT 27.2*        Recent Labs     20  1840      K 3.4*   CL 99   CO2 33*

## 2020-12-26 NOTE — PROGRESS NOTES
Hospitalist Progress Note      Synopsis: Duane Tovar Sr. is a 69 y.o. male Past medical history significant for chronic ischemic heart disease/CAD status post multiple cardiac catheterizations status post multiple stents to the RCA, extensive multifocal plaque and stenosis of LAD and left circumflex, atrial fibrillation not on oral anticoagulation, HFpEF COPD stage IV on 4 L nasal cannula at home, peripheral vascular disease, left subclavian arterial stenosis, tobacco abuse who presented with worsening SOB and CP for the last 2-3 days. COVID +. Cardiology is consulted. Being transitioned from Bipap to NRB    Subjective    Tachycardic  Attempting transition to NRB  Exam:  BP (!) 172/96   Pulse 135   Temp 98.1 °F (36.7 °C) (Axillary)   Resp (!) 32   Ht 5' 10\" (1.778 m)   Wt 177 lb (80.3 kg)   SpO2 (!) 86%   BMI 25.40 kg/m²     Due to the current efforts to prevent transmission of COVID-19 and also the need to preserve PPE for other caregivers, a face-to-face encounter with the patient was not performed. That being said, all relevant records and diagnostic tests were reviewed, including laboratory results and imaging. Please reference any relevant documentation elsewhere.  Care will be coordinated with the primary service and other specialties as appropriate.       Medications:  Reviewed    Infusion Medications    bumetanide 0.1 mg/mL infusion Stopped (12/25/20 2106)    heparin (PORCINE) Infusion 14 Units/kg/hr (12/26/20 0340)     Scheduled Medications    sodium chloride flush  10 mL Intravenous 2 times per day    amLODIPine  5 mg Oral Daily    [START ON 12/27/2020] clopidogrel  75 mg Oral Daily    [START ON 12/27/2020] metoprolol succinate  50 mg Oral Daily    aspirin  81 mg Oral Daily    atorvastatin  80 mg Oral Nightly    vitamin D  1,000 Units Oral Daily    dexamethasone  6 mg Oral Daily    fluticasone  1 spray Nasal Daily    fluticasone-umeclidin-vilant  1 puff Inhalation Daily    montelukast  10 mg Oral Nightly     PRN Meds: sodium chloride flush, polyethylene glycol, acetaminophen **OR** acetaminophen, guaiFENesin, nitroGLYCERIN, heparin (porcine), heparin (porcine), senna, ipratropium-albuterol    I/O    Intake/Output Summary (Last 24 hours) at 12/26/2020 1248  Last data filed at 12/26/2020 0800  Gross per 24 hour   Intake 86 ml   Output 200 ml   Net -114 ml       Labs:   Recent Labs     12/25/20  1840 12/26/20  0137 12/26/20  0535 12/26/20  1141   WBC 7.2 6.3 7.3  --    HGB 8.7* 7.7* 7.5* 9.4*   HCT 27.2* 24.3* 23.8* 29.8*    212 209  --        Recent Labs     12/25/20  1840 12/26/20  0137 12/26/20  0535    142 145   K 3.4* 3.9 4.3   CL 99 104 106   CO2 33* 30* 30*   BUN 24* 23 23   CREATININE 1.1  1.1 1.0 0.9   CALCIUM 8.0* 7.7* 7.8*       Recent Labs     12/25/20  1840 12/26/20  0137 12/26/20  0535   PROT 5.4* 5.2* 5.1*   ALKPHOS 62 50 48   ALT 31 30 29   AST 37 31 32   BILITOT 0.4 0.3 0.3       Recent Labs     12/25/20  1840 12/26/20  0137   INR 1.5 1.8       Recent Labs     12/25/20  1840 12/26/20  0112 12/26/20  0535 12/26/20  1141   CKTOTAL 88  --   --  118   TROPONINI 0.20* 0.21* 0.22* 0.21*       Chronic labs:  Lab Results   Component Value Date    CHOL 179 03/11/2017    TRIG 97 03/11/2017    HDL 49 03/11/2017    LDLCALC 111 (H) 03/11/2017    TSH 1.411 04/25/2013    INR 1.8 12/26/2020    LABA1C 5.8 04/25/2013       Radiology:  Imaging studies reviewed today.     ASSESSMENT:  Acute hypoxic respiratory failure  NSTEMI - type 2  HFpEF  PAD  HLD  HTN    PLAN:  Wean oxygen as tolerated   Appreciate cardiac recs; continue plavix, toprol, norvasc; on nitropaste  Bumex ggt   Plan for outpatient cardiac cath  ECHO pending  Transfuse as indicated   PT/OT      Diet: Diet NPO Effective Now  Code Status: Full Code  PT/OT Eval Status:   ordered  DVT Prophylaxis:   heparin  Recommended disposition at discharge:  pending medical progression +++++++++++++++++++++++++++++++++++++++++++++++++  Vivian Meza MD   [facility].  +++++++++++++++++++++++++++++++++++++++++++++++++  NOTE: This report was transcribed using voice recognition software. Every effort was made to ensure accuracy; however, inadvertent computerized transcription errors may be present.

## 2020-12-26 NOTE — PROGRESS NOTES
Date: 12/26/2020    Time: 12:26 AM    Patient Placed On BIPAP/CPAP/ Non-Invasive Ventilation? No, on from previous shift. If no must comment. Facial area red/color change? No           If YES are Blister/Lesion present? No   If yes must notify nursing staff  BIPAP/CPAP skin barrier?   Yes    Skin barrier type:mepilexlite       Comments:        [employer]

## 2020-12-26 NOTE — CONSULTS
[facility]  Internal Medicine Residency Program  History and Physical  MICU    Patient:  Morro Aceves. 69 y.o. male MRN: [id number]     Date of Service: 12/26/2020    Hospital Day: 2      Chief complaint:   Chief Complaint   Patient presents with    Shortness of Breath     pt discharged on the 15th from hospital. pt reports increased sob since discharge. pt was 78% on room air upon EMS arrival     History of Present Illness   Nenita Griffin Sr.is a 69 y.o. male who presented to [facility] for shortness of breath which began a few days ago. Associates shortness of breath with chest pain, worsening with shortness of breath. Left sided chest pain, non radiating. He notes that his oxygen level was 78% at home. He was placed on a nonrebreather by EMS and brought to the hospital.  Patient has also been complaining of fevers chills and myalgias. Patient has history of COVID-19 pneumonia and initially diagnosed on 11/30/2020. He was recently admitted 2 times in the month of December from 12/1-12/6 and 12/10-12/15. He was hospitalized from 12/1-12/6 with Covid pneumonia and RAMON, treated with remdesivir for 5 days at that time and steroid. Discharged on prednisone 40 mg for 5 days. After initial discharge he was having more shortness of breath cough with clear sputum and chest pain and returned on 12/10/2020 to the ED. At that time, patient was thought to have a possible left lower lobe pneumonia versus COPD exacerbation. He was treated with antibiotics, steroids and remdesivir therapy was repeated. He returned on 12/25/2020 with chest pain and SOB.     Past medical history significant for chronic ischemic heart disease/CAD status post multiple cardiac catheterizations status post multiple stents to the RCA, extensive multifocal plaque and stenosis of LAD and left circumflex, atrial fibrillation not on oral anticoagulation, HFpEF COPD stage IV on 4 L nasal cannula at home, peripheral vascular disease, left subclavian arterial stenosis, tobacco abuse. ED Course: Was hypotensive and received fluid bolus moved blood pressure. Placed on BiPAP and given Bumex dose.     Past Medical History:      Diagnosis Date    (HFpEF) heart failure with preserved ejection fraction ([address] 07/22/2020 7/18/2020- echo- LVEF 60%    Acute MI (HCC)     Anxiety     Arthritis     Arthritis     hands, back     Atherosclerosis 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 ([address] 7/18/2019    Bilateral carotid artery stenosis 7/18/2019    CAD (coronary artery disease)     Carotid artery stenosis     Carotid bruit 6/5/2013    Carotid stenosis, right 6/5/2013    CHF (congestive heart failure) (HCC)     COPD (chronic obstructive pulmonary disease) ([address]     Headache(784.0)     Hyperlipidemia     Hypertension     PVD (peripheral vascular disease) ([address] 4/26/2013    PVD (peripheral vascular disease) with claudication ([address] 6/5/2013    STEMI (ST elevation myocardial infarction) ([address] 10/7/2012    Subclavian artery stenosis, left ([address] 4/26/2013    Traumatic retroperitoneal hematoma 4/26/2013       Past Surgical History:        Procedure Laterality Date    CARDIAC CATHETERIZATION  07/21/2020    Dr. Carmen Kaplan  03/13/2017    2.5/15 [place] balloon to RCA and RPL    CORONARY ANGIOPLASTY WITH STENT PLACEMENT  10/07/2012    Ion KILEY x2 to RCA per Dr. Vazquez Nielsen  4/24/13    Stent MID RCA 3.5x38, 2.5X15 RPL    DIAGNOSTIC CARDIAC CATH LAB PROCEDURE  1998    [place] stent to the RCA    DIAGNOSTIC CARDIAC CATH LAB PROCEDURE  1999    balloon to prox RCA unsuccessful PTC of the ostium of the posterior descending bracnh of the RCA    DIAGNOSTIC CARDIAC CATH LAB PROCEDURE  2002    cardiac cath with PTCA cutting balloon angioplasty to the ostium of the posterior descending artery branch of the [facility]    ECHO COMPL W DOP COLOR FLOW  10/9/2012    Mod concentric LVH, EF 55%, stage II diastolic dysfunction    ECHO COMPL W DOP COLOR FLOW  4/25/2013            Medications Prior to Admission:    Prior to Admission medications    Medication Sig Start Date End Date Taking?  Authorizing Provider   dexamethasone (DECADRON) 4 MG tablet 1 tab twice a day for 3 days then 1 tab daily x3 days then stop 12/15/20   Stefano Ryan MD   enoxaparin (LOVENOX) 30 MG/0.3ML injection Inject 0.3 mLs into the skin 2 times daily 12/14/20   Armen Crowder MD   zinc sulfate (ZINCATE) 220 (50 Zn) MG capsule Take 1 capsule by mouth daily 12/4/20   Emmy Buys, DO   vitamin C (VITAMIN C) 500 MG tablet Take 1 tablet by mouth daily 12/4/20   Emmy Buys, DO   Cholecalciferol (VITAMIN D) 25 MCG TABS Take 1 tablet by mouth daily 12/4/20   Emmy Buys, DO   ipratropium-albuterol (DUONEB) 0.5-2.5 (3) MG/3ML SOLN nebulizer solution Inhale 3 mLs into the lungs every 6 hours as needed for Shortness of Breath 7/22/20   Stefano Ryan MD   isosorbide mononitrate (IMDUR) 60 MG extended release tablet Take 1 tablet by mouth daily 7/23/20   Leticia Williamson MD   ranolazine (RANEXA) 500 MG extended release tablet Take 1 tablet by mouth 2 times daily 7/22/20   Leticia Williamson MD   metoprolol succinate (TOPROL XL) 25 MG extended release tablet Take 3 tablets by mouth 2 times daily 7/22/20   Leticia Williamson MD   amLODIPine (NORVASC) 5 MG tablet Take 5 mg by mouth 2 times daily    Historical Provider, MD   acetaminophen (TYLENOL) 500 MG tablet Take 1,000 mg by mouth 2 times daily as needed for Pain    Historical Provider, MD   fluticasone (FLONASE) 50 MCG/ACT nasal spray 1 spray by Nasal route daily  5/13/20   Historical Provider, MD   montelukast (SINGULAIR) 10 MG tablet Take 10 mg by mouth nightly  5/13/20   Historical Provider, MD   cilostazol (PLETAL) 100 MG tablet Take 1 tablet by mouth 2 times daily 1/3/20   Tonja Patel MD   nitroGLYCERIN (NITROSTAT) 0.4 MG SL tablet Place 1 tablet under the tongue every 5 minutes as needed for Chest pain 19   Paco Morales MD   atorvastatin (LIPITOR) 80 MG tablet Take 1 tablet by mouth daily 19   Paco oMrales MD   Fluticasone-Umeclidin-Vilant (TRELEGY ELLIPTA) 044-15.6-36 MCG/INH AEPB Inhale 1 puff into the lungs daily     Historical Provider, MD   tamsulosin (FLOMAX) 0.4 MG capsule Take 0.4 mg by mouth daily  9/8/15   Historical Provider, MD   aspirin 81 MG chewable tablet Take 1 tablet by mouth daily. 14   Nena Boyd MD       Allergies:  Bee pollen and Penicillins    Social History:   TOBACCO:   reports that he quit smoking about 7 years ago. His smoking use included cigarettes. He started smoking about 53 years ago. He has a 45.00 pack-year smoking history. His smokeless tobacco use includes chew. ETOH:   reports current alcohol use. Family History:       Problem Relation Age of Onset    Heart Disease Mother          age 50    Heart Disease Father          66's     Cancer Father         kidney    Cancer Paternal Uncle         kidney       REVIEW OF SYSTEMS:    · Constitutional: +fever, +chills, no change in weight; good appetite  · HEENT: No blurred vision, no ear problems, no sore throat, no rhinorrhea. · Respiratory: + cough, No sputum production, no pleuritic chest pain, +shortness of breath  · Cardiology: +angina,+dyspnea on exertion, no paroxysmal nocturnal dyspnea, no orthopnea, no palpitation,+leg swelling. · Gastroenterology: No dysphagia, no reflux; no abdominal pain, no nausea or vomiting; no constipation or diarrhea.  No hematochezia   · Genitourinary: No dysuria, no frequency, hesitancy; no hematuria  · Musculoskeletal: no joint pain, no myalgia, no change in range of movement  · Neurology: no focal weakness in extremities, no slurred speech, no double vision, no tingling or numbness sensation  · Endocrinology: no temperature intolerance, no polyphagia, polydipsia or polyuria  · Hematology: no increased bleeding, no bruising, no lymphadenopathy  · Skin: no skin changes noticed by patient  · Psychology: no depressed mood, no suicidal ideation    Physical Exam   · Vitals: /68   Pulse 77   Temp 97.5 °F (36.4 °C) (Axillary)   Resp 16   Ht 5' 10\" (1.778 m)   Wt 177 lb (80.3 kg)   SpO2 99%   BMI 25.40 kg/m²           · General Appearance: alert and oriented to person, place and time, well developed and well- nourished, in no acute distress. On BIPAP  · Skin: warm and dry, no rash or erythema  · Head: normocephalic and atraumatic  · Eyes: pupils equal, round, and reactive to light, extraocular eye movements intact, conjunctivae normal  · ENT: tympanic membrane, external ear and ear canal normal bilaterally, nose without deformity, nasal mucosa and turbinates normal without polyps  · Neck: supple and non-tender without mass, no thyromegaly or thyroid nodules, no cervical lymphadenopathy  · Pulmonary/Chest: decreased lung sounds. +ronchi and rales.    · Cardiovascular: normal rate, regular rhythm, normal S1 and S2, no murmurs, rubs, clicks, or gallops, distal pulses intact, no carotid bruits  · Abdomen: soft, non-tender, non-distended, normal bowel sounds, no masses or organomegaly  · Extremities: no cyanosis, clubbing or edema  · Musculoskeletal: normal range of motion, no joint swelling, deformity or tenderness  · Neurologic: reflexes normal and symmetric, no cranial nerve deficit, gait, coordination and speech normal     Lines     site day    Art line   None    TLC None    PICC None    Hemoaccess None    Oxygen:     BIPAP at 12/6 since 2345 on 12/25/2020  Mechanical Ventilation:   N/a  ABG:     Lab Results   Component Value Date    PH 7.476 12/25/2020    PCO2 48.3 12/25/2020    PO2 92.7 12/25/2020    HCO3 34.8 12/25/2020    BE 10.2 12/25/2020    THB 8.5 12/25/2020    O2SAT 97.0 12/25/2020     Labs and Imaging Studies   Basic Labs  CBC:   Lab Results   Component Value Date    WBC 6.3 12/26/2020    RBC 2.55 12/26/2020    HGB 7.7 12/26/2020    HCT 24.3 12/26/2020    MCV 95.3 12/26/2020    RDW 14.1 12/26/2020     12/26/2020     CMP:  Lab Results   Component Value Date     12/26/2020    K 3.9 12/26/2020     12/26/2020    CO2 30 12/26/2020    BUN 23 12/26/2020    PROT 5.2 12/26/2020       Imaging Studies:     Xr Chest (2 Vw)    Result Date: 12/10/2020  EXAMINATION: TWO XRAY VIEWS OF THE CHEST 12/10/2020 1:32 pm COMPARISON: 12/03/2020 HISTORY: ORDERING SYSTEM PROVIDED HISTORY: sob TECHNOLOGIST PROVIDED HISTORY: Reason for exam:->sob What reading provider will be dictating this exam?->CRC FINDINGS: There is subtle opacity in the retrocardiac portion of the left lower lobe that could represent pneumonia. Otherwise, no acute airspace disease is seen. The lungs are hyperinflated with flattening of the diaphragm, consistent with COPD. The heart and mediastinum are unchanged and there is no evidence of vascular congestion. No significant effusion is seen. 1. Possible left lower lobe pneumonia. 2. COPD changes. Xr Chest Portable    Result Date: 12/3/2020  EXAMINATION: ONE XRAY VIEW OF THE CHEST 12/3/2020 12:23 pm COMPARISON: 12/02/2020 HISTORY: ORDERING SYSTEM PROVIDED HISTORY: f/u covid TECHNOLOGIST PROVIDED HISTORY: Reason for exam:->f/u covid What reading provider will be dictating this exam?->CRC FINDINGS: There is improved aeration of the right and left lungs. There are resolving patchy infiltrates seen within the lateral aspect of the right and left lungs. There is hyperinflation of the lungs consistent with COPD. The heart is mildly enlarged. There is no evidence of failure. 1. Improved aeration of the right and left lungs with resolving pulmonary infiltrates. Xr Chest Portable    Result Date: 12/2/2020  EXAMINATION: ONE XRAY VIEW OF THE CHEST 12/2/2020 8:53 pm COMPARISON: None.  HISTORY: ORDERING SYSTEM PROVIDED HISTORY: f/u covid TECHNOLOGIST PROVIDED HISTORY: Reason for exam:->f/u covid What reading provider will be dictating this exam?->CRC FINDINGS: Mild focal infiltrates noted at the lateral left lung base. The right lung is clear. The heart is mildly enlarged. No pneumothorax. The costophrenic angles are clear bilaterally. Mild focal infiltrates, left lung base. Cardiomegaly. Xr Chest Portable    Result Date: 12/1/2020  EXAMINATION: ONE XRAY VIEW OF THE CHEST 12/1/2020 12:24 pm COMPARISON: 11/30/2020 HISTORY: ORDERING SYSTEM PROVIDED HISTORY: Shortness of breath TECHNOLOGIST PROVIDED HISTORY: Reason for exam:->Shortness of breath What reading provider will be dictating this exam?->CRC FINDINGS: Cardiomediastinal silhouette is stable. There is stable atelectasis or scarring at the left lung base. No acute infiltrate, effusion, or pneumothorax is seen. No acute osseous abnormalities. Stable left lung base atelectasis or scarring. No acute infiltrates are seen    Xr Chest Portable    Result Date: 11/30/2020  EXAMINATION: ONE XRAY VIEW OF THE CHEST 11/30/2020 7:09 pm COMPARISON: July 18, 2020 HISTORY: ORDERING SYSTEM PROVIDED HISTORY: chest pain TECHNOLOGIST PROVIDED HISTORY: Reason for exam:->chest pain What reading provider will be dictating this exam?->CRC FINDINGS: The heart is mildly enlarged. Focal atelectasis noted at the left lung base. The right lung is clear. The costophrenic angles are clear. Focal atelectasis, left lung base. Mild cardiomegaly. Cta Chest W Contrast    Result Date: 12/25/2020  EXAMINATION: CTA OF THE CHEST 12/25/2020 7:13 pm TECHNIQUE: CTA of the chest was performed after the administration of intravenous contrast.  Multiplanar reformatted images are provided for review. MIP images are provided for review. Dose modulation, iterative reconstruction, and/or weight based adjustment of the mA/kV was utilized to reduce the radiation dose to as low as reasonably achievable. COMPARISON: December 1, 2020 HISTORY: ORDERING SYSTEM PROVIDED HISTORY: Hypoxia, covid, concern for PE TECHNOLOGIST PROVIDED HISTORY: Reason for exam:->Hypoxia, covid, concern for PE What reading provider will be dictating this exam?->CRC FINDINGS: There is borderline cardiac size. Mild ectasia of the ascending thoracic aorta measuring 3.8 cm is noted with the calcified ulcerative plaque in the aorta. There is severe coronary artery calcification. There are no filling defects in the main pulmonary artery and the central branches. Extensive lymphadenopathy is noted in the mediastinum and hilum with lymph nodes measuring up to 1.7 cm in the paratracheal region, prevascular space, AP window, subcarinal region and smaller ones measuring up to 1.4 cm in the pulmonary hilum. There is advanced centrilobular emphysema with patchy infiltrates and pleural effusion in the lower lobes bilaterally and to a lesser extent in the upper lobes. Small spiculated infiltrative nodular densities ranging from 6-8 mm are identified in the upper lobes. The liver is of normal architecture. Gallbladder is partially distended. There is severe ulcerative plaque in the abdominal aorta. Degenerative changes are identified in the thoracic spine. No central pulmonary embolism or aortic dissection. Cardiomegaly with severe coronary artery calcification. Advanced emphysema with patchy bibasilar infiltrates and effusions likely CHF/edema and or pneumonia. Indeterminate pulmonary nodules vaguely identified in the lungs. Surveillance is recommended according to Fleischner society guidelines. Cta Pulmonary W Contrast    Result Date: 12/1/2020  EXAMINATION: CTA OF THE CHEST 12/1/2020 3:06 pm TECHNIQUE: CTA of the chest was performed after the administration of intravenous contrast.  Multiplanar reformatted images are provided for review. MIP images are provided for review.  Dose modulation, iterative reconstruction, and/or weight based adjustment of the mA/kV was utilized to reduce the radiation dose to as low as reasonably achievable. COMPARISON: None. HISTORY: ORDERING SYSTEM PROVIDED HISTORY: pe TECHNOLOGIST PROVIDED HISTORY: Reason for exam:->pe What reading provider will be dictating this exam?->CRC FINDINGS: No filling defect in the pulmonary arteries to suggest pulmonary embolism. The heart is normal in size. No pericardial effusion. Atherosclerotic calcifications are associated with the coronary arteries. Calcifications are associated with the aortic valve. There is hyperinflation of airspaces throughout both lungs notable in the upper lobes. There is peribronchial thickening in perihilar locations extending into right and left lower lobes with areas of atelectasis in lung bases. There are scattered areas of scarring. There is no pneumothorax. View of the upper abdomen shows normal bilateral adrenal glands. 1.  No pulmonary embolism. 2.  Emphysematous changes. 3.  Peribronchial inflammatory changes with distal areas of atelectasis notable in lower lobes. Us Retroperitoneal Complete    Result Date: 12/3/2020  EXAMINATION: RETROPERITONEAL ULTRASOUND OF THE KIDNEYS AND URINARY BLADDER 12/3/2020 COMPARISON: CT abdomen and pelvis, 08/18/2019 HISTORY: ORDERING SYSTEM PROVIDED HISTORY: RAMON TECHNOLOGIST PROVIDED HISTORY: Reason for exam:->RAMON What reading provider will be dictating this exam?->CRC FINDINGS: Kidneys: The right kidney measures 11.0 cm in length and the left kidney measures 10.6 cm in length. Kidneys demonstrate normal cortical echogenicity. No evidence of hydronephrosis or intrarenal stones. Small 1 cm cyst is seen in in the right kidney. Bladder: Bladder is unremarkable in appearance. 1. Small right renal cyst.  Otherwise, unremarkable sonogram of the kidneys. 2. Unremarkable sonogram of the bladder.       EKG: ST depression in V4 and V5    Resident's Assessment and Plan     Pratik Delvis. is a 69 y.o. male Past medical history significant for chronic ischemic heart disease/CAD status post multiple cardiac catheterizations status post multiple stents to the RCA, extensive multifocal plaque and stenosis of LAD and left circumflex, atrial fibrillation not on oral anticoagulation, HFpEF COPD stage IV on 4 L nasal cannula at home, peripheral vascular disease, left subclavian arterial stenosis, tobacco abuse who presented with worsening SOB and CP for the last 2-3 days. COVID +. Two recent admissions in December to hospital. Currently in the MICU being managed for:       Neurology:   AAOx3    Cardiology:   Type 2 NSTEMI  · Troponin 0.20 on admission. Trending up. 0.21 --> 0.22.   · Initial EKG shows V4-V5 ST depressions (new) and possible q waves. Repeat EKG in unit shows resolving ST depressions. · Cardiology following. Reccs appreciated- Heparin for 48 hours; Plavix 300 mg then 75 mg daily. Resume Toprolol and Norvasc. 1/2 inch of Ntropaste for left chest pain. Resume Bumex ggt . Outpatient cardiac catherization recommended. · Repeat Echo. HFpEF  · Last Echo EF 60% with indeterminate. diastolic function, mild LV concentric hypertrophy noted. · Bumex ggt at 0.5 cc/hr. Continue. · Resume Metoprolol,  Amldipine. Continue to hold Niroglycerin, Imdur, Ranexa,     PAD  Hold home Cilostazol      HLD  Continue Lipitor 80 mg nightly. HTN  · HTN to 180s SBP this AM likely in the setting of anxiety and pain. Despite given metoprolol and amlodipine, still hypertensive, thus given 1 dose of Ativan 0.5 mg.     Pulmonary:   Acute hypoxic respiratory failure 2/2 COVID -19 PNA vs COPD exacerbation  · Transition from BiPAP to NRB. Maintain sats > 90%. · Conitnue Flonase, Trelegy Ellipta, Duoneb, Singulair. · CT 12/25/2020 (and prior CT chest) reviewed during rounds- worsening patchy infiltrates in bibasilar spaces, centrilobular emphysema, pleural effusions in the lower lobes b/l and upper lobes. · Flutter valve. Mucolytic   · Trend inflammatory markers. COPD Stage IV  · PFTs 9/2019- FEV1 27% of predicted. · Follows with Dr. Mosher File   · Pulmonology reccs appreciated. Nephrology (Fluids/ Electrolytes & Nutrition):   Goal   - Keep Mg> 2  - Keep Phosphorous> 3  - Keep K> 4   Replete as necessary. Gastroenterology:   Possible GI Bleed  · Hgb goal greater 9.0 given ACS. Transfuse 1 units pRBC as Hgb 7.3.   · FOBT pending. Hematology/ Oncology:   Normocytic Anemia  · Hgb trending down 8.7 --> 7.7   · Hgb goal > 9.0. Transfuse 1 unit. · Repeat H&H q8h. · FOBT pending. Infectious Disease:   COVID-19 PNA  · Received 10 days of Remdesivir from 12/1/2020-12/6/2020 and 12/15/2020- 12/20/2020  · Repeat COVID test positive. Otherwise respiratory panel negative. Next of Kin/ POA:   Johanna Turpin [phone]  Code Status:   Full Code    PT/OT: reccs appreciated  DVT ppx: Heparin   GI ppx: ppi  Disposition: MICU    Trip List, DO, PGY-1   Attending physician: Dr. Graham Rosas    I personally saw, examined and provided care for the patient. Radiographs, labs and medication list were reviewed by me independently. I spoke with bedside nursing, therapists and consultants. Critical care services and times documented are independent of procedures and multidisciplinary rounds with Residents. Additionally comprehensive, multidisciplinary rounds were conducted with the MICU team. The case was discussed in detail and plans for care were established. Review of Residents documentation was conducted and revisions were made as appropriate. I agree with the above documented exam, problem list and plan of care.   Felix Albarran MD   CCT excluding procedures:40'

## 2020-12-26 NOTE — CONSULTS
[facility]  [address], [address]  [facility], [address] [place] [address]    Pulmonary Consultation      Reason for Consult:  sob    Requesting Physician:  Che Portillo DO    CHIEF COMPLAINT:  sob    History Obtained From:  patient, electronic medical record    HISTORY OF PRESENT ILLNESS:                The patient is a 69 y.o. male with significant past medical history of COVID who presents with increased sob for the past few days. Has been back and forth with hospital admission this month due to [place] and COPD (12/2-6 and 12/10-15). Started complaining of chest pain and sob for a few days, gradually getting worse. Had been using his oxygen continuously and found to be hypoxic on admission. EKG changes showing NSTEMI also noted and decision to admit to ICU made. Oxygen status worsened and is currently on BIPAP 15/5 85 saturating about 90%. Complaining of some chest pain/tightness and sob, no cough/congestion, some help with BIPAP use and asking for pain medications for chest pain. Sitting up in bed looking uncomfortable with chest pain and slightly tachypneic.     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 ([address] 7/18/2019    Bilateral carotid artery stenosis 7/18/2019    CAD (coronary artery disease)     Carotid artery stenosis     Carotid bruit 6/5/2013    Carotid stenosis, right 6/5/2013    CHF (congestive heart failure) ([address]     COPD (chronic obstructive pulmonary disease) ([address]     Headache(784.0)     Hyperlipidemia     Hypertension     PVD (peripheral vascular disease) ([address] 4/26/2013    PVD (peripheral vascular disease) with claudication ([address] 6/5/2013    STEMI (ST elevation myocardial infarction) ([facility] 10/7/2012    Subclavian artery stenosis, left ([facility] 4/26/2013    Traumatic retroperitoneal hematoma 4/26/2013       Past Surgical History:        Procedure Laterality Date    CARDIAC CATHETERIZATION  07/21/2020    Dr. Marisa De La Torre  03/13/2017    2.5/15 [place] balloon to RCA and RPL    CORONARY ANGIOPLASTY WITH STENT PLACEMENT  10/07/2012    Ion KILEY x2 to RCA per Dr. Cristal Juarez  4/24/13    Stent MID RCA 3.5x38, 2.5X15 RPL    DIAGNOSTIC CARDIAC CATH LAB PROCEDURE  1998    [place] stent to the RCA    DIAGNOSTIC CARDIAC CATH LAB PROCEDURE  1999    balloon to prox RCA unsuccessful PTC of the ostium of the posterior descending bracnh of the RCA    DIAGNOSTIC CARDIAC CATH LAB PROCEDURE  2002    cardiac cath with PTCA cutting balloon angioplasty to the ostium of the posterior descending artery branch of the RC    ECHO COMPL W DOP COLOR FLOW  10/9/2012    Mod concentric LVH, EF 55%, stage II diastolic dysfunction    ECHO COMPL W DOP COLOR FLOW  4/25/2013            Current Medications:    Current Facility-Administered Medications: sodium chloride flush 0.9 % injection 10 mL, 10 mL, Intravenous, 2 times per day  sodium chloride flush 0.9 % injection 10 mL, 10 mL, Intravenous, PRN  polyethylene glycol (GLYCOLAX) packet 17 g, 17 g, Oral, Daily PRN  acetaminophen (TYLENOL) tablet 650 mg, 650 mg, Oral, Q6H PRN **OR** acetaminophen (TYLENOL) suppository 650 mg, 650 mg, Rectal, Q6H PRN  guaiFENesin tablet 400 mg, 400 mg, Oral, BID PRN  amLODIPine (NORVASC) tablet 5 mg, 5 mg, Oral, Daily  [START ON 12/27/2020] clopidogrel (PLAVIX) tablet 75 mg, 75 mg, Oral, Daily  [START ON 12/27/2020] metoprolol succinate (TOPROL XL) extended release tablet 50 mg, 50 mg, Oral, Daily  nitroGLYCERIN (NITROSTAT) SL tablet 0.4 mg, 0.4 mg, Sublingual, Q5 Min PRN  bumetanide (BUMEX) 12.5 mg in sodium chloride 0.9 % 125 mL infusion, 0.5 mg/hr, Intravenous, Continuous  heparin (porcine) injection 4,000 Units, 4,000 Units, Intravenous, PRN  heparin (porcine) injection 2,000 Units, 2,000 Units, Intravenous, PRN  heparin 25,000 units in dextrose 5% 250 mL infusion, 12 Units/kg/hr, Intravenous, Continuous  senna (SENOKOT) tablet 8.6 mg, 1 tablet, Oral, Daily PRN  aspirin chewable tablet 81 mg, 81 mg, Oral, Daily  atorvastatin (LIPITOR) tablet 80 mg, 80 mg, Oral, Nightly  Vitamin D (CHOLECALCIFEROL) tablet 1,000 Units, 1,000 Units, Oral, Daily  dexamethasone (DECADRON) tablet 6 mg, 6 mg, Oral, Daily  fluticasone (FLONASE) 50 MCG/ACT nasal spray 1 spray, 1 spray, Nasal, Daily  fluticasone-umeclidin-vilant (TRELEGY ELLIPTA) 100-62.5-25 MCG/INH inhaler 1 puff, 1 puff, Inhalation, Daily  ipratropium-albuterol (DUONEB) nebulizer solution 3 mL, 3 mL, Inhalation, Q6H PRN  montelukast (SINGULAIR) tablet 10 mg, 10 mg, Oral, Nightly    Allergies:  Bee pollen and Penicillins    Social History:    TOBACCO:   reports that he quit smoking about 7 years ago. His smoking use included cigarettes. He started smoking about 53 years ago. He has a 45.00 pack-year smoking history. His smokeless tobacco use includes chew.     Family History:       Problem Relation Age of Onset    Heart Disease Mother          age 50    Heart Disease Father          66's     Cancer Father         kidney    Cancer Paternal Uncle         kidney       REVIEW OF SYSTEMS:    Review of systems not obtained due to patient factors - sob and chest pain    PHYSICAL EXAM:      Vitals:    BP (!) 127/54   Pulse 116   Temp 98.2 °F (36.8 °C) (Axillary)   Resp 28   Ht 5' 10\" (1.778 m)   Wt 177 lb (80.3 kg)   SpO2 95%   BMI 25.40 kg/m²     EYES:  Lids and lashes normal, pupils equal, round and reactive to light, extra ocular muscles intact, sclera clear, conjunctiva normal  ENT:  Normocephalic, without obvious abnormality, atraumatic, sinuses nontender on palpation, external ears without lesions, oral pharynx with moist mucus membranes, tonsils without erythema or exudates, gums normal and good dentition. NECK:  Supple, symmetrical, trachea midline, no adenopathy, thyroid symmetric, not enlarged and no tenderness, skin normal  LUNGS:  Bilateral ronchi  CARDIOVASCULAR:  Normal apical impulse, regular rate and rhythm, normal S1 and S2, no S3 or S4, and no murmur noted  ABDOMEN:  No scars, normal bowel sounds, soft, non-distended, non-tender, no masses palpated, no hepatosplenomegally  MUSCULOSKELETAL:  There is no redness, warmth, or swelling of the joints. NEUROLOGIC:  Awake, conversant, looking slightly tachypneic and uncomfortable with chest pain, moving extremities    DATA:    CBC:   Recent Labs     12/25/20 1840 12/26/20 0137 12/26/20  0535 12/26/20  1141   WBC 7.2 6.3 7.3  --    HGB 8.7* 7.7* 7.5* 9.4*   HCT 27.2* 24.3* 23.8* 29.8*   MCV 94.4 95.3 95.2  --     212 209  --        BMP:  Recent Labs     12/25/20 1840 12/26/20  0137 12/26/20  0535    142 145   K 3.4* 3.9 4.3   CL 99 104 106   CO2 33* 30* 30*   BUN 24* 23 23   CREATININE 1.1  1.1 1.0 0.9    ALB:3,BILIDIR:3,BILITOT:3,ALKPHOS:3)@    PT/INR:   Recent Labs     12/25/20 1840 12/26/20  0137   PROTIME 16.5* 19.9*   INR 1.5 1.8       ABG:   Recent Labs     12/26/20  1237   PH 7.373   PO2 84.6   PCO2 50.4*   HCO3 28.7*   BE 2.8   O2SAT 96.4   METHB 0.1   O2HB 95.8   COHB 0.5   O2CON 14.0   HHB 3.6   THB 10.3*     FiO2 : 85 %       Radiology Review:  CTA chest reviewed with new small bilateral pleural effusions with congestion/atelectasis both lower lobes when compared to previous, (-) PE      IMPRESSION/RECOMMENDATIONS:      COVID  NSTEMI  Hypoxia  COPD      1. Currently on heparin drip, being managed medically for now, as per Cardiology  2. Cont with BIPAP, observe oxygenation, taper as tolerated  3. On MDI, observe use if able to, may need neb treatments if can't  4.  Cont with steroid, taper as tolerated  5. Other issues as per MICU team              Time at the bedside, reviewing labs and radiographs, reviewing notes and consultations, discussing with staff and family was more than 50 minutes. Thanks for letting us see this patient in consultation. Please contact us with any questions. Office [phone] or after hours through [employer], x [phone].

## 2020-12-26 NOTE — PROGRESS NOTES
Date: 12/25/2020    Time: 8:23 PM    Patient Placed On BIPAP/CPAP/ Non-Invasive Ventilation? Yes    If no must comment. Facial area red/color change? No           If YES are Blister/Lesion present? No   If yes must notify nursing staff  BIPAP/CPAP skin barrier? Yes    Skin barrier type:mepilexlite    Comments: 15/5/100%      Iram Pringle,RRT,RRT-ACCS

## 2020-12-27 NOTE — PROGRESS NOTES
Date: 12/26/2020    Time: 11:47 PM    Patient Placed On BIPAP/CPAP/ Non-Invasive Ventilation? No    If no must comment. On from previous shift  Facial area red/color change? No           If YES are Blister/Lesion present? No   If yes must notify nursing staff  BIPAP/CPAP skin barrier?   Yes    Skin barrier type:mepilexlite       Comments:        Mckayla Zamarripa

## 2020-12-27 NOTE — PROGRESS NOTES
[address] [facility]  Department of Internal Medicine   Internal Medicine Residency   MICU Progress Note    Patient:  Kimberly Tang. 69 y.o. male  MRN: [id number]     Date of Service: 12/27/2020    Allergy: Bee pollen and Penicillins    Subjective   24h change: Received Ativan 0.5 mg overnight for anxiety. Given Bumex 2 mg with appropriate urine output (1.655L in last 24 hours). Pt seen and examined this AM. Pt continues to be on BiPAP. ABG this AM 7.407/50.1/85.3/31. 5. Pt notes subjective improvement since yesterday. However, he notes he has increased sputum production, but is unable to spit it out b/c \"it feels stuck. \"     Otherwise, Denies fevers, chills, headache, CP, abdominal pain, N/V, constipation or diarrhea or paraesthesias of b/l upper and lower extremities. Objective     VS: BP (!) 147/75   Pulse 100   Temp 98.1 °F (36.7 °C) (Axillary)   Resp 19   Ht 5' 10\" (1.778 m)   Wt 170 lb 10.2 oz (77.4 kg)   SpO2 93%   BMI 24.48 kg/m²           I & O - 24hr:     Intake/Output Summary (Last 24 hours) at 12/27/2020 1042  Last data filed at 12/27/2020 0900  Gross per 24 hour   Intake 771.82 ml   Output 1845 ml   Net -1073.18 ml       Physical Exam:  General Appearance: alert, appears stated age and cooperative, well developed and well nourished. In NAD. On BiPAP. Neck: no adenopathy, no carotid bruit, no JVD, supple, symmetrical, trachea midline and thyroid not enlarged, symmetric, no tenderness/mass/nodules  Lung: Decreased lung sounds b/l. +rhonchi and rales. Heart: regular rate and rhythm, S1, S2 normal, no murmur, click, rub or gallop  Abdomen: soft, non-tender; bowel sounds normal; no masses,  no organomegaly  Extremities:  extremities normal, atraumatic, no cyanosis or edema  Musculoskeletal: No joint swelling, no muscle tenderness. ROM normal in all joints of extremities.    Neurologic: Mental status: Alert, oriented, thought content appropriate    Lines     site day    Art line None    TLC None    PICC None    Hemoaccess None    Oxygen:    CPAP/BIPAP at 15/5.    Mechanical Ventilation:  n/a  ABG:     Lab Results   Component Value Date    PH 7.407 12/27/2020    AXP3KHZ 50.1 12/27/2020    PCO2 44.6 12/26/2020    PO2ART 85.3 12/27/2020    PO2 67.7 12/26/2020    YQE3DYK 31.5 12/27/2020    HCO3 29.6 12/26/2020    BE 5.9 12/27/2020    THB 10.7 12/26/2020    O2SAT 96.3 12/27/2020        Medications     Infusions: (Fluid, Sedation, Vasopressors)  IVF:   none  Vasopressors  none  Sedation  none    Nutrition:   NPO  ATB:   Antibiotics  Days                 Patient currently has   Isolation  DVT prophylaxis/ GI prophylaxis,    PT/OT  Labs     CBC:   Lab Results   Component Value Date    WBC 9.7 12/27/2020    RBC 2.82 12/27/2020    HGB 8.4 12/27/2020    HCT 27.1 12/27/2020    MCV 96.1 12/27/2020    MCH 29.8 12/27/2020    MCHC 31.0 12/27/2020    RDW 14.6 12/27/2020     12/27/2020    MPV 9.8 12/27/2020     CMP:    Lab Results   Component Value Date     12/27/2020    K 4.2 12/27/2020     12/27/2020    CO2 31 12/27/2020    BUN 23 12/27/2020    CREATININE 0.8 12/27/2020    GFRAA >60 12/27/2020    LABGLOM >60 12/27/2020    GLUCOSE 119 12/27/2020    PROT 5.5 12/27/2020    LABALBU 2.5 12/27/2020    CALCIUM 8.2 12/27/2020    BILITOT 0.5 12/27/2020    ALKPHOS 51 12/27/2020    AST 58 12/27/2020    ALT 40 12/27/2020       Imaging Studies:  CXR: 12/27/2020     Impression   Bilateral predominantly lower lobe airspace disease.  No significant change.         Resident's Assessment and Plan   Tamie Avendaño is a 69 y.o. male with past medical history significant for chronic ischemic heart disease/CAD status post multiple cardiac catheterizations status post multiple stents to the RCA, extensive multifocal plaque and stenosis of LAD and left circumflex, atrial fibrillation not on oral anticoagulation, HFpEF COPD stage IV on 4 L nasal cannula at home, peripheral vascular disease, left subclavian arterial stenosis, tobacco abuse who presented with worsening SOB and CP for the last 2-3 days. COVID +. Two recent admissions in December to hospital. Currently in the MICU being managed for:     Neurology:   AAOx3     Cardiology:   Type 2 NSTEMI  Troponin 0.20 on admission. Trending up. 0.21 --> 0.22-->0. 39. Repeat EKG shows resolving ST depressions. Initial EKG shows V4-V5 ST depressions (new) and possible q waves. Cardiology following. Reccs appreciated-   48 hours of Heparin completed. 12/25/2020 - 12/27/2020  Given Plavix 300 mg. Continue Plavix 75 mg daily. Continue Toprolol and Norvasc. 1/2 inch of Nitropaste for left chest pain. 2 doses of Bumex 2 mg thus far. UO 1.6 L. Outpatient cardiac catherization recommended.      HFpEF  Last Echo EF 60% with indeterminate. diastolic function, mild LV concentric hypertrophy noted. Continue diuresis with Bumex 0.5 mg today. Resume Metoprolol,  Amldipine. Continue to hold Niroglycerin, Imdur, Ranexa,      PAD  Hold home Cilostazol      HLD  Continue Lipitor 80 mg nightly.      HTN  HTN to 180s SBP this AM likely in the setting of anxiety and pain. Despite given metoprolol and amlodipine, still hypertensive, thus given 1 dose of Ativan 0.5 mg.      Pulmonary:   Acute hypoxic respiratory failure 2/2 COVID -19 PNA vs COPD exacerbation  Currently on BiPAP. Transition to NRB. Maintain saturations > 90%. Patient is maintaining saturations greater than 92% with nonrebreather. He is stable for transfer to general floor. Scheduled duo nebs. Will start patient on Brovana (Mozelle Mungo not in our hospital formulary) . We will continue Pulmicort. Start on EZPAP. Mucomyst twice daily. CT 12/25/2020 (and prior CT chest) reviewed during rounds- worsening patchy infiltrates in bibasilar spaces, centrilobular emphysema, pleural effusions in the lower lobes b/l and upper lobes. Trend inflammatory markers.   Continue diuresis with Bumex 0.5 mg today.     COPD

## 2020-12-27 NOTE — PROGRESS NOTES
Hospitalist Progress Note      Synopsis: Kayleen Mao Sr. is a 69 y.o. male Past medical history significant for chronic ischemic heart disease/CAD status post multiple cardiac catheterizations status post multiple stents to the RCA, extensive multifocal plaque and stenosis of LAD and left circumflex, atrial fibrillation not on oral anticoagulation, HFpEF COPD stage IV on 4 L nasal cannula at home, peripheral vascular disease, left subclavian arterial stenosis, tobacco abuse who presented with worsening SOB and CP for the last 2-3 days. COVID +. Cardiology is consulted. Continues on bipap     Subjective    Tachycardic  Continues on bipap  Increased sputum production     Exam:  /72   Pulse 96   Temp 98.8 °F (37.1 °C) (Axillary)   Resp 24   Ht 5' 10\" (1.778 m)   Wt 170 lb 10.2 oz (77.4 kg)   SpO2 91%   BMI 24.48 kg/m²     Due to the current efforts to prevent transmission of COVID-19 and also the need to preserve PPE for other caregivers, a face-to-face encounter with the patient was not performed. That being said, all relevant records and diagnostic tests were reviewed, including laboratory results and imaging. Please reference any relevant documentation elsewhere.  Care will be coordinated with the primary service and other specialties as appropriate.       Medications:  Reviewed    Infusion Medications    heparin (PORCINE) Infusion 14 Units/kg/hr (12/27/20 0641)     Scheduled Medications    metoprolol tartrate  50 mg Oral BID    Arformoterol Tartrate  15 mcg Nebulization BID    budesonide  500 mcg Nebulization BID    ipratropium-albuterol  1 ampule Inhalation Q4H While awake    acetylcysteine  2 mL Inhalation Q6H    sodium chloride  20 mL Intravenous Once    sodium chloride flush  10 mL Intravenous 2 times per day    amLODIPine  5 mg Oral Daily    clopidogrel  75 mg Oral Daily    aspirin  81 mg Oral Daily    atorvastatin  80 mg Oral Nightly    Vitamin D  1,000 Units Oral Daily    pending  Transfuse as indicated   PT/OT  Diuresis as indicated       Diet: Diet NPO Effective Now  Code Status: Full Code  PT/OT Eval Status:   ordered  DVT Prophylaxis:   heparin  Recommended disposition at discharge:  pending medical progression     +++++++++++++++++++++++++++++++++++++++++++++++++  Alisa Toledo MD   [facility].  +++++++++++++++++++++++++++++++++++++++++++++++++  NOTE: This report was transcribed using voice recognition software. Every effort was made to ensure accuracy; however, inadvertent computerized transcription errors may be present.

## 2020-12-27 NOTE — PROGRESS NOTES
Date: 12/26/2020    Time: 8:49 PM    Patient Placed On BIPAP/CPAP/ Non-Invasive Ventilation? No    If no must comment. On from previous shift  Facial area red/color change? No           If YES are Blister/Lesion present? No   If yes must notify nursing staff  BIPAP/CPAP skin barrier?   Yes    Skin barrier type:mepilexlite       Comments:        Juanpablo Cedeno

## 2020-12-28 NOTE — PROGRESS NOTES
Hospitalist Progress Note      Synopsis: Katherine Corral Sr. is a 69 y.o. male Past medical history significant for chronic ischemic heart disease/CAD status post multiple cardiac catheterizations status post multiple stents to the RCA, extensive multifocal plaque and stenosis of LAD and left circumflex, atrial fibrillation not on oral anticoagulation, HFpEF COPD stage IV on 4 L nasal cannula at home, peripheral vascular disease, left subclavian arterial stenosis, tobacco abuse who presented with worsening SOB and CP for the last 2-3 days. COVID +. Cardiology is consulted. Waws on bipap but required intubation on 12/28    Subjective  Desaturating requiring intubation     Exam:  /65   Pulse 99   Temp 98.1 °F (36.7 °C) (Oral)   Resp 24   Ht 5' 10\" (1.778 m)   Wt 161 lb 9.6 oz (73.3 kg)   SpO2 (!) 87%   BMI 23.19 kg/m²     Due to the current efforts to prevent transmission of COVID-19 and also the need to preserve PPE for other caregivers, a face-to-face encounter with the patient was not performed. That being said, all relevant records and diagnostic tests were reviewed, including laboratory results and imaging. Please reference any relevant documentation elsewhere.  Care will be coordinated with the primary service and other specialties as appropriate.       Medications:  Reviewed    Infusion Medications    bumetanide 0.1 mg/mL infusion 0.5 mg/hr (12/28/20 1030)    fentaNYL 5 mcg/ml in 0.9%  ml infusion 25 mcg/hr (12/28/20 1252)     Scheduled Medications    metoprolol tartrate  75 mg Oral BID    propofol        acetylcysteine  2 mL Inhalation BID    chlorhexidine  15 mL Mouth/Throat BID    piperacillin-tazobactam  3.375 g Intravenous Q8H    sodium chloride   Intravenous Q8H    Arformoterol Tartrate  15 mcg Nebulization BID    budesonide  500 mcg Nebulization BID    ipratropium-albuterol  1 ampule Inhalation Q4H While awake    sodium chloride flush  10 mL Intravenous 2 times per day    studies reviewed today. ASSESSMENT:  Acute hypoxic respiratory failure  NSTEMI - type 2  HFpEF  PAD  HLD  HTN    PLAN:  Wean vent as able   Appreciate cardiac recs; continue plavix, toprol, norvasc; on nitropaste  IV antibiotics, follow cultures   Diuresis  Plan for outpatient cardiac cath  ECHO pending  Transfuse as indicated   PT/OT  Diuresis as indicated       Diet: Diet NPO Effective Now  Code Status: Full Code  PT/OT Eval Status:   ordered  DVT Prophylaxis:   heparin  Recommended disposition at discharge:  pending medical progression     +++++++++++++++++++++++++++++++++++++++++++++++++  Alisa Agustin MD   [facility].  +++++++++++++++++++++++++++++++++++++++++++++++++  NOTE: This report was transcribed using voice recognition software. Every effort was made to ensure accuracy; however, inadvertent computerized transcription errors may be present.

## 2020-12-28 NOTE — PROGRESS NOTES
Date: 12/28/2020    Time: 8:55 AM    Patient Placed On BIPAP/CPAP/ Non-Invasive Ventilation? No    If no must comment. On from previous xhift  Facial area red/color change? No           If YES are Blister/Lesion present? No   If yes must notify nursing staff  BIPAP/CPAP skin barrier?   Yes    Skin barrier type:mepilexlite       Comments:  Increased peep to 12          Inna Soila     12/28/20 0852   NIV Type   Skin Protection for O2 Device Yes   Location Nose   Mode Bilevel   Mask Type Full face mask  (non vented mas)   Mask Size Medium   Settings/Measurements   IPAP 18 cmH20   CPAP/EPAP 12 cmH2O   Resp 27   Insp Rise Time (%) 2 %   FiO2  100 %   Vt Exhaled 502 mL   Minute Volume 14 Liters   Mask Leak (lpm) 33 lpm   Comfort Level Fair   Using Accessory Muscles Yes   SpO2 88   Alarm Settings   Alarms On Y

## 2020-12-28 NOTE — PROGRESS NOTES
Date: 12/27/2020    Time: 8:51 PM    Patient Placed On BIPAP/CPAP/ Non-Invasive Ventilation? No    If no must comment. On from previous shift  Facial area red/color change? No           If YES are Blister/Lesion present? No   If yes must notify nursing staff  BIPAP/CPAP skin barrier?   Yes    Skin barrier type:mepilexlite       Comments:        Naomi Monroy

## 2020-12-28 NOTE — PLAN OF CARE
Will show no infection signs and symptoms  Outcome: Met This Shift  Goal: Absence of new skin breakdown  Description: Absence of new skin breakdown  Outcome: Met This Shift     Problem: Pain:  Description: Pain management should include both nonpharmacologic and pharmacologic interventions.   Goal: Pain level will decrease  Description: Pain level will decrease  Outcome: Met This Shift  Goal: Control of acute pain  Description: Control of acute pain  Outcome: Met This Shift  Goal: Control of chronic pain  Description: Control of chronic pain  Outcome: Met This Shift

## 2020-12-28 NOTE — CARE COORDINATION
12/28 Care Coordination: Neftaly Morton to the ED for shortness of breath. History of recent COVID-19 diagnosis, discharged home after be admitted for acute on chronic hypoxic respiratory failure on 12/15/2020. He reports he been doing well until 2 days ago when he started to develop some worsening shortness of breath. Normally on 4 L of oxygen,He resides with his wife in a one floor plan home. Pt did not have any C service when discharge his last admission. Jordyn Cook He gets his Oxygen from [address]. Call placed to his Wife,Message left. Will await her call back. CM/SW will continue to follow for discharge planning. Lazaro Cottrell [facility]  [phone]    12/28 Care Coordination:Chacorta's wife Stephanie Green called back. Discussed discharge plan. She states she is upset,felt they discharged him too soon the last admission. Discussed that he could benefit from [address]. He refused last admission. She stated that would be a good Idea, had no preference only one that is covered by his insurance. Discussed possible ADAN. She states would have to ask Chacorta. She felt it would be a good idea but it would be up to him. CM/SW will continue to follow for discharge planning.    Lazaro Cottrell BSN,RN--[phone]

## 2020-12-28 NOTE — PROGRESS NOTES
Pt desaturated to 85-88%, fio2 increased to 100% by RN, breath sounds rhonchi bilaterally with weak cough, pt does not tolerate coming off bipap, rapidly desats, duoneb and mucomyst tx given, spo2 at 90-91% post treatment

## 2020-12-28 NOTE — PROGRESS NOTES
Date: 12/27/2020    Time: 11:59 PM    Patient Placed On BIPAP/CPAP/ Non-Invasive Ventilation? No    If no must comment. On from previous shift  Facial area red/color change? No           If YES are Blister/Lesion present? No   If yes must notify nursing staff  BIPAP/CPAP skin barrier?   Yes    Skin barrier type:mepilexlite       Comments:        Arnold Gomes

## 2020-12-28 NOTE — ANESTHESIA PROCEDURE NOTES
Airway  Date/Time: 12/28/2020 12:24 PM  Urgency: emergent    Airway not difficult    General Information and Staff    Patient location during procedure: ICU  Anesthesiologist: Minerva Romero DO  Resident/CRNA: BARBI Harding CRNA  Performed: resident/CRNA     Consent for Airway (if performed for an anesthetic, see related documentation for consents)  Patient identity confirmed: per hospital policy  Consent: The procedure was performed in an emergent situation. Verbal consent not obtained. Written consent not obtained. Risks and benefits: risks, benefits and alternatives were not discussed      Indications and Patient Condition  Indications for airway management: airway protection, hypoxemia and respiratory distress  Spontaneous ventilation: present  Sedation level: deep  Preoxygenated: no (pt on bipap prior to intubation)  Patient position: ramp  MILS not maintained throughout  Mask difficulty assessment: not attempted    Final Airway Details  Final airway type: endotracheal airway      Successful airway: ETT  Cuffed: yes   Successful intubation technique: video laryngoscopy  Facilitating devices/methods: intubating stylet  Endotracheal tube insertion site: oral  Blade type: CMAC. Blade size: CMAC D.  ETT size (mm): 8.0  Cormack-Lehane Classification: grade I - full view of glottis  Placement verified by: chest auscultation and capnometry   Measured from: lips  ETT to lips (cm): 25  Number of attempts at approach: 1  Ventilation between attempts: VENTILATOR     Additional Comments  Pt intubated without difficulty, VSS. Notified Dr. Jose Ramon Burkett pt's last K 6.2 per chart but moderately hemolyzed, repeat K sent per nursing and chart. Pt K prior 4.2 per chart. Per Dr. Jose Ramon Burkett, ok to use anectine for intubation.      Etomidate-10mg  Anectine-100mg  no

## 2020-12-28 NOTE — PROGRESS NOTES
12/28/2020    O2SAT 92.9 12/28/2020        Medications     Infusions: (Fluid, Sedation, Vasopressors)  IVF:    None   Vasopressors   None   Sedation   None     Nutrition:   NPO  ATB:   Antibiotics  Days                   Patient currently has   Urinary cath  DVT prophylaxis/ GI prophylaxis,    PT/OT  Labs     CBC with Differential:    Lab Results   Component Value Date    WBC 12.8 12/28/2020    RBC 3.55 12/28/2020    HGB 11.0 12/28/2020    HCT 33.6 12/28/2020     12/28/2020    MCV 95.2 12/28/2020    MCH 29.6 12/28/2020    MCHC 31.1 12/28/2020    RDW 15.7 12/28/2020    NRBC 0.9 12/02/2020    SEGSPCT 68 04/27/2013    METASPCT 1 04/27/2013    LYMPHOPCT 3.0 12/28/2020    MONOPCT 5.8 12/28/2020    BASOPCT 0.1 12/28/2020    MONOSABS 0.74 12/28/2020    LYMPHSABS 0.39 12/28/2020    EOSABS 0.00 12/28/2020    BASOSABS 0.01 12/28/2020     CMP:    Lab Results   Component Value Date     12/28/2020    K 3.6 12/28/2020    K 6.2 12/28/2020    CL 98 12/28/2020    CO2 29 12/28/2020    BUN 31 12/28/2020    CREATININE 0.9 12/28/2020    GFRAA >60 12/28/2020    LABGLOM >60 12/28/2020    GLUCOSE 125 12/28/2020    PROT 6.2 12/28/2020    LABALBU 2.5 12/28/2020    CALCIUM 8.6 12/28/2020    BILITOT 0.5 12/28/2020    ALKPHOS 63 12/28/2020    AST 86 12/28/2020    ALT 59 12/28/2020       Imaging Studies:  CXR: 12/28  Worsening of right-sided infiltrates.  No change in left lower lung opacity. Resident's Assessment and Plan     Cardiology:   EKG change likely 2/2 type II NSTEMI vs myocarditis  -Initial EKG shows V4 to V5 ST depression and possible Q waves.   Repeat EKG shows resolving ST depressions  -Troponin 0 0.20 -> 0.42, CK-MB/CK 12.6%  -Completed 48 hours of heparin  -Continue Plavix 75 mg daily  -Continue Toprolol and Norvasc  -1/2 inch of Nitropaste to left chest pain  -Outpatient cardiac catheterization recommended by cardiology  -Cardiology following    HFpEF, EF 60%  -On Bumex drip  -Resume Toprolol and Norvasc    Hypertension  -Continue metoprolol and Norvasc    Pulmonary:   Acute hypoxic respiratory failure 2/2 COVID-19 pneumonia vs COPD exacerbation vs aspiration pneumonia  -Received to 10 days of remdesivir  -Repeat Covid test positive, otherwise respiratory panel negative  -Continue to monitor daily chest x-ray, ABG  -Trend inflammatory markers  -Continue Decadron 6 mg daily  -Continue Brovana, Pulmicort, and DuoNeb  -Continue Mucomyst  -Start Zosyn for hospital-acquired aspiration pneumonia  -Desaturated on BiPAP this morning. Start Bumex drip. Low threshold for intubation    COPD stage IV  -PFT 9/2019 showed FEV1 27% of predicted  -Pulmonology following    Gastroenterology:   Possible GI bleed  -Hemoglobin stable  -Hemoglobin goal >9   -Follow FOBT    Hematology/ Oncology:   Normocytic anemia  -Hemoglobin stable, 11 today  -Follow H&H every 8 hours    Code Status: Full code      PT/OT: Not indicated for now  GI ppx: Protonix        Richard Will MD, PGY-1    Attending physician: Dr. Ewa Clemens  Department of Pulmonary, Critical Care and Sleep Medicine  [facility]  Department of Internal Medicine      During multidisciplinary team rounds Martin Cadet Sr. is a 69 y.o. male was seen, examined and discussed. This is confirmation that I have personally seen and examined the patient and that the key elements of the encounter were performed by me (> 85 % time). The medications & laboratory data was discussed and adjusted where necessary. The radiographic images were reviewed or with radiologist or consultant if felt dis-concordant with the exam or history. The above findings were corroborated, plans confirmed and changes made if needed. Family is updated at the bedside as available. Key issues of the case were discussed among consultants. Critical Care time is documented if appropriate.       Diann Earl, DO, FACP, FCCP, Kari Sierra,

## 2020-12-29 PROBLEM — E43 SEVERE PROTEIN-CALORIE MALNUTRITION (HCC): Status: RESOLVED | Noted: 2020-01-01 | Resolved: 2020-01-01

## 2020-12-29 PROBLEM — E44.0 MODERATE PROTEIN-CALORIE MALNUTRITION (HCC): Status: ACTIVE | Noted: 2020-01-01

## 2020-12-29 PROBLEM — E43 SEVERE PROTEIN-CALORIE MALNUTRITION (HCC): Status: ACTIVE | Noted: 2020-01-01

## 2020-12-29 NOTE — PROGRESS NOTES
Hospitalist Progress Note      Synopsis: Darron Noble Sr. is a 69 y.o. male Past medical history significant for chronic ischemic heart disease/CAD status post multiple cardiac catheterizations status post multiple stents to the RCA, extensive multifocal plaque and stenosis of LAD and left circumflex, atrial fibrillation not on oral anticoagulation, HFpEF COPD stage IV on 4 L nasal cannula at home, peripheral vascular disease, left subclavian arterial stenosis, tobacco abuse who presented with worsening SOB and CP for the last 2-3 days. COVID +. Cardiology is consulted. Waws on bipap but required intubation on 12/28    Subjective  Intubated on 12/28   Continues to have high FiO2 requirements and low grade fever     Exam:  /73   Pulse 99   Temp 100.2 °F (37.9 °C) (Esophageal)   Resp 21   Ht 5' 10\" (1.778 m)   Wt 163 lb 2.7 oz (74 kg)   SpO2 93%   BMI 23.41 kg/m²     Due to the current efforts to prevent transmission of COVID-19 and also the need to preserve PPE for other caregivers, a face-to-face encounter with the patient was not performed. That being said, all relevant records and diagnostic tests were reviewed, including laboratory results and imaging. Please reference any relevant documentation elsewhere.  Care will be coordinated with the primary service and other specialties as appropriate.       Medications:  Reviewed    Infusion Medications    fentaNYL 5 mcg/ml in 0.9%  ml infusion 150 mcg/hr (12/29/20 0825)     Scheduled Medications    pantoprazole  40 mg Intravenous Daily    And    sodium chloride (PF)  10 mL Intravenous Daily    enoxaparin  30 mg Subcutaneous BID    bumetanide  2 mg Intravenous Daily    metoprolol tartrate  75 mg Oral BID    acetylcysteine  2 mL Inhalation BID    chlorhexidine  15 mL Mouth/Throat BID    piperacillin-tazobactam  3.375 g Intravenous Q8H    sodium chloride   Intravenous Q8H    Arformoterol Tartrate  15 mcg Nebulization BID    budesonide 500 mcg Nebulization BID    ipratropium-albuterol  1 ampule Inhalation Q4H While awake    sodium chloride flush  10 mL Intravenous 2 times per day    amLODIPine  5 mg Oral Daily    clopidogrel  75 mg Oral Daily    aspirin  81 mg Oral Daily    atorvastatin  80 mg Oral Nightly    Vitamin D  1,000 Units Oral Daily    dexamethasone  6 mg Oral Daily    fluticasone  1 spray Nasal Daily    montelukast  10 mg Oral Nightly     PRN Meds: perflutren lipid microspheres, midazolam, diphenhydrAMINE, sodium chloride flush, polyethylene glycol, acetaminophen **OR** acetaminophen, guaiFENesin, nitroGLYCERIN, senna    I/O    Intake/Output Summary (Last 24 hours) at 12/29/2020 1656  Last data filed at 12/29/2020 1400  Gross per 24 hour   Intake 2060.73 ml   Output 3425 ml   Net -1364.27 ml       Labs:   Recent Labs     12/27/20  0518 12/27/20  0518 12/28/20  0507 12/28/20  0507 12/29/20  0119 12/29/20  0511 12/29/20  0907   WBC 9.7  --  12.8*  --   --  11.2  --    HGB 8.4*   < > 10.5*   < > 9.9* 10.5* 10.3*   HCT 27.1*   < > 33.8*   < > 31.2* 32.6* 32.5*     --  291  --   --  304  --     < > = values in this interval not displayed. Recent Labs     12/29/20  0119 12/29/20  0511 12/29/20  0907    147* 144   K 3.7 4.1 3.9   CL 95* 98 95*   CO2 33* 38* 39*   BUN 33* 33* 33*   CREATININE 1.1 1.0 1.1   CALCIUM 8.4* 8.7 8.6       Recent Labs     12/27/20  0518 12/28/20  0507 12/29/20  0511   PROT 5.5* 6.2* 6.2*   ALKPHOS 51 63 68   ALT 40 59* 60*   AST 58* 86* 46*   BILITOT 0.5 0.5 0.6       No results for input(s): INR in the last 72 hours. Recent Labs     12/26/20 2212 12/26/20 2212 12/28/20  0752 12/28/20  1844 12/28/20 2036   CKTOTAL 233*  --   --   --   --    TROPONINI 0.39*   < > 0.27* 0.40* 0.45*    < > = values in this interval not displayed.        Chronic labs:  Lab Results   Component Value Date    CHOL 179 03/11/2017    TRIG 97 03/11/2017    HDL 49 03/11/2017    LDLCALC 111 (H) 03/11/2017    TSH 1.411 04/25/2013    INR 1.8 12/26/2020    LABA1C 5.8 04/25/2013       Radiology:  Imaging studies reviewed today. ASSESSMENT:  Acute hypoxic respiratory failure  NSTEMI - type 2  HFpEF  PAD  HLD  HTN    PLAN:  Wean vent as able   Appreciate cardiac recs; continue plavix, toprol, norvasc; on nitropaste  IV antibiotics, follow cultures   Diuresis  Plan for outpatient cardiac cath  ECHO pending  Transfuse as indicated   PT/OT  Diuresis as indicated       Diet: DIET TUBE FEED CONTINUOUS/CYCLIC NPO; 1.5 Calorie without Fiber; Orogastric; Continuous; 10; 50; 24  Diet Tube Feed Modular: Protein Modular  Code Status: Full Code  PT/OT Eval Status:   ordered  DVT Prophylaxis:   heparin  Recommended disposition at discharge:  pending medical progression     +++++++++++++++++++++++++++++++++++++++++++++++++  Alisa Noonan MD   [facility].  +++++++++++++++++++++++++++++++++++++++++++++++++  NOTE: This report was transcribed using voice recognition software. Every effort was made to ensure accuracy; however, inadvertent computerized transcription errors may be present.

## 2020-12-29 NOTE — PROGRESS NOTES
[address] [facility]  Department of Internal Medicine   Internal Medicine Residency   MICU Progress Note    Patient:  Gianluca Zeng. 69 y.o. male  MRN: [id number]     Date of Service: 12/29/2020    Allergy: Bee pollen and Penicillins    Subjective     Patient seen and examined at bedside this AM.  Intubated yesterday, vent 20/450/14/90%, satting 90%, P/F0.71, blood pressure 22. Sedated with fentanyl 150. Started Bumex drip 0.5 mg/h yesterday. Patient responds well, UOP 4 L in 24 hours. CXR shows improvement in bilateral pulmonary airspace infiltrates. On Decadron day 4. On Zosyn day 2. On aspirin and Plavix. Objective     VS: BP 89/62   Pulse 89   Temp 100.2 °F (37.9 °C) (Esophageal)   Resp 20   Ht 5' 10\" (1.778 m)   Wt 163 lb 2.7 oz (74 kg)   SpO2 94%   BMI 23.41 kg/m²           I & O - 24hr:     Intake/Output Summary (Last 24 hours) at 12/29/2020 1252  Last data filed at 12/29/2020 1200  Gross per 24 hour   Intake 1864.73 ml   Output 4525 ml   Net -2660.27 ml       Physical Exam:  · General Appearance: Intubated and sedated  · Neck: no adenopathy, no carotid bruit, no JVD and supple, symmetrical, trachea midline  · Lung: Coarse lung sounds bilateral  · Heart: regular rate and rhythm, S1, S2 normal, no murmur, click, rub or gallop  · Abdomen: soft, non-tender; bowel sounds normal; no masses,  no organomegaly  · Extremities:  extremities normal, atraumatic, no cyanosis or edema  · Musculoskeletal: No joint swelling, no muscle tenderness. ROM normal in all joints of extremities.    · Neurologic: Mental status: sedated    Lines     site day    Art line   None    TLC None    PICC None    Hemoaccess None    Oxygen:       Mechanical Ventilation:   Mode: AC   TV: 450 RR 20  PEEP 14 FiO2 90%   Pplat 22         ABG:     Lab Results   Component Value Date    PH 7.471 12/29/2020    GOK7RIG 50.1 12/27/2020    PCO2 47.4 12/29/2020    PO2ART 85.3 12/27/2020    PO2 64.0 12/29/2020    JOQ3FSJ 31.5 12/27/2020    HCO3 33.8 12/29/2020    BE 9.0 12/29/2020    THB 11.8 12/29/2020    O2SAT 92.0 12/29/2020        Medications     Infusions: (Fluid, Sedation, Vasopressors)  IVF:    None   Vasopressors   None   Sedation   Fentanyl at 150 mcg/hr     Nutrition:   NPO  ATB:   Antibiotics  Days   Zosyn  2               Patient currently has   Urinary cath  Restraint   DVT prophylaxis/ GI prophylaxis,    PT/OT  Labs     CBC with Differential:    Lab Results   Component Value Date    WBC 11.2 12/29/2020    RBC 3.57 12/29/2020    HGB 10.3 12/29/2020    HCT 32.5 12/29/2020     12/29/2020    MCV 91.3 12/29/2020    MCH 29.4 12/29/2020    MCHC 32.2 12/29/2020    RDW 14.7 12/29/2020    NRBC 0.9 12/02/2020    SEGSPCT 68 04/27/2013    METASPCT 0.9 12/29/2020    LYMPHOPCT 1.7 12/29/2020    MONOPCT 5.2 12/29/2020    BASOPCT 0.1 12/29/2020    MONOSABS 0.56 12/29/2020    LYMPHSABS 0.22 12/29/2020    EOSABS 0.00 12/29/2020    BASOSABS 0.00 12/29/2020     CMP:    Lab Results   Component Value Date     12/29/2020    K 3.9 12/29/2020    K 4.1 12/29/2020    CL 95 12/29/2020    CO2 39 12/29/2020    BUN 33 12/29/2020    CREATININE 1.1 12/29/2020    GFRAA >60 12/29/2020    LABGLOM >60 12/29/2020    GLUCOSE 128 12/29/2020    PROT 6.2 12/29/2020    LABALBU 2.7 12/29/2020    CALCIUM 8.6 12/29/2020    BILITOT 0.6 12/29/2020    ALKPHOS 68 12/29/2020    AST 46 12/29/2020    ALT 60 12/29/2020       Imaging Studies:  CXR: 12/29  There are persistent bilateral pulmonary airspace infiltrates, which appear improved since the prior study.     Resident's Assessment and Plan   Mouna Carroll is a 69 y.o. male with PMHx significant for CAD s/p multiple cardiac catheterizations s/p multiple stents to the RCA, extensive multifocal plaque and stenosis of LAD and left circumflex, paroxysmal A. fib not on oral anticoagulation, HFpEF, COPD stage IV on 4 L nasal cannula at home, PVD, left subclavian arterial stenosis, tobacco abuse, who presented with worsening shortness of breath and chest pain for 2 to 3 days. Covid positive. Cardiology:   EKG change likely 2/2 type II NSTEMI vs myocarditis  -Initial EKG shows V4 to V5 ST depression and possible Q waves. Repeat EKG shows resolving ST depressions  -Troponin 0 0.20 -> 0.42, CK-MB/CK 12.6%  -Completed 48 hours of heparin  -Continue Plavix 75 mg daily  -Continue Toprolol and Norvasc  -Outpatient cardiac catheterization recommended by cardiology  -Cardiology consulted  -Follow echo    HFpEF, EF 60%  -On Bumex drip  -Resume Toprolol and Norvasc    Hypertension  -Continue metoprolol and Norvasc    Paroxysmal A. Fib  -Currently sinus rhythm, rate controlled  -Started Lovenox    Pulmonary:   Acute hypoxic respiratory failure 2/2 COVID-19 pneumonia vs COPD exacerbation vs aspiration pneumonia  -Received to 10 days of remdesivir  -Repeat Covid test positive, otherwise respiratory panel negative  -Continue to monitor daily chest x-ray, ABG  -Trend inflammatory markers  -Intubated and sedated  -Intermittent proning, 16 hours/8 hours  -Continue Decadron 6 mg daily  -Continue Brovana, Pulmicort, and DuoNeb  -Continue Mucomyst  -Continue Zosyn for hospital-acquired aspiration pneumonia  -Patient responded well to Bumex drip, 4 L urine output on 12/28. Patient looks dry on 12/29, switch Bumex drip to Bumex 2 mg daily  -D-dimer 1599. Patient on DAPT. [id number] score 4, Has bled score 3. Recent possible GI bleed. Discussed risk and benefit on round today, will start Lovenox at prophylaxis dose. Monitor H&H every 8 hours.     COPD stage IV  -PFT 9/2019 showed FEV1 27% of predicted  -Pulmonology following    Gastroenterology:   Possible GI bleed  -Hemoglobin stable  -Hemoglobin goal >9   -S/p 2 unit pRBC transfusion  -Follow FOBT    Hematology/ Oncology:   Normocytic anemia  -Hemoglobin stable, 11 today  -Hemoglobin goal >9, transfuse as indicated  -Follow H&H every 8 hours    Code Status: Full code      PT/OT: Not indicated for now  GI ppx/DVT PPx: Protonix/Lovenox  Diet: Start tube feeding on 12/29  Disposition: FLORI Chen MD, PGY-1    Attending physician: Dr. Neftali Correa  Department of Pulmonary, Critical Care and Sleep Medicine  [address] [facility]  Department of Internal Medicine      During multidisciplinary team rounds Mak Wilkerson Sr. is a 69 y.o. male was seen, examined and discussed. This is confirmation that I have personally seen and examined the patient and that the key elements of the encounter were performed by me (> 85 % time). The medications & laboratory data was discussed and adjusted where necessary. The radiographic images were reviewed or with radiologist or consultant if felt dis-concordant with the exam or history. The above findings were corroborated, plans confirmed and changes made if needed. Family is updated at the bedside as available. Key issues of the case were discussed among consultants. Critical Care time is documented if appropriate.       Jewel Champion, DO, FACP, FCCP, Melissa penny,

## 2020-12-29 NOTE — FLOWSHEET NOTE
Pt. Is unable to follow commands, reaches for lines and tubes while unrestrained. Restraints continued for pt. Safety.

## 2020-12-29 NOTE — PROGRESS NOTES
Power picc Placement 12/29/2020    Product number:[id number]   Lot Number: [id number]      Ultrasound: yes   Right Brachial vein:                Upper Arm Circumference: 24cm    Size: 5fdl    Exposed Length: 1cm    Internal Length: 42cm   Cut: 7cm   Vein Measurement: 0.65cm  Vee Woodall  12/29/2020  9:10 AM

## 2020-12-29 NOTE — PROGRESS NOTES
Comprehensive Nutrition Assessment    Type and Reason for Visit:  Initial(ICU LOS)    Nutrition Recommendations/Plan: Continue NPO, Modify Tube Feeding    Rec non-fiber containing formula d/t low MAPS:   1.5 calorie without fiber (Osmolite 1.5) @ 50 ml/hr + 1 protein modular. Will provide; 1200 ml tv, 1800 kcals, 75 gm pro (1900 kcals & 101 gm pro w/ mod), 914 ml free water  Regimen meets 100% est calorie & protein needs    Nutrition Assessment:  Pt at nutritional risk d/t inability to consume po 2/2 intubation +COVID19 (ongoing x 1 mon w/ recent admit). Noted malnutrition. Plans to start EN support, will provide TF rec & continue to monitor. Malnutrition Assessment:  Malnutrition Status: Moderate Malnutrition   Context:  Acute Illness     Findings of the 6 clinical characteristics of malnutrition:  Energy Intake:  75% or less of estimated energy requirements for 7 or more days  Weight Loss:   7% x 4 mon  Body Fat Loss:  Unable to assess d/t isolation  Muscle Mass Loss:  Unable to assess d/t isolation   Fluid Accumulation:  7 - Moderate to Severe   Strength:  Not Performed    Estimated Daily Nutrient Needs:  Energy (kcal):  PS3B 1869; 8170-4359; Weight Used for Energy Requirements:  Current     Protein (g):  (1.3-1.5 g/kg);  Weight Used for Protein Requirements:  Current        Fluid (ml/day):  per critical care    Nutrition Related Findings:  Pt intubated, intermittent hypotension (not on pressor) MAP 69, +I/O's, +4 pitting edema, present BS, OGT clamped      Wounds:  None       Current Nutrition Therapies:    Current Tube Feeding (TF) Orders:  · Feeding Route: Orogastric(clamped TF not started yet)  · Formula: Standard w/Fiber  · Schedule: Continuous  · Goal TF & Flush Orders Provides: 1440 kcals, 67 gm pro, 968 ml free water    Anthropometric Measures:  · Height: 5' 10\" (177.8 cm)  · Current Body Weight: 163 lb (73.9 kg)(12/29 actual)   · Admission Body Weight: 170 lb (77.1 kg)(12/27 first measured)    · Usual Body Weight: 175 lb (79.4 kg)(EMR actual 8/11)     · Ideal Body Weight: 166 lbs  · BMI: 23.4 BMI Categories: Normal Weight (BMI 18.5-24. 9)       Nutrition Diagnosis:   · In context of acute illness or injury, Moderate malnutrition related to inadequate protein-energy intake(2/2 ongoing COVID infection) as evidenced by poor intake prior to admission, weight loss, localized or generalized fluid accumulation(6.9% x 4 months)    Nutrition Interventions:    Nutrition Education/Counseling:  Education not indicated   Coordination of Nutrition Care:  Continue to monitor while inpatient    Goals:  Start TF, tolerate at goal       Nutrition Monitoring and Evaluation:   Food/Nutrient Intake Outcomes:  Diet Advancement/Tolerance, Enteral Nutrition Intake/Tolerance  Physical Signs/Symptoms Outcomes:  Biochemical Data, Nutrition Focused Physical Findings, Skin, Weight, GI Status, Hemodynamic Status, Fluid Status or Edema     Discharge Planning:     Too soon to determine     Electronically signed by Sun Parr RD, LD on 12/29/20 at 12:11 PM EST    Contact: Ext [phone]

## 2020-12-29 NOTE — PLAN OF CARE
Problem: Falls - Risk of:  Goal: Will remain free from falls  Description: Will remain free from falls  Outcome: Met This Shift     Problem: Falls - Risk of:  Goal: Absence of physical injury  Description: Absence of physical injury  Outcome: Met This Shift     Problem: Isolation Precautions - Risk of Spread of Infection  Goal: Prevent transmission of infection  Outcome: Met This Shift     Problem: Restraint Use - Nonviolent/Non-Self-Destructive Behavior:  Goal: Absence of restraint-related injury  Description: Absence of restraint-related injury  Outcome: Met This Shift     Problem: Restraint Use - Nonviolent/Non-Self-Destructive Behavior:  Goal: Absence of restraint indications  Description: Absence of restraint indications  Outcome: Not Met This Shift

## 2020-12-29 NOTE — PROGRESS NOTES
[facility]  [address] [facility], [address], [facility]      Pulmonary Progress Note      SUBJECTIVE:  Still on vent, slightly decreased Fio2 requirements, sedated, similar with moderate amounts of secretions being suctioned as per Nurse.     OBJECTIVE    Medications    Continuous Infusions:   fentaNYL 5 mcg/ml in 0.9%  ml infusion 150 mcg/hr (20 0825)       Scheduled Meds:   pantoprazole  40 mg Intravenous Daily    And    sodium chloride (PF)  10 mL Intravenous Daily    metoprolol tartrate  75 mg Oral BID    acetylcysteine  2 mL Inhalation BID    chlorhexidine  15 mL Mouth/Throat BID    piperacillin-tazobactam  3.375 g Intravenous Q8H    sodium chloride   Intravenous Q8H    Arformoterol Tartrate  15 mcg Nebulization BID    budesonide  500 mcg Nebulization BID    ipratropium-albuterol  1 ampule Inhalation Q4H While awake    sodium chloride flush  10 mL Intravenous 2 times per day    amLODIPine  5 mg Oral Daily    clopidogrel  75 mg Oral Daily    aspirin  81 mg Oral Daily    atorvastatin  80 mg Oral Nightly    Vitamin D  1,000 Units Oral Daily    dexamethasone  6 mg Oral Daily    fluticasone  1 spray Nasal Daily    montelukast  10 mg Oral Nightly       PRN Meds:diphenhydrAMINE, sodium chloride flush, polyethylene glycol, acetaminophen **OR** acetaminophen, guaiFENesin, nitroGLYCERIN, LORazepam, heparin (porcine), heparin (porcine), senna    Physical    VITALS:  /80   Pulse 99   Temp 100.2 °F (37.9 °C) (Esophageal)   Resp 20   Ht 5' 10\" (1.778 m)   Wt 163 lb 2.7 oz (74 kg)   SpO2 93%   BMI 23.41 kg/m²     24HR INTAKE/OUTPUT:      Intake/Output Summary (Last 24 hours) at 2020 1425  Last data filed at 2020 1400  Gross per 24 hour   Intake 2166.73 ml   Output 3925 ml   Net -1758.27 ml       24HR PULSE OXIMETRY RANGE:    SpO2  Av.1 %  Min: 90 %  Max: 97 %    General appearance: appears stated age  Lungs: rhonchi bilaterally, (+) ETT  Heart: regular rate and rhythm, S1, S2 normal, no murmur, click, rub or gallop  Abdomen: soft, non-tender; bowel sounds normal; no masses,  no organomegaly  Extremities: extremities normal, atraumatic, no cyanosis or edema  Neurologic: Mental status: sedated, minimal non-purposeful reaction to stimulus    Data    CBC:   Recent Labs     12/27/20  0518 12/27/20  0518 12/28/20  0507 12/28/20  0507 12/29/20  0119 12/29/20  0511 12/29/20  0907   WBC 9.7  --  12.8*  --   --  11.2  --    HGB 8.4*   < > 10.5*   < > 9.9* 10.5* 10.3*   HCT 27.1*   < > 33.8*   < > 31.2* 32.6* 32.5*   MCV 96.1  --  95.2  --   --  91.3  --      --  291  --   --  304  --     < > = values in this interval not displayed. BMP:  Recent Labs     12/29/20  0119 12/29/20  0511 12/29/20  0907    147* 144   K 3.7 4.1 3.9   CL 95* 98 95*   CO2 33* 38* 39*   BUN 33* 33* 33*   CREATININE 1.1 1.0 1.1    ALB:3,BILIDIR:3,BILITOT:3,ALKPHOS:3)@    PT/INR:   No results for input(s): PROTIME, INR in the last 72 hours. ABG:   Recent Labs     12/29/20  0602   PH 7.471*   PO2 64.0*   PCO2 47.4*   HCO3 33.8*   BE 9.0*   O2SAT 92.0   METHB 0.1   O2HB 91.5*   COHB 0.4   O2CON 15.2   HHB 8.0*   THB 11.8     FiO2 : 90 %  I:E Ratio: 1:3.50    Radiology/Other tests reviewed: CXR reviewed with slightly better aeration both lower lung fields compared to previous    Assessment:     Active Problems:    Respiratory distress    Acute on chronic respiratory failure with hypoxia (HCC)    Moderate protein-calorie malnutrition (HCC)  Resolved Problems:    Severe protein-calorie malnutrition ([address]      Plan:       1. Cont with current vent settings, wean when more stable and tolerates  2. Cont with nebs  3. Cont with steroids  4. Cont with antibiotics  5.  Other issues as per MICU team      Time at the bedside, reviewing labs and radiographs, reviewing notes and consultations, discussing with staff and family was more than 35 minutes. Thanks for letting us see this patient in consultation. Please contact us with any questions. Office [phone] or after hours through [facility], x [phone].

## 2020-12-30 NOTE — PLAN OF CARE
Problem: Restraint Use - Nonviolent/Non-Self-Destructive Behavior:  Goal: Absence of restraint indications  12/30/2020 3434 by Estefani Wallace RN  Outcome: Completed  12/30/2020 6295 by Estefani Wallace RN  Outcome: Met This Shift  12/29/2020 1717 by Silvano Vargas RN  Outcome: Not Met This Shift  Goal: Absence of restraint-related injury  12/30/2020 5752 by Estefani Wallace RN  Outcome: Completed  12/30/2020 0623 by Estefani Wallace RN  Outcome: Met This Shift  12/29/2020 1717 by Silvano Vargas RN  Outcome: Met This Shift

## 2020-12-30 NOTE — PROGRESS NOTES
PROGRESS NOTE     CARDIOLOGY    Chief complaint: Seen today for follow up, management & recommendations for coronary artery disease, abnormal troponin. He is intubated, sedated, and in the prone position. I was called to see the patient regarding his troponins increasing. He does awaken enough to answer yes and no questions. He denies any chest pain. I had a long discussion with his nurse. They transferred him to the prone position to try to improve his saturations. His saturations on his blood gases consistently still remained in the mid 80s. He states that his sputum is now turning dark brown/purulent. Wt Readings from Last 3 Encounters:   12/30/20 154 lb 12.2 oz (70.2 kg)   12/10/20 177 lb (80.3 kg)   12/01/20 177 lb (80.3 kg)     Temp Readings from Last 3 Encounters:   12/30/20 99.3 °F (37.4 °C) (Esophageal)   12/15/20 97.7 °F (36.5 °C) (Infrared)   12/06/20 98.6 °F (37 °C) (Temporal)     BP Readings from Last 3 Encounters:   12/30/20 108/79   12/15/20 (!) 178/82   12/06/20 (!) 168/75     Pulse Readings from Last 3 Encounters:   12/30/20 95   12/15/20 65   12/06/20 59         Intake/Output Summary (Last 24 hours) at 12/30/2020 1429  Last data filed at 12/30/2020 1300  Gross per 24 hour   Intake 1641 ml   Output 1145 ml   Net 496 ml       Recent Labs     12/28/20  0507 12/28/20  0507 12/29/20  0511 12/29/20  0511 12/30/20  0145 12/30/20  0505 12/30/20  1147   WBC 12.8*  --  11.2  --   --  14.9*  --    HGB 10.5*   < > 10.5*   < > 11.1* 11.7* 11.9*   HCT 33.8*   < > 32.6*   < > 35.0* 37.0 37.1   MCV 95.2  --  91.3  --   --  93.0  --      --  304  --   --  362  --     < > = values in this interval not displayed.      Recent Labs     12/28/20  0752 12/28/20  0752 12/30/20  0146 12/30/20  0505 12/30/20  1147      < > 138 139 137   K 5.8*   < > 3.6 3.9 4.1   CL 99   < > 97* 95* 96*   CO2 27   < > 33* 36* 32*   BUN 28*   < > 38* 39* 40*   CREATININE 0.7   < > 1.0 1.1 0.9   MG 2.1  --   -- 2.0  --     < > = values in this interval not displayed. No results for input(s): PROTIME, INR in the last 72 hours. Recent Labs     12/30/20  0146 12/30/20  0505 12/30/20  1315   TROPONINI 0.72* 0.88* 0.81*     No results for input(s): BNP in the last 72 hours. No results for input(s): CHOL, HDL, TRIG in the last 72 hours.     Invalid input(s): CHOLHDLR, LDLCALCU          midazolam (VERSED) 100 mg in dextrose 5 % 100 mL infusion, Continuous      midazolam PF (VERSED) injection 4 mg, Once      metoprolol tartrate (LOPRESSOR) tablet 50 mg, BID      pantoprazole (PROTONIX) injection 40 mg, Daily    And      sodium chloride (PF) 0.9 % injection 10 mL, Daily      enoxaparin (LOVENOX) injection 30 mg, BID      [Held by provider] bumetanide (BUMEX) injection 2 mg, Daily      perflutren lipid microspheres (DEFINITY) injection 1.65 mg, ONCE PRN      fentaNYL 5 mcg/ml in 0.9%  ml infusion, Continuous      acetylcysteine (MUCOMYST) 20 % solution 400 mg, BID      chlorhexidine (PERIDEX) 0.12 % solution 15 mL, BID      diphenhydrAMINE (BENADRYL) injection 25 mg, Q6H PRN      piperacillin-tazobactam (ZOSYN) 3.375 g in dextrose 5 % 100 mL IVPB (mini-bag), Q8H      0.9 % sodium chloride infusion admixture, Q8H      Arformoterol Tartrate (BROVANA) nebulizer solution 15 mcg, BID      budesonide (PULMICORT) nebulizer suspension 500 mcg, BID      ipratropium-albuterol (DUONEB) nebulizer solution 1 ampule, Q4H While awake      sodium chloride flush 0.9 % injection 10 mL, 2 times per day      sodium chloride flush 0.9 % injection 10 mL, PRN      polyethylene glycol (GLYCOLAX) packet 17 g, Daily PRN      acetaminophen (TYLENOL) tablet 650 mg, Q6H PRN    Or      acetaminophen (TYLENOL) suppository 650 mg, Q6H PRN      guaiFENesin tablet 400 mg, BID PRN      amLODIPine (NORVASC) tablet 5 mg, Daily      clopidogrel (PLAVIX) tablet 75 mg, Daily      nitroGLYCERIN (NITROSTAT) SL tablet 0.4 mg, Q5 Min PRN      senna (SENOKOT) tablet 8.6 mg, Daily PRN      aspirin chewable tablet 81 mg, Daily      atorvastatin (LIPITOR) tablet 80 mg, Nightly      Vitamin D (CHOLECALCIFEROL) tablet 1,000 Units, Daily      dexamethasone (DECADRON) tablet 6 mg, Daily      fluticasone (FLONASE) 50 MCG/ACT nasal spray 1 spray, Daily      montelukast (SINGULAIR) tablet 10 mg, Nightly        Review of systems:     As above. Further is unobtainable. Physical exam:    Constitutional: Sedated, intubated, and in the prone position. With stimulation he does open his eyes and answers yes and no. He says \"no\" to chest pain. Eyes: As above. He does open his eyes but I cannot examine further. Heart: He is in the prone position. Unable to auscultate the heart. Lungs: Rhonchi. Extremities: He has no lower extremity edema. His bed is tilted so his head is above his legs. Urine: Mildly cloudy. Assessment/Recommendations  1. Covid pneumonia with respiratory failure. Saturations remaining in the mid 80s on blood gases. Now in the prone position. 2. Now with dark purulent sputum. 3. Systolic blood pressure is 90 currently. 4. Urine becoming cloudy. 5. Increasing WBCs. 6. Known coronary artery disease. I did review his cath films from July.  of the RCA, moderate mid LAD disease, severe mid circumflex disease. Small vessel disease 1.5 mm vessel. Does not appear amenable to PCI. 7. Abnormal troponin in the setting of all of the above. Probable demand ischemia with all of the above as well. 8. Pulmonary fibrosis. 9. Hypertension. Meds now being held for borderline blood pressures. 10. Peripheral vascular disease  11. Paroxysmal atrial fibrillation  12. Low albumin. 13. I reviewed his cath films. His circumflex does not appear to be amenable to PCI. This is a small vessel. 14. He is also not a candidate for the Cath Lab at this time.   15. I recommend trying to treat his comorbidities to increase his myocardial supply. I will defer the comorbidities to others. 16. Clinically he appears to be volume depleted at this time as well. I will give some IV fluids gently. Note: This report was completed using computerized voice recognition software. Every effort has been made to ensure accuracy, however; and invert and computerized transcription errors may be present.

## 2020-12-30 NOTE — PLAN OF CARE
Problem: Restraint Use - Nonviolent/Non-Self-Destructive Behavior:  Goal: Absence of restraint indications  12/30/2020 1812 by Cindy Pulliam RN  Outcome: Met This Shift  12/29/2020 1717 by Hema Anton RN  Outcome: Not Met This Shift  Goal: Absence of restraint-related injury  12/30/2020 7070 by Cindy Pulliam RN  Outcome: Met This Shift  12/29/2020 1717 by Hema Anton RN  Outcome: Met This Shift

## 2020-12-30 NOTE — PLAN OF CARE
Problem: Falls - Risk of:  Goal: Will remain free from falls  Description: Will remain free from falls  Outcome: Met This Shift     Problem: Falls - Risk of:  Goal: Absence of physical injury  Description: Absence of physical injury  Outcome: Met This Shift     Problem:  Body Temperature -  Risk of, Imbalanced  Goal: Ability to maintain a body temperature within defined limits  Outcome: Met This Shift     Problem: Skin Integrity:  Goal: Will show no infection signs and symptoms  Description: Will show no infection signs and symptoms  Outcome: Met This Shift     Problem: Skin Integrity:  Goal: Absence of new skin breakdown  Description: Absence of new skin breakdown  Outcome: Met This Shift     Problem: Pain:  Goal: Control of acute pain  Description: Control of acute pain  Outcome: Met This Shift     Problem: Risk for Fluid Volume Deficit  Goal: Maintain normal heart rhythm  Outcome: Ongoing

## 2020-12-30 NOTE — PLAN OF CARE
Problem: Falls - Risk of:  Goal: Will remain free from falls  Description: Will remain free from falls  12/30/2020 1547 by Demarco Jennings RN  Outcome: Met This Shift     Problem: Falls - Risk of:  Goal: Absence of physical injury  Description: Absence of physical injury  12/30/2020 1547 by Demarco Jennings RN  Outcome: Met This Shift     Problem: Airway Clearance - Ineffective  Goal: Achieve or maintain patent airway  Outcome: Met This Shift     Problem:  Body Temperature -  Risk of, Imbalanced  Goal: Ability to maintain a body temperature within defined limits  12/30/2020 1547 by Demarco Jennings RN  Outcome: Met This Shift     Problem: Risk for Fluid Volume Deficit  Goal: Maintain normal heart rhythm  12/30/2020 1547 by Demarco Jennings RN  Outcome: Met This Shift     Problem: Skin Integrity:  Goal: Will show no infection signs and symptoms  Description: Will show no infection signs and symptoms  12/30/2020 1547 by Demarco Jennings RN  Outcome: Met This Shift     Problem: Skin Integrity:  Goal: Absence of new skin breakdown  Description: Absence of new skin breakdown  12/30/2020 1547 by Demarco Jennings RN  Outcome: Met This Shift     Problem: Pain:  Goal: Control of acute pain  Description: Control of acute pain  Outcome: Met This Shift

## 2020-12-30 NOTE — PROGRESS NOTES
Hospitalist Progress Note      Synopsis: Huy Carlos Sr. is a 69 y.o. male Past medical history significant for chronic ischemic heart disease/CAD status post multiple cardiac catheterizations status post multiple stents to the RCA, extensive multifocal plaque and stenosis of LAD and left circumflex, atrial fibrillation not on oral anticoagulation, HFpEF COPD stage IV on 4 L nasal cannula at home, peripheral vascular disease, left subclavian arterial stenosis, tobacco abuse who presented with worsening SOB and CP for the last 2-3 days. COVID +. Cardiology is consulted. Waws on bipap but required intubation on 12/28. Subjective    Continues to have high FiO2 requirements and low grade fever   Pt troponins continue to rise     Exam:  BP 88/66   Pulse 88   Temp 99.3 °F (37.4 °C) (Esophageal)   Resp 22   Ht 5' 10\" (1.778 m)   Wt 154 lb 12.2 oz (70.2 kg)   SpO2 94%   BMI 22.21 kg/m²     Due to the current efforts to prevent transmission of COVID-19 and also the need to preserve PPE for other caregivers, a face-to-face encounter with the patient was not performed. That being said, all relevant records and diagnostic tests were reviewed, including laboratory results and imaging. Please reference any relevant documentation elsewhere.  Care will be coordinated with the primary service and other specialties as appropriate.       Medications:  Reviewed    Infusion Medications    midazolam 2 mg/hr (12/30/20 0839)    sodium chloride      fentaNYL 5 mcg/ml in 0.9%  ml infusion 200 mcg/hr (12/30/20 1310)     Scheduled Medications    midazolam  4 mg Intravenous Once    metoprolol tartrate  50 mg Oral BID    pantoprazole  40 mg Intravenous Daily    And    sodium chloride (PF)  10 mL Intravenous Daily    enoxaparin  30 mg Subcutaneous BID    [Held by provider] bumetanide  2 mg Intravenous Daily    acetylcysteine  2 mL Inhalation BID    chlorhexidine  15 mL Mouth/Throat BID    piperacillin-tazobactam 3.375 g Intravenous Q8H    sodium chloride   Intravenous Q8H    Arformoterol Tartrate  15 mcg Nebulization BID    budesonide  500 mcg Nebulization BID    ipratropium-albuterol  1 ampule Inhalation Q4H While awake    sodium chloride flush  10 mL Intravenous 2 times per day    amLODIPine  5 mg Oral Daily    clopidogrel  75 mg Oral Daily    aspirin  81 mg Oral Daily    atorvastatin  80 mg Oral Nightly    Vitamin D  1,000 Units Oral Daily    dexamethasone  6 mg Oral Daily    fluticasone  1 spray Nasal Daily    montelukast  10 mg Oral Nightly     PRN Meds: perflutren lipid microspheres, diphenhydrAMINE, sodium chloride flush, polyethylene glycol, acetaminophen **OR** acetaminophen, guaiFENesin, nitroGLYCERIN, senna    I/O    Intake/Output Summary (Last 24 hours) at 12/30/2020 1613  Last data filed at 12/30/2020 1300  Gross per 24 hour   Intake 1641 ml   Output 975 ml   Net 666 ml       Labs:   Recent Labs     12/28/20  0507 12/28/20  0507 12/29/20  0511 12/29/20  0511 12/30/20  0145 12/30/20  0505 12/30/20  1147   WBC 12.8*  --  11.2  --   --  14.9*  --    HGB 10.5*   < > 10.5*   < > 11.1* 11.7* 11.9*   HCT 33.8*   < > 32.6*   < > 35.0* 37.0 37.1     --  304  --   --  362  --     < > = values in this interval not displayed. Recent Labs     12/30/20  0146 12/30/20  0505 12/30/20  1147    139 137   K 3.6 3.9 4.1   CL 97* 95* 96*   CO2 33* 36* 32*   BUN 38* 39* 40*   CREATININE 1.0 1.1 0.9   CALCIUM 7.8* 8.1* 8.0*       Recent Labs     12/28/20  0507 12/29/20  0511 12/30/20  0505   PROT 6.2* 6.2* 5.7*   ALKPHOS 63 68 69   ALT 59* 60* 46*   AST 86* 46* 35   BILITOT 0.5 0.6 0.4       No results for input(s): INR in the last 72 hours.     Recent Labs     12/30/20  0146 12/30/20  0505 12/30/20  1315   TROPONINI 0.72* 0.88* 0.81*       Chronic labs:  Lab Results   Component Value Date    CHOL 179 03/11/2017    TRIG 97 03/11/2017    HDL 49 03/11/2017    LDLCALC 111 (H) 03/11/2017    TSH 1.411 04/25/2013    INR 1.8 12/26/2020    LABA1C 5.8 04/25/2013       Radiology:  Imaging studies reviewed today. ASSESSMENT:  Acute hypoxic respiratory failure  NSTEMI - type 2  HFpEF  PAD  HLD  HTN    PLAN:  Wean vent as able   Appreciate cardiac recs; continue plavix, toprol, norvasc; on nitropaste  IV antibiotics, follow cultures   Diuresis  Plan for outpatient cardiac cath  ECHO pending  Transfuse as indicated   PT/OT  Diuresis as indicated   Trops trending up - cardiology updated. Diet: DIET TUBE FEED CONTINUOUS/CYCLIC NPO; 1.5 Calorie without Fiber; Orogastric; Continuous; 10; 50; 24  Diet Tube Feed Modular: Protein Modular  Code Status: Full Code  PT/OT Eval Status:   ordered  DVT Prophylaxis:   heparin  Recommended disposition at discharge:  pending medical progression     +++++++++++++++++++++++++++++++++++++++++++++++++  Alisa Agustin MD   [facility].  +++++++++++++++++++++++++++++++++++++++++++++++++  NOTE: This report was transcribed using voice recognition software. Every effort was made to ensure accuracy; however, inadvertent computerized transcription errors may be present.

## 2020-12-31 NOTE — PROGRESS NOTES
Hospitalist Progress Note      Synopsis: Herb Sanchez Sr. is a 69 y.o. male Past medical history significant for chronic ischemic heart disease/CAD status post multiple cardiac catheterizations status post multiple stents to the RCA, extensive multifocal plaque and stenosis of LAD and left circumflex, atrial fibrillation not on oral anticoagulation, HFpEF COPD stage IV on 4 L nasal cannula at home, peripheral vascular disease, left subclavian arterial stenosis, tobacco abuse who presented with worsening SOB and CP for the last 2-3 days. COVID +. Cardiology is consulted. Waws on bipap but required intubation on 12/28. Subjective  Hypotensive overnight requiring multiple boluses and now on pressors   Continues to have high FiO2 requirements and low grade fever   Pt troponins continue to rise     Exam:  BP (!) 93/59   Pulse 88   Temp 98.8 °F (37.1 °C) (Esophageal)   Resp 27   Ht 5' 10\" (1.778 m)   Wt 164 lb 9.6 oz (74.7 kg)   SpO2 91%   BMI 23.62 kg/m²     Due to the current efforts to prevent transmission of COVID-19 and also the need to preserve PPE for other caregivers, a face-to-face encounter with the patient was not performed. That being said, all relevant records and diagnostic tests were reviewed, including laboratory results and imaging. Please reference any relevant documentation elsewhere.  Care will be coordinated with the primary service and other specialties as appropriate.       Medications:  Reviewed    Infusion Medications    phenylephrine (DOMINGA-SYNEPHRINE) 50mg/250mL infusion 300 mcg/min (12/31/20 0374)    midazolam 3 mg/hr (12/31/20 8193)    norepinephrine 2 mcg/min (12/31/20 9935)    fentaNYL 5 mcg/ml in 0.9%  ml infusion 200 mcg/hr (12/31/20 1241)     Scheduled Medications    albumin human  25 g Intravenous Q8H    hydrocortisone sodium succinate PF  100 mg Intravenous Q8H    ertapenem (INVanz) IVPB  1 g Intravenous Q24H    midazolam  4 mg Intravenous Once    [Held by provider] metoprolol tartrate  50 mg Oral BID    influenza virus vaccine  0.5 mL Intramuscular Prior to discharge    pantoprazole  40 mg Intravenous Daily    And    sodium chloride (PF)  10 mL Intravenous Daily    enoxaparin  30 mg Subcutaneous BID    [Held by provider] bumetanide  2 mg Intravenous Daily    acetylcysteine  2 mL Inhalation BID    chlorhexidine  15 mL Mouth/Throat BID    sodium chloride   Intravenous Q8H    Arformoterol Tartrate  15 mcg Nebulization BID    budesonide  500 mcg Nebulization BID    ipratropium-albuterol  1 ampule Inhalation Q4H While awake    sodium chloride flush  10 mL Intravenous 2 times per day    [Held by provider] amLODIPine  5 mg Oral Daily    clopidogrel  75 mg Oral Daily    aspirin  81 mg Oral Daily    atorvastatin  80 mg Oral Nightly    Vitamin D  1,000 Units Oral Daily    fluticasone  1 spray Nasal Daily    montelukast  10 mg Oral Nightly     PRN Meds: perflutren lipid microspheres, diphenhydrAMINE, sodium chloride flush, polyethylene glycol, acetaminophen **OR** acetaminophen, guaiFENesin, nitroGLYCERIN, senna    I/O    Intake/Output Summary (Last 24 hours) at 12/31/2020 1549  Last data filed at 12/31/2020 1500  Gross per 24 hour   Intake 7711 ml   Output 883 ml   Net 6828 ml       Labs:   Recent Labs     12/29/20 0511 12/29/20 0511 12/30/20 0505 12/30/20 0505 12/30/20 2052 12/31/20 0431 12/31/20  1306   WBC 11.2  --  14.9*  --   --  17.9*  --    HGB 10.5*   < > 11.7*   < > 11.5* 11.8* 10.4*   HCT 32.6*   < > 37.0   < > 36.0* 36.9* 32.4*     --  362  --   --  383  --     < > = values in this interval not displayed.        Recent Labs     12/30/20 2052 12/31/20 0431 12/31/20  1306    135 137   K 4.0 3.7 3.6   CL 98 98 102   CO2 30* 30* 28   BUN 43* 46* 37*   CREATININE 1.1 1.2 1.0   CALCIUM 7.5* 7.8* 7.1*       Recent Labs     12/29/20 0511 12/30/20  0505 12/31/20  0431   PROT 6.2* 5.7* 4.9*   ALKPHOS 68 69 65   ALT 60* 46* 34   AST 46* 35 30   BILITOT 0.6 0.4 0.4       No results for input(s): INR in the last 72 hours. Recent Labs     12/30/20  1315 12/30/20 2052 12/31/20  0431   TROPONINI 0.81* 0.88* 0.98*       Chronic labs:  Lab Results   Component Value Date    CHOL 179 03/11/2017    TRIG 97 03/11/2017    HDL 49 03/11/2017    LDLCALC 111 (H) 03/11/2017    TSH 1.411 04/25/2013    INR 1.8 12/26/2020    LABA1C 5.8 04/25/2013       Radiology:  Imaging studies reviewed today. ASSESSMENT:  Septic shock 2/2 pneumonia  Acute hypoxic respiratory failure  NSTEMI - type 2  HFpEF  PAD  HLD  HTN    PLAN:  Wean vent as able   Bolus as indicated; wean pressors as able   Appreciate cardiac recs; continue plavix, toprol, norvasc; on nitropaste  IV antibiotics, follow cultures   Diuresis  Plan for outpatient cardiac cath  ECHO pending  Transfuse as indicated   PT/OT  Diuresis as indicated   Trops trending up - cardiology updated. Diet: DIET TUBE FEED CONTINUOUS/CYCLIC NPO; 1.5 Calorie without Fiber; Orogastric; Continuous; 10; 50; 24  Diet Tube Feed Modular: Protein Modular  Code Status: Full Code  PT/OT Eval Status:   ordered  DVT Prophylaxis:   heparin  Recommended disposition at discharge:  pending medical progression     +++++++++++++++++++++++++++++++++++++++++++++++++  Alisa Nava MD   [facility].  +++++++++++++++++++++++++++++++++++++++++++++++++  NOTE: This report was transcribed using voice recognition software. Every effort was made to ensure accuracy; however, inadvertent computerized transcription errors may be present.

## 2020-12-31 NOTE — PROCEDURES
Arterial line placement    Procedure: Left radial arterial line placement. Indications: Continuous monitoring of blood pressure in a patient with hypotension +/- shock, on Levophed. Anesthesia: Local infiltration of 1% lidocaine. Consent: The family members were counseled regarding the procedure, its indications, risks, potential complications and alternatives, and any questions were answered. Consent was obtained to proceed. Technique: Time Out: Immediately prior to the procedure a \"timeout\" was called to verify the correct patient and procedure. Procedure was done using strict aseptic technique. Rayshawn's test was performed and was normal. left radial site was cleaned with chloraprep and draped. Radial artery was identified, then Lidocaine 1% was infiltrated locally. Radial arterial line was inserted, a good blood flow was obtained, after which guidewire was inserted all the way with no resistance. Then the canula was inserted and needle with guidewire was withdrawn. Pulsatile bright red blood flow was observed. The canula was connected to BP monitoring apparatus and a good quality waveform was noted. Then the canula was secured with 2 stay sutures of 3-0 silk after Lidocaine infiltration, following which dressing was applied. Number of sticks: 1. Number of Kits used: 1     Complications: No immediate complication. Estimated blood loss: About 2 ml. Comment: Patient tolerated the procedure well. Iain Altman MD PGY-1  12/31/2020 1:43 AM        [place]  Department of Pulmonary, Critical Care and Sleep Medicine  [address] [facility]  Department of Internal Medicine  Attending Procedural Note Addendum Statement    This procedure was supervised. The critical elements of this procedure was monitored and, if needed, I was available for the needs of the procedure.      Darleen Irene DO, FCCP, FACOI,

## 2020-12-31 NOTE — PROGRESS NOTES
[facility] [address] [facility]  [address] [facility], [address]  [facility], [address] [facility]      Pulmonary Progress Note      SUBJECTIVE:  Still on vent, sedated, similar with respiratory function.     OBJECTIVE    Medications    Continuous Infusions:   phenylephrine (DOMINGA-SYNEPHRINE) 50mg/250mL infusion 300 mcg/min (12/31/20 0444)    midazolam 3 mg/hr (12/31/20 0443)    norepinephrine 3 mcg/min (12/31/20 1251)    fentaNYL 5 mcg/ml in 0.9%  ml infusion 200 mcg/hr (12/31/20 1241)       Scheduled Meds:   albumin human  25 g Intravenous Q8H    hydrocortisone sodium succinate PF  100 mg Intravenous Q8H    ertapenem (INVanz) IVPB  1 g Intravenous Q24H    midazolam  4 mg Intravenous Once    [Held by provider] metoprolol tartrate  50 mg Oral BID    influenza virus vaccine  0.5 mL Intramuscular Prior to discharge    pantoprazole  40 mg Intravenous Daily    And    sodium chloride (PF)  10 mL Intravenous Daily    enoxaparin  30 mg Subcutaneous BID    [Held by provider] bumetanide  2 mg Intravenous Daily    acetylcysteine  2 mL Inhalation BID    chlorhexidine  15 mL Mouth/Throat BID    sodium chloride   Intravenous Q8H    Arformoterol Tartrate  15 mcg Nebulization BID    budesonide  500 mcg Nebulization BID    ipratropium-albuterol  1 ampule Inhalation Q4H While awake    sodium chloride flush  10 mL Intravenous 2 times per day    [Held by provider] amLODIPine  5 mg Oral Daily    clopidogrel  75 mg Oral Daily    aspirin  81 mg Oral Daily    atorvastatin  80 mg Oral Nightly    Vitamin D  1,000 Units Oral Daily    fluticasone  1 spray Nasal Daily    montelukast  10 mg Oral Nightly       PRN Meds:perflutren lipid microspheres, diphenhydrAMINE, sodium chloride flush, polyethylene glycol, acetaminophen **OR** acetaminophen, guaiFENesin, nitroGLYCERIN, senna    Physical    VITALS:  BP (!) 93/59   Pulse 81   Temp 98.8 °F (37.1 °C) (Esophageal)   Resp (!) 32   Ht 5' 10\" (1.778 m)   Wt 164 lb 9.6 oz (74.7 kg)   SpO2 91%   BMI 23.62 kg/m²     24HR INTAKE/OUTPUT:      Intake/Output Summary (Last 24 hours) at 2020 1440  Last data filed at 2020 1230  Gross per 24 hour   Intake 4895 ml   Output 704 ml   Net 4191 ml       24HR PULSE OXIMETRY RANGE:    SpO2  Av.6 %  Min: 83 %  Max: 94 %    General appearance: appears stated age  Lungs: rhonchi bilaterally, (+) ETT  Heart: regular rate and rhythm, S1, S2 normal, no murmur, click, rub or gallop  Abdomen: soft, non-tender; bowel sounds normal; no masses,  no organomegaly  Extremities: extremities normal, atraumatic, no cyanosis or edema  Neurologic: Mental status: sedated, minimal non-purposeful reaction to stimulus    Data    CBC:   Recent Labs     20  0511 20  0511 20  0505 20  0505 20  0431 20  1306   WBC 11.2  --  14.9*  --   --  17.9*  --    HGB 10.5*   < > 11.7*   < > 11.5* 11.8* 10.4*   HCT 32.6*   < > 37.0   < > 36.0* 36.9* 32.4*   MCV 91.3  --  93.0  --   --  93.4  --      --  362  --   --  383  --     < > = values in this interval not displayed. BMP:  Recent Labs     20  0431 20  1306    135 137   K 4.0 3.7 3.6   CL 98 98 102   CO2 30* 30* 28   BUN 43* 46* 37*   CREATININE 1.1 1.2 1.0    ALB:3,BILIDIR:3,BILITOT:3,ALKPHOS:3)@    PT/INR:   No results for input(s): PROTIME, INR in the last 72 hours.     ABG:   Recent Labs     20  1317   PH 7.294*   PO2 78.0   PCO2 67.5*   HCO3 32.0*   BE 3.9*   O2SAT 93.8   METHB 0.3   O2HB 93.0*   COHB 0.5   O2CON 15.1   HHB 6.2*   THB 11.5     FiO2 : 100 %  I:E Ratio: 1:2.10    Radiology/Other tests reviewed: CXR reviewed with slightly better aeration both lower lung fields compared to previous    Assessment:     Active Problems:    Respiratory distress    Acute on chronic respiratory failure with hypoxia (HCC)    Moderate protein-calorie malnutrition (HCC)  Resolved Problems:    Severe protein-calorie malnutrition ([facility] [address]      Plan:       1. Cont with current vent settings, wean when more stable and tolerates  2. Cont with nebs  3. Cont with steroids  4. Cont with antibiotics  5. Other issues as per MICU team, poor prognosis      Time at the bedside, reviewing labs and radiographs, reviewing notes and consultations, discussing with staff and family was more than 35 minutes. Thanks for letting us see this patient in consultation. Please contact us with any questions. Office [phone] or after hours through [employer], x [phone].

## 2020-12-31 NOTE — PLAN OF CARE
Pt's wife Mindy Atkinson called, she was updated about pt's current condition. She expressed frustration regarding patient's early discharge and readmission. All questions answered. She would like to have a virtual meeting set up between her and her .

## 2020-12-31 NOTE — PROGRESS NOTES
[address] [facility]  Department of Internal Medicine   Internal Medicine Residency   MICU Progress Note    Patient:  Servando Hernández. 69 y.o. male  MRN: [id number]     Date of Service: 12/31/2020    Allergy: Bee pollen and Penicillins    Subjective   Overnight events; while proned there were issues with ETT cuff, he had to be supined, reintubated with increasing oxygen requirements. As a result, PEEP was increased to 16. Patient also became more hypotensive requiring 2 pressors-neosynephrine and levophed. NICOM was done which showed fluid responsiveness. He received a total of 1.5 L       Patient seen and examined today. Hospital day #6, vent day #4. Intubated and sedated with fentanyl and Versed, currently supine  AC/VC: 450/24/100/16 pplat 26, PF 55%   Currently on levo and vaso, given albumin 25 g q 8 hrly x 3 doses, decadron switched to hydrocortisone 100 mg q 8 hrly  Troponins still uptrending-0.98, seen by cardiology yesterday, not a candidate for cath or PCI. Continue medical management  Making good urine output, 1.7 L out over the last 24 h, net -3.2 L since admission. Worsening leukocytosis now up to 17.9, respiratory cultures positive for Moraxella, will switch from Zosyn to Levaquin and place a consult to ID.     Objective     VS: BP (!) 93/59   Pulse 87   Temp 98.8 °F (37.1 °C) (Esophageal)   Resp 23   Ht 5' 10\" (1.778 m)   Wt 164 lb 9.6 oz (74.7 kg)   SpO2 91%   BMI 23.62 kg/m²   ABP (Arterial line BP): 123/67  ABP mean (Arterial line mean): 89 mmHg    I & O - 24hr:     Intake/Output Summary (Last 24 hours) at 12/31/2020 1240  Last data filed at 12/31/2020 1230  Gross per 24 hour   Intake 6215 ml   Output 767 ml   Net 5448 ml       Physical Exam:  · General Appearance: Intubated, sedated, supine  · Lung: diminished breath sounds bilaterally  · Heart: regular rate and rhythm, S1, S2 normal, no murmur, click, rub or gallop  · Extremities:  extremities normal, atraumatic, no cyanosis or edema  · Musculoskeletal: No joint swelling, no muscle tenderness. ROM normal in all joints of extremities.    · Neurologic: Mental status: Intubated, sedated, proned    Lines     site day    Art line   None    TLC None    PICC R Arm    Hemoaccess None      Mechanical Ventilation:   AC/VC 24/450/16/100%  ABG:     Lab Results   Component Value Date    PH 7.342 12/31/2020    RJB2QWN 50.1 12/27/2020    PCO2 62.7 12/31/2020    PO2ART 85.3 12/27/2020    PO2 54.5 12/31/2020    GLV5QWP 31.5 12/27/2020    HCO3 33.2 12/31/2020    BE 5.7 12/31/2020    THB 12.5 12/31/2020    O2SAT 86.0 12/31/2020        Medications     Infusions: (Fluid, Sedation, Vasopressors)  Sedation   Fentanyl 200, Versed 1    Nutrition:   Tube feeds  ATB:   Antibiotics  Days   Zosyn 4 (d/c)   Levaquin          Patient currently has   Urinary cath  Isolation  Restraints  DVT prophylaxis/ GI prophylaxis,    Labs     CBC:   Lab Results   Component Value Date    WBC 17.9 12/31/2020    RBC 3.95 12/31/2020    HGB 11.8 12/31/2020    HCT 36.9 12/31/2020    MCV 93.4 12/31/2020    MCH 29.9 12/31/2020    MCHC 32.0 12/31/2020    RDW 14.4 12/31/2020     12/31/2020    MPV 10.3 12/31/2020     BMP:    Lab Results   Component Value Date     12/31/2020    K 3.7 12/31/2020    CL 98 12/31/2020    CO2 30 12/31/2020    BUN 46 12/31/2020    LABALBU 1.8 12/31/2020    CREATININE 1.2 12/31/2020    CALCIUM 7.8 12/31/2020    GFRAA >60 12/31/2020    LABGLOM 60 12/31/2020    GLUCOSE 153 12/31/2020       Imaging Studies:        Resident's Assessment and Plan   Pierre Sawyer is a 69 y.o. male with PMHx significant for CAD s/p multiple cardiac catheterizations s/p multiple stents to the RCA, extensive multifocal plaque and stenosis of LAD and left circumflex, paroxysmal A. fib not on oral anticoagulation, HFpEF, COPD stage IV on 4 L nasal cannula at home, PVD, left subclavian arterial stenosis, tobacco abuse, who presented with worsening shortness of breath and chest pain for 2 to 3 days. Covid positive. Assessment:  1. Acute hypoxic respiratory failure likely 2/2 Covid PNA plus superimposed Moraxella pneumonia  · Reintubated overnight 12/30, sedated and currently supine  · AC /24/100/16 with P plat 26. PF still low at 55%  2. Septic shock likely 2/2 Covid PNA and Moraxella above  · WBC up to 17.9,S/p remdesivir, Decadron. On Zosyn day 4  · S/p 2.5 L today as patient still fluid responsive, on levo and kendra  · On hydrocortisone 100 every 8, Albumin 2.5 g every 8 hourly for 3 doses  3. NSTEMI   · Cardiology following, not a candidate for intervention at this time. Continue medical management  4. History of CAD, s/p PCI with multiple stents.  of RCA moderate mid LAD disease and severe mid circumflex disease. 5. History of A. Fib  6. History of HFpEF 60%  7. History of HTN  8. History of COPD on chronic home oxygen-4 L by NC        Plan:    1. Continue mechanical ventilation, sedation. Advanced ET tube tube placement satisfactory then repeat ABG. Consider proning if PF ratio not improving. 2. Continue IV fluid boluses and reassess fluid responsiveness. Continue to wean off pressors with MAP goal >65. Continue Hydrocort 100 mg every 8 hourly and albumin 2.5 g every 8 hourly for 3 doses  3. ID consult, recommendations greatly appreciated. Antibiotics switched from Zosyn to Levaquin today  4. Cardiology following, recommendations greatly appreciated. Continue ASA, Plavix and Lovenox 30 mg twice daily  5. Continue breathing treatments  6. Continue to hold all antihypertensive      DVT/GI Prophylaxis: Lovenox/Protonix  Disposition: MICU Haven Krabbe M.D.     Internal Medicine Resident, PGY 3    Attending Physician: Perico Gama Dr  Department of Pulmonary, Critical Care and Sleep Medicine  [address] [facility]  Department of Internal Medicine      During multidisciplinary team rounds Karuna Cutler is a 69 y.o. male was seen, examined and discussed. This is confirmation that I have personally seen and examined the patient and that the key elements of the encounter were performed by me (> 85 % time). The medications & laboratory data was discussed and adjusted where necessary. The radiographic images were reviewed or with radiologist or consultant if felt dis-concordant with the exam or history. The above findings were corroborated, plans confirmed and changes made if needed. Family is updated at the bedside as available. Key issues of the case were discussed among consultants. Critical Care time is documented if appropriate.       Nacho Huynh DO, FACP, FCCP, [facility],

## 2020-12-31 NOTE — PROGRESS NOTES
Stage  CI HR MAP TPRI SVI   Baseline 2.8 96 83 2386 29   Challenge 3.3 96 87 2094 39   Result (%Ä) %19.5 -0.3% 4.8% -12.3% 36.3%     250cc bolus NICOM results

## 2020-12-31 NOTE — PLAN OF CARE
Problem: Falls - Risk of:  Goal: Will remain free from falls  Description: Will remain free from falls  12/31/2020 0834 by Barbra aYn RN  Outcome: Met This Shift     Problem: Falls - Risk of:  Goal: Absence of physical injury  Description: Absence of physical injury  12/31/2020 0834 by Barbra Yan RN  Outcome: Met This Shift     Problem:  Body Temperature -  Risk of, Imbalanced  Goal: Ability to maintain a body temperature within defined limits  Outcome: Met This Shift     Problem: Skin Integrity:  Goal: Will show no infection signs and symptoms  Description: Will show no infection signs and symptoms  Outcome: Met This Shift     Problem: Skin Integrity:  Goal: Absence of new skin breakdown  Description: Absence of new skin breakdown  Outcome: Met This Shift     Problem: Pain:  Goal: Control of acute pain  Description: Control of acute pain  Outcome: Met This Shift     Problem: Gas Exchange - Impaired  Goal: Absence of hypoxia  Outcome: Ongoing     Problem: Risk for Fluid Volume Deficit  Goal: Maintain normal heart rhythm  Outcome: Not Met This Shift

## 2020-12-31 NOTE — PROGRESS NOTES
Stage SVI CI HR  TFC MAP BP type TRPI DO21   Baseline 26 3.0 116 -- -- -- -- --   Challenge 30 3.4 120 -- -- -- -- --   Result (%?) 20 14.7 3.6 -- -- -- -- --

## 2020-12-31 NOTE — CONSULTS
[address] [place] [address] [facility]    Consultation Note     Admit Date: 12/25/2020  6:18 PM    Reason for Consult:   Healthcare associated pneumonia    Attending Physician:  Emile Crews MD     Chief Complaint: Shortness of breath on home O2    HISTORY OF PRESENT ILLNESS:   The patient is a 69 y.o.  man known to the Infectious Diseases service. The patient is through the emergency room for the third time this month. On 11- 30-12 6-2020 he was in the hospital for Covid pneumonia at that time he had a couple positive blood cultures for coag negative staph which were considered contaminant. He was treated with remdesivir and dexamethasone and discharged around 12/6/2020. He has home O2 4 L and has underlying COPD. He was re- admitted on 12 - and discharged on 12/15/2020 for decompensation and shortness of breath. He was subsequently discharged only to be readmitted on 12/25/2020. On 12/25  admission he presented there is mention with O2 sat of 78% on home O2. He complained of cough some left-sided chest discomfort and worsening shortness of breath. Cough was productive and respiratory cultures have grown normal nunu and Moraxella catarrhalis was beta-lactamase positive. His Covid respiratory viral panel was also positive on 1226. Review of the radiograph showed that the 12/10/2020 x-rays from the second admission were fairly clear. His subsequent admitting x-rays on 1225 showed bilateral alveolar infiltrates. In May to give more of an impression of bacterial alveolar infiltrates as opposed to groundglass. Currently is on Levaquin but because of his septic presentation he is also on Levophed and Milo-Synephrine sedated with fentanyl and Versed. He did get Zosyn on 26 December 2020 but prior to that he had been diuresed on admission.   Now he is on Levaquin  Past Medical History:        Diagnosis Date    (HFpEF) heart failure with preserved ejection fraction ([facility] 07/22/2020 7/18/2020- echo- LVEF 60%    Acute MI ([facility])     Anxiety     Arthritis     Arthritis     hands, back     Atherosclerosis 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 ([address] 7/18/2019    Bilateral carotid artery stenosis 7/18/2019    CAD (coronary artery disease)     Carotid artery stenosis     Carotid bruit 6/5/2013    Carotid stenosis, right 6/5/2013    CHF (congestive heart failure) ([facility])     COPD (chronic obstructive pulmonary disease) ([facility])     Headache(784.0)     Hyperlipidemia     Hypertension     PVD (peripheral vascular disease) ([address] 4/26/2013    PVD (peripheral vascular disease) with claudication ([address] 6/5/2013    STEMI (ST elevation myocardial infarction) ([address] 10/7/2012    Subclavian artery stenosis, left ([address] 4/26/2013    Traumatic retroperitoneal hematoma 4/26/2013     Past Surgical History:        Procedure Laterality Date    CARDIAC CATHETERIZATION  07/21/2020    Dr. Lela Her  03/13/2017    2.5/15 [place] balloon to RCA and RPL    CORONARY ANGIOPLASTY WITH STENT PLACEMENT  10/07/2012    Ion KILEY x2 to RCA per Dr. Haily Rosario  4/24/13    Stent MID RCA 3.5x38, 2.5X15 RPL    DIAGNOSTIC CARDIAC CATH LAB PROCEDURE  1998    [place] stent to the RCA    DIAGNOSTIC CARDIAC CATH LAB PROCEDURE  1999    balloon to prox RCA unsuccessful PTC of the ostium of the posterior descending bracnh of the RCA    DIAGNOSTIC CARDIAC CATH LAB PROCEDURE  2002    cardiac cath with PTCA cutting balloon angioplasty to the ostium of the posterior descending artery branch of the RC    ECHO COMPL W DOP COLOR FLOW  10/9/2012    Mod concentric LVH, EF 55%, stage II diastolic dysfunction    ECHO COMPL W DOP COLOR FLOW  4/25/2013          Current Medications:   Scheduled Meds:   albumin human  25 g Intravenous Q8H    hydrocortisone sodium succinate PF  100 mg Intravenous Q8H    levofloxacin 750 mg Intravenous Q24H    sodium chloride  1,000 mL Intravenous Once    midazolam  4 mg Intravenous Once    [Held by provider] metoprolol tartrate  50 mg Oral BID    influenza virus vaccine  0.5 mL Intramuscular Prior to discharge    pantoprazole  40 mg Intravenous Daily    And    sodium chloride (PF)  10 mL Intravenous Daily    enoxaparin  30 mg Subcutaneous BID    [Held by provider] bumetanide  2 mg Intravenous Daily    acetylcysteine  2 mL Inhalation BID    chlorhexidine  15 mL Mouth/Throat BID    sodium chloride   Intravenous Q8H    Arformoterol Tartrate  15 mcg Nebulization BID    budesonide  500 mcg Nebulization BID    ipratropium-albuterol  1 ampule Inhalation Q4H While awake    sodium chloride flush  10 mL Intravenous 2 times per day    [Held by provider] amLODIPine  5 mg Oral Daily    clopidogrel  75 mg Oral Daily    aspirin  81 mg Oral Daily    atorvastatin  80 mg Oral Nightly    Vitamin D  1,000 Units Oral Daily    fluticasone  1 spray Nasal Daily    montelukast  10 mg Oral Nightly     Continuous Infusions:   phenylephrine (DOMINGA-SYNEPHRINE) 50mg/250mL infusion 300 mcg/min (12/31/20 0444)    midazolam 3 mg/hr (12/31/20 0443)    norepinephrine 3 mcg/min (12/31/20 1251)    fentaNYL 5 mcg/ml in 0.9%  ml infusion 200 mcg/hr (12/31/20 0443)     PRN Meds:perflutren lipid microspheres, diphenhydrAMINE, sodium chloride flush, polyethylene glycol, acetaminophen **OR** acetaminophen, guaiFENesin, nitroGLYCERIN, senna    Allergies:  Bee pollen and Penicillins    Social History:   Social History     Socioeconomic History    Marital status:      Spouse name: Not on file    Number of children: Not on file    Years of education: Not on file    Highest education level: Not on file   Occupational History    Not on file   Social Needs    Financial resource strain: Not on file    Food insecurity     Worry: Not on file     Inability: Not on file   LS9 needs Medical: Not on file     Non-medical: Not on file   Tobacco Use    Smoking status: Former Smoker     Packs/day: 1.50     Years: 30.00     Pack years: 45.00     Types: Cigarettes     Start date: 1967     Quit date: 2013     Years since quittin.0    Smokeless tobacco: Current User     Types: Chew   Substance and Sexual Activity    Alcohol use: Yes     Comment: occ. 1 cup half and half coffee per day    Drug use: No    Sexual activity: Not on file   Lifestyle    Physical activity     Days per week: Not on file     Minutes per session: Not on file    Stress: Not on file   Relationships    Social connections     Talks on phone: Not on file     Gets together: Not on file     Attends Judaism service: Not on file     Active member of club or organization: Not on file     Attends meetings of clubs or organizations: Not on file     Relationship status: Not on file    Intimate partner violence     Fear of current or ex partner: Not on file     Emotionally abused: Not on file     Physically abused: Not on file     Forced sexual activity: Not on file   Other Topics Concern    Not on file   Social History Narrative    Not on file     Family History:       Problem Relation Age of Onset    Heart Disease Mother          age 54    Heart Disease Father          66's     Cancer Father         kidney    Cancer Paternal Uncle         kidney   . Otherwise non-pertinent to the chief complaint. REVIEW OF SYSTEMS: Unable to obtain because the patient sedated and intubated  CONSTITUTIONAL:  No chills, fevers or night sweats. No loss of weight. EYES:  No double vision or drainage from eyes, ears or throat. HEENT:  No neck stiffness. No dysphagia. No drainage from eyes, ears or throat  RESPIRATORY: Positive cough, positive productive sputum or hemoptysis. CARDIOVASCULAR:  No chest pain, palpitations, positive orthopnea positive dyspnea on exertion.   GASTROINTESTINAL:  No nausea, vomiting, 12/29/2020     No results found for: [facility]  Lab Results   Component Value Date    SEDRATE 8 03/04/2013     Lab Results   Component Value Date    ALT 34 12/31/2020    AST 30 12/31/2020    ALKPHOS 65 12/31/2020    BILITOT 0.4 12/31/2020     Lab Results   Component Value Date     12/31/2020    K 3.7 12/31/2020    CL 98 12/31/2020    CO2 30 12/31/2020    BUN 46 12/31/2020    CREATININE 1.2 12/31/2020    GFRAA >60 12/31/2020    LABGLOM 60 12/31/2020    GLUCOSE 153 12/31/2020    PROT 4.9 12/31/2020    LABALBU 1.8 12/31/2020    CALCIUM 7.8 12/31/2020    BILITOT 0.4 12/31/2020    ALKPHOS 65 12/31/2020    AST 30 12/31/2020    ALT 34 12/31/2020       Lab Results   Component Value Date    PROTIME 19.9 12/26/2020    INR 1.8 12/26/2020       Lab Results   Component Value Date    TSH 1.411 04/25/2013       Lab Results   Component Value Date    COLORU Yellow 11/30/2020    PHUR 7.0 11/30/2020    WBCUA 0-1 09/23/2014    RBCUA 0-1 09/23/2014    BACTERIA RARE 09/23/2014    CLARITYU Clear 11/30/2020    SPECGRAV 1.010 11/30/2020    LEUKOCYTESUR Negative 11/30/2020    UROBILINOGEN 0.2 11/30/2020    BILIRUBINUR Negative 11/30/2020    BLOODU Negative 11/30/2020    GLUCOSEU Negative 11/30/2020       Lab Results   Component Value Date    ZOR8WGH 31.5 12/27/2020    NBZ9HTJ 50.1 12/27/2020    PO2ART 85.3 12/27/2020     Radiology:  XR ABDOMEN FOR NG/OG/NE TUBE PLACEMENT   Final Result   Extensive amount of bilateral parenchymal fibrosis and chronic infiltrates   not significantly changed. NG tube is appropriate. ET tube should be advanced. XR CHEST PORTABLE   Final Result   Extensive amount of bilateral parenchymal fibrosis and chronic infiltrates   not significantly changed. NG tube is appropriate. ET tube should be advanced. XR CHEST PORTABLE   Final Result   Bilateral lung airspace opacities may represent pneumonia or pulmonary edema   in the proper clinical setting.    Right upper extremity PICC line terminates in SVC.      XR CHEST PORTABLE   Final Result   There are persistent bilateral pulmonary airspace infiltrates, which appear   improved since the prior study. XR ABDOMEN FOR NG/OG/NE TUBE PLACEMENT   Final Result   Enteric tube tip in the fundus of the stomach. XR CHEST PORTABLE   Final Result   Worsening of right-sided infiltrates. No change in left lower lung opacity. XR CHEST PORTABLE   Final Result   Bilateral predominantly lower lobe airspace disease. No significant change. XR CHEST PORTABLE   Final Result   Bilateral pulmonary infiltrates new since the prior study      CTA CHEST W CONTRAST   Final Result   No central pulmonary embolism or aortic dissection. Cardiomegaly with severe coronary artery calcification. Advanced emphysema with patchy bibasilar infiltrates and effusions likely   CHF/edema and or pneumonia. Indeterminate pulmonary nodules vaguely identified in the lungs. Surveillance is recommended according to Fleischner society guidelines. XR CHEST PORTABLE    (Results Pending)   XR CHEST PORTABLE    (Results Pending)   XR CHEST PORTABLE    (Results Pending)       Microbiology:  Pending  No results for input(s): BC in the last 72 hours. Recent Labs     12/28/20  1417   ORG Moraxella catarrhalis*     No results for input(s): BLOODCULT2 in the last 72 hours. No results for input(s): STREPNEUMAGU in the last 72 hours. No results for input(s): LP1UAG in the last 72 hours. No results for input(s): ASO in the last 72 hours. Recent Labs     12/28/20  1417 12/30/20  1315   CULTRESP Oral Pharyngeal Randee reduced*  Light growth  Susceptibility testing of this isolate is not routinely  indicated to guide therapy.   Beta Lactamase POSITIVE   Oral Pharyngeal Randee reduced       Assessment:  · Community-acquired pneumonia with a patient with COPD and recent COVID-23 disease that was treated on the first admission and the second admission with a chest x-ray on 1210 that was normal

## 2020-12-31 NOTE — PROGRESS NOTES
[address] [facility]  Department of Internal Medicine   Internal Medicine Residency   MICU Progress Note    Patient:  Karuna Brown. 69 y.o. male  MRN: [id number]     Date of Service: 12/30/2020    Allergy: Bee pollen and Penicillins    Subjective     Patient seen and examined. Hospital day #5, vent day #3. Intubated and sedated with fentanyl 200 and Versed 1, proned. On triple therapy: Lovenox, aspirin, Plavix. Chest x-ray looks worse with bilateral infiltrates. AC/VC: increasing respiratory rate 20-24 and increased FiO2 to 100%. P/F ratio worsen, from 71-53 today. Holding Bumex due to labile blood pressure, SBP   Troponins uptrending and reconsulting cardiology. Making good urine output, 1.7 L out over the last 24 h, net -3.2 L since admission. Worsening leukocytosis from 11.2-14.9, on Zosyn day three for HAP    Objective     VS: BP 83/65   Pulse 101   Temp 98.6 °F (37 °C) (Esophageal)   Resp 24   Ht 5' 10\" (1.778 m)   Wt 154 lb 12.2 oz (70.2 kg)   SpO2 91%   BMI 22.21 kg/m²           I & O - 24hr:     Intake/Output Summary (Last 24 hours) at 12/30/2020 2033  Last data filed at 12/30/2020 1700  Gross per 24 hour   Intake 2961 ml   Output 765 ml   Net 2196 ml       Physical Exam:  General Appearance: Intubated, sedated, prone  Lung: diminished breath sounds bilaterally  Heart: regular rate and rhythm, S1, S2 normal, no murmur, click, rub or gallop  Extremities:  extremities normal, atraumatic, no cyanosis or edema  Musculoskeletal: No joint swelling, no muscle tenderness. ROM normal in all joints of extremities.    Neurologic: Mental status: Intubated, sedated, proned    Lines     site day    Art line   None    TLC None    PICC R Arm    Hemoaccess None      Mechanical Ventilation:  AC/VC 24/450/14/100%  ABG:     Lab Results   Component Value Date    PH 7.445 12/30/2020    BMQ3SWK 50.1 12/27/2020    PCO2 49.9 12/30/2020    PO2ART 85.3 12/27/2020    PO2 51.2 12/30/2020    KKI3BGC 31.5 12/27/2020    HCO3 33.5 12/30/2020    BE 8.1 12/30/2020    THB 13.0 12/30/2020    O2SAT 86.6 12/30/2020        Medications     Infusions: (Fluid, Sedation, Vasopressors)  Sedation  Fentanyl 200, Versed 1    Nutrition:   Tube feeds  ATB:   Antibiotics  Days   Zosyn 3             Patient currently has   Urinary cath  Isolation  Restraints  DVT prophylaxis/ GI prophylaxis,    Labs     CBC:   Lab Results   Component Value Date    WBC 14.9 12/30/2020    RBC 3.98 12/30/2020    HGB 11.9 12/30/2020    HCT 37.1 12/30/2020    MCV 93.0 12/30/2020    MCH 29.4 12/30/2020    MCHC 31.6 12/30/2020    RDW 14.5 12/30/2020     12/30/2020    MPV 10.2 12/30/2020     BMP:    Lab Results   Component Value Date     12/30/2020    K 4.1 12/30/2020    K 3.9 12/30/2020    CL 96 12/30/2020    CO2 32 12/30/2020    BUN 40 12/30/2020    LABALBU 2.3 12/30/2020    CREATININE 0.9 12/30/2020    CALCIUM 8.0 12/30/2020    GFRAA >60 12/30/2020    LABGLOM >60 12/30/2020    GLUCOSE 148 12/30/2020       Imaging Studies:     Impression   Bilateral lung airspace opacities may represent pneumonia or pulmonary edema   in the proper clinical setting. Right upper extremity PICC line terminates in SVC.             Resident's Assessment and Plan   Cade Herrera is a 69 y.o. male with PMHx significant for CAD s/p multiple cardiac catheterizations s/p multiple stents to the RCA, extensive multifocal plaque and stenosis of LAD and left circumflex, paroxysmal A. fib not on oral anticoagulation, HFpEF, COPD stage IV on 4 L nasal cannula at home, PVD, left subclavian arterial stenosis, tobacco abuse, who presented with worsening shortness of breath and chest pain for 2 to 3 days. Covid positive. Cardiology:   1. NSTEMI, type II   -Troponins uptrending from 0.62-0.72   -Plan to reconsult cardiology    2. History of HFpEF 60% EF, hypertension   -holding Norvasc and put parameters on metoprolol due to hypotension    3.   Paroxysmal atrial fibrillation, chads vas score 4, has bled score 3   -Sinus rhythm and rate controlled   -On Lovenox    Pulmonary:  1. Acute hypoxic respiratory Failure +/-superimposed bacterial pneumonia 2/2 COVID pneumonia s/p remdesivir   -Intubated and sedated   -Supine the patient for 8 h today. Pending repeat ABG, we will likely prone the patient again for another 16 h   -AC/VC 24/450/14/100 percent   -ABG 7.385, PCO2 60, PO2 53, bicarb 35, P/F 53   -Continue Brovana, Pulmicort, DuoNeb, Mucomyst, Zosyn for HAP    2. COPD stage IV, continue above treatment    Hematology/ Oncology:  1. Normocytic anemia   -Hemoglobin stable, goal hemoglobin >9   -Follow H&H every 8 hours     Code Status:   Full    PT/OT evaluation: Not indicated at this time   DVT prophylaxis/ GI prophylaxis: Lovenox/Protonix  Disposition: Continue current care      Mejia Renteria MD, PGY-1  Attending physician: Perico Gama [address]  Department of Pulmonary, Critical Care and Sleep Medicine  [address] [facility]  Department of Internal Medicine      During multidisciplinary team rounds Chinyere AnguianoHussein Sr. is a 69 y.o. male was seen, examined and discussed. This is confirmation that I have personally seen and examined the patient and that the key elements of the encounter were performed by me (> 85 % time). The medications & laboratory data was discussed and adjusted where necessary. The radiographic images were reviewed or with radiologist or consultant if felt dis-concordant with the exam or history. The above findings were corroborated, plans confirmed and changes made if needed. Family is updated at the bedside as available. Key issues of the case were discussed among consultants. Critical Care time is documented if appropriate.       Luba Avila, DO, FACP, FCCP, [place],

## 2020-12-31 NOTE — PROGRESS NOTES
Inpatient Cardiology Progress note     PATIENT IS BEING FOLLOWED FOR: NSTEMI, h/o CAD     Keyon Rooney. is a 69 y.o. male who follows with Dr Scruggs Colon: Unable to obtain as the patient is intubated and sedated  OBJECTIVE: Per nursing staff patient is supine from his tube exchange this morning. Still requiring multiple pressors with map in the 70s, SBP in the 40s this morning. Heart rate  with PACs/PVCs. ROS:  Unable to obtain at this time as the patient is intubated and sedated    PHYSICAL EXAM: Performed at this time as this visit was performed via remote consult in an attempt to reduce potential exposure and conserve PPE as the patient is in COVID-19 isolation.      BP (!) 93/59   Pulse 93   Temp 97.5 °F (36.4 °C) (Esophageal)   Resp 24   Ht 5' 10\" (1.778 m)   Wt 164 lb 9.6 oz (74.7 kg)   SpO2 (!) 89%   BMI 23.62 kg/m²        Intake/Output Summary (Last 24 hours) at 12/31/2020 1106  Last data filed at 12/31/2020 1100  Gross per 24 hour   Intake 5215 ml   Output 720 ml   Net 4495 ml       Weight:   Wt Readings from Last 3 Encounters:   12/31/20 164 lb 9.6 oz (74.7 kg)   12/10/20 177 lb (80.3 kg)   12/01/20 177 lb (80.3 kg)     Current Inpatient Medications:   albumin human  25 g Intravenous Q8H    hydrocortisone sodium succinate PF  100 mg Intravenous Q8H    levofloxacin  750 mg Intravenous Q24H    midazolam  4 mg Intravenous Once    [Held by provider] metoprolol tartrate  50 mg Oral BID    influenza virus vaccine  0.5 mL Intramuscular Prior to discharge    pantoprazole  40 mg Intravenous Daily    And    sodium chloride (PF)  10 mL Intravenous Daily    enoxaparin  30 mg Subcutaneous BID    [Held by provider] bumetanide  2 mg Intravenous Daily    acetylcysteine  2 mL Inhalation BID    chlorhexidine  15 mL Mouth/Throat BID    sodium chloride   Intravenous Q8H    Arformoterol Tartrate  15 mcg Nebulization BID    budesonide  500 mcg Nebulization BID    ipratropium-albuterol  1 ampule Inhalation Q4H While awake    sodium chloride flush  10 mL Intravenous 2 times per day    [Held by provider] amLODIPine  5 mg Oral Daily    clopidogrel  75 mg Oral Daily    aspirin  81 mg Oral Daily    atorvastatin  80 mg Oral Nightly    Vitamin D  1,000 Units Oral Daily    fluticasone  1 spray Nasal Daily    montelukast  10 mg Oral Nightly       IV Infusions (if any):   phenylephrine (DOMINGA-SYNEPHRINE) 50mg/250mL infusion 300 mcg/min (12/31/20 0444)    midazolam 3 mg/hr (12/31/20 0443)    norepinephrine 4 mcg/min (12/31/20 1103)    fentaNYL 5 mcg/ml in 0.9%  ml infusion 200 mcg/hr (12/31/20 0443)       DIAGNOSTIC/ LABORATORY DATA:  Labs:   CBC:   Recent Labs     12/30/20  0505 12/30/20  0505 12/30/20 2052 12/31/20 0431   WBC 14.9*  --   --  17.9*   HGB 11.7*   < > 11.5* 11.8*   HCT 37.0   < > 36.0* 36.9*     --   --  383    < > = values in this interval not displayed. BMP:   Recent Labs     12/30/20 2052 12/31/20 0431    135   K 4.0 3.7   CO2 30* 30*   BUN 43* 46*   CREATININE 1.1 1.2   LABGLOM >60 60   CALCIUM 7.5* 7.8*     Mag:   Recent Labs     12/30/20  0505 12/31/20  0431   MG 2.0 2.2     HgA1c:   Lab Results   Component Value Date    LABA1C 5.8 04/25/2013       CARDIAC ENZYMES:  Recent Labs     12/30/20  1315 12/30/20 2052 12/31/20  0431   TROPONINI 0.81* 0.88* 0.98*     FASTING LIPID PANEL:  Lab Results   Component Value Date    CHOL 179 03/11/2017    HDL 49 03/11/2017    LDLCALC 111 03/11/2017    TRIG 97 03/11/2017     LIVER PROFILE:  Recent Labs     12/30/20  0505 12/31/20  0431   AST 35 30   ALT 46* 34   LABALBU 2.3* 1.8*     CXR 12/30/2020  Bilateral lung airspace opacities may represent pneumonia or pulmonary edema in the proper clinical setting. Right upper extremity PICC line terminates in SVC. CXR 10/31/2020  Extensive amount of bilateral parenchymal fibrosis and chronic infiltrates not significantly changed.   NG tube is appropriate. ET tube should be advanced. Telemetry: Sinus rhythm  PACs/PVCs per nursing staff     TTE Dr Keri Rivera 7/16/2020: EF visually estimated at 60%. No regional wall motion abnormalities seen. Mild CLVH. Normal right ventricle structure and function. Left atrial volume index of 53 ml per meters squared BSA. Physiologic and/or trace mitral regurgitation is present     [facility] Dr Hair Zhou 7/21/2020: LM  30% distal stenosis.  LAD calcified throughout with to 50% mid vessel stenosis.  The circumflex artery 80% tubular stenosis small OM 2.  RCA dominant with diffuse in-stent stenosis and 100% distal occlusion. Treat medically      Assessment:  1. COVID-19 pneumonia with respiratory failure. Intubated with saturations in the 80s, supine position from tube exchanges morning. 2. Significant hypotension this morning with systolic blood pressure reported in the 40s. On Levophed and maxed on Milo-Synephrine. MAP 70s    3. NSTEMI, likely type II secondary to hypoxemia, hypotension and possible myocarditis from COVID-19. Troponin continue to rise, not a good candidate for cardiac catheterization at this time  4. History of coronary artery disease -> Dr. Joshua Braxton reviewed cardiac catheterization 7/21/2020 \" of the RCA, moderate mid LAD disease, severe mid circumflex disease. Small vessel disease 1.5 mm vessel. Does not appear amenable to PCI. \"  5. Pulmonary fibrosis  6. Paroxysmal atrial fibrillation, not on oral anticoagulation  7. History of hypertension  8. Dark purulent sputum    Plan:  1. Recommend treatment of co morbidities, confounding factors including hypoxemia and hypotension. 2. Not a good candidate for cardiac catheterization at this time  3.  Cardiology will see as needed, please call if needed    Discussed with Dr. Joshua Braxton  Electronically signed by Jamari Tirado, [id number] on 12/31/2020 at 11:06 AM

## 2020-12-31 NOTE — PROGRESS NOTES
Residents notified BP only picks up every once in a while, unreliable by NIBP. Levophed increased per MAR. Orders to obtain NICOM.

## 2021-01-01 ENCOUNTER — APPOINTMENT (OUTPATIENT)
Dept: GENERAL RADIOLOGY | Age: 71
DRG: 870 | End: 2021-01-01
Payer: MEDICARE

## 2021-01-01 VITALS
HEART RATE: 42 BPM | DIASTOLIC BLOOD PRESSURE: 55 MMHG | HEIGHT: 70 IN | RESPIRATION RATE: 18 BRPM | OXYGEN SATURATION: 82 % | TEMPERATURE: 97.2 F | WEIGHT: 202.5 LBS | BODY MASS INDEX: 28.99 KG/M2 | SYSTOLIC BLOOD PRESSURE: 91 MMHG

## 2021-01-01 LAB
AADO2: 565.6 MMHG
AADO2: 580.7 MMHG
AADO2: 581 MMHG
AADO2: 582.4 MMHG
AADO2: 595.5 MMHG
ALBUMIN SERPL-MCNC: 2.2 G/DL (ref 3.5–5.2)
ALBUMIN SERPL-MCNC: 3.1 G/DL (ref 3.5–5.2)
ALP BLD-CCNC: 78 U/L (ref 40–129)
ALP BLD-CCNC: 87 U/L (ref 40–129)
ALT SERPL-CCNC: 18 U/L (ref 0–40)
ALT SERPL-CCNC: 18 U/L (ref 0–40)
ANION GAP SERPL CALCULATED.3IONS-SCNC: 10 MMOL/L (ref 7–16)
ANION GAP SERPL CALCULATED.3IONS-SCNC: 7 MMOL/L (ref 7–16)
ANION GAP SERPL CALCULATED.3IONS-SCNC: 9 MMOL/L (ref 7–16)
ANISOCYTOSIS: ABNORMAL
ANISOCYTOSIS: ABNORMAL
AST SERPL-CCNC: 20 U/L (ref 0–39)
AST SERPL-CCNC: 23 U/L (ref 0–39)
B.E.: -0.1 MMOL/L (ref -3–3)
B.E.: -2.3 MMOL/L (ref -3–3)
B.E.: -4 MMOL/L (ref -3–3)
B.E.: -6.2 MMOL/L (ref -3–3)
B.E.: 0.8 MMOL/L (ref -3–3)
BASOPHILS ABSOLUTE: 0 E9/L (ref 0–0.2)
BASOPHILS RELATIVE PERCENT: 0.4 % (ref 0–2)
BASOPHILS RELATIVE PERCENT: 0.4 % (ref 0–2)
BASOPHILS RELATIVE PERCENT: 0.6 % (ref 0–2)
BILIRUB SERPL-MCNC: 0.4 MG/DL (ref 0–1.2)
BILIRUB SERPL-MCNC: 0.6 MG/DL (ref 0–1.2)
BUN BLDV-MCNC: 29 MG/DL (ref 8–23)
BUN BLDV-MCNC: 30 MG/DL (ref 8–23)
BUN BLDV-MCNC: 33 MG/DL (ref 8–23)
BURR CELLS: ABNORMAL
BURR CELLS: ABNORMAL
C-REACTIVE PROTEIN: 7.2 MG/DL (ref 0–0.4)
C-REACTIVE PROTEIN: 7.9 MG/DL (ref 0–0.4)
CALCIUM SERPL-MCNC: 8 MG/DL (ref 8.6–10.2)
CALCIUM SERPL-MCNC: 8 MG/DL (ref 8.6–10.2)
CALCIUM SERPL-MCNC: 8.2 MG/DL (ref 8.6–10.2)
CHLORIDE BLD-SCNC: 97 MMOL/L (ref 98–107)
CHLORIDE BLD-SCNC: 99 MMOL/L (ref 98–107)
CHLORIDE BLD-SCNC: 99 MMOL/L (ref 98–107)
CO2: 25 MMOL/L (ref 22–29)
CO2: 28 MMOL/L (ref 22–29)
CO2: 28 MMOL/L (ref 22–29)
COHB: 0.6 % (ref 0–1.5)
COHB: 0.6 % (ref 0–1.5)
COHB: 0.7 % (ref 0–1.5)
COHB: 0.7 % (ref 0–1.5)
COHB: 1.1 % (ref 0–1.5)
CREAT SERPL-MCNC: 0.7 MG/DL (ref 0.7–1.2)
CREAT SERPL-MCNC: 0.8 MG/DL (ref 0.7–1.2)
CREAT SERPL-MCNC: 0.8 MG/DL (ref 0.7–1.2)
CRITICAL: ABNORMAL
CULTURE, RESPIRATORY: NORMAL
D DIMER: 1838 NG/ML DDU
D DIMER: 2533 NG/ML DDU
DATE ANALYZED: ABNORMAL
DATE OF COLLECTION: ABNORMAL
EOSINOPHILS ABSOLUTE: 0 E9/L (ref 0.05–0.5)
EOSINOPHILS ABSOLUTE: 0 E9/L (ref 0.05–0.5)
EOSINOPHILS ABSOLUTE: 0.14 E9/L (ref 0.05–0.5)
EOSINOPHILS RELATIVE PERCENT: 0 % (ref 0–6)
EOSINOPHILS RELATIVE PERCENT: 0.6 % (ref 0–6)
EOSINOPHILS RELATIVE PERCENT: 0.9 % (ref 0–6)
FIO2: 100 %
GFR AFRICAN AMERICAN: >60
GFR NON-AFRICAN AMERICAN: >60 ML/MIN/1.73
GLUCOSE BLD-MCNC: 140 MG/DL (ref 74–99)
GLUCOSE BLD-MCNC: 152 MG/DL (ref 74–99)
GLUCOSE BLD-MCNC: 187 MG/DL (ref 74–99)
HCO3: 20.3 MMOL/L (ref 22–26)
HCO3: 22.8 MMOL/L (ref 22–26)
HCO3: 25.3 MMOL/L (ref 22–26)
HCO3: 28.1 MMOL/L (ref 22–26)
HCO3: 28.9 MMOL/L (ref 22–26)
HCT VFR BLD CALC: 32.2 % (ref 37–54)
HCT VFR BLD CALC: 32.8 % (ref 37–54)
HCT VFR BLD CALC: 34.3 % (ref 37–54)
HCT VFR BLD CALC: 35.8 % (ref 37–54)
HEMOGLOBIN: 10 G/DL (ref 12.5–16.5)
HEMOGLOBIN: 10.3 G/DL (ref 12.5–16.5)
HEMOGLOBIN: 10.8 G/DL (ref 12.5–16.5)
HEMOGLOBIN: 11.1 G/DL (ref 12.5–16.5)
HHB: 10.1 % (ref 0–5)
HHB: 13.1 % (ref 0–5)
HHB: 17.5 % (ref 0–5)
HHB: 22.8 % (ref 0–5)
HHB: 24.7 % (ref 0–5)
HYPOCHROMIA: ABNORMAL
LAB: ABNORMAL
LACTIC ACID: 1.6 MMOL/L (ref 0.5–2.2)
LYMPHOCYTES ABSOLUTE: 0 E9/L (ref 1.5–4)
LYMPHOCYTES ABSOLUTE: 0.16 E9/L (ref 1.5–4)
LYMPHOCYTES ABSOLUTE: 0.62 E9/L (ref 1.5–4)
LYMPHOCYTES RELATIVE PERCENT: 0.9 % (ref 20–42)
LYMPHOCYTES RELATIVE PERCENT: 2.8 % (ref 20–42)
LYMPHOCYTES RELATIVE PERCENT: 3.5 % (ref 20–42)
Lab: ABNORMAL
MCH RBC QN AUTO: 29.2 PG (ref 26–35)
MCH RBC QN AUTO: 29.4 PG (ref 26–35)
MCH RBC QN AUTO: 29.7 PG (ref 26–35)
MCHC RBC AUTO-ENTMCNC: 31 % (ref 32–34.5)
MCHC RBC AUTO-ENTMCNC: 31.1 % (ref 32–34.5)
MCHC RBC AUTO-ENTMCNC: 31.4 % (ref 32–34.5)
MCV RBC AUTO: 94.2 FL (ref 80–99.9)
MCV RBC AUTO: 94.5 FL (ref 80–99.9)
MCV RBC AUTO: 94.7 FL (ref 80–99.9)
METAMYELOCYTES RELATIVE PERCENT: 0.9 % (ref 0–1)
METAMYELOCYTES RELATIVE PERCENT: 0.9 % (ref 0–1)
METHB: 0.2 % (ref 0–1.5)
METHB: 0.3 % (ref 0–1.5)
MODE: AC
MONOCYTES ABSOLUTE: 0 E9/L (ref 0.1–0.95)
MONOCYTES ABSOLUTE: 0.77 E9/L (ref 0.1–0.95)
MONOCYTES ABSOLUTE: 0.98 E9/L (ref 0.1–0.95)
MONOCYTES RELATIVE PERCENT: 5.2 % (ref 2–12)
MONOCYTES RELATIVE PERCENT: 5.3 % (ref 2–12)
MONOCYTES RELATIVE PERCENT: 5.3 % (ref 2–12)
MYELOCYTE PERCENT: 0.9 % (ref 0–0)
MYELOCYTE PERCENT: 1.8 % (ref 0–0)
MYELOCYTE PERCENT: 2.6 % (ref 0–0)
NEUTROPHILS ABSOLUTE: 13.86 E9/L (ref 1.8–7.3)
NEUTROPHILS ABSOLUTE: 15.64 E9/L (ref 1.8–7.3)
NEUTROPHILS ABSOLUTE: 18.62 E9/L (ref 1.8–7.3)
NEUTROPHILS RELATIVE PERCENT: 86.8 % (ref 43–80)
NEUTROPHILS RELATIVE PERCENT: 93.9 % (ref 43–80)
NEUTROPHILS RELATIVE PERCENT: 96.5 % (ref 43–80)
NUCLEATED RED BLOOD CELLS: 0.9 /100 WBC
O2 CONTENT: 11.1 ML/DL
O2 CONTENT: 11.7 ML/DL
O2 CONTENT: 12.9 ML/DL
O2 CONTENT: 13.7 ML/DL
O2 CONTENT: 14 ML/DL
O2 SATURATION: 75.1 % (ref 92–98.5)
O2 SATURATION: 77 % (ref 92–98.5)
O2 SATURATION: 82.3 % (ref 92–98.5)
O2 SATURATION: 86.8 % (ref 92–98.5)
O2 SATURATION: 89.8 % (ref 92–98.5)
O2HB: 74.4 % (ref 94–97)
O2HB: 76.4 % (ref 94–97)
O2HB: 81.2 % (ref 94–97)
O2HB: 86 % (ref 94–97)
O2HB: 89 % (ref 94–97)
OPERATOR ID: 1632
OPERATOR ID: 1868
OPERATOR ID: 2577
OPERATOR ID: 2577
OPERATOR ID: ABNORMAL
OVALOCYTES: ABNORMAL
PATIENT TEMP: 37 C
PCO2: 45 MMHG (ref 35–45)
PCO2: 49.1 MMHG (ref 35–45)
PCO2: 56.7 MMHG (ref 35–45)
PCO2: 63.6 MMHG (ref 35–45)
PCO2: 64.4 MMHG (ref 35–45)
PDW BLD-RTO: 14.6 FL (ref 11.5–15)
PDW BLD-RTO: 14.7 FL (ref 11.5–15)
PDW BLD-RTO: 14.8 FL (ref 11.5–15)
PEEP/CPAP: 16 CMH2O
PEEP/CPAP: 16 CMH2O
PEEP/CPAP: 18 CMH2O
PFO2: 0.42 MMHG/%
PFO2: 0.43 MMHG/%
PFO2: 0.5 MMHG/%
PFO2: 0.58 MMHG/%
PFO2: 0.62 MMHG/%
PH BLOOD GAS: 7.26 (ref 7.35–7.45)
PH BLOOD GAS: 7.27 (ref 7.35–7.45)
PH BLOOD GAS: 7.29 (ref 7.35–7.45)
PLATELET # BLD: 306 E9/L (ref 130–450)
PLATELET # BLD: 318 E9/L (ref 130–450)
PLATELET # BLD: 373 E9/L (ref 130–450)
PMV BLD AUTO: 10.1 FL (ref 7–12)
PMV BLD AUTO: 10.4 FL (ref 7–12)
PMV BLD AUTO: 10.7 FL (ref 7–12)
PO2: 42 MMHG (ref 75–100)
PO2: 43.4 MMHG (ref 75–100)
PO2: 50.3 MMHG (ref 75–100)
PO2: 58 MMHG (ref 75–100)
PO2: 62.3 MMHG (ref 75–100)
POIKILOCYTES: ABNORMAL
POLYCHROMASIA: ABNORMAL
POTASSIUM REFLEX MAGNESIUM: 4 MMOL/L (ref 3.5–5)
POTASSIUM REFLEX MAGNESIUM: 4 MMOL/L (ref 3.5–5)
POTASSIUM SERPL-SCNC: 3.9 MMOL/L (ref 3.5–5)
RBC # BLD: 3.4 E12/L (ref 3.8–5.8)
RBC # BLD: 3.47 E12/L (ref 3.8–5.8)
RBC # BLD: 3.8 E12/L (ref 3.8–5.8)
RI(T): 11.55
RI(T): 13.72
RI(T): 13.87
RI(T): 9.32
RI(T): 9.75
RR MECHANICAL: 18 B/MIN
RR MECHANICAL: 28 B/MIN
RR MECHANICAL: 28 B/MIN
RR MECHANICAL: 30 B/MIN
RR MECHANICAL: 30 B/MIN
SCHISTOCYTES: ABNORMAL
SMEAR, RESPIRATORY: NORMAL
SODIUM BLD-SCNC: 132 MMOL/L (ref 132–146)
SODIUM BLD-SCNC: 134 MMOL/L (ref 132–146)
SODIUM BLD-SCNC: 136 MMOL/L (ref 132–146)
SOURCE, BLOOD GAS: ABNORMAL
TEAR DROP CELLS: ABNORMAL
THB: 10.9 G/DL (ref 11.5–16.5)
THB: 11.3 G/DL (ref 11.5–16.5)
THB: 12.3 G/DL (ref 11.5–16.5)
THB: 12.3 G/DL (ref 11.5–16.5)
THB: 8.8 G/DL (ref 11.5–16.5)
TIME ANALYZED: 2249
TIME ANALYZED: 438
TIME ANALYZED: 455
TIME ANALYZED: 651
TIME ANALYZED: 812
TOTAL PROTEIN: 5 G/DL (ref 6.4–8.3)
TOTAL PROTEIN: 5.2 G/DL (ref 6.4–8.3)
TOXIC GRANULATION: ABNORMAL
TROPONIN: 0.42 NG/ML (ref 0–0.03)
TROPONIN: 0.52 NG/ML (ref 0–0.03)
URINE CULTURE, ROUTINE: NORMAL
VT MECHANICAL: 420 ML
VT MECHANICAL: 450 ML
VT MECHANICAL: 450 ML
WBC # BLD: 15.4 E9/L (ref 4.5–11.5)
WBC # BLD: 15.8 E9/L (ref 4.5–11.5)
WBC # BLD: 19.6 E9/L (ref 4.5–11.5)

## 2021-01-01 PROCEDURE — 6370000000 HC RX 637 (ALT 250 FOR IP): Performed by: INTERNAL MEDICINE

## 2021-01-01 PROCEDURE — 94003 VENT MGMT INPAT SUBQ DAY: CPT

## 2021-01-01 PROCEDURE — 2580000003 HC RX 258: Performed by: INTERNAL MEDICINE

## 2021-01-01 PROCEDURE — 2500000003 HC RX 250 WO HCPCS: Performed by: INTERNAL MEDICINE

## 2021-01-01 PROCEDURE — 2000000000 HC ICU R&B

## 2021-01-01 PROCEDURE — 82805 BLOOD GASES W/O2 SATURATION: CPT

## 2021-01-01 PROCEDURE — 99223 1ST HOSP IP/OBS HIGH 75: CPT | Performed by: PHYSICIAN ASSISTANT

## 2021-01-01 PROCEDURE — 83605 ASSAY OF LACTIC ACID: CPT

## 2021-01-01 PROCEDURE — 85378 FIBRIN DEGRADE SEMIQUANT: CPT

## 2021-01-01 PROCEDURE — 6370000000 HC RX 637 (ALT 250 FOR IP): Performed by: NURSE PRACTITIONER

## 2021-01-01 PROCEDURE — 71045 X-RAY EXAM CHEST 1 VIEW: CPT

## 2021-01-01 PROCEDURE — 6360000002 HC RX W HCPCS: Performed by: INTERNAL MEDICINE

## 2021-01-01 PROCEDURE — C9113 INJ PANTOPRAZOLE SODIUM, VIA: HCPCS | Performed by: INTERNAL MEDICINE

## 2021-01-01 PROCEDURE — 86140 C-REACTIVE PROTEIN: CPT

## 2021-01-01 PROCEDURE — 84484 ASSAY OF TROPONIN QUANT: CPT

## 2021-01-01 PROCEDURE — 2500000003 HC RX 250 WO HCPCS

## 2021-01-01 PROCEDURE — 2580000003 HC RX 258: Performed by: SPECIALIST

## 2021-01-01 PROCEDURE — 6360000002 HC RX W HCPCS

## 2021-01-01 PROCEDURE — 80053 COMPREHEN METABOLIC PANEL: CPT

## 2021-01-01 PROCEDURE — 94640 AIRWAY INHALATION TREATMENT: CPT

## 2021-01-01 PROCEDURE — 85014 HEMATOCRIT: CPT

## 2021-01-01 PROCEDURE — 80048 BASIC METABOLIC PNL TOTAL CA: CPT

## 2021-01-01 PROCEDURE — 85025 COMPLETE CBC W/AUTO DIFF WBC: CPT

## 2021-01-01 PROCEDURE — P9047 ALBUMIN (HUMAN), 25%, 50ML: HCPCS | Performed by: INTERNAL MEDICINE

## 2021-01-01 PROCEDURE — 2580000003 HC RX 258: Performed by: FAMILY MEDICINE

## 2021-01-01 PROCEDURE — 85018 HEMOGLOBIN: CPT

## 2021-01-01 PROCEDURE — 99291 CRITICAL CARE FIRST HOUR: CPT | Performed by: INTERNAL MEDICINE

## 2021-01-01 PROCEDURE — 6360000002 HC RX W HCPCS: Performed by: SPECIALIST

## 2021-01-01 RX ORDER — LORAZEPAM 2 MG/ML
0.5 INJECTION INTRAMUSCULAR
Status: DISCONTINUED | OUTPATIENT
Start: 2021-01-01 | End: 2021-01-01 | Stop reason: HOSPADM

## 2021-01-01 RX ORDER — MORPHINE SULFATE 2 MG/ML
2 INJECTION, SOLUTION INTRAMUSCULAR; INTRAVENOUS
Status: DISCONTINUED | OUTPATIENT
Start: 2021-01-01 | End: 2021-01-01 | Stop reason: HOSPADM

## 2021-01-01 RX ORDER — VECURONIUM BROMIDE 1 MG/ML
10 INJECTION, POWDER, LYOPHILIZED, FOR SOLUTION INTRAVENOUS ONCE
Status: COMPLETED | OUTPATIENT
Start: 2021-01-01 | End: 2021-01-01

## 2021-01-01 RX ORDER — FUROSEMIDE 10 MG/ML
INJECTION INTRAMUSCULAR; INTRAVENOUS
Status: COMPLETED
Start: 2021-01-01 | End: 2021-01-01

## 2021-01-01 RX ORDER — FUROSEMIDE 10 MG/ML
20 INJECTION INTRAMUSCULAR; INTRAVENOUS ONCE
Status: COMPLETED | OUTPATIENT
Start: 2021-01-01 | End: 2021-01-01

## 2021-01-01 RX ORDER — VECURONIUM BROMIDE 1 MG/ML
INJECTION, POWDER, LYOPHILIZED, FOR SOLUTION INTRAVENOUS
Status: COMPLETED
Start: 2021-01-01 | End: 2021-01-01

## 2021-01-01 RX ORDER — FUROSEMIDE 40 MG/1
40 TABLET ORAL DAILY
Status: DISCONTINUED | OUTPATIENT
Start: 2021-01-01 | End: 2021-01-01

## 2021-01-01 RX ORDER — 0.9 % SODIUM CHLORIDE 0.9 %
1000 INTRAVENOUS SOLUTION INTRAVENOUS ONCE
Status: COMPLETED | OUTPATIENT
Start: 2021-01-01 | End: 2021-01-01

## 2021-01-01 RX ORDER — GLYCOPYRROLATE 0.2 MG/ML
0.2 INJECTION INTRAMUSCULAR; INTRAVENOUS EVERY 4 HOURS PRN
Status: DISCONTINUED | OUTPATIENT
Start: 2021-01-01 | End: 2021-01-01 | Stop reason: HOSPADM

## 2021-01-01 RX ORDER — FUROSEMIDE 10 MG/ML
40 INJECTION INTRAMUSCULAR; INTRAVENOUS ONCE
Status: COMPLETED | OUTPATIENT
Start: 2021-01-01 | End: 2021-01-01

## 2021-01-01 RX ADMIN — PHENYLEPHRINE HYDROCHLORIDE 300 MCG/MIN: 10 INJECTION INTRAVENOUS at 03:13

## 2021-01-01 RX ADMIN — VECURONIUM BROMIDE FOR INJECTION 10 MG: 1 INJECTION, POWDER, LYOPHILIZED, FOR SOLUTION INTRAVENOUS at 08:05

## 2021-01-01 RX ADMIN — SODIUM CHLORIDE, PRESERVATIVE FREE 10 ML: 5 INJECTION INTRAVENOUS at 00:15

## 2021-01-01 RX ADMIN — ATORVASTATIN CALCIUM 80 MG: 80 TABLET, FILM COATED ORAL at 20:09

## 2021-01-01 RX ADMIN — PHENYLEPHRINE HYDROCHLORIDE 300 MCG/MIN: 10 INJECTION INTRAVENOUS at 09:20

## 2021-01-01 RX ADMIN — BUDESONIDE 500 MCG: 0.5 SUSPENSION RESPIRATORY (INHALATION) at 10:27

## 2021-01-01 RX ADMIN — FUROSEMIDE 20 MG: 10 INJECTION, SOLUTION INTRAMUSCULAR; INTRAVENOUS at 21:12

## 2021-01-01 RX ADMIN — PANTOPRAZOLE SODIUM 40 MG: 40 INJECTION, POWDER, FOR SOLUTION INTRAVENOUS at 08:24

## 2021-01-01 RX ADMIN — IPRATROPIUM BROMIDE AND ALBUTEROL SULFATE 1 AMPULE: 2.5; .5 SOLUTION RESPIRATORY (INHALATION) at 10:27

## 2021-01-01 RX ADMIN — Medication 1000 UNITS: at 08:24

## 2021-01-01 RX ADMIN — PHENYLEPHRINE HYDROCHLORIDE 240 MCG/MIN: 10 INJECTION INTRAVENOUS at 20:21

## 2021-01-01 RX ADMIN — ENOXAPARIN SODIUM 30 MG: 30 INJECTION SUBCUTANEOUS at 20:08

## 2021-01-01 RX ADMIN — CLOPIDOGREL 75 MG: 75 TABLET, FILM COATED ORAL at 09:19

## 2021-01-01 RX ADMIN — BUDESONIDE 500 MCG: 0.5 SUSPENSION RESPIRATORY (INHALATION) at 08:22

## 2021-01-01 RX ADMIN — HYDROCORTISONE SODIUM SUCCINATE 100 MG: 100 INJECTION, POWDER, FOR SOLUTION INTRAMUSCULAR; INTRAVENOUS at 01:07

## 2021-01-01 RX ADMIN — PHENYLEPHRINE HYDROCHLORIDE 270 MCG/MIN: 10 INJECTION INTRAVENOUS at 02:42

## 2021-01-01 RX ADMIN — ARFORMOTEROL TARTRATE 15 MCG: 15 SOLUTION RESPIRATORY (INHALATION) at 22:00

## 2021-01-01 RX ADMIN — ERTAPENEM SODIUM 1000 MG: 1 INJECTION, POWDER, LYOPHILIZED, FOR SOLUTION INTRAMUSCULAR; INTRAVENOUS at 14:31

## 2021-01-01 RX ADMIN — Medication 200 MCG/HR: at 11:38

## 2021-01-01 RX ADMIN — FUROSEMIDE 40 MG: 10 INJECTION, SOLUTION INTRAMUSCULAR; INTRAVENOUS at 08:14

## 2021-01-01 RX ADMIN — MONTELUKAST SODIUM 10 MG: 10 TABLET, COATED ORAL at 20:09

## 2021-01-01 RX ADMIN — Medication 1000 UNITS: at 09:26

## 2021-01-01 RX ADMIN — IPRATROPIUM BROMIDE AND ALBUTEROL SULFATE 1 AMPULE: 2.5; .5 SOLUTION RESPIRATORY (INHALATION) at 13:23

## 2021-01-01 RX ADMIN — SODIUM CHLORIDE: 9 INJECTION, SOLUTION INTRAVENOUS at 08:23

## 2021-01-01 RX ADMIN — CHLORHEXIDINE GLUCONATE 0.12% ORAL RINSE 15 ML: 1.2 LIQUID ORAL at 20:07

## 2021-01-01 RX ADMIN — IPRATROPIUM BROMIDE AND ALBUTEROL SULFATE 1 AMPULE: 2.5; .5 SOLUTION RESPIRATORY (INHALATION) at 08:22

## 2021-01-01 RX ADMIN — ENOXAPARIN SODIUM 30 MG: 30 INJECTION SUBCUTANEOUS at 08:24

## 2021-01-01 RX ADMIN — SODIUM CHLORIDE 1000 ML: 9 INJECTION, SOLUTION INTRAVENOUS at 08:23

## 2021-01-01 RX ADMIN — SODIUM CHLORIDE: 9 INJECTION, SOLUTION INTRAVENOUS at 16:17

## 2021-01-01 RX ADMIN — ACETYLCYSTEINE 400 MG: 200 SOLUTION ORAL; RESPIRATORY (INHALATION) at 10:26

## 2021-01-01 RX ADMIN — ALBUMIN (HUMAN) 25 G: 0.25 INJECTION, SOLUTION INTRAVENOUS at 00:29

## 2021-01-01 RX ADMIN — HYDROCORTISONE SODIUM SUCCINATE 100 MG: 100 INJECTION, POWDER, FOR SOLUTION INTRAMUSCULAR; INTRAVENOUS at 09:19

## 2021-01-01 RX ADMIN — SODIUM CHLORIDE, PRESERVATIVE FREE 10 ML: 5 INJECTION INTRAVENOUS at 08:24

## 2021-01-01 RX ADMIN — FUROSEMIDE 40 MG: 10 INJECTION, SOLUTION INTRAMUSCULAR; INTRAVENOUS at 11:46

## 2021-01-01 RX ADMIN — SODIUM CHLORIDE, PRESERVATIVE FREE 10 ML: 5 INJECTION INTRAVENOUS at 01:07

## 2021-01-01 RX ADMIN — CISATRACURIUM BESYLATE 2 MCG/KG/MIN: 10 INJECTION, SOLUTION INTRAVENOUS at 08:40

## 2021-01-01 RX ADMIN — PANTOPRAZOLE SODIUM 40 MG: 40 INJECTION, POWDER, FOR SOLUTION INTRAVENOUS at 09:19

## 2021-01-01 RX ADMIN — SODIUM CHLORIDE, PRESERVATIVE FREE 10 ML: 5 INJECTION INTRAVENOUS at 09:29

## 2021-01-01 RX ADMIN — HYDROCORTISONE SODIUM SUCCINATE 100 MG: 100 INJECTION, POWDER, FOR SOLUTION INTRAMUSCULAR; INTRAVENOUS at 08:39

## 2021-01-01 RX ADMIN — PHENYLEPHRINE HYDROCHLORIDE 270 MCG/MIN: 10 INJECTION INTRAVENOUS at 00:03

## 2021-01-01 RX ADMIN — VECURONIUM BROMIDE 10 MG: 1 INJECTION, POWDER, LYOPHILIZED, FOR SOLUTION INTRAVENOUS at 08:05

## 2021-01-01 RX ADMIN — PHENYLEPHRINE HYDROCHLORIDE 250 MCG/MIN: 10 INJECTION INTRAVENOUS at 09:05

## 2021-01-01 RX ADMIN — ARFORMOTEROL TARTRATE 15 MCG: 15 SOLUTION RESPIRATORY (INHALATION) at 10:27

## 2021-01-01 RX ADMIN — FLUTICASONE PROPIONATE 1 SPRAY: 50 SPRAY, METERED NASAL at 08:39

## 2021-01-01 RX ADMIN — Medication 200 MCG/HR: at 09:22

## 2021-01-01 RX ADMIN — ARFORMOTEROL TARTRATE 15 MCG: 15 SOLUTION RESPIRATORY (INHALATION) at 08:22

## 2021-01-01 RX ADMIN — PHENYLEPHRINE HYDROCHLORIDE 240 MCG/MIN: 10 INJECTION INTRAVENOUS at 12:58

## 2021-01-01 RX ADMIN — PHENYLEPHRINE HYDROCHLORIDE 300 MCG/MIN: 10 INJECTION INTRAVENOUS at 06:01

## 2021-01-01 RX ADMIN — PHENYLEPHRINE HYDROCHLORIDE 300 MCG/MIN: 10 INJECTION INTRAVENOUS at 06:21

## 2021-01-01 RX ADMIN — Medication 10 ML: at 09:26

## 2021-01-01 RX ADMIN — Medication 200 MCG/HR: at 03:32

## 2021-01-01 RX ADMIN — Medication 5 MCG/MIN: at 09:22

## 2021-01-01 RX ADMIN — HYDROCORTISONE SODIUM SUCCINATE 100 MG: 100 INJECTION, POWDER, FOR SOLUTION INTRAMUSCULAR; INTRAVENOUS at 16:26

## 2021-01-01 RX ADMIN — IPRATROPIUM BROMIDE AND ALBUTEROL SULFATE 1 AMPULE: 2.5; .5 SOLUTION RESPIRATORY (INHALATION) at 21:59

## 2021-01-01 RX ADMIN — ACETYLCYSTEINE 400 MG: 200 SOLUTION ORAL; RESPIRATORY (INHALATION) at 08:22

## 2021-01-01 RX ADMIN — ENOXAPARIN SODIUM 30 MG: 30 INJECTION SUBCUTANEOUS at 09:19

## 2021-01-01 RX ADMIN — IPRATROPIUM BROMIDE AND ALBUTEROL SULFATE 1 AMPULE: 2.5; .5 SOLUTION RESPIRATORY (INHALATION) at 17:49

## 2021-01-01 RX ADMIN — CLOPIDOGREL 75 MG: 75 TABLET, FILM COATED ORAL at 08:24

## 2021-01-01 RX ADMIN — Medication 10 ML: at 08:24

## 2021-01-01 RX ADMIN — ASPIRIN 81 MG: 81 TABLET, CHEWABLE ORAL at 08:24

## 2021-01-01 RX ADMIN — PHENYLEPHRINE HYDROCHLORIDE 240 MCG/MIN: 10 INJECTION INTRAVENOUS at 16:26

## 2021-01-01 RX ADMIN — Medication 200 MCG/HR: at 17:57

## 2021-01-01 RX ADMIN — BUDESONIDE 500 MCG: 0.5 SUSPENSION RESPIRATORY (INHALATION) at 21:59

## 2021-01-01 RX ADMIN — Medication 10 ML: at 20:07

## 2021-01-01 RX ADMIN — HYDROCORTISONE SODIUM SUCCINATE 100 MG: 100 INJECTION, POWDER, FOR SOLUTION INTRAMUSCULAR; INTRAVENOUS at 00:12

## 2021-01-01 RX ADMIN — FUROSEMIDE 40 MG: 10 INJECTION INTRAMUSCULAR; INTRAVENOUS at 11:46

## 2021-01-01 RX ADMIN — CHLORHEXIDINE GLUCONATE 0.12% ORAL RINSE 15 ML: 1.2 LIQUID ORAL at 09:19

## 2021-01-01 RX ADMIN — CHLORHEXIDINE GLUCONATE 0.12% ORAL RINSE 15 ML: 1.2 LIQUID ORAL at 08:24

## 2021-01-01 RX ADMIN — Medication 200 MCG/HR: at 01:36

## 2021-01-01 RX ADMIN — CISATRACURIUM BESYLATE 2 MCG/KG/MIN: 10 INJECTION, SOLUTION INTRAVENOUS at 00:34

## 2021-01-01 RX ADMIN — ASPIRIN 81 MG: 81 TABLET, CHEWABLE ORAL at 09:19

## 2021-01-01 ASSESSMENT — PAIN SCALES - GENERAL
PAINLEVEL_OUTOF10: 0

## 2021-01-01 ASSESSMENT — PULMONARY FUNCTION TESTS
PIF_VALUE: 39
PIF_VALUE: 48
PIF_VALUE: 40
PIF_VALUE: 43
PIF_VALUE: 41
PIF_VALUE: 45
PIF_VALUE: 40
PIF_VALUE: 39
PIF_VALUE: 41
PIF_VALUE: 40
PIF_VALUE: 40
PIF_VALUE: 45
PIF_VALUE: 41
PIF_VALUE: 40
PIF_VALUE: 40
PIF_VALUE: 50
PIF_VALUE: 40

## 2021-01-01 NOTE — PROGRESS NOTES
acetylcysteine  2 mL Inhalation BID    chlorhexidine  15 mL Mouth/Throat BID    sodium chloride   Intravenous Q8H    Arformoterol Tartrate  15 mcg Nebulization BID    budesonide  500 mcg Nebulization BID    ipratropium-albuterol  1 ampule Inhalation Q4H While awake    sodium chloride flush  10 mL Intravenous 2 times per day    [Held by provider] amLODIPine  5 mg Oral Daily    clopidogrel  75 mg Oral Daily    aspirin  81 mg Oral Daily    atorvastatin  80 mg Oral Nightly    Vitamin D  1,000 Units Oral Daily    fluticasone  1 spray Nasal Daily    montelukast  10 mg Oral Nightly     PRN Meds: perflutren lipid microspheres, diphenhydrAMINE, sodium chloride flush, polyethylene glycol, acetaminophen **OR** acetaminophen, guaiFENesin, nitroGLYCERIN, senna    I/O    Intake/Output Summary (Last 24 hours) at 1/1/2021 1332  Last data filed at 1/1/2021 1200  Gross per 24 hour   Intake 5665 ml   Output 1723 ml   Net 3942 ml       Labs:   Recent Labs     12/31/20  0431 12/31/20  0431 12/31/20 2018 01/01/21  0459 01/01/21  1041   WBC 17.9*  --   --  15.4* 15.8*   HGB 11.8*   < > 9.9* 10.3* 10.0*   HCT 36.9*   < > 31.5* 32.8* 32.2*     --   --  318 306    < > = values in this interval not displayed. Recent Labs     12/31/20  1306 12/31/20 2018 01/01/21  0459    137 136   K 3.6 3.8 4.0    100 99   CO2 28 28 28   BUN 37* 32* 29*   CREATININE 1.0 0.8 0.8   CALCIUM 7.1* 7.8* 8.0*       Recent Labs     12/30/20  0505 12/31/20  0431 01/01/21  0459   PROT 5.7* 4.9* 5.2*   ALKPHOS 69 65 78   ALT 46* 34 18   AST 35 30 20   BILITOT 0.4 0.4 0.6       No results for input(s): INR in the last 72 hours.     Recent Labs     12/31/20  0431 12/31/20 2017 01/01/21  1041   TROPONINI 0.98* 0.66* 0.42*       Chronic labs:  Lab Results   Component Value Date    CHOL 179 03/11/2017    TRIG 97 03/11/2017    HDL 49 03/11/2017    LDLCALC 111 (H) 03/11/2017    TSH 1.411 04/25/2013    INR 1.8 12/26/2020    LABA1C 5.8 04/25/2013       Radiology:  Imaging studies reviewed today. ASSESSMENT:  Septic shock 2/2 pneumonia  Acute hypoxic respiratory failure  NSTEMI - type 2  HFpEF  PAD  HLD  HTN    PLAN:  Wean vent as able   Bolus as indicated; wean pressors as able   Appreciate cardiac recs; continue plavix, toprol, norvasc; on nitropaste  IV antibiotics, follow cultures   Diuresis  Unfortunately doing poorly  Continue supportive care    Diet: DIET TUBE FEED CONTINUOUS/CYCLIC NPO; 1.5 Calorie without Fiber; Orogastric; Continuous; 10; 50; 24  Diet Tube Feed Modular: Protein Modular  Code Status: Full Code  PT/OT Eval Status:   ordered  DVT Prophylaxis:   heparin  Recommended disposition at discharge:  pending medical progression     +++++++++++++++++++++++++++++++++++++++++++++++++  Jeramy Briggs MD   [facility].  +++++++++++++++++++++++++++++++++++++++++++++++++  NOTE: This report was transcribed using voice recognition software. Every effort was made to ensure accuracy; however, inadvertent computerized transcription errors may be present.

## 2021-01-01 NOTE — CONSULTS
Palliative Care Department  [phone]  Palliative Care Initial Consult  Provider [facility] [address]  [id number]  Hospital Day: 8    Referring Provider: Prabhu Ram MD  Palliative Medicine was consulted for assistance with: Goals of care, CODE STATUS, family support, and symptom management    CHIEF COMPLAINT: Shortness of breath    ASSESSMENT/PLAN:     Pertinent hospital diagnoses:     1. Viral Pneumonia, COVID-19  -  ID following  -  S/p remdesivir, steroids, vitamin C and zinc  -  Treatment for secondary bacterial infection, Ivanz    2. Acute hypoxic respiratory failure  - Treatment per ICU team/primary service    3. NSTEMI  -  Cardiology consulted  -  medical ma       PALLIATIVE CARE ENCOUNTER  -  Capacity: At this time, Brii Lon Sr., Does Not have capacity for medical decision-making. Capacity is time limited and situation/question specific  - Surrogate decision maker/Legal NOK:    MATA Bertrand [phone]   Spouse  Rashaad Driver [phone] (step-mother)  - Outcome of goals of care meeting: change code status  - Code Status:   DNR-CCA  - Advanced directives:   - Spiritual assessment: none identified  - Bereavement and grief: no issues identified currently    - DISPO: treatment respiratory failure    Referrals to: none today    HPI:   [address] Vaughn Leahy is a 69 y.o. with a past medical history of coronary artery disease status post stent, atrial fibrillation not anticoagulated, CHF, COPD, chronic respiratory failure on 3 L at home, hypertension, peripheral vascular disease, left subclavian artery stenosis, alcohol dependence, hypertension, hyperlipidemia, peripheral vascular disease with claudication, carotid artery stenosis who presented to the emergency department from home with worsening shortness of breath. This is the patient's third hospitalization recently after being diagnosed with COVID-19 11/30/2020.   He was admitted 12/1 through 12/6 treated with remdesivir, steroids, vitamin C and zinc.  He additionally had acute kidney injury. He returned due to hypoxia and was admitted 12/10 through 12/15/2020. He again was provided supplemental oxygen and treated for secondary bacterial infection with antibiotics. Patient completed workup in the ED and was admitted on 12/25/2020 with diagnosis(ses) of acute on chronic respiratory failure with hypoxia, acute pulmonary edema, acute echocardiogram changes, elevated troponin, supplemental oxygen dependent, hypotension, respiratory distress and acute chest pain. He required ICU admission. He was found in atrial fibrillation with diffuse lateral ST depression. He received fluid bolus improving his blood pressure and was placed on BiPAP. Consultation services following include cardiology, pulmonology, infectious disease. Patient required emergent intubation 12/28/2020. He is receiving antibiotics, Invanz. He was diagnosed with an NSTEMI currently being medically managed. He is being treated for septic shock likely secondary to Covid pneumonia and Moraxella pneumonia. He is receiving anticoagulation, aspirin, Plavix and Lovenox. Patient continues to be intubated, ventilator day #4 and sedated with fentanyl and Versed and paralyzed in prone position. He is requiring pressor support. Discussion held over telephone due to current visitor restriction secondary to Sherill Hitch pandemic  Details of Conversation: Palliative medicine services introduced to the patient's son, Valerie Lipscomb over the phone. Valerie Lipscomb reports that patient's wife Guillermina Horvath is his stepmother. Guillermina Horvath has identified that he is the healthcare power of . Valerie Lipscomb reports he remembers completing power of  forms however he is uncertain where they are. Patient's medical condition was reviewed.   CODE STATUS levels were discussed and Valerie Lipscomb wish to change his father to a DNR-CCA with the understanding that if he suffered a cardiac event in light of his current comorbidities it would likely be catastrophic and prevent any meaningful recovery. He stated his father was an active man and would be interested in maintaining a quality of life. Support was provided through active listening, life review and exploration of needs for the family and patient. He stated his stepmother wishes to see his father and I discussed the availability of Zoom calls however patient is currently proned and it may be difficult for her to see him this way. She has had bedside RNs hold the phone to the patient's ear so she could speak with him. He was agreeable to waiting until the patient is in a supine position to arrange a Zoom call. He did ask about the prognosis and length of time in which his father can stay at this critical condition and I advised I was unable to provide an accurate answer. I suggested he speak with the critical care physicians. He stated he wanted to give him probably a week to see how he did and then make further decisions.     Past Medical History:   Diagnosis Date    (HFpEF) heart failure with preserved ejection fraction ([address] 07/22/2020 7/18/2020- echo- LVEF 60%    [facility]     Anxiety     Arthritis     Arthritis     hands, back     Atherosclerosis 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 ([address] 7/18/2019    Bilateral carotid artery stenosis 7/18/2019    CAD (coronary artery disease)     Carotid artery stenosis     Carotid bruit 6/5/2013    Carotid stenosis, right 6/5/2013    CHF (congestive heart failure) (HCC)     COPD (chronic obstructive pulmonary disease) ([address]     Headache(784.0)     Hyperlipidemia     Hypertension     PVD (peripheral vascular disease) ([address] 4/26/2013    PVD (peripheral vascular disease) with claudication ([address] 6/5/2013    STEMI (ST elevation myocardial infarction) ([address] 10/7/2012    Subclavian artery stenosis, left ([address] 4/26/2013    Traumatic retroperitoneal hematoma 4/26/2013       Past Surgical

## 2021-01-01 NOTE — PLAN OF CARE
Problem: Falls - Risk of:  Goal: Will remain free from falls  Description: Will remain free from falls  Outcome: Met This Shift     Problem: Falls - Risk of:  Goal: Absence of physical injury  Description: Absence of physical injury  Outcome: Met This Shift     Problem: Breathing Pattern - Ineffective  Goal: Ability to achieve and maintain a regular respiratory rate  Outcome: Met This Shift     Problem: Nutrition Deficits  Goal: Optimize nutrtional status  Outcome: Met This Shift     Problem: Skin Integrity:  Goal: Will show no infection signs and symptoms  Description: Will show no infection signs and symptoms  Outcome: Met This Shift     Problem: Skin Integrity:  Goal: Absence of new skin breakdown  Description: Absence of new skin breakdown  Outcome: Met This Shift     Problem: Pain:  Goal: Control of acute pain  Description: Control of acute pain  Outcome: Met This Shift     Problem: Risk for Fluid Volume Deficit  Goal: Maintain normal heart rhythm  Outcome: Not Met This Shift

## 2021-01-01 NOTE — PROGRESS NOTES
20.3 01/01/2021    BE -6.2 01/01/2021    THB 8.8 01/01/2021    O2SAT 89.8 01/01/2021        Medications     Infusions: (Fluid, Sedation, Vasopressors)  Sedation   Fentanyl 200, Versed 1  · Nimbex     Nutrition:   Tube feeds  ATB:   Antibiotics  Days   Zosyn 4 (d/c)   Invanz 1         Patient currently has   Urinary cath  Isolation  Restraints  DVT prophylaxis/ GI prophylaxis,    Labs     CBC:   Lab Results   Component Value Date    WBC 15.4 01/01/2021    RBC 3.47 01/01/2021    HGB 10.3 01/01/2021    HCT 32.8 01/01/2021    MCV 94.5 01/01/2021    MCH 29.7 01/01/2021    MCHC 31.4 01/01/2021    RDW 14.6 01/01/2021     01/01/2021    MPV 10.4 01/01/2021     BMP:    Lab Results   Component Value Date     01/01/2021    K 4.0 01/01/2021    CL 99 01/01/2021    CO2 28 01/01/2021    BUN 29 01/01/2021    LABALBU 3.1 01/01/2021    CREATININE 0.8 01/01/2021    CALCIUM 8.0 01/01/2021    GFRAA >60 01/01/2021    LABGLOM >60 01/01/2021    GLUCOSE 152 01/01/2021       Imaging Studies:  12/31/20      CXR 1/1/21      Resident's Assessment and Plan   Juvencio Maldonado is a 69 y.o. male with PMHx significant for CAD s/p multiple cardiac catheterizations s/p multiple stents to the RCA, extensive multifocal plaque and stenosis of LAD and left circumflex, paroxysmal A. fib not on oral anticoagulation, HFpEF, COPD stage IV on 4 L nasal cannula at home, PVD, left subclavian arterial stenosis, tobacco abuse, who presented with worsening shortness of breath and chest pain for 2 to 3 days. Covid positive. Assessment:  1. Acute hypoxic respiratory failure likely 2/2 Covid PNA plus superimposed Moraxella pneumonia  2. Septic shock likely 2/2 Covid PNA and Moraxella above-improving  3. NSTEMI   · Cardiology following, not a candidate for intervention at this time. Continue medical management  4. History of CAD, s/p PCI with multiple stents.  of RCA moderate mid LAD disease and severe mid circumflex disease. 5. History of A. Fib  6. History of HFpEF 60%  7. History of HTN  8. History of COPD on chronic home oxygen-4 L by NC        Plan:    · Continue mechanical ventilation, sedation. S/P 10 mg vec, started nimbex gtt, continue proning for 16 hrs and repeat ABG  · S/P 1 L NS, weaned off levo and weaning off vaso with MAP goal >65. Continue Hydrocort 100 mg every 8 hourly  · ID consult, following, recommendations greatly appreciated. ATB switched to [facility]  · Cardiology following, recommendations greatly appreciated. Continue ASA, Plavix and Lovenox 30 mg twice daily  · Continue breathing treatments  · Continue to hold all antihypertensive meds  · CBC, CMP, ABG, CXR daily  · Consult to Palliative      DVT/GI Prophylaxis: Lovenox/Protonix  Disposition: MICU    Jr Neville M.D. Internal Medicine Resident, PGY 3    Attending Physician: Dr. Silverio Gentile personally saw, examined and provided care for the patient. Radiographs, labs and medication list were reviewed by me independently. I spoke with bedside nursing, therapists and consultants. Critical care services and times documented are independent of procedures and multidisciplinary rounds with Residents. Additionally comprehensive, multidisciplinary rounds were conducted with the MICU team. The case was discussed in detail and plans for care were established. Review of Residents documentation was conducted and revisions were made as appropriate. I agree with the above documented exam, problem list and plan of care. Septic shock   ARDS/COvID  HFpEF  Shock   NSTEMI  moraxella in sputum on Invanbs  + 6 L ,goal discussed not be any more than 6 .lasix small doses to keep I&OS under control   Poor prognosis   palliative care    Care reviewed with nursing staff, medical and surgical specialty care, primary care and the patient's family as available.      Chart review/lab review/X-ray viewing/documentation and had long Conversation with patient/family re: prognosis, care options and any end of life issues:      Critical care time spent reviewing labs/films, examining patient, collaborating with other physicians more than 35  Minutes  excluding procedures . Katya Espinoza M.D.   1/1/2021  8:33 PM

## 2021-01-01 NOTE — PROGRESS NOTES
[address] [place] [address] [facility]  Progress Note      Chief Complaint   Patient presents with    Shortness of Breath     pt discharged on the 15th from hospital. pt reports increased sob since discharge. pt was 78% on room air upon EMS arrival       SUBJECTIVE:  Patient is tolerating medications. No reported adverse drug reactions. No nausea, vomiting, diarrhea. Afebrile  Related to the 100% FiO2  Prone position  Paralyzed and sedated with Versed and fentanyl  Dominga-Synephrine at 240 mics        Review of systems:  As stated above in the chief complaint, otherwise negative.     Medications:  Scheduled Meds:   hydrocortisone sodium succinate PF  100 mg Intravenous Q8H    ertapenem (INVanz) IVPB  1 g Intravenous Q24H    midazolam  4 mg Intravenous Once    [Held by provider] metoprolol tartrate  50 mg Oral BID    influenza virus vaccine  0.5 mL Intramuscular Prior to discharge    pantoprazole  40 mg Intravenous Daily    And    sodium chloride (PF)  10 mL Intravenous Daily    enoxaparin  30 mg Subcutaneous BID    [Held by provider] bumetanide  2 mg Intravenous Daily    acetylcysteine  2 mL Inhalation BID    chlorhexidine  15 mL Mouth/Throat BID    sodium chloride   Intravenous Q8H    Arformoterol Tartrate  15 mcg Nebulization BID    budesonide  500 mcg Nebulization BID    ipratropium-albuterol  1 ampule Inhalation Q4H While awake    sodium chloride flush  10 mL Intravenous 2 times per day    [Held by provider] amLODIPine  5 mg Oral Daily    clopidogrel  75 mg Oral Daily    aspirin  81 mg Oral Daily    atorvastatin  80 mg Oral Nightly    Vitamin D  1,000 Units Oral Daily    fluticasone  1 spray Nasal Daily    montelukast  10 mg Oral Nightly     Continuous Infusions:   cisatracurium (NIMBEX) infusion 2 mcg/kg/min (01/01/21 0840)    phenylephrine (DOMINGA-SYNEPHRINE) 50mg/250mL infusion 240 mcg/min (01/01/21 0919)    midazolam 2 mg/hr (01/01/21 0920)    norepinephrine Stopped (21 0604)    fentaNYL 5 mcg/ml in 0.9%  ml infusion 200 mcg/hr (21 1138)     PRN Meds:perflutren lipid microspheres, diphenhydrAMINE, sodium chloride flush, polyethylene glycol, acetaminophen **OR** acetaminophen, guaiFENesin, nitroGLYCERIN, senna    OBJECTIVE:  /66   Pulse 89   Temp 98.1 °F (36.7 °C)   Resp 28   Ht 5' 10\" (1.778 m)   Wt 191 lb 4.8 oz (86.8 kg)   SpO2 (!) 79%   BMI 27.45 kg/m²   Temp  Av.2 °F (36.2 °C)  Min: 96.4 °F (35.8 °C)  Max: 98.1 °F (36.7 °C)  Constitutional: The patient is awake, alert, and oriented. Skin: Warm and dry. No rashes were noted. HEENT: Round and reactive pupils. Moist mucous membranes. No ulcerations or thrush. Neck: Supple to movements. Chest: No use of accessory muscles to breathe. Symmetrical expansion. No wheezing, crackles or rhonchi. Cardiovascular: S1 and S2 are rhythmic and regular. No murmurs appreciated. Abdomen: Positive bowel sounds to auscultation. Benign to palpation. No masses felt. No hepatosplenomegaly. Genitourinary: Choi  Extremities: No clubbing, no cyanosis, no edema.   Lines: peripheral  2020 left radial art line  2020 double-lumen PICC right brachial  Laboratory and Tests Review:  Lab Results   Component Value Date    WBC 15.8 (H) 2021    WBC 15.4 (H) 2021    WBC 17.9 (H) 2020    HGB 10.0 (L) 2021    HCT 32.2 (L) 2021    MCV 94.7 2021     2021     Lab Results   Component Value Date    NEUTROABS 15.64 (H) 2021    NEUTROABS 13.86 (H) 2021    NEUTROABS 16.47 (H) 2020     No results found for: [facility]  Lab Results   Component Value Date    ALT 18 2021    AST 20 2021    ALKPHOS 78 2021    BILITOT 0.6 2021     Lab Results   Component Value Date     2021    K 4.0 2021    CL 99 2021    CO2 28 2021    BUN 29 2021    CREATININE 0.8 2021    CREATININE 0.8 2020 CREATININE 1.0 12/31/2020    GFRAA >60 01/01/2021    LABGLOM >60 01/01/2021    GLUCOSE 152 01/01/2021    PROT 5.2 01/01/2021    LABALBU 3.1 01/01/2021    CALCIUM 8.0 01/01/2021    BILITOT 0.6 01/01/2021    ALKPHOS 78 01/01/2021    AST 20 01/01/2021    ALT 18 01/01/2021     Lab Results   Component Value Date    CRP 7.2 (H) 01/01/2021    CRP 5.5 (H) 12/31/2020    CRP 4.8 (H) 12/30/2020     Lab Results   Component Value Date    SEDRATE 8 03/04/2013     Radiology:  Reviewed and changes seen from 12/10 to 12/26/2020 showed bilateral infiltrates likely bacterial pneumonia proposal and COPD    Microbiology:   Lab Results   Component Value Date    BC 24 Hours no growth 12/30/2020    BC 5 Days no growth 12/25/2020    BC 5 Days no growth 12/10/2020    ORG Moraxella catarrhalis 12/28/2020    ORG Enterococcus faecalis 11/30/2020    ORG Staphylococcus epidermidis 11/30/2020     Lab Results   Component Value Date    BLOODCULT2 24 Hours no growth 12/30/2020    BLOODCULT2 5 Days no growth 12/10/2020    BLOODCULT2 5 Days no growth 12/01/2020    ORG Moraxella catarrhalis 12/28/2020    ORG Enterococcus faecalis 11/30/2020    ORG Staphylococcus epidermidis 11/30/2020     No results found for: WNDABS  Smear, Respiratory   Date Value Ref Range Status   12/30/2020   Final    Group 6: <25 PMN's/LPF and <25 Epithelial cells/LPF  Few Polymorphonuclear leukocytes  Epithelial cells not seen  Few gram positive rods Diphtheroid like       No results found for: MPNEUMO, CLAMYDCU, LABLEGI, AFBCX, FUNGSM, LABFUNG  CULTURE, RESPIRATORY   Date Value Ref Range Status   12/30/2020 Oral Pharyngeal Randee present  Final     No results found for: CXCATHTIP  No results found for: BFCS  No results found for: CXSURG  Urine Culture, Routine   Date Value Ref Range Status   12/30/2020 Growth not present, incubation continues  Preliminary     No results found for: [facility]    ASSESSMENT:  · Respiratory failure associated with Moraxella catarrhalis

## 2021-01-02 NOTE — DEATH NOTES
Death Pronouncement Note  Patient's Name: Morro Aceves   Patient's YOB: 1950  MRN Number: [id number]    Admitting Provider: Teri Suggs DO  Attending Provider: Claudell Keens, MD    Patient was examined and the following were absent: Blood Pressure, respiratory effort and pulses    I declared the patient dead at 11:06 am  Preliminary Cause of Death: Cardiopulmonary arrest    Electronically signed by Michael Saldana MD on 1/2/21 at 11:25 AM EST  Attending: Dr. Jaja Ash

## 2021-01-02 NOTE — PLAN OF CARE
Discussed with son Keyon Sutherland and wife- Latoya Millard.  Decision made to withdraw care Will switch code-status to [facility]

## 2021-01-02 NOTE — PROGRESS NOTES
[address] [facility]  Department of Internal Medicine   Internal Medicine Residency   MICU Progress Note    Patient:  Cy Cancer. 69 y.o. male  MRN: [id number]     Date of Service: 1/2/2021    Allergy: Bee pollen and Penicillins  CC follow up resp failure   Subjective   Overnight events; patient with persistent hypoxia saturations in the 60s-70s. PF 50 this morning. Code status changed to DNR-CCA. Patient seen and examined today. Hospital day #7, vent day #5. Intubated and sedated, paralyzed and proned. Pt is 9 L positive, received 40 Lasix ytdy with 1.6 L diuresed  AC/VC: 450/30/100/16 PF 50%   Currently on levo and kendra, hydrocortisone 100 mg q 8 hrly  Troponins 0.52 today, seen by cardiology, not a candidate for cath or PCI. ID seen yesterday and started the pt on Invanz, wbc 19.6    Objective     VS: /83   Pulse 102   Temp 97.2 °F (36.2 °C) (Esophageal)   Resp (!) 35   Ht 5' 10\" (1.778 m)   Wt 202 lb 8 oz (91.9 kg)   SpO2 (!) 69%   BMI 29.06 kg/m²   ABP (Arterial line BP): 98/60  ABP mean (Arterial line mean): 77 mmHg    I & O - 24hr:     Intake/Output Summary (Last 24 hours) at 1/2/2021 1005  Last data filed at 1/2/2021 0542  Gross per 24 hour   Intake 4319 ml   Output 1490 ml   Net 2829 ml       Physical Exam:  · General Appearance: Intubated, sedated,proned  · Lung: diminished breath sounds bilaterally  · Heart: regular rate and rhythm, S1, S2 normal, no murmur, click, rub or gallop  · Extremities:  extremities normal, atraumatic, no cyanosis or edema  · Musculoskeletal: No joint swelling, no muscle tenderness. ROM normal in all joints of extremities.    · Neurologic: Mental status: Intubated, sedated, proned    Lines     site day    Art line   None    TLC None    PICC R Arm    Hemoaccess None      Mechanical Ventilation:   AC/VC 24/450/16/100%  ABG:     Lab Results   Component Value Date    PH 7.263 01/02/2021    BCK2PSB 50.1 12/27/2020    PCO2 63.6 01/02/2021    PO2ART 85.3 12/27/2020    PO2 42.0 01/02/2021    IVO8YMB 31.5 12/27/2020    HCO3 28.1 01/02/2021    BE -0.1 01/02/2021    THB 12.3 01/02/2021    O2SAT 75.1 01/02/2021        Medications     Infusions: (Fluid, Sedation, Vasopressors)  Sedation   Fentanyl 200, Versed 1  · Nimbex     Nutrition:   Tube feeds  ATB:   Antibiotics  Days   Zosyn 4 (d/c)   Invanz 2         Patient currently has   Urinary cath  Isolation  Restraints  DVT prophylaxis/ GI prophylaxis,    Labs     CBC:   Lab Results   Component Value Date    WBC 19.6 01/02/2021    RBC 3.80 01/02/2021    HGB 11.1 01/02/2021    HCT 35.8 01/02/2021    MCV 94.2 01/02/2021    MCH 29.2 01/02/2021    MCHC 31.0 01/02/2021    RDW 14.8 01/02/2021     01/02/2021    MPV 10.7 01/02/2021     BMP:    Lab Results   Component Value Date     01/02/2021    K 4.0 01/02/2021    CL 97 01/02/2021    CO2 28 01/02/2021    BUN 33 01/02/2021    LABALBU 2.2 01/02/2021    CREATININE 0.8 01/02/2021    CALCIUM 8.2 01/02/2021    GFRAA >60 01/02/2021    LABGLOM >60 01/02/2021    GLUCOSE 187 01/02/2021       Imaging Studies:    CXR 1/1/21      CXR 1/2/21    Resident's Assessment and Plan   Mouna Carly is a 69 y.o. male with PMHx significant for CAD s/p multiple cardiac catheterizations s/p multiple stents to the RCA, extensive multifocal plaque and stenosis of LAD and left circumflex, paroxysmal A. fib not on oral anticoagulation, HFpEF, COPD stage IV on 4 L nasal cannula at home, PVD, left subclavian arterial stenosis, tobacco abuse, who presented with worsening shortness of breath and chest pain for 2 to 3 days. Covid positive. Assessment:  1. Acute hypoxic respiratory failure likely 2/2 ARDS from Covid PNA plus superimposed Moraxella pneumonia  2. Septic shock likely 2/2 Covid PNA and Moraxella  3. NSTEMI   · Cardiology following, not a candidate for intervention at this time. Continue medical management  4. History of CAD, s/p PCI with multiple stents.    of RCA moderate mid LAD disease and severe mid circumflex disease. 5. History of A. Fib  6. History of HFpEF 60%  7. History of HTN  8. History of COPD on chronic home oxygen-4 L by NC        Plan:    · Discussions had with family on guarded prognosis, decision made to switch the patient to [facility] and withdraw care  · Awaiting arrival of sonPam prior to terminal extubation    DVT/GI Prophylaxis: Lovenox/Protonix  Disposition: MICU  Code: DNR-CC    Gregorio Díaz M.D. Internal Medicine Resident, PGY 3    Attending Physician: Dr. Mennie Barthel personally saw, examined and provided care for the patient. Radiographs, labs and medication list were reviewed by me independently. I spoke with bedside nursing, therapists and consultants. Critical care services and times documented are independent of procedures and multidisciplinary rounds with Residents. Additionally comprehensive, multidisciplinary rounds were conducted with the MICU team. The case was discussed in detail and plans for care were established. Review of Residents documentation was conducted and revisions were made as appropriate. I agree with the above documented exam, problem list and plan of care.     patient has deteriorated  Family decide comfort measures  Luzma Richey MD,[facility]  Pulmonary&Critical Care Medicine   Director of [address] [place] [address] Director of [address] [place] [address]    Lazaro Rocha

## 2021-01-02 NOTE — DISCHARGE SUMMARY
Discharge Summary    Patient ID   Ching Cook   [birth year]  [id number]    Primary Care:   Ayan Hamilton DO    Admit date: 12/25/2020   Discharge date: 1/2/2021    Medical Record number: [id number]   Admitting Physician: Gary Winkler DO   Discharge Physician: Jeanmarie Farris MD    Consultants: pulmonary/intensive care    Procedures: none    Discharge Diagnoses:      Patient Active Problem List   Diagnosis Code    Arthritis M19.90    CAD (coronary artery disease) I25.10    Subclavian artery stenosis, left (HCC) I77.1    Carotid bruit R09.89    Carotid stenosis, right I65.21    PVD (peripheral vascular disease) with claudication ([facility]) I73.9    Chest pain in adult R07.9    Atrial fibrillation ([facility] [address] I48.91    Bilateral carotid artery stenosis I65.23    Atherosclerosis 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 (HCC) I70.229    Chronic obstructive pulmonary disease with acute exacerbation (HCC) J44.1    HTN (hypertension) I10    Chronic diastolic congestive heart failure (HCC) I50.32    COVID-19 U07.1    Acute respiratory failure due to severe acute respiratory syndrome coronavirus 2 (SARS-CoV-2) infection (HCC) U07.1, J96.00    Pneumonia due to COVID-19 virus U07.1, J12.82    Anemia D64.9    Respiratory distress R06.03    Acute on chronic respiratory failure with hypoxia (HCC) J96.21    Moderate protein-calorie malnutrition (HCC) E44.0     Due to the current efforts to prevent transmission of COVID-19 and also the need to preserve PPE for other caregivers, a face-to-face encounter with the patient was not performed. That being said, all relevant records and diagnostic tests were reviewed, including laboratory results and imaging. Please reference any relevant documentation elsewhere. Care will be coordinated with the primary service and other specialties as appropriate.     Hospital Course:    Mr. Giorgio Musa is a 59-year-old male presented to the emergency department due to shortness of breath to 3 days. Patient stated it was persistent in severity and worsened by nothing. Patient was diagnosed recently with Covid viral pneumonia discharged home he came back on 12/15/2020 with acute hypoxic respiratory failure. Patient states that he was doing well until the last 2 days when he had progressively worsening shortness of breath. Normally he is on 4 L of oxygen and followed by pulmonary due to his COPD. At this time patient was admitted he was 75 8% on air with oxygen. Patient was seen by critical care team while in hospital.  Patient and family expressed concern and their desire wishes by wife with patient to be extubated and withdrawal.  This was done and patient  on  at 1106. Physical Exam:   BP (!) 91/55   Pulse (!) 42   Temp 97.2 °F (36.2 °C) (Esophageal)   Resp 18   Ht 5' 10\" (1.778 m)   Wt 202 lb 8 oz (91.9 kg)   SpO2 (!) 82%   BMI 29.06 kg/m²   Neck: no JVD  Lungs: equal BS, clear   CV: RRR, normal S1S2, no significant murmur  Abdomen: soft, nontender, normally active BS, no masses or tenderness  Extremities: no edema or cords  Neurologic: alert, oriented, no focal CN or motor deficit        Medications: see computerized discharge medication list     Medication List      ASK your doctor about these medications    acetaminophen 500 MG tablet  Commonly known as: TYLENOL     amLODIPine 5 MG tablet  Commonly known as: NORVASC     ascorbic acid 500 MG tablet  Commonly known as: VITAMIN C  Take 1 tablet by mouth daily     aspirin 81 MG chewable tablet  Take 1 tablet by mouth daily.      atorvastatin 80 MG tablet  Commonly known as: LIPITOR  Take 1 tablet by mouth daily     cilostazol 100 MG tablet  Commonly known as: PLETAL  Take 1 tablet by mouth 2 times daily     dexamethasone 4 MG tablet  Commonly known as: DECADRON  1 tab twice a day for 3 days then 1 tab daily x3 days then stop     enoxaparin 30 MG/0.3ML injection  Commonly known as: LOVENOX  Inject 0.3 mLs into the skin 2 times daily     fluticasone 50 MCG/ACT nasal spray  Commonly known as: FLONASE     ipratropium-albuterol 0.5-2.5 (3) MG/3ML Soln nebulizer solution  Commonly known as: DUONEB  Inhale 3 mLs into the lungs every 6 hours as needed for Shortness of Breath     isosorbide mononitrate 60 MG extended release tablet  Commonly known as: IMDUR  Take 1 tablet by mouth daily     metoprolol succinate 25 MG extended release tablet  Commonly known as: TOPROL XL  Take 3 tablets by mouth 2 times daily     montelukast 10 MG tablet  Commonly known as: SINGULAIR     nitroGLYCERIN 0.4 MG SL tablet  Commonly known as: NITROSTAT  Place 1 tablet under the tongue every 5 minutes as needed for Chest pain     ranolazine 500 MG extended release tablet  Commonly known as: RANEXA  Take 1 tablet by mouth 2 times daily     tamsulosin 0.4 MG capsule  Commonly known as: FLOMAX     Trelegy Ellipta 100-62.5-25 MCG/INH Aepb  Generic drug: fluticasone-umeclidin-vilant     Vitamin D3 25 MCG Tabs  Take 1 tablet by mouth daily     zinc sulfate 220 (50 Zn) MG capsule  Commonly known as: ZINCATE  Take 1 capsule by mouth daily          Patient Instructions: Patient has       Discharged Condition: Patient has   Disposition: Patient has   Activity: Patient has   Diet: none  Wound Care: none needed    Follow-up: Patient has         Electronically signed by Bernadette Helton MD on 2021 at 1:48 PM  [facility] Hospitalist   Time spent on discharge  minutes

## 2021-01-02 NOTE — FLOWSHEET NOTE
In to assess patient for shift, pulse ox positional, repositioned on finger. Sats 65%, resident notified. Resident at bedside, Ordered to give 40 mg lasix, ABG drawn. Also patient hooked up to BIS at this time.

## 2021-01-04 LAB
BLOOD CULTURE, ROUTINE: NORMAL
CULTURE, BLOOD 2: NORMAL

## 2021-01-06 NOTE — PROGRESS NOTES
Physician Progress Note      PATIENT:               Tom Burnette  CSN #:                  [id number]  :                       1950  ADMIT DATE:       2020 6:18 PM  [address] DATE:        2021 3:10 PM  RESPONDING  PROVIDER #:        Dariela Quiros MD          QUERY TEXT:    Pt admitted with COVID 19 and acute respiratory failure. Noted documentation   of septic shock on  by ordered ICU consultant. If possible, please   document in progress notes and discharge summary:    The medical record reflects the following:  Risk Factors: COVID 19  Clinical Indicators: Labs on admission WBC 7.2, CRP 7.0; VS on admission 98.2,   110, 17, 80/52, 95% NRB; Per ID consult currently on Levaquin but because of   his septic presentation also on pressor support; per ICU progress notes    Septic shock  Treatment: ID Consult, BiPAP followed by mechanical ventilation, IV   antibiotics, Monitoring in the ICU and ultimately withdraw of care per family   wishes    Thank you  Shayne LESTERN, RN, CCDS  Clinical Documentation Improvement  Options provided:  -- Sepsis confirmed present on admission  -- Sepsis confirmed not present on admission  -- Other - I will add my own diagnosis  -- Disagree - Not applicable / Not valid  -- Disagree - Clinically unable to determine / Unknown  -- Refer to Clinical Documentation Reviewer    PROVIDER RESPONSE TEXT:    The diagnosis of Sepsis was confirmed as present on admission.     Query created by: Mary Feng on 2/3/9106 3:07 AM      Electronically signed by:  Dariela Quiros MD 2021 8:17 AM

## 2021-06-04 NOTE — PROGRESS NOTES
[facility] Cardiology Inpatient Progress Note    Patient is a 78 y.o. male of Mayuri Rojo MD seen in hospital follow up. Chief complaint: Chest pain    HPI: 78-year-old ex-smoker male who is seen in consultation due to CHF. He has history of CAD, status post multiple PCI to his RCA in 1998 with last heart catheterization in January 2017 when patient was not felt to be candidate for coronary vascularization, peripheral arterial disease of the carotid and lower extremities, hypertension, mild aortic stenosis, hyperlipidemia and COPD oxygen dependent. Some CP overnight. No SOB. Medical History:  1. Cardiac cath and stents to RCA, 1998.    2. Cardiac cath with balloon angioplasty proximal RCA unsuccessful, 1999.  3. Cardiac cath with PTCA cutting balloon angioplasty to ostium of PDA branch of RCA, 2002. 4. Multiple percutaneous coronary artery interventions (possibly up to four stents RCA), 2007. 5. Cardiac cath, 10/25/2008. RCA treated with 2.75 x 12 mm Vision stent. 6. Hypertension. 7. Hyperlipidemia. 8. GERD. 9. COPD. And wears oxygen at night  10. No history of diabetes mellitus. 11. Allergy to penicillin. 12. Cigarette abuse. 37.5 pack years. Quit 01/20/2013. 13. Echo, 2007. Mild aortic stenosis. 14. Carotid US, 2007. Mild carotid stenosis. 15. Presentation with inferior STEMI. Coronary stenting/PCI, 10/07/2012 (Dr. Ml Webster). ION KILEY x 2 to RCA. 16. [facility] admission for \"angina\", 04/2013. Catheterization/PCI to mid RCA and proximal VLV. Post catheterization right groin hematoma/retroperitoneal hematoma with hypotension and tachycardia. NSTEMI secondary to hypotension. 17. Echo, 04/2013. Suboptimal.    18. Coronary angioplasty with stent mid RCA 3.5 x 38 and 2.5 x 15 RPL, 04/23/2013. 19. History of noncompliance with medications. 20. Lexiscan MPS, 08/2014. Large fixed inferior defect. Moderate lateral reversible defect.    EF normal.    21. Cardiac Renal which is low in protein, phosphorus, and sodium.   catheterization, 09/25/2014. Balloon angioplasty of mid and distal RCA and right PLV. No stents ([facility]). 22. OP evaluation, Dr. Moise Amor, 10/2014. Palpitations. Holter monitor showed frequent PVCs. 23. OP evaluation, Dr. Ivis Reynoso, 01/14/2015. 24. Echo, 03/31/2015. LVEF normal.  Moderate CLVH. Small pericardial effusion. AV not well demonstrated. Maximal gradient 8.4.    25. Cardiac CTA, [facility], 04/02/2015. Extensive stenting of RCA, cannot assess lumen of vessel. Extensive multifocal plaque and stenosis of LAD and left CX.    26. Lexiscan/exercise MPS 04/23/2015. EF 54%. Fixed inferior and lateral defects. Small reversible defect. No significant WMA. Low-to-intermediate risk. 27. Echo, 05/10/2016. Normal EF. LA dilatation (index 49). Mild calcific aortic stenosis. Peak velocity 2.2. Mean gradient 9. MUKUL by continuity equation 1.5 and by telemetry 1.6 cm² consistent with mild stenosis. 28. Admitted with chest pain March 11, 2017, no acute coronary syndrome, symptoms consistent with unstable angina  29. Cardiac catheterization March 13, 2017 Left main: 0% stenosisLAD: 50 % stenosisCircumflex: 80%stenosisAfter OM1RCA: Dominant. 70 % mid vessel in stent stenosis. 99% ostial stenosis of RCA-PL branch ( in stent ) and 100% stenosis of RCA-PDA  LV angio: 50-55% EF.  30. Lexiscan stress test August 28, 2019 intermediate risk and EF was abnormal at 47% with hypokinesis of the inferior lateral wall and a moderate sized fixed defect in the inferior lateral wall suggestive of a prior MI but no reversible ischemia  31. [employer] [facility]  the lower extremity with claudication pain chronically   32.  Subclavian artery stenosis on the left in 2013 carotid stenosis      Patient Active Problem List   Diagnosis    Arthritis    CAD (coronary artery disease)    Subclavian artery stenosis, left (HCC)    Carotid bruit    Carotid stenosis, right    PVD (peripheral vascular disease) with claudication ([facility] [address]    injection 10 mL  10 mL Intravenous PRN Jack Myersovec, DO        enoxaparin (LOVENOX) injection 40 mg  40 mg Subcutaneous Daily Jack WONG Popovec, DO   40 mg at 07/19/20 0835    acetaminophen (TYLENOL) tablet 1,000 mg  1,000 mg Oral BID PRN Jack Myersovec, DO   1,000 mg at 07/17/20 1642    amLODIPine (NORVASC) tablet 5 mg  5 mg Oral BID Jack WONG Popovec, DO   5 mg at 07/19/20 0834    aspirin chewable tablet 81 mg  81 mg Oral Daily Jack P Popovec, DO   81 mg at 07/19/20 0834    atorvastatin (LIPITOR) tablet 80 mg  80 mg Oral Daily Jack WONG Popovec, DO   80 mg at 07/19/20 0834    cilostazol (PLETAL) tablet 100 mg  100 mg Oral BID Jack Myersovec, DO   100 mg at 07/19/20 0834    fluticasone (FLONASE) 50 MCG/ACT nasal spray 1 spray  1 spray Nasal Daily Jack Myersovec, DO   1 spray at 07/19/20 0840    lisinopril (PRINIVIL;ZESTRIL) tablet 20 mg  20 mg Oral BID Jack WONG Popovec, DO   20 mg at 07/19/20 0834    montelukast (SINGULAIR) tablet 10 mg  10 mg Oral Nightly Jack WONG Popovec, DO   10 mg at 07/18/20 2145    tamsulosin (FLOMAX) capsule 0.4 mg  0.4 mg Oral Daily Jack WONG Popovec, DO   0.4 mg at 07/19/20 0834    sodium chloride flush 0.9 % injection 10 mL  10 mL Intravenous 2 times per day Jack WONG Popovec, DO   10 mL at 07/19/20 0835    sodium chloride flush 0.9 % injection 10 mL  10 mL Intravenous PRN Jack Myersovec, DO   10 mL at 07/18/20 0336    polyethylene glycol (GLYCOLAX) packet 17 g  17 g Oral Daily PRN Jack Dingc, DO        promethazine (PHENERGAN) tablet 12.5 mg  12.5 mg Oral Q6H PRN Jack Dingc, DO        Or    ondansetron (ZOFRAN) injection 4 mg  4 mg Intravenous Q6H PRN Jack Myersovec, DO        guaiFENesin-dextromethorphan (ROBITUSSIN DM) 100-10 MG/5ML syrup 10 mL  10 mL Oral 4x Daily Jack Payne DO   10 mL at 07/19/20 0835    budesonide (PULMICORT) nebulizer suspension 500 mcg  0.5 mg Nebulization BID Itz Leos MD   500 mcg at 07/18/20 1935    Arformoterol Tartrate (BROVANA) nebulizer solution 15 mcg  15 mcg Nebulization BID Mena Back MD   15 mcg at 07/18/20 1935    nitroGLYCERIN (NITROSTAT) SL tablet 0.4 mg  0.4 mg Sublingual Q5 Min PRN Jack Payne DO   0.4 mg at 07/18/20 1221       Review of systems:   Heart: as above   Lungs: as above   Eyes: denies changes in vision or discharge. Ears: denies changes in hearing or pain. Nose: denies epistaxis or masses   Throat: denies sore throat or trouble swallowing. Neuro: denies numbness, tingling, tremors. Skin: denies rashes or itching. : denies hematuria, dysuria   GI: denies vomiting, diarrhea   Psych: denies mood changed, anxiety, depression. Physical Exam   BP (!) 144/66   Pulse 67   Temp 96.9 °F (36.1 °C) (Temporal)   Resp 16   Ht 5' 10\" (1.778 m)   Wt 179 lb 12.8 oz (81.6 kg)   SpO2 98%   BMI 25.80 kg/m²   Constitutional: Oriented to person, place, and time. No distress. Well developed. Head: Normocephalic and atraumatic. Neck: Neck supple. No hepatojugular reflux. No JVD present. Carotid bruit is not present. No tracheal deviation present. No thyromegaly present. Cardiovascular: Normal rate, regular rhythm, normal heart sounds. intact distal pulses. No gallop and no friction rub. No murmur heard. Pulmonary: Breath sounds normal. No respiratory distress. No wheezes. No rales. Chest: Effort normal. No tenderness. Abdominal: Soft. Bowel sounds are normal. No distension or mass. No tenderness, rebound or guarding. Musculoskeletal: . No tenderness. No clubbing or cyanosis. Extremitites: Intact distal pulses. No edema  Neurological: Alert and oriented to person, place, and time. Skin: Skin is warm and dry. No rash noted. Not diaphoretic. No erythema. Psychiatric: Normal mood and affect. Behavior is normal.   Lymphadenopathy: No cervical adenopathy. No groin adenopathy.       CBC:   Lab Results   Component Value Date    WBC 13.6 07/17/2020    RBC 3.50 07/17/2020    HGB 10.7 07/17/2020    HCT 34.2 07/17/2020    MCV 97.7 07/17/2020    MCH 30.6 07/17/2020    MCHC 31.3 07/17/2020    RDW 13.7 07/17/2020     07/17/2020    MPV 9.9 07/17/2020     BMP:   Lab Results   Component Value Date     07/16/2020    K 4.9 07/16/2020     07/16/2020    CO2 27 07/16/2020    BUN 16 07/16/2020    LABALBU 3.9 12/19/2018    CREATININE 0.8 07/16/2020    CALCIUM 9.0 07/16/2020    GFRAA >60 07/16/2020    LABGLOM >60 07/16/2020     Magnesium:    Lab Results   Component Value Date    MG 2.0 09/26/2014     Cardiac Enzymes:   Lab Results   Component Value Date    CKTOTAL 47 07/19/2020    CKTOTAL 50 07/18/2020    CKTOTAL 50 07/18/2020    CKMB 3.0 07/19/2020    CKMB 3.4 07/18/2020    CKMB 3.8 07/18/2020    TROPONINI 0.11 (H) 07/17/2020    TROPONINI 0.07 (H) 07/17/2020    TROPONINI <0.01 07/16/2020      PT/INR:    Lab Results   Component Value Date    PROTIME 11.6 12/19/2018    INR 1.0 12/19/2018     TSH:    Lab Results   Component Value Date    TSH 1.411 04/25/2013     Rhythm Strip: normal sinus rhythm. ASSESSMENT & PLAN:    Patient Active Problem List   Diagnosis    Arthritis    CAD (coronary artery disease)    Subclavian artery stenosis, left (HCC)    Carotid bruit    Carotid stenosis, right    PVD (peripheral vascular disease) with claudication (HCC)    Chest pain in adult    Atrial fibrillation with RVR (HCC)    Chest pain    Bilateral carotid artery stenosis    Atherosclerosis 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 ([address]    Chronic obstructive pulmonary disease with acute exacerbation ([address]    Hypertension    Acute on chronic congestive heart failure ([address]     1. Chest pain/CAD:     Progressive exertional chest pain symptoms. Troponin bumped. Titrate medications. Cath Monday. AUC 8/4. ASA/statin/BB/imdur/ranexa/lovenox x 1.     2. Chronic diastolic CHF due to moderate elevated hypertension and myocardial ischemia    3. Hypertension: Observe.     4. COPD with questionable

## 2021-09-13 NOTE — CARE COORDINATION
SOCIAL WORK/DISCHARGE PLANNING;  Pt is a readmission. He was discharged home on 12-6-20 During that stay pt treated with remdesivir. He returned home on oxygen-4L. Pt currently at baseline of 4L. His 02 is supplied by [employer]. Spoke with pt via phone. He resides with his wife in a one floor plan home. There are 2 steps to enter. Pt did not have any [place] [address] services and he is not sure he will need any at discharge. Pt states he has two large jobs andthey aren't used to strangers. Per pt, plan will be to return home with his wife. His pcp is Alvaro Sebastian and pharmacy is [facility] on [address] [place] [address].   [facility]  [phone]
SOCIAL WORK/DISCHARGE PLANNING;  Unable to reach pt in room. Spoke with RN, pt up in room, he is doing well with pt/ot. Ampac scores of 20&22. Pt is at baseline for home 02-4L. Pt does not want [facility] as he has two large dogs at home. Anticipate discharge home today.     Rebekah Lackey LSW  [phone]
Simple: Patient demonstrates quick and easy understanding

## 2025-01-31 NOTE — PROGRESS NOTES
murmur, click, rub or gallop  Abdomen: soft, non-tender; bowel sounds normal; no masses,  no organomegaly  Extremities: extremities normal, atraumatic, no cyanosis or edema  Neurologic: Mental status: Alert, oriented, thought content appropriate    Data    CBC:   Recent Labs     12/14/20  0550   WBC 13.6*   HGB 10.2*   HCT 30.3*   MCV 90.2          BMP:  Recent Labs     12/14/20  0550      K 4.8      CO2 27   BUN 22   CREATININE 0.7    ALB:3,BILIDIR:3,BILITOT:3,ALKPHOS:3)@    PT/INR: No results for input(s): PROTIME, INR in the last 72 hours. ABG:   No results for input(s): PH, PO2, PCO2, HCO3, BE, O2SAT, METHB, O2HB, COHB, O2CON, HHB, THB in the last 72 hours. Radiology/Other tests reviewed: none    Assessment:     Active Problems:    CAD (coronary artery disease)    PVD (peripheral vascular disease) with claudication (HCC)    Atrial fibrillation (HCC)    Chronic obstructive pulmonary disease with acute exacerbation (HCC)    HTN (hypertension)    Acute respiratory failure due to severe acute respiratory syndrome coronavirus 2 (SARS-CoV-2) infection ([facility] [address]    Pneumonia due to COVID-19 virus    Anemia  Resolved Problems:    * No resolved hospital problems. *      Plan:       1. Cont with decadron bid, will switch over to po prior to discharge  2. remdesivir being repeated, observe for now if cont full course or stop earlier  3. Cont with oxygen, will see if any need to increase supplementation with ambulation, will check on this prior to discharge  4. Cont with antibiotic for now, not much pathology with CXR, blood cultures (-) so far, will send for sputum culture  5. Cont with MDI, incentive spirometer  6. OOB to chair, PT/OT      Time at the bedside, reviewing labs and radiographs, reviewing notes and consultations, discussing with staff and family was more than 35 minutes. Thanks for letting us see this patient in consultation. Please contact us with any questions.  Office () [phone] or after hours through [employer], x [phone]. Detail Level: Detailed